# Patient Record
Sex: FEMALE | Race: BLACK OR AFRICAN AMERICAN | Employment: FULL TIME | URBAN - METROPOLITAN AREA
[De-identification: names, ages, dates, MRNs, and addresses within clinical notes are randomized per-mention and may not be internally consistent; named-entity substitution may affect disease eponyms.]

---

## 2020-10-06 ENCOUNTER — TRANSCRIBE ORDER (OUTPATIENT)
Dept: SCHEDULING | Age: 55
End: 2020-10-06

## 2020-10-06 DIAGNOSIS — Z12.31 SCREENING MAMMOGRAM, ENCOUNTER FOR: Primary | ICD-10-CM

## 2020-10-16 ENCOUNTER — HOSPITAL ENCOUNTER (OUTPATIENT)
Dept: MAMMOGRAPHY | Age: 55
Discharge: HOME | End: 2020-10-16
Payer: COMMERCIAL

## 2020-10-16 DIAGNOSIS — Z12.31 SCREENING MAMMOGRAM, ENCOUNTER FOR: ICD-10-CM

## 2020-10-16 PROCEDURE — 77067 SCR MAMMO BI INCL CAD: CPT

## 2020-11-16 ENCOUNTER — TRANSCRIBE ORDER (OUTPATIENT)
Dept: SCHEDULING | Age: 55
End: 2020-11-16

## 2020-11-16 DIAGNOSIS — I50.20 HFREF (HEART FAILURE WITH REDUCED EJECTION FRACTION) (HCC): Primary | ICD-10-CM

## 2020-11-25 ENCOUNTER — HOSPITAL ENCOUNTER (OUTPATIENT)
Dept: VASCULAR SURGERY | Age: 55
Discharge: HOME | End: 2020-11-25
Attending: NURSE PRACTITIONER
Payer: COMMERCIAL

## 2020-11-25 DIAGNOSIS — I50.20 HFREF (HEART FAILURE WITH REDUCED EJECTION FRACTION) (HCC): ICD-10-CM

## 2020-11-25 LAB
ECHO AO ROOT DIAM: 3.15 CM
ECHO AV AREA PEAK VELOCITY: 1.61 CM2
ECHO AV AREA VTI: 1.61 CM2
ECHO AV MEAN GRADIENT: 8.48 MMHG
ECHO AV MEAN VELOCITY: 133.64 CM/S
ECHO AV PEAK GRADIENT: 18.56 MMHG
ECHO AV PEAK VELOCITY: 215.38 CM/S
ECHO AV VTI: 38.54 CM
ECHO EST RA PRESSURE: 3 MMHG
ECHO LA VOL 2C: 29.26 ML (ref 22–52)
ECHO LA VOL 4C: 56.62 ML (ref 22–52)
ECHO LA VOL BP: 45.52 ML (ref 22–52)
ECHO LV EDV A2C: 176.62 ML
ECHO LV EDV A2C: 176.62 ML
ECHO LV EDV BP: 115.66 ML (ref 56–104)
ECHO LV EDV BP: 115.66 ML (ref 56–104)
ECHO LV EJECTION FRACTION A2C: 18 PERCENT
ECHO LV EJECTION FRACTION A4C: 26 PERCENT
ECHO LV EJECTION FRACTION A4C: 26 PERCENT
ECHO LV EJECTION FRACTION BIPLANE: 20 PERCENT (ref 55–100)
ECHO LV ESV A2C: 58 ML
ECHO LV ESV BP: 92.57 ML (ref 19–49)
ECHO LV ESV BP: 92.57 ML (ref 19–49)
ECHO LV INTERNAL DIMENSION DIASTOLIC: 5.95 CM (ref 3.9–5.3)
ECHO LV INTERNAL DIMENSION SYSTOLIC: 5.3 CM
ECHO LV IVSD: 1.12 CM (ref 0.6–0.9)
ECHO LV MASS 2D: 270.8 G (ref 67–162)
ECHO LV POSTERIOR WALL DIASTOLIC: 1.05 CM (ref 0.6–0.9)
ECHO LVOT DIAM: 2 CM
ECHO LVOT PEAK GRADIENT: 4.93 MMHG
ECHO LVOT PEAK VELOCITY: 110.96 CM/S
ECHO LVOT SV: 62.2 ML
ECHO LVOT SV: 62.2 ML
ECHO LVOT VTI: 19.76 CM
ECHO MV A VELOCITY: 130.02 CM/S
ECHO MV AREA PHT: 4.3 CM2
ECHO MV E DECELERATION TIME (DT): 176.52 MS
ECHO MV E VELOCITY: 97.96 CM/S
ECHO MV E/A RATIO: 0.75
ECHO MV PRESSURE HALF TIME (PHT): 51.19 MS
ECHO RA AREA 4C: 12 CM2
ECHO RIGHT VENTRICULAR SYSTOLIC PRESSURE (RVSP): 20 MMHG
ECHO RV INTERNAL DIMENSION: 2.07 CM
ECHO TV REGURGITANT MAX VELOCITY: 207.79 CM/S
ECHO TV REGURGITANT PEAK GRADIENT: 17.27 MMHG
LVOT MG: 2.82 MMHG

## 2020-11-25 PROCEDURE — 93306 TTE W/DOPPLER COMPLETE: CPT

## 2021-01-01 ENCOUNTER — TRANSCRIBE ORDER (OUTPATIENT)
Dept: REGISTRATION | Age: 56
End: 2021-01-01

## 2021-01-01 ENCOUNTER — HOSPITAL ENCOUNTER (INPATIENT)
Age: 56
LOS: 1 days | Discharge: HOME | DRG: 378 | End: 2021-10-08
Attending: EMERGENCY MEDICINE | Admitting: HOSPITALIST
Payer: COMMERCIAL

## 2021-01-01 ENCOUNTER — APPOINTMENT (OUTPATIENT)
Dept: GENERAL RADIOLOGY | Age: 56
End: 2021-01-01
Attending: EMERGENCY MEDICINE
Payer: COMMERCIAL

## 2021-01-01 ENCOUNTER — HOSPITAL ENCOUNTER (OUTPATIENT)
Dept: INFUSION THERAPY | Age: 56
Discharge: HOME | End: 2021-11-03
Payer: COMMERCIAL

## 2021-01-01 ENCOUNTER — HOSPITAL ENCOUNTER (OUTPATIENT)
Dept: MAMMOGRAPHY | Age: 56
Discharge: HOME | End: 2021-10-22
Payer: COMMERCIAL

## 2021-01-01 ENCOUNTER — HOSPITAL ENCOUNTER (OUTPATIENT)
Dept: INFUSION THERAPY | Age: 56
Discharge: HOME | End: 2021-12-23
Attending: INTERNAL MEDICINE | Admitting: INTERNAL MEDICINE
Payer: COMMERCIAL

## 2021-01-01 ENCOUNTER — APPOINTMENT (OUTPATIENT)
Dept: CT IMAGING | Age: 56
DRG: 378 | End: 2021-01-01
Attending: EMERGENCY MEDICINE
Payer: COMMERCIAL

## 2021-01-01 ENCOUNTER — TRANSCRIBE ORDER (OUTPATIENT)
Dept: SCHEDULING | Age: 56
End: 2021-01-01

## 2021-01-01 ENCOUNTER — APPOINTMENT (OUTPATIENT)
Dept: GENERAL RADIOLOGY | Age: 56
DRG: 378 | End: 2021-01-01
Attending: EMERGENCY MEDICINE
Payer: COMMERCIAL

## 2021-01-01 ENCOUNTER — ANESTHESIA (OUTPATIENT)
Dept: ENDOSCOPY | Age: 56
End: 2021-01-01
Payer: COMMERCIAL

## 2021-01-01 ENCOUNTER — TELEPHONE (OUTPATIENT)
Dept: INTERNAL MEDICINE | Age: 56
End: 2021-01-01

## 2021-01-01 ENCOUNTER — APPOINTMENT (OUTPATIENT)
Dept: CT IMAGING | Age: 56
End: 2021-01-01
Attending: EMERGENCY MEDICINE
Payer: COMMERCIAL

## 2021-01-01 ENCOUNTER — OFFICE VISIT (OUTPATIENT)
Dept: GASTROENTEROLOGY | Age: 56
End: 2021-01-01
Payer: COMMERCIAL

## 2021-01-01 ENCOUNTER — HOSPITAL ENCOUNTER (EMERGENCY)
Age: 56
Discharge: HOME | End: 2021-11-04
Attending: EMERGENCY MEDICINE
Payer: COMMERCIAL

## 2021-01-01 ENCOUNTER — HOSPITAL ENCOUNTER (OUTPATIENT)
Dept: PREADMISSION TESTING | Age: 56
Discharge: HOME | End: 2021-11-12
Payer: COMMERCIAL

## 2021-01-01 ENCOUNTER — TELEPHONE (OUTPATIENT)
Dept: PRIMARY CARE CLINIC | Age: 56
End: 2021-01-01

## 2021-01-01 ENCOUNTER — ANESTHESIA EVENT (OUTPATIENT)
Dept: ENDOSCOPY | Age: 56
DRG: 378 | End: 2021-01-01
Payer: COMMERCIAL

## 2021-01-01 ENCOUNTER — HOSPITAL ENCOUNTER (OUTPATIENT)
Dept: CT IMAGING | Age: 56
Discharge: HOME | End: 2021-12-14
Attending: INTERNAL MEDICINE
Payer: COMMERCIAL

## 2021-01-01 ENCOUNTER — HOSPITAL ENCOUNTER (OUTPATIENT)
Age: 56
Setting detail: OUTPATIENT SURGERY
Discharge: HOME | End: 2021-11-17
Attending: INTERNAL MEDICINE | Admitting: INTERNAL MEDICINE
Payer: COMMERCIAL

## 2021-01-01 ENCOUNTER — HOSPITAL ENCOUNTER (OUTPATIENT)
Dept: LAB | Age: 56
Discharge: HOME | End: 2021-11-02
Payer: COMMERCIAL

## 2021-01-01 ENCOUNTER — ANESTHESIA (OUTPATIENT)
Dept: ENDOSCOPY | Age: 56
DRG: 378 | End: 2021-01-01
Payer: COMMERCIAL

## 2021-01-01 ENCOUNTER — ANESTHESIA EVENT (OUTPATIENT)
Dept: ENDOSCOPY | Age: 56
End: 2021-01-01
Payer: COMMERCIAL

## 2021-01-01 ENCOUNTER — HOSPITAL ENCOUNTER (OUTPATIENT)
Dept: GENERAL RADIOLOGY | Age: 56
Discharge: HOME | End: 2021-12-30
Attending: INTERNAL MEDICINE
Payer: COMMERCIAL

## 2021-01-01 ENCOUNTER — APPOINTMENT (OUTPATIENT)
Dept: VASCULAR SURGERY | Age: 56
DRG: 378 | End: 2021-01-01
Attending: HOSPITALIST
Payer: COMMERCIAL

## 2021-01-01 VITALS
HEART RATE: 80 BPM | WEIGHT: 170.64 LBS | TEMPERATURE: 98.3 F | HEIGHT: 60 IN | OXYGEN SATURATION: 99 % | DIASTOLIC BLOOD PRESSURE: 72 MMHG | RESPIRATION RATE: 20 BRPM | SYSTOLIC BLOOD PRESSURE: 116 MMHG | BODY MASS INDEX: 33.5 KG/M2

## 2021-01-01 VITALS
SYSTOLIC BLOOD PRESSURE: 140 MMHG | DIASTOLIC BLOOD PRESSURE: 82 MMHG | BODY MASS INDEX: 33.57 KG/M2 | OXYGEN SATURATION: 100 % | TEMPERATURE: 98.6 F | HEART RATE: 73 BPM | WEIGHT: 171 LBS | HEIGHT: 60 IN | RESPIRATION RATE: 16 BRPM

## 2021-01-01 VITALS
OXYGEN SATURATION: 100 % | HEART RATE: 61 BPM | RESPIRATION RATE: 17 BRPM | TEMPERATURE: 98.7 F | BODY MASS INDEX: 33.38 KG/M2 | HEIGHT: 60 IN | DIASTOLIC BLOOD PRESSURE: 89 MMHG | SYSTOLIC BLOOD PRESSURE: 169 MMHG | WEIGHT: 170 LBS

## 2021-01-01 VITALS
OXYGEN SATURATION: 97 % | RESPIRATION RATE: 18 BRPM | WEIGHT: 171 LBS | TEMPERATURE: 98.2 F | BODY MASS INDEX: 33.57 KG/M2 | HEART RATE: 74 BPM | SYSTOLIC BLOOD PRESSURE: 139 MMHG | DIASTOLIC BLOOD PRESSURE: 77 MMHG | HEIGHT: 60 IN

## 2021-01-01 VITALS
DIASTOLIC BLOOD PRESSURE: 90 MMHG | TEMPERATURE: 98 F | HEIGHT: 60 IN | OXYGEN SATURATION: 97 % | RESPIRATION RATE: 16 BRPM | SYSTOLIC BLOOD PRESSURE: 160 MMHG | WEIGHT: 171.2 LBS | HEART RATE: 65 BPM | BODY MASS INDEX: 33.61 KG/M2

## 2021-01-01 VITALS
SYSTOLIC BLOOD PRESSURE: 108 MMHG | HEIGHT: 60 IN | WEIGHT: 169 LBS | TEMPERATURE: 97.8 F | HEART RATE: 59 BPM | RESPIRATION RATE: 18 BRPM | DIASTOLIC BLOOD PRESSURE: 70 MMHG | OXYGEN SATURATION: 99 % | BODY MASS INDEX: 33.18 KG/M2

## 2021-01-01 VITALS
TEMPERATURE: 98.6 F | DIASTOLIC BLOOD PRESSURE: 86 MMHG | HEART RATE: 70 BPM | RESPIRATION RATE: 20 BRPM | SYSTOLIC BLOOD PRESSURE: 151 MMHG | OXYGEN SATURATION: 97 %

## 2021-01-01 DIAGNOSIS — D64.9 SYMPTOMATIC ANEMIA: ICD-10-CM

## 2021-01-01 DIAGNOSIS — R19.5 HEME POSITIVE STOOL: Primary | ICD-10-CM

## 2021-01-01 DIAGNOSIS — R77.8 OTHER SPECIFIED ABNORMALITIES OF PLASMA PROTEINS: ICD-10-CM

## 2021-01-01 DIAGNOSIS — R77.8 ELEVATED TROPONIN LEVEL: Primary | ICD-10-CM

## 2021-01-01 DIAGNOSIS — R77.8 ELEVATED TROPONIN LEVEL: ICD-10-CM

## 2021-01-01 DIAGNOSIS — D64.9 ANEMIA, UNSPECIFIED TYPE: Primary | ICD-10-CM

## 2021-01-01 DIAGNOSIS — E88.09 HYPERPROTEINEMIA: Primary | ICD-10-CM

## 2021-01-01 DIAGNOSIS — E88.09 HYPERPROTEINEMIA: ICD-10-CM

## 2021-01-01 DIAGNOSIS — D64.9 ANEMIA: ICD-10-CM

## 2021-01-01 DIAGNOSIS — Z12.31 VISIT FOR SCREENING MAMMOGRAM: ICD-10-CM

## 2021-01-01 DIAGNOSIS — Z12.31 VISIT FOR SCREENING MAMMOGRAM: Primary | ICD-10-CM

## 2021-01-01 DIAGNOSIS — S22.32XA CLOSED FRACTURE OF ONE RIB OF LEFT SIDE, INITIAL ENCOUNTER: Primary | ICD-10-CM

## 2021-01-01 DIAGNOSIS — R06.02 SHORTNESS OF BREATH: ICD-10-CM

## 2021-01-01 DIAGNOSIS — D64.9 ANEMIA: Primary | ICD-10-CM

## 2021-01-01 LAB
ABO + RH BLD: NORMAL
ALBUMIN SERPL ELPH-MCNC: 4.3 G/DL (ref 2.9–4.4)
ALBUMIN SERPL-MCNC: 3.2 G/DL (ref 3.5–5)
ALBUMIN/GLOB SERPL: 0.4 {RATIO} (ref 1.1–2.2)
ALBUMIN/GLOB SERPL: 0.6 {RATIO} (ref 0.7–1.7)
ALP SERPL-CCNC: 61 U/L (ref 45–117)
ALPHA1 GLOB SERPL ELPH-MCNC: 0.3 G/DL (ref 0–0.4)
ALPHA2 GLOB SERPL ELPH-MCNC: 0.7 G/DL (ref 0.4–1)
ALT SERPL-CCNC: 13 U/L (ref 12–78)
ANION GAP SERPL CALC-SCNC: 2 MMOL/L (ref 5–15)
ANION GAP SERPL CALC-SCNC: 4 MMOL/L (ref 5–15)
ANION GAP SERPL CALC-SCNC: 6 MMOL/L (ref 5–15)
AST SERPL W P-5'-P-CCNC: 13 U/L (ref 15–37)
ATRIAL RATE: 70 BPM
B-GLOBULIN SERPL ELPH-MCNC: 1.1 G/DL (ref 0.7–1.3)
BASOPHILS # BLD: 0 K/UL (ref 0–0.1)
BASOPHILS # BLD: 0 K/UL (ref 0–0.2)
BASOPHILS # BLD: 0 K/UL (ref 0–0.2)
BASOPHILS NFR BLD: 0 % (ref 0–1)
BASOPHILS NFR BLD: 1 % (ref 0–2.5)
BASOPHILS NFR BLD: 1 % (ref 0–2.5)
BILIRUB SERPL-MCNC: 0.5 MG/DL (ref 0.2–1)
BLD PROD TYP BPU: NORMAL
BLOOD GROUP ANTIBODIES SERPL: NEGATIVE
BNP SERPL-MCNC: 50 PG/ML
BPU ID: NORMAL
BUN SERPL-MCNC: 11 MG/DL (ref 6–20)
BUN SERPL-MCNC: 13 MG/DL (ref 6–20)
BUN SERPL-MCNC: 13 MG/DL (ref 6–20)
BUN/CREAT SERPL: 10 (ref 12–20)
BUN/CREAT SERPL: 11 (ref 12–20)
BUN/CREAT SERPL: 7 (ref 12–20)
CA-I BLD-MCNC: 8.7 MG/DL (ref 8.5–10.1)
CA-I BLD-MCNC: 8.9 MG/DL (ref 8.5–10.1)
CA-I BLD-MCNC: 9 MG/DL (ref 8.5–10.1)
CALCULATED P AXIS, ECG09: -3 DEGREES
CALCULATED R AXIS, ECG10: 129 DEGREES
CALCULATED T AXIS, ECG11: 163 DEGREES
CHLORIDE SERPL-SCNC: 100 MMOL/L (ref 97–108)
CHLORIDE SERPL-SCNC: 103 MMOL/L (ref 97–108)
CHLORIDE SERPL-SCNC: 105 MMOL/L (ref 97–108)
CO2 SERPL-SCNC: 28 MMOL/L (ref 21–32)
CO2 SERPL-SCNC: 28 MMOL/L (ref 21–32)
CO2 SERPL-SCNC: 29 MMOL/L (ref 21–32)
COVID-19 RAPID TEST, COVR: NOT DETECTED
CREAT SERPL-MCNC: 1 MG/DL (ref 0.55–1.02)
CREAT SERPL-MCNC: 1.35 MG/DL (ref 0.55–1.02)
CREAT SERPL-MCNC: 1.85 MG/DL (ref 0.55–1.02)
CROSSMATCH RESULT,%XM: NORMAL
D DIMER PPP FEU-MCNC: 0.76 MG/L FEU (ref 0.19–0.5)
DIAGNOSIS, 93000: NORMAL
DIFFERENTIAL METHOD BLD: ABNORMAL
ECHO AO ROOT DIAM: 2.89 CM
ECHO AV MEAN GRADIENT: 9.06 MMHG
ECHO AV MEAN VELOCITY: 141.92 CM/S
ECHO AV PEAK GRADIENT: 19.44 MMHG
ECHO AV PEAK VELOCITY: 220.48 CM/S
ECHO AV VTI: 44.07 CM
ECHO LA VOL 2C: 33.52 ML (ref 22–52)
ECHO LA VOL 4C: 30.66 ML (ref 22–52)
ECHO LA VOL BP: 35.82 ML (ref 22–52)
ECHO LA VOL BP: 35.82 ML (ref 22–52)
ECHO LA VOLUME INDEX A2C: 19.15 ML/M2 (ref 16–28)
ECHO LA VOLUME INDEX A4C: 17.52 ML/M2 (ref 16–28)
ECHO LV EDV A2C: 122.41 ML
ECHO LV EDV A2C: 122.41 ML
ECHO LV EDV A2C: 99.61 ML
ECHO LV EDV A2C: 99.61 ML
ECHO LV EDV BP: 71.14 ML (ref 56–104)
ECHO LV EDV BP: 71.14 ML (ref 56–104)
ECHO LV EJECTION FRACTION A2C: 68 PERCENT
ECHO LV EJECTION FRACTION A2C: 68 PERCENT
ECHO LV EJECTION FRACTION A4C: 62 PERCENT
ECHO LV EJECTION FRACTION A4C: 62 PERCENT
ECHO LV EJECTION FRACTION BIPLANE: 64 PERCENT (ref 55–100)
ECHO LV EJECTION FRACTION BIPLANE: 64 PERCENT (ref 55–100)
ECHO LV ESV A2C: 20.9 ML
ECHO LV ESV BP: 25.61 ML (ref 19–49)
ECHO LV ESV BP: 25.61 ML (ref 19–49)
ECHO LV ESV INDEX A2C: 11.9 ML/M2
ECHO LV INTERNAL DIMENSION DIASTOLIC: 4.65 CM (ref 3.9–5.3)
ECHO LV INTERNAL DIMENSION DIASTOLIC: 5.08 CM (ref 3.9–5.3)
ECHO LV INTERNAL DIMENSION SYSTOLIC: 3.55 CM
ECHO LV IVSD: 0.99 CM (ref 0.6–0.9)
ECHO LV POSTERIOR WALL DIASTOLIC: 0.9 CM (ref 0.6–0.9)
ECHO LVOT PEAK GRADIENT: 6.93 MMHG
ECHO LVOT PEAK VELOCITY: 131.59 CM/S
ECHO LVOT SV: 42.2 ML
ECHO LVOT SV: 55.7 ML
ECHO LVOT VTI: 27.81 CM
ECHO MV A VELOCITY: 113 CM/S
ECHO MV AREA PHT: 3.16 CM2
ECHO MV AREA PHT: 3.16 CM2
ECHO MV E DECELERATION TIME (DT): 240.14 MS
ECHO MV E VELOCITY: 99.91 CM/S
ECHO MV E/A RATIO: 0.88
ECHO MV PRESSURE HALF TIME (PHT): 69.64 MS
ECHO RV INTERNAL DIMENSION: 2.66 CM
ECHO TV REGURGITANT MAX VELOCITY: 264.38 CM/S
ECHO TV REGURGITANT MAX VELOCITY: 264.38 CM/S
ECHO TV REGURGITANT PEAK GRADIENT: 27.96 MMHG
ECHO TV REGURGITANT PEAK GRADIENT: 27.96 MMHG
EOSINOPHIL # BLD: 0 K/UL (ref 0–0.4)
EOSINOPHIL # BLD: 0 K/UL (ref 0–0.7)
EOSINOPHIL # BLD: 0 K/UL (ref 0–0.7)
EOSINOPHIL NFR BLD: 0 % (ref 0.9–2.9)
EOSINOPHIL NFR BLD: 0 % (ref 0.9–2.9)
EOSINOPHIL NFR BLD: 0 % (ref 0–7)
ERYTHROCYTE [DISTWIDTH] IN BLOOD BY AUTOMATED COUNT: 18.9 % (ref 11.5–14.5)
ERYTHROCYTE [DISTWIDTH] IN BLOOD BY AUTOMATED COUNT: 21.2 % (ref 11.5–14.5)
ERYTHROCYTE [DISTWIDTH] IN BLOOD BY AUTOMATED COUNT: 22.6 % (ref 11.5–14.5)
EST. AVERAGE GLUCOSE BLD GHB EST-MCNC: 169 MG/DL
FOLATE SERPL-MCNC: 17 NG/ML (ref 5–21)
GAMMA GLOB SERPL ELPH-MCNC: 5.2 G/DL (ref 0.4–1.8)
GLOBULIN SER CALC-MCNC: 7.2 G/DL (ref 2.2–3.9)
GLOBULIN SER CALC-MCNC: 8 G/DL (ref 2–4)
GLUCOSE BLD STRIP.AUTO-MCNC: 101 MG/DL (ref 65–117)
GLUCOSE BLD STRIP.AUTO-MCNC: 104 MG/DL (ref 65–117)
GLUCOSE BLD STRIP.AUTO-MCNC: 113 MG/DL (ref 65–117)
GLUCOSE BLD STRIP.AUTO-MCNC: 118 MG/DL (ref 65–117)
GLUCOSE BLD STRIP.AUTO-MCNC: 119 MG/DL (ref 65–117)
GLUCOSE BLD STRIP.AUTO-MCNC: 131 MG/DL (ref 65–117)
GLUCOSE BLD STRIP.AUTO-MCNC: 185 MG/DL (ref 65–117)
GLUCOSE BLD STRIP.AUTO-MCNC: 92 MG/DL (ref 65–117)
GLUCOSE BLD STRIP.AUTO-MCNC: 98 MG/DL (ref 65–117)
GLUCOSE SERPL-MCNC: 110 MG/DL (ref 65–100)
GLUCOSE SERPL-MCNC: 123 MG/DL (ref 65–100)
GLUCOSE SERPL-MCNC: 190 MG/DL (ref 65–100)
HBA1C MFR BLD: 7.5 % (ref 4–5.6)
HCT VFR BLD AUTO: 23.1 % (ref 36–46)
HCT VFR BLD AUTO: 24.2 % (ref 35–47)
HCT VFR BLD AUTO: 24.8 % (ref 36–46)
HCT VFR BLD AUTO: 25.5 % (ref 36–46)
HCT VFR BLD AUTO: 25.6 % (ref 36–46)
HGB BLD-MCNC: 7.6 G/DL (ref 11.5–16)
HGB BLD-MCNC: 7.6 G/DL (ref 13.5–17.5)
HGB BLD-MCNC: 8.3 G/DL (ref 13.5–17.5)
HGB BLD-MCNC: 8.4 G/DL (ref 13.5–17.5)
HGB BLD-MCNC: 8.4 G/DL (ref 13.5–17.5)
IMM GRANULOCYTES # BLD AUTO: 0 K/UL
IMM GRANULOCYTES NFR BLD AUTO: 0 %
INR PPP: 1.1 (ref 0.9–1.1)
INR PPP: 1.2 (ref 0.9–1.1)
IRON SATN MFR SERPL: 31 % (ref 20–50)
IRON SERPL-MCNC: 93 UG/DL (ref 35–150)
LVOT MG: 3.67 MMHG
LYMPHOCYTES # BLD: 1.7 K/UL (ref 1–4.8)
LYMPHOCYTES # BLD: 1.8 K/UL (ref 1–4.8)
LYMPHOCYTES # BLD: 2.4 K/UL (ref 0.8–3.5)
LYMPHOCYTES NFR BLD: 38 % (ref 20.5–51.1)
LYMPHOCYTES NFR BLD: 45 % (ref 20.5–51.1)
LYMPHOCYTES NFR BLD: 57 % (ref 12–49)
M PROTEIN SERPL ELPH-MCNC: 5 G/DL
MAGNESIUM SERPL-MCNC: 1.7 MG/DL (ref 1.6–2.4)
MAGNESIUM SERPL-MCNC: 2 MG/DL (ref 1.6–2.4)
MCH RBC QN AUTO: 31.3 PG (ref 26–34)
MCH RBC QN AUTO: 32.7 PG (ref 31–34)
MCH RBC QN AUTO: 33.5 PG (ref 31–34)
MCHC RBC AUTO-ENTMCNC: 31.4 G/DL (ref 30–36.5)
MCHC RBC AUTO-ENTMCNC: 32.7 G/DL (ref 31–36)
MCHC RBC AUTO-ENTMCNC: 33.4 G/DL (ref 31–36)
MCV RBC AUTO: 102.4 FL (ref 80–100)
MCV RBC AUTO: 97.9 FL (ref 80–100)
MCV RBC AUTO: 99.6 FL (ref 80–99)
MONOCYTES # BLD: 0.4 K/UL (ref 0–1)
MONOCYTES # BLD: 0.5 K/UL (ref 0.2–2.4)
MONOCYTES # BLD: 0.6 K/UL (ref 0.2–2.4)
MONOCYTES NFR BLD: 11 % (ref 1.7–9.3)
MONOCYTES NFR BLD: 15 % (ref 1.7–9.3)
MONOCYTES NFR BLD: 9 % (ref 5–13)
NEUTS SEG # BLD: 1.4 K/UL (ref 1.8–8)
NEUTS SEG # BLD: 1.5 K/UL (ref 1.8–7.7)
NEUTS SEG # BLD: 2.3 K/UL (ref 1.8–7.7)
NEUTS SEG NFR BLD: 34 % (ref 32–75)
NEUTS SEG NFR BLD: 39 % (ref 42–75)
NEUTS SEG NFR BLD: 50 % (ref 42–75)
NRBC # BLD: 0 K/UL (ref 0–0.01)
NRBC # BLD: 0.01 K/UL
NRBC # BLD: 0.01 K/UL
NRBC BLD-RTO: 0 PER 100 WBC
NRBC BLD-RTO: 0.3 PER 100 WBC
NRBC BLD-RTO: 0.3 PER 100 WBC
P-R INTERVAL, ECG05: 178 MS
PERFORMED BY, TECHID: ABNORMAL
PERFORMED BY, TECHID: NORMAL
PLATELET # BLD AUTO: 110 K/UL (ref 150–400)
PLATELET # BLD AUTO: 120 K/UL (ref 150–400)
PLATELET # BLD AUTO: 86 K/UL (ref 150–400)
PLEASE NOTE, 011150: ABNORMAL
PMV BLD AUTO: 10.5 FL (ref 8.9–12.9)
PMV BLD AUTO: 8.2 FL (ref 6.5–11.5)
PMV BLD AUTO: 8.3 FL (ref 6.5–11.5)
POTASSIUM SERPL-SCNC: 4 MMOL/L (ref 3.5–5.1)
POTASSIUM SERPL-SCNC: 4.2 MMOL/L (ref 3.5–5.1)
POTASSIUM SERPL-SCNC: 4.2 MMOL/L (ref 3.5–5.1)
PROT SERPL-MCNC: 11.2 G/DL (ref 6.4–8.2)
PROT SERPL-MCNC: 11.5 G/DL (ref 6–8.5)
PROTHROMBIN TIME: 11.1 SEC (ref 9–11.1)
PROTHROMBIN TIME: 15 SEC (ref 11.9–14.7)
Q-T INTERVAL, ECG07: 430 MS
QRS DURATION, ECG06: 130 MS
QTC CALCULATION (BEZET), ECG08: 464 MS
RBC # BLD AUTO: 2.25 M/UL (ref 4.5–5.9)
RBC # BLD AUTO: 2.43 M/UL (ref 3.8–5.2)
RBC # BLD AUTO: 2.54 M/UL (ref 4.5–5.9)
RBC MORPH BLD: ABNORMAL
SARS-COV-2, COV2: NORMAL
SARS-COV-2, XPLCVT: NOT DETECTED
SODIUM SERPL-SCNC: 134 MMOL/L (ref 136–145)
SODIUM SERPL-SCNC: 135 MMOL/L (ref 136–145)
SODIUM SERPL-SCNC: 136 MMOL/L (ref 136–145)
SOURCE, COVRS: NORMAL
SPECIMEN EXP DATE BLD: NORMAL
SPECIMEN SOURCE: NORMAL
STATUS OF UNIT,%ST: NORMAL
TIBC SERPL-MCNC: 304 UG/DL (ref 250–450)
TRANSFUSION STATUS PATIENT QL: NORMAL
TROPONIN-HIGH SENSITIVITY: 4 NG/L (ref 0–51)
UNIT DIVISION, %UDIV: 0
VENTRICULAR RATE, ECG03: 70 BPM
VIT B12 SERPL-MCNC: 151 PG/ML (ref 193–986)
WBC # BLD AUTO: 4 K/UL (ref 4.4–11.3)
WBC # BLD AUTO: 4.2 K/UL (ref 3.6–11)
WBC # BLD AUTO: 4.5 K/UL (ref 4.4–11.3)

## 2021-01-01 PROCEDURE — 87635 SARS-COV-2 COVID-19 AMP PRB: CPT

## 2021-01-01 PROCEDURE — 84484 ASSAY OF TROPONIN QUANT: CPT

## 2021-01-01 PROCEDURE — 76040000019: Performed by: INTERNAL MEDICINE

## 2021-01-01 PROCEDURE — 85610 PROTHROMBIN TIME: CPT

## 2021-01-01 PROCEDURE — 36430 TRANSFUSION BLD/BLD COMPNT: CPT

## 2021-01-01 PROCEDURE — C9113 INJ PANTOPRAZOLE SODIUM, VIA: HCPCS | Performed by: HOSPITALIST

## 2021-01-01 PROCEDURE — 88342 IMHCHEM/IMCYTCHM 1ST ANTB: CPT

## 2021-01-01 PROCEDURE — 74011000250 HC RX REV CODE- 250: Performed by: HOSPITALIST

## 2021-01-01 PROCEDURE — 76060000031 HC ANESTHESIA FIRST 0.5 HR: Performed by: INTERNAL MEDICINE

## 2021-01-01 PROCEDURE — 65270000029 HC RM PRIVATE

## 2021-01-01 PROCEDURE — 36415 COLL VENOUS BLD VENIPUNCTURE: CPT

## 2021-01-01 PROCEDURE — 83735 ASSAY OF MAGNESIUM: CPT

## 2021-01-01 PROCEDURE — 85025 COMPLETE CBC W/AUTO DIFF WBC: CPT

## 2021-01-01 PROCEDURE — 43239 EGD BIOPSY SINGLE/MULTIPLE: CPT | Performed by: INTERNAL MEDICINE

## 2021-01-01 PROCEDURE — 80048 BASIC METABOLIC PNL TOTAL CA: CPT

## 2021-01-01 PROCEDURE — 88305 TISSUE EXAM BY PATHOLOGIST: CPT

## 2021-01-01 PROCEDURE — 88311 DECALCIFY TISSUE: CPT

## 2021-01-01 PROCEDURE — 99284 EMERGENCY DEPT VISIT MOD MDM: CPT

## 2021-01-01 PROCEDURE — 85018 HEMOGLOBIN: CPT

## 2021-01-01 PROCEDURE — 74176 CT ABD & PELVIS W/O CONTRAST: CPT

## 2021-01-01 PROCEDURE — 88264 CHROMOSOME ANALYSIS 20-25: CPT

## 2021-01-01 PROCEDURE — 74011250636 HC RX REV CODE- 250/636

## 2021-01-01 PROCEDURE — 71045 X-RAY EXAM CHEST 1 VIEW: CPT

## 2021-01-01 PROCEDURE — 77067 SCR MAMMO BI INCL CAD: CPT

## 2021-01-01 PROCEDURE — 82607 VITAMIN B-12: CPT

## 2021-01-01 PROCEDURE — 86923 COMPATIBILITY TEST ELECTRIC: CPT

## 2021-01-01 PROCEDURE — 82962 GLUCOSE BLOOD TEST: CPT

## 2021-01-01 PROCEDURE — 74011250636 HC RX REV CODE- 250/636: Performed by: NURSE ANESTHETIST, CERTIFIED REGISTERED

## 2021-01-01 PROCEDURE — 80053 COMPREHEN METABOLIC PANEL: CPT

## 2021-01-01 PROCEDURE — 74011000258 HC RX REV CODE- 258: Performed by: NURSE ANESTHETIST, CERTIFIED REGISTERED

## 2021-01-01 PROCEDURE — 74011250636 HC RX REV CODE- 250/636: Performed by: RADIOLOGY

## 2021-01-01 PROCEDURE — 88237 TISSUE CULTURE BONE MARROW: CPT

## 2021-01-01 PROCEDURE — 99153 MOD SED SAME PHYS/QHP EA: CPT

## 2021-01-01 PROCEDURE — 74011250636 HC RX REV CODE- 250/636: Performed by: INTERNAL MEDICINE

## 2021-01-01 PROCEDURE — 77030021593 HC FCPS BIOP ENDOSC BSC -A: Performed by: INTERNAL MEDICINE

## 2021-01-01 PROCEDURE — 88280 CHROMOSOME KARYOTYPE STUDY: CPT

## 2021-01-01 PROCEDURE — 83036 HEMOGLOBIN GLYCOSYLATED A1C: CPT

## 2021-01-01 PROCEDURE — 93005 ELECTROCARDIOGRAM TRACING: CPT

## 2021-01-01 PROCEDURE — 77030019988 HC FCPS ENDOSC DISP BSC -B: Performed by: INTERNAL MEDICINE

## 2021-01-01 PROCEDURE — 88374 M/PHMTRC ALYS ISHQUANT/SEMIQ: CPT

## 2021-01-01 PROCEDURE — 0DB78ZX EXCISION OF STOMACH, PYLORUS, VIA NATURAL OR ARTIFICIAL OPENING ENDOSCOPIC, DIAGNOSTIC: ICD-10-PCS | Performed by: INTERNAL MEDICINE

## 2021-01-01 PROCEDURE — 86901 BLOOD TYPING SEROLOGIC RH(D): CPT

## 2021-01-01 PROCEDURE — 88184 FLOWCYTOMETRY/ TC 1 MARKER: CPT

## 2021-01-01 PROCEDURE — 77075 RADEX OSSEOUS SURVEY COMPL: CPT

## 2021-01-01 PROCEDURE — P9016 RBC LEUKOCYTES REDUCED: HCPCS

## 2021-01-01 PROCEDURE — 83880 ASSAY OF NATRIURETIC PEPTIDE: CPT

## 2021-01-01 PROCEDURE — 99214 OFFICE O/P EST MOD 30 MIN: CPT | Performed by: INTERNAL MEDICINE

## 2021-01-01 PROCEDURE — 77030028872 CT BX BONE MARROW AND ASPIRATION

## 2021-01-01 PROCEDURE — 99152 MOD SED SAME PHYS/QHP 5/>YRS: CPT

## 2021-01-01 PROCEDURE — 93306 TTE W/DOPPLER COMPLETE: CPT

## 2021-01-01 PROCEDURE — 74011000636 HC RX REV CODE- 636: Performed by: EMERGENCY MEDICINE

## 2021-01-01 PROCEDURE — 30233N1 TRANSFUSION OF NONAUTOLOGOUS RED BLOOD CELLS INTO PERIPHERAL VEIN, PERCUTANEOUS APPROACH: ICD-10-PCS | Performed by: HOSPITALIST

## 2021-01-01 PROCEDURE — 74011250636 HC RX REV CODE- 250/636: Performed by: HOSPITALIST

## 2021-01-01 PROCEDURE — 88185 FLOWCYTOMETRY/TC ADD-ON: CPT

## 2021-01-01 PROCEDURE — 74011000250 HC RX REV CODE- 250: Performed by: NURSE ANESTHETIST, CERTIFIED REGISTERED

## 2021-01-01 PROCEDURE — 74011250636 HC RX REV CODE- 250/636: Performed by: FAMILY MEDICINE

## 2021-01-01 PROCEDURE — 74011250637 HC RX REV CODE- 250/637: Performed by: EMERGENCY MEDICINE

## 2021-01-01 PROCEDURE — 71275 CT ANGIOGRAPHY CHEST: CPT

## 2021-01-01 PROCEDURE — 83540 ASSAY OF IRON: CPT

## 2021-01-01 PROCEDURE — 85379 FIBRIN DEGRADATION QUANT: CPT

## 2021-01-01 PROCEDURE — 74011000250 HC RX REV CODE- 250

## 2021-01-01 PROCEDURE — 99223 1ST HOSP IP/OBS HIGH 75: CPT | Performed by: INTERNAL MEDICINE

## 2021-01-01 PROCEDURE — 74011250637 HC RX REV CODE- 250/637: Performed by: HOSPITALIST

## 2021-01-01 PROCEDURE — 2709999900 HC NON-CHARGEABLE SUPPLY: Performed by: INTERNAL MEDICINE

## 2021-01-01 PROCEDURE — 45380 COLONOSCOPY AND BIOPSY: CPT | Performed by: INTERNAL MEDICINE

## 2021-01-01 PROCEDURE — U0005 INFEC AGEN DETEC AMPLI PROBE: HCPCS

## 2021-01-01 PROCEDURE — 88313 SPECIAL STAINS GROUP 2: CPT

## 2021-01-01 PROCEDURE — 77063 BREAST TOMOSYNTHESIS BI: CPT

## 2021-01-01 PROCEDURE — 71101 X-RAY EXAM UNILAT RIBS/CHEST: CPT

## 2021-01-01 RX ORDER — SODIUM CHLORIDE 0.9 % (FLUSH) 0.9 %
5-40 SYRINGE (ML) INJECTION AS NEEDED
Status: DISCONTINUED | OUTPATIENT
Start: 2021-01-01 | End: 2021-01-01 | Stop reason: HOSPADM

## 2021-01-01 RX ORDER — SPIRONOLACTONE 25 MG/1
25 TABLET ORAL DAILY
COMMUNITY
End: 2021-01-01

## 2021-01-01 RX ORDER — OXYCODONE AND ACETAMINOPHEN 5; 325 MG/1; MG/1
2 TABLET ORAL
Status: COMPLETED | OUTPATIENT
Start: 2021-01-01 | End: 2021-01-01

## 2021-01-01 RX ORDER — PANTOPRAZOLE SODIUM 20 MG/1
40 TABLET, DELAYED RELEASE ORAL DAILY
Status: ON HOLD | COMMUNITY
End: 2021-01-01 | Stop reason: SDUPTHER

## 2021-01-01 RX ORDER — ACETAMINOPHEN 325 MG/1
650 TABLET ORAL
Status: DISCONTINUED | OUTPATIENT
Start: 2021-01-01 | End: 2021-01-01 | Stop reason: HOSPADM

## 2021-01-01 RX ORDER — SODIUM CHLORIDE 9 MG/ML
250 INJECTION, SOLUTION INTRAVENOUS AS NEEDED
Status: DISCONTINUED | OUTPATIENT
Start: 2021-01-01 | End: 2021-01-01 | Stop reason: HOSPADM

## 2021-01-01 RX ORDER — ONDANSETRON 2 MG/ML
4 INJECTION INTRAMUSCULAR; INTRAVENOUS
Status: DISCONTINUED | OUTPATIENT
Start: 2021-01-01 | End: 2021-01-01 | Stop reason: HOSPADM

## 2021-01-01 RX ORDER — MAGNESIUM SULFATE 100 %
4 CRYSTALS MISCELLANEOUS AS NEEDED
Status: DISCONTINUED | OUTPATIENT
Start: 2021-01-01 | End: 2021-01-01 | Stop reason: HOSPADM

## 2021-01-01 RX ORDER — LANOLIN ALCOHOL/MO/W.PET/CERES
400 CREAM (GRAM) TOPICAL 4 TIMES DAILY
COMMUNITY

## 2021-01-01 RX ORDER — METFORMIN HYDROCHLORIDE 500 MG/1
TABLET ORAL
COMMUNITY
End: 2022-01-01

## 2021-01-01 RX ORDER — DEXTROSE 50 % IN WATER (D50W) INTRAVENOUS SYRINGE
25-50 AS NEEDED
Status: DISCONTINUED | OUTPATIENT
Start: 2021-01-01 | End: 2021-01-01 | Stop reason: HOSPADM

## 2021-01-01 RX ORDER — SODIUM CHLORIDE 0.9 % (FLUSH) 0.9 %
5-40 SYRINGE (ML) INJECTION EVERY 8 HOURS
Status: DISCONTINUED | OUTPATIENT
Start: 2021-01-01 | End: 2021-01-01 | Stop reason: HOSPADM

## 2021-01-01 RX ORDER — INSULIN LISPRO 100 [IU]/ML
INJECTION, SOLUTION INTRAVENOUS; SUBCUTANEOUS
Status: DISCONTINUED | OUTPATIENT
Start: 2021-01-01 | End: 2021-01-01 | Stop reason: HOSPADM

## 2021-01-01 RX ORDER — LANOLIN ALCOHOL/MO/W.PET/CERES
400 CREAM (GRAM) TOPICAL 4 TIMES DAILY
Status: DISCONTINUED | OUTPATIENT
Start: 2021-01-01 | End: 2021-01-01 | Stop reason: HOSPADM

## 2021-01-01 RX ORDER — CALCIUM CARB/VITAMIN D3/VIT K1 500-500-40
TABLET,CHEWABLE ORAL DAILY
COMMUNITY
End: 2022-01-01

## 2021-01-01 RX ORDER — FENTANYL CITRATE 50 UG/ML
12.5-5 INJECTION, SOLUTION INTRAMUSCULAR; INTRAVENOUS
Status: DISCONTINUED | OUTPATIENT
Start: 2021-01-01 | End: 2021-01-01 | Stop reason: HOSPADM

## 2021-01-01 RX ORDER — SODIUM, POTASSIUM,MAG SULFATES 17.5-3.13G
SOLUTION, RECONSTITUTED, ORAL ORAL
Qty: 1 KIT | Refills: 0 | Status: SHIPPED | OUTPATIENT
Start: 2021-01-01 | End: 2021-01-01

## 2021-01-01 RX ORDER — PANTOPRAZOLE SODIUM 40 MG/1
40 TABLET, DELAYED RELEASE ORAL
Status: DISCONTINUED | OUTPATIENT
Start: 2021-01-01 | End: 2021-01-01 | Stop reason: HOSPADM

## 2021-01-01 RX ORDER — MIDAZOLAM HYDROCHLORIDE 1 MG/ML
.5-1 INJECTION, SOLUTION INTRAMUSCULAR; INTRAVENOUS
Status: DISCONTINUED | OUTPATIENT
Start: 2021-01-01 | End: 2021-01-01 | Stop reason: HOSPADM

## 2021-01-01 RX ORDER — POLYETHYLENE GLYCOL 3350 17 G/17G
17 POWDER, FOR SOLUTION ORAL DAILY PRN
Status: DISCONTINUED | OUTPATIENT
Start: 2021-01-01 | End: 2021-01-01 | Stop reason: HOSPADM

## 2021-01-01 RX ORDER — FAMOTIDINE 40 MG/1
40 TABLET, FILM COATED ORAL 2 TIMES DAILY
COMMUNITY
End: 2021-01-01

## 2021-01-01 RX ORDER — FUROSEMIDE 20 MG/1
20 TABLET ORAL
Qty: 30 TABLET | Refills: 0 | Status: ON HOLD | OUTPATIENT
Start: 2021-01-01 | End: 2022-01-01

## 2021-01-01 RX ORDER — SODIUM CHLORIDE 9 MG/ML
INJECTION, SOLUTION INTRAVENOUS
Status: DISCONTINUED | OUTPATIENT
Start: 2021-01-01 | End: 2021-01-01 | Stop reason: HOSPADM

## 2021-01-01 RX ORDER — GLYCOPYRROLATE 0.2 MG/ML
INJECTION INTRAMUSCULAR; INTRAVENOUS AS NEEDED
Status: DISCONTINUED | OUTPATIENT
Start: 2021-01-01 | End: 2021-01-01 | Stop reason: HOSPADM

## 2021-01-01 RX ORDER — OXYCODONE AND ACETAMINOPHEN 5; 325 MG/1; MG/1
1 TABLET ORAL
Qty: 12 TABLET | Refills: 0 | Status: SHIPPED | OUTPATIENT
Start: 2021-01-01 | End: 2021-01-01

## 2021-01-01 RX ORDER — SODIUM CHLORIDE 9 MG/ML
125 INJECTION, SOLUTION INTRAVENOUS CONTINUOUS
Status: DISCONTINUED | OUTPATIENT
Start: 2021-01-01 | End: 2021-01-01 | Stop reason: HOSPADM

## 2021-01-01 RX ORDER — ACETAMINOPHEN 650 MG/1
650 SUPPOSITORY RECTAL
Status: DISCONTINUED | OUTPATIENT
Start: 2021-01-01 | End: 2021-01-01 | Stop reason: HOSPADM

## 2021-01-01 RX ORDER — PANTOPRAZOLE SODIUM 40 MG/1
40 TABLET, DELAYED RELEASE ORAL DAILY
Qty: 30 TABLET | Refills: 0 | Status: ON HOLD | OUTPATIENT
Start: 2021-01-01 | End: 2022-01-01 | Stop reason: SDUPTHER

## 2021-01-01 RX ORDER — CARVEDILOL 12.5 MG/1
25 TABLET ORAL 2 TIMES DAILY
Status: DISCONTINUED | OUTPATIENT
Start: 2021-01-01 | End: 2021-01-01 | Stop reason: HOSPADM

## 2021-01-01 RX ORDER — AMLODIPINE BESYLATE 5 MG/1
5 TABLET ORAL DAILY
COMMUNITY
End: 2021-01-01

## 2021-01-01 RX ORDER — PANTOPRAZOLE SODIUM 20 MG/1
40 TABLET, DELAYED RELEASE ORAL DAILY
Qty: 30 TABLET | Refills: 1 | Status: SHIPPED | OUTPATIENT
Start: 2021-01-01 | End: 2021-01-01 | Stop reason: SDUPTHER

## 2021-01-01 RX ORDER — ATORVASTATIN CALCIUM 20 MG/1
20 TABLET, FILM COATED ORAL DAILY
COMMUNITY

## 2021-01-01 RX ORDER — SODIUM CHLORIDE 9 MG/ML
25 INJECTION, SOLUTION INTRAVENOUS CONTINUOUS
Status: DISCONTINUED | OUTPATIENT
Start: 2021-01-01 | End: 2021-01-01 | Stop reason: HOSPADM

## 2021-01-01 RX ORDER — CARVEDILOL 25 MG/1
25 TABLET ORAL 2 TIMES DAILY
COMMUNITY

## 2021-01-01 RX ORDER — LOSARTAN POTASSIUM 25 MG/1
25 TABLET ORAL DAILY
COMMUNITY
End: 2022-01-01

## 2021-01-01 RX ORDER — PROPOFOL 10 MG/ML
INJECTION, EMULSION INTRAVENOUS AS NEEDED
Status: DISCONTINUED | OUTPATIENT
Start: 2021-01-01 | End: 2021-01-01 | Stop reason: HOSPADM

## 2021-01-01 RX ORDER — MAGNESIUM SULFATE HEPTAHYDRATE 40 MG/ML
2 INJECTION, SOLUTION INTRAVENOUS ONCE
Status: COMPLETED | OUTPATIENT
Start: 2021-01-01 | End: 2021-01-01

## 2021-01-01 RX ORDER — SODIUM CHLORIDE 9 MG/ML
50 INJECTION, SOLUTION INTRAVENOUS CONTINUOUS
Status: DISCONTINUED | OUTPATIENT
Start: 2021-01-01 | End: 2021-01-01 | Stop reason: HOSPADM

## 2021-01-01 RX ADMIN — Medication 10 ML: at 05:49

## 2021-01-01 RX ADMIN — PROPOFOL 50 MG: 10 INJECTION, EMULSION INTRAVENOUS at 10:19

## 2021-01-01 RX ADMIN — MAGNESIUM OXIDE 400 MG: 400 TABLET ORAL at 18:39

## 2021-01-01 RX ADMIN — Medication 10 ML: at 14:00

## 2021-01-01 RX ADMIN — SODIUM CHLORIDE: 9 INJECTION INTRAVENOUS at 10:11

## 2021-01-01 RX ADMIN — GLYCOPYRROLATE 0.2 MG: 0.2 INJECTION, SOLUTION INTRAMUSCULAR; INTRAVENOUS at 14:17

## 2021-01-01 RX ADMIN — Medication 10 ML: at 21:43

## 2021-01-01 RX ADMIN — PROPOFOL 50 MG: 10 INJECTION, EMULSION INTRAVENOUS at 10:24

## 2021-01-01 RX ADMIN — MAGNESIUM OXIDE 400 MG: 400 TABLET ORAL at 21:11

## 2021-01-01 RX ADMIN — Medication 10 ML: at 14:51

## 2021-01-01 RX ADMIN — FENTANYL CITRATE 50 MCG: 50 INJECTION, SOLUTION INTRAMUSCULAR; INTRAVENOUS at 08:45

## 2021-01-01 RX ADMIN — SODIUM CHLORIDE 40 MG: 9 INJECTION INTRAMUSCULAR; INTRAVENOUS; SUBCUTANEOUS at 21:11

## 2021-01-01 RX ADMIN — SODIUM CHLORIDE 40 MG: 9 INJECTION INTRAMUSCULAR; INTRAVENOUS; SUBCUTANEOUS at 09:00

## 2021-01-01 RX ADMIN — SODIUM CHLORIDE 25 ML/HR: 9 INJECTION, SOLUTION INTRAVENOUS at 09:00

## 2021-01-01 RX ADMIN — OXYCODONE AND ACETAMINOPHEN 2 TABLET: 5; 325 TABLET ORAL at 11:08

## 2021-01-01 RX ADMIN — PROPOFOL 50 MG: 10 INJECTION, EMULSION INTRAVENOUS at 14:17

## 2021-01-01 RX ADMIN — IOPAMIDOL 100 ML: 755 INJECTION, SOLUTION INTRAVENOUS at 11:00

## 2021-01-01 RX ADMIN — SODIUM CHLORIDE 50 ML/HR: 9 INJECTION, SOLUTION INTRAVENOUS at 08:00

## 2021-01-01 RX ADMIN — PROPOFOL 50 MG: 10 INJECTION, EMULSION INTRAVENOUS at 14:14

## 2021-01-01 RX ADMIN — MAGNESIUM SULFATE HEPTAHYDRATE 2 G: 40 INJECTION, SOLUTION INTRAVENOUS at 13:32

## 2021-01-01 RX ADMIN — PROPOFOL 50 MG: 10 INJECTION, EMULSION INTRAVENOUS at 10:29

## 2021-01-01 RX ADMIN — MIDAZOLAM 1 MG: 1 INJECTION INTRAMUSCULAR; INTRAVENOUS at 08:52

## 2021-01-01 RX ADMIN — SODIUM CHLORIDE 500 ML: 9 INJECTION, SOLUTION INTRAVENOUS at 08:55

## 2021-01-01 RX ADMIN — SODIUM CHLORIDE 40 MG: 9 INJECTION INTRAMUSCULAR; INTRAVENOUS; SUBCUTANEOUS at 13:41

## 2021-01-01 RX ADMIN — MAGNESIUM OXIDE 400 MG: 400 TABLET ORAL at 14:57

## 2021-01-01 RX ADMIN — SODIUM CHLORIDE: 9 INJECTION INTRAVENOUS at 14:13

## 2021-01-01 RX ADMIN — MAGNESIUM OXIDE 400 MG: 400 TABLET ORAL at 17:05

## 2021-01-01 RX ADMIN — FENTANYL CITRATE 50 MCG: 50 INJECTION, SOLUTION INTRAMUSCULAR; INTRAVENOUS at 08:52

## 2021-01-01 RX ADMIN — CARVEDILOL 25 MG: 12.5 TABLET, FILM COATED ORAL at 21:11

## 2021-01-01 RX ADMIN — MIDAZOLAM 1 MG: 1 INJECTION INTRAMUSCULAR; INTRAVENOUS at 08:45

## 2021-08-26 ENCOUNTER — APPOINTMENT (OUTPATIENT)
Dept: CT IMAGING | Age: 56
End: 2021-08-26
Attending: EMERGENCY MEDICINE
Payer: COMMERCIAL

## 2021-08-26 ENCOUNTER — HOSPITAL ENCOUNTER (EMERGENCY)
Age: 56
Discharge: HOME | End: 2021-08-26
Attending: EMERGENCY MEDICINE
Payer: COMMERCIAL

## 2021-08-26 VITALS
WEIGHT: 166 LBS | SYSTOLIC BLOOD PRESSURE: 118 MMHG | HEART RATE: 61 BPM | OXYGEN SATURATION: 100 % | RESPIRATION RATE: 16 BRPM | TEMPERATURE: 97.5 F | HEIGHT: 60 IN | DIASTOLIC BLOOD PRESSURE: 78 MMHG | BODY MASS INDEX: 32.59 KG/M2

## 2021-08-26 DIAGNOSIS — R10.84 ABDOMINAL PAIN, GENERALIZED: Primary | ICD-10-CM

## 2021-08-26 LAB
ALBUMIN SERPL-MCNC: 2.8 G/DL (ref 3.5–5)
ALBUMIN/GLOB SERPL: 0.3 {RATIO} (ref 1.1–2.2)
ALP SERPL-CCNC: 54 U/L (ref 45–117)
ALT SERPL-CCNC: 42 U/L (ref 12–78)
ANION GAP SERPL CALC-SCNC: 5 MMOL/L (ref 5–15)
APPEARANCE UR: CLEAR
AST SERPL W P-5'-P-CCNC: 38 U/L (ref 15–37)
BASOPHILS # BLD: 0 K/UL (ref 0–0.2)
BASOPHILS NFR BLD: 1 % (ref 0–2.5)
BILIRUB SERPL-MCNC: 0.4 MG/DL (ref 0.2–1)
BILIRUB UR QL: NEGATIVE
BUN SERPL-MCNC: 28 MG/DL (ref 6–20)
BUN/CREAT SERPL: 20 (ref 12–20)
CA-I BLD-MCNC: 8.7 MG/DL (ref 8.5–10.1)
CHLORIDE SERPL-SCNC: 97 MMOL/L (ref 97–108)
CO2 SERPL-SCNC: 27 MMOL/L (ref 21–32)
COLOR UR: ABNORMAL
CREAT SERPL-MCNC: 1.41 MG/DL (ref 0.55–1.02)
EOSINOPHIL # BLD: 0 K/UL (ref 0–0.7)
EOSINOPHIL NFR BLD: 0 % (ref 0.9–2.9)
ERYTHROCYTE [DISTWIDTH] IN BLOOD BY AUTOMATED COUNT: 19.1 % (ref 11.5–14.5)
GLOBULIN SER CALC-MCNC: 8.1 G/DL (ref 2–4)
GLUCOSE SERPL-MCNC: 134 MG/DL (ref 65–100)
GLUCOSE UR STRIP.AUTO-MCNC: >1000 MG/DL
HCT VFR BLD AUTO: 28.1 % (ref 36–46)
HGB BLD-MCNC: 9.2 G/DL (ref 13.5–17.5)
HGB UR QL STRIP: NEGATIVE
INR PPP: 1.2 (ref 0.9–1.1)
KETONES UR QL STRIP.AUTO: NEGATIVE MG/DL
LEUKOCYTE ESTERASE UR QL STRIP.AUTO: NEGATIVE
LIPASE SERPL-CCNC: 205 U/L (ref 73–393)
LYMPHOCYTES # BLD: 3.1 K/UL (ref 1–4.8)
LYMPHOCYTES NFR BLD: 52 % (ref 20.5–51.1)
MAGNESIUM SERPL-MCNC: 1.8 MG/DL (ref 1.6–2.4)
MCH RBC QN AUTO: 32.5 PG (ref 31–34)
MCHC RBC AUTO-ENTMCNC: 32.7 G/DL (ref 31–36)
MCV RBC AUTO: 99.5 FL (ref 80–100)
MONOCYTES # BLD: 0.6 K/UL (ref 0.2–2.4)
MONOCYTES NFR BLD: 10 % (ref 1.7–9.3)
NEUTS SEG # BLD: 2.2 K/UL (ref 1.8–7.7)
NEUTS SEG NFR BLD: 37 % (ref 42–75)
NITRITE UR QL STRIP.AUTO: NEGATIVE
NRBC # BLD: 0.02 K/UL
NRBC BLD-RTO: 0.3 PER 100 WBC
PH UR STRIP: 6.5 [PH] (ref 5–8)
PLATELET # BLD AUTO: 240 K/UL (ref 150–400)
PMV BLD AUTO: 8.1 FL (ref 6.5–11.5)
POTASSIUM SERPL-SCNC: 5.5 MMOL/L (ref 3.5–5.1)
PROT SERPL-MCNC: 10.9 G/DL (ref 6.4–8.2)
PROT UR STRIP-MCNC: NEGATIVE MG/DL
PROTHROMBIN TIME: 12 SEC (ref 9–11.1)
RBC # BLD AUTO: 2.82 M/UL (ref 4.5–5.9)
SODIUM SERPL-SCNC: 129 MMOL/L (ref 136–145)
SP GR UR REFRACTOMETRY: 1.01 (ref 1–1.03)
UROBILINOGEN UR QL STRIP.AUTO: 0.2 EU/DL (ref 0.2–1)
WBC # BLD AUTO: 5.9 K/UL (ref 4.4–11.3)

## 2021-08-26 PROCEDURE — 85610 PROTHROMBIN TIME: CPT

## 2021-08-26 PROCEDURE — 74011250636 HC RX REV CODE- 250/636: Performed by: EMERGENCY MEDICINE

## 2021-08-26 PROCEDURE — 99284 EMERGENCY DEPT VISIT MOD MDM: CPT

## 2021-08-26 PROCEDURE — 85025 COMPLETE CBC W/AUTO DIFF WBC: CPT

## 2021-08-26 PROCEDURE — 81003 URINALYSIS AUTO W/O SCOPE: CPT

## 2021-08-26 PROCEDURE — 83735 ASSAY OF MAGNESIUM: CPT

## 2021-08-26 PROCEDURE — 83690 ASSAY OF LIPASE: CPT

## 2021-08-26 PROCEDURE — 80053 COMPREHEN METABOLIC PANEL: CPT

## 2021-08-26 PROCEDURE — 74177 CT ABD & PELVIS W/CONTRAST: CPT

## 2021-08-26 PROCEDURE — 74011000636 HC RX REV CODE- 636: Performed by: EMERGENCY MEDICINE

## 2021-08-26 PROCEDURE — 96360 HYDRATION IV INFUSION INIT: CPT

## 2021-08-26 RX ORDER — PANTOPRAZOLE SODIUM 40 MG/1
40 TABLET, DELAYED RELEASE ORAL DAILY
Qty: 20 TABLET | Refills: 0 | Status: SHIPPED | OUTPATIENT
Start: 2021-08-26 | End: 2021-09-15

## 2021-08-26 RX ADMIN — SODIUM CHLORIDE 1000 ML: 9 INJECTION, SOLUTION INTRAVENOUS at 12:46

## 2021-08-26 RX ADMIN — IOPAMIDOL 100 ML: 755 INJECTION, SOLUTION INTRAVENOUS at 11:51

## 2021-08-26 NOTE — ED PROVIDER NOTES
EMERGENCY DEPARTMENT HISTORY AND PHYSICAL EXAM      Date: 8/26/2021  Patient Name: Jose Luis Lopez    History of Presenting Illness     Chief Complaint   Patient presents with    Abdominal Pain     pt seen by Delvin Peraza PCP this morning, pt has had abdominal pain ongoing for a week. Pt VS stable. Pt states she has had a small amount of diarrhea, denies N/V. Pt states that epigastric pain, states she has no appetite. History Provided By: Patient    HPI: Jose Luis Lopez, 48 y.o. female with a past medical history significant diabetes, hypertension and obesity presents to the ED with cc of abdominal pain achy constant diffusely in abdomen, not better with eating not better with bowel movement; patient states decreased p.o. intake due to early satiety and then vomits up food that she has eaten soon after    There are no other complaints, changes, or physical findings at this time. PCP: Yanira Ibanez PA-C    No current facility-administered medications on file prior to encounter. No current outpatient medications on file prior to encounter. Past History     Past Medical History:  Past Medical History:   Diagnosis Date    AICD (automatic cardioverter/defibrillator) present     Diabetes ([facility] [address]     Hypertension        Past Surgical History:  Past Surgical History:   Procedure Laterality Date    HX HYSTERECTOMY      HX OOPHORECTOMY         Family History:  Family History   Problem Relation Age of Onset    Breast Cancer Sister        Social History:  Social History     Tobacco Use    Smoking status: Never Smoker    Smokeless tobacco: Never Used   Substance Use Topics    Alcohol use: Not on file    Drug use: Not on file       Allergies:  No Known Allergies      Review of Systems     Review of Systems   Constitutional: Positive for appetite change. Negative for fever and unexpected weight change. HENT: Negative for rhinorrhea and sore throat.     Eyes: Negative for pain and visual disturbance. Respiratory: Negative for cough and shortness of breath. Cardiovascular: Negative for chest pain and leg swelling. Gastrointestinal: Positive for abdominal pain, diarrhea, nausea and vomiting. Endocrine: Negative for polydipsia and polyuria. Genitourinary: Negative for dysuria and urgency. Musculoskeletal: Negative for back pain and neck pain. Skin: Negative for color change and pallor. Neurological: Negative for weakness and numbness. Psychiatric/Behavioral: Negative. Physical Exam     Physical Exam  Vitals and nursing note reviewed. Constitutional:       General: She is not in acute distress. Appearance: She is obese. She is not ill-appearing, toxic-appearing or diaphoretic. HENT:      Head: Normocephalic and atraumatic. Mouth/Throat:      Mouth: Mucous membranes are moist.   Eyes:      Extraocular Movements: Extraocular movements intact. Pupils: Pupils are equal, round, and reactive to light. Cardiovascular:      Rate and Rhythm: Normal rate and regular rhythm. Heart sounds: Normal heart sounds. Pulmonary:      Effort: Pulmonary effort is normal.      Breath sounds: Normal breath sounds. Abdominal:      General: Bowel sounds are normal.      Palpations: Abdomen is soft. Tenderness: There is generalized abdominal tenderness. Skin:     General: Skin is warm and dry. Capillary Refill: Capillary refill takes less than 2 seconds. Neurological:      General: No focal deficit present. Mental Status: She is alert and oriented to person, place, and time.    Psychiatric:         Mood and Affect: Mood normal.         Behavior: Behavior normal.         Lab and Diagnostic Study Results     Labs -     Recent Results (from the past 12 hour(s))   URINALYSIS W/ RFLX MICROSCOPIC    Collection Time: 08/26/21 10:53 AM   Result Value Ref Range    Color Yellow/Straw      Appearance Clear Clear      Specific gravity 1.015 1.003 - 1.030      pH (UA) 6.5 5.0 - 8.0      Protein Negative Negative mg/dL    Glucose >1,000 (A) Negative mg/dL    Ketone Negative Negative mg/dL    Bilirubin Negative Negative      Blood Negative Negative      Urobilinogen 0.2 0.2 - 1.0 EU/dL    Nitrites Negative Negative      Leukocyte Esterase Negative Negative         Radiologic Studies -   @lastxrresult@  CT Results  (Last 48 hours)    None        CXR Results  (Last 48 hours)    None            Medical Decision Making   - I am the first provider for this patient. - I reviewed the vital signs, available nursing notes, past medical history, past surgical history, family history and social history. - Initial assessment performed. The patients presenting problems have been discussed, and they are in agreement with the care plan formulated and outlined with them. I have encouraged them to ask questions as they arise throughout their visit. Vital Signs-Reviewed the patient's vital signs. Patient Vitals for the past 12 hrs:   Temp Pulse Resp BP SpO2   08/26/21 1041 97.5 °F (36.4 °C) 61 16 113/74 100 %       Records Reviewed: Nursing Notes    The patient presents with abdominal pain with a differential diagnosis of cholecystitis, diverticulitis, gastroenteritis and UTI      ED Course:     ED Course as of Aug 26 1417   Thu Aug 26, 2021   1347 Called radiology and left message for CT results for patient    [SB]   1408 Patient to finish first bag of IV fluids there is about 500 cc left to be infused and patient be discharged to home    [SB]      ED Course User Index  [SB] Viola Curry MD       Provider Notes (Medical Decision Making): MDM       Procedures   Medical Decision Makingedical Decision Making  Performed by: Junie Kiser MD  PROCEDURES:  Procedures       Disposition   Disposition: Condition stable and improved  DC- Adult Discharges: All of the diagnostic tests were reviewed and questions answered.  Diagnosis, care plan and treatment options were discussed. The patient understands the instructions and will follow up as directed. The patients results have been reviewed with them. They have been counseled regarding their diagnosis. The patient verbally convey understanding and agreement of the signs, symptoms, diagnosis, treatment and prognosis and additionally agrees to follow up as recommended with their PCP in 24 - 48 hours. They also agree with the care-plan and convey that all of their questions have been answered. I have also put together some discharge instructions for them that include: 1) educational information regarding their diagnosis, 2) how to care for their diagnosis at home, as well a 3) list of reasons why they would want to return to the ED prior to their follow-up appointment, should their condition change. DISCHARGE PLAN:  1. There are no discharge medications for this patient. 2.   Follow-up Information    None       3. Return to ED if worse   4. There are no discharge medications for this patient. Diagnosis     Clinical Impression: No diagnosis found. Attestations:    Miriam Stoll MD    Please note that this dictation was completed with [employer] the computer voice recognition software. Quite often unanticipated grammatical, syntax, homophones, and other interpretive errors are inadvertently transcribed by the computer software. Please disregard these errors. Please excuse any errors that have escaped final proofreading. Thank you.

## 2021-08-26 NOTE — ED TRIAGE NOTES
.  Chief Complaint   Patient presents with    Abdominal Pain     pt seen by Dodie Betancourt PCP this morning, pt has had abdominal pain ongoing for a week. Pt VS stable. Pt states she has had a small amount of diarrhea, denies N/V. Pt states that epigastric pain, states she has no appetite.

## 2021-10-07 PROBLEM — D64.9 SYMPTOMATIC ANEMIA: Status: ACTIVE | Noted: 2021-01-01

## 2021-10-07 PROBLEM — N17.9 AKI (ACUTE KIDNEY INJURY) (HCC): Status: ACTIVE | Noted: 2021-01-01

## 2021-10-07 PROBLEM — I50.22 CHRONIC SYSTOLIC HF (HEART FAILURE) (HCC): Status: ACTIVE | Noted: 2021-01-01

## 2021-10-07 PROBLEM — R19.5 HEME POSITIVE STOOL: Status: ACTIVE | Noted: 2021-01-01

## 2021-10-07 NOTE — H&P
History and Physical      Chief Complaints:     Chief Complaint   Patient presents with    Shortness of Breath     pt presents with c/o SOB that pt states has been ongoing for 3 days, pt 100% on room air, pt has pmh of aicd placement and EF of 20%. Pt in NAD in triage, pt VS Stable, pt also describes decreased urinary output         Subjective:     Desiree Castaneda is a 48 y.o. female followed by Mary Adler PA-C and [facility] cardiology  has a past medical history of AICD (automatic cardioverter/defibrillator) present (12/2020), Cardiomyopathy ([address] EF 15-20% (08/2020), Diabetes ([address] and Hypertension. Presents from home with sob with activity leading to coughing and also with increased easy fatigue saying that her legs get tired. Patient has hx of HF and says weight stays stable at 170-172. Had recent defibrilator placement in December 2020 and has been compliant with her meds. Patient states she went to pcp office and tested stool for blood and found to be positive but patient denies any black stool and says almost all the time they are brown in color and sometimes green. Hg drawn on 8/26 had noted hg of 9.2 and repeat today was down to 7. 6. patient has been on baby ASA since her cardiomyopathy diagnosis. Has hx of GERD symptoms. It was also noted that creat slightly increased at 1.85 from previous of 1.4 and mag low at 1.7. dimer was slightly increased at 0.76 and CTA of chest done and negative for PE. COVID screening done and also noted to be negative. BNP also low at 50. With patient hx of low EF cardiomyopathy and presenting symptoms it was concern for either worsening heart failure or symptomatic anemia. So patient was referred for admission for symptomatic anemia, hypomagnesemia, chronic systolic HF.        Past Medical History:   Diagnosis Date    AICD (automatic cardioverter/defibrillator) present 12/2020    Cardiomyopathy ([address] 08/2020    Diabetes ([address]     Hypertension Past Surgical History:   Procedure Laterality Date    HX COLONOSCOPY      HX HEART CATHETERIZATION      x 2     HX HERNIA REPAIR      HX HYSTERECTOMY      HX IMPLANTABLE CARDIOVERTER DEFIBRILLATOR      HX OOPHORECTOMY      HX SVT ABLATION       Family History   Problem Relation Age of Onset    Breast Cancer Sister     Diabetes Mother     Hypertension Mother     Elevated Lipids Mother     Heart Surgery Father       Social History     Tobacco Use    Smoking status: Never Smoker    Smokeless tobacco: Never Used   Substance Use Topics    Alcohol use: Not on file       Prior to Admission medications    Medication Sig Start Date End Date Taking? Authorizing Provider   metFORMIN (GLUCOPHAGE) 500 mg tablet Take  by mouth three (3) times daily (with meals). Yes Provider, Historical   carvediloL (Coreg) 25 mg tablet Take 25 mg by mouth two (2) times a day. Yes Provider, Historical   magnesium oxide (MAG-OX) 400 mg tablet Take 400 mg by mouth four (4) times daily. Yes Provider, Historical   amLODIPine (NORVASC) 5 mg tablet Take 5 mg by mouth daily. Yes Provider, Historical   spironolactone (ALDACTONE) 25 mg tablet Take 25 mg by mouth daily. Yes Provider, Historical   famotidine (Pepcid) 40 mg tablet Take 40 mg by mouth two (2) times a day. Yes Provider, Historical   pantoprazole (PROTONIX) 20 mg tablet Take 40 mg by mouth daily. Yes Provider, Historical   atorvastatin (LIPITOR) 20 mg tablet Take 20 mg by mouth daily. Yes Provider, Historical   dapagliflozin (Farxiga) 10 mg tab tablet Take 10 mg by mouth daily. Yes Provider, Historical   losartan (COZAAR) 25 mg tablet Take 25 mg by mouth daily. Yes Provider, Historical     No Known Allergies     Review of Systems:  Review of Systems   Constitutional: Positive for fatigue. HENT: Negative. Eyes: Negative. Respiratory: Positive for cough and shortness of breath. Cardiovascular: Negative. Gastrointestinal: Negative.     Endocrine: Negative. Genitourinary: Negative. Musculoskeletal: Negative. Skin: Negative. Allergic/Immunologic: Negative. Neurological: Negative. Hematological: Negative. Psychiatric/Behavioral: Negative. Objective:     Vitals:  Visit Vitals  /78   Pulse 77   Temp 98 °F (36.7 °C)   Resp 16   Ht 5' (1.524 m)   Wt 77.6 kg (171 lb)   SpO2 98%   BMI 33.40 kg/m²       Physical Exam:  General: Alert, cooperative, no distress. Head:  Normocephalic, without obvious abnormality, atraumatic. Eyes:  Conjunctivae/corneas clear. Pupils equal, round, reactive to light. Extraocular movements intact. Lungs:  Clear to auscultation bilaterally, no wheezes, crackles  Chest wall: left upper chest wall tenderness / swelling at defib site and noted to be chronic. Heart:  Regular rate and rhythm, S1, S2 normal, no murmur, click, rub, or gallop. Abdomen:   Soft, non-tender. Bowel sounds normal. No masses. No organomegaly. Back:  No spine tenderness to palpation  Extremities: Extremities normal, atraumatic, no cyanosis or edema. Pulses: Symmetric all extremities. Skin: Skin color, texture, turgor normal.   Lymph nodes: Cervical nodes normal.  Neurologic: CNII-XII intact. Normal strength, sensation, and reflexes throughout. Labs:  Recent Results (from the past 24 hour(s))   CBC WITH AUTOMATED DIFF    Collection Time: 10/07/21  9:42 AM   Result Value Ref Range    WBC 4.5 4.4 - 11.3 K/uL    RBC 2.25 (L) 4.50 - 5.90 M/uL    HGB 7.6 (L) 13.5 - 17.5 g/dL    HCT 23.1 (L) 36 - 46 %    .4 (H) 80 - 100 FL    MCH 33.5 31 - 34 PG    MCHC 32.7 31.0 - 36.0 g/dL    RDW 21.2 (H) 11.5 - 14.5 %    PLATELET 953 (L) 118 - 400 K/uL    MPV 8.2 6.5 - 11.5 FL    NRBC 0.3  WBC    ABSOLUTE NRBC 0.01 K/uL    NEUTROPHILS 50 42 - 75 %    LYMPHOCYTES 38 20.5 - 51.1 %    MONOCYTES 11 (H) 1.7 - 9.3 %    EOSINOPHILS 0 (L) 0.9 - 2.9 %    BASOPHILS 1 0.0 - 2.5 %    ABS. NEUTROPHILS 2.3 1.8 - 7.7 K/UL    ABS.  LYMPHOCYTES 1.7 1.0 - 4.8 K/UL    ABS. MONOCYTES 0.5 0.2 - 2.4 K/UL    ABS. EOSINOPHILS 0.0 0.0 - 0.7 K/UL    ABS. BASOPHILS 0.0 0.0 - 0.2 K/UL   PROTHROMBIN TIME + INR    Collection Time: 10/07/21  9:42 AM   Result Value Ref Range    Prothrombin time 11.1 9.0 - 11.1 sec    INR 1.1 0.9 - 1.1     D DIMER    Collection Time: 10/07/21  9:42 AM   Result Value Ref Range    D-dimer 0.76 (H) 0.19 - 0.50 mg/L FEU   METABOLIC PANEL, COMPREHENSIVE    Collection Time: 10/07/21  9:42 AM   Result Value Ref Range    Sodium 134 (L) 136 - 145 mmol/L    Potassium 4.2 3.5 - 5.1 mmol/L    Chloride 100 97 - 108 mmol/L    CO2 28 21 - 32 mmol/L    Anion gap 6 5 - 15 mmol/L    Glucose 190 (H) 65 - 100 mg/dL    BUN 13 6 - 20 mg/dL    Creatinine 1.85 (H) 0.55 - 1.02 mg/dL    BUN/Creatinine ratio 7 (L) 12 - 20      GFR est AA 34 (L) >60 ml/min/1.73m2    GFR est non-AA 28 (L) >60 ml/min/1.73m2    Calcium 8.9 8.5 - 10.1 mg/dL    Bilirubin, total 0.5 0.2 - 1.0 mg/dL    AST (SGOT) 13 (L) 15 - 37 U/L    ALT (SGPT) 13 12 - 78 U/L    Alk.  phosphatase 61 45 - 117 U/L    Protein, total 11.2 (H) 6.4 - 8.2 g/dL    Albumin 3.2 (L) 3.5 - 5.0 g/dL    Globulin 8.0 (H) 2.0 - 4.0 g/dL    A-G Ratio 0.4 (L) 1.1 - 2.2     TROPONIN-HIGH SENSITIVITY    Collection Time: 10/07/21  9:42 AM   Result Value Ref Range    Troponin-High Sensitivity 4 0 - 51 ng/L   MAGNESIUM    Collection Time: 10/07/21  9:42 AM   Result Value Ref Range    Magnesium 1.7 1.6 - 2.4 mg/dL   BNP    Collection Time: 10/07/21  9:42 AM   Result Value Ref Range    NT pro-BNP 50 <125 pg/mL   EKG, 12 LEAD, INITIAL    Collection Time: 10/07/21  9:45 AM   Result Value Ref Range    Ventricular Rate 70 BPM    Atrial Rate 70 BPM    P-R Interval 178 ms    QRS Duration 130 ms    Q-T Interval 430 ms    QTC Calculation (Bezet) 464 ms    Calculated P Axis -3 degrees    Calculated R Axis 129 degrees    Calculated T Axis 163 degrees    Diagnosis       Atrial-sensed ventricular-paced rhythm  No further analysis attempted due to paced rhythm     COVID-19 RAPID TEST    Collection Time: 10/07/21 11:55 AM   Result Value Ref Range    Specimen source Nasopharyngeal      COVID-19 rapid test Not Detected Not Detected     GLUCOSE, POC    Collection Time: 10/07/21  1:20 PM   Result Value Ref Range    Glucose (POC) 98 65 - 117 mg/dL    Performed by [address] [place] [address]    Collection Time: 10/07/21  1:45 PM   Result Value Ref Range    Crossmatch Expiration 10/10/2021,2359     ABO/Rh(D) A Positive     Antibody screen Negative     Unit number [id number]     Blood component type RC LR     Unit division 00     Status of unit Allocated     TRANSFUSION STATUS Ok to transfuse     Crossmatch result Compatible    ECHO ADULT COMPLETE    Collection Time: 10/07/21  2:23 PM   Result Value Ref Range    LV ED Vol A2C 122.41 mL    LV ED Vol A2C 122.41 mL    LV ED Vol A2C 99.61 mL    LV ED Vol A2C 99.61 mL    IVSd 0.99 (A) 0.60 - 0.90 cm    LVIDd 5.08 3.90 - 5.30 cm    LVIDd 4.65 3.90 - 5.30 cm    LVIDs 3.55 cm    LVPWd 0.90 0.60 - 0.90 cm    LVOT SV 55.7 mL    LVOT SV 42.2 mL    BP EF 64.0 55.0 - 100.0 percent    BP EF 64.0 55.0 - 100.0 percent    LV Ejection Fraction MOD 2C 68 percent    LV Ejection Fraction MOD 2C 68 percent    LV Ejection Fraction MOD 4C 62 percent    LV Ejection Fraction MOD 4C 62 percent    LV ED Vol BP 71.14 56.0 - 104.0 mL    LV ED Vol BP 71.14 56.0 - 104.0 mL    LV ES Vol A2C 20.90 mL    LV ES Vol BP 25.61 19.0 - 49.0 mL    LV ES Vol BP 25.61 19.0 - 49.0 mL    LVOT Peak Gradient 6.93 mmHg    Left Ventricular Outflow Tract Mean Gradient 3.67 mmHg    LVOT Peak Velocity 131.59 cm/s    LVOT VTI 27.81 cm    RVIDd 2.66 cm    LA Volume 35.82 22.0 - 52.0 mL    LA Volume 35.82 22.0 - 52.0 mL    LA Vol 2C 33.52 22.00 - 52.00 mL    LA Vol 4C 30.66 22.00 - 52.00 mL    AoV PG 19.44 mmHg    Aortic Valve Systolic Mean Gradient 3.33 mmHg    Aortic Valve Systolic Peak Velocity 637.66 cm/s    Aortic valve mean velocity 141.92 cm/s    AoV VTI 44.07 cm    MV A Mo 113.00 cm/s    Mitral Valve E Wave Deceleration Time 240.14 ms    MV E Mo 99.91 cm/s    MV E/A 0.88     Mitral Valve Pressure Half-time 69.64 ms    MVA (PHT) 3.16 cm2    MVA (PHT) 3.16 cm2    Triscuspid Valve Regurgitation Peak Gradient 27.96 mmHg    Triscuspid Valve Regurgitation Peak Gradient 27.96 mmHg    TR Max Velocity 264.38 cm/s    TR Max Velocity 264.38 cm/s    Ao Root D 2.89 cm    LA Vol Index 19.15 16.00 - 28.00 ml/m2    LA Vol Index 17.52 16.00 - 28.00 ml/m2    LVES Vol Index A2C 11.9 mL/m2       Imaging:  CTA CHEST W OR W WO CONT    Result Date: 10/7/2021  1. No large central pulmonary embolus. 2. Cardiomegaly with cardiac device. 3. No acute findings. XR CHEST PORT    Result Date: 10/7/2021  Right lung base atelectasis. No consolidation. Assessment & Plan:     Symptomatic anemia  - presents with increased sob with activity leading to coughing  - hg down from 9.2 in august to 7.6 now  - obtain iron panel   - type and cross 1 units and tranfuse. - risk and benefits explained. - monitor on tele  - repeat hg in Am    Possible GI bleed  - hg decreased down to 7.6 and family noted that patient was heme positive stool and was scheduled to see Dr. You Marcos but first available appt was in November. - GI consulted and on evaluation will plan for EGD in AM   - started protonix 40mg IV bid   - type and transfuse 1 unit of prbc secondary to hg < 8 in patient with low EF cardiomyopathy and symptomatic   - follow repeat cbc in AM   - keep NPO after midnight      Acute on chronic hypomagnesemia  -Slightly low at 1.7 and a 2 g IV magnesium sulfate rider was given and will restart patient's home magnesium supplementation 400 mg oral 4 times a day  -We will repeat mag level in a.m.     Low EF cardiomyopathy  -Diagnosed in August 2020 status post cardiac cath x2 with no stents and status post defibrillator placement in December 2020  -Follows with Demond amador at [facility] cardiology  -Continue Coreg 25 mg oral twice daily but will hold losartan and amlodipine and spironolactone secondary to YANETH  -BNP only 50  -Repeat echo shows prelim normal EF with resolution of previous low ejection fraction of 15 to 20%  -Cardiology consultation in a.m.     Hypertension  -Patient notes blood pressure has been low at times at home but was 140/67 on arrival with a heart rate of 71  -We will restart patient's home Coreg but hold losartan and amlodipine and spironolactone dosing  -Monitor vitals every 4 hours    Diabetes  -Monitor with Accu-Cheks AC at bedtime with sliding scale  -Hold Metformin secondary to creatinine increased to 1.85 but GFR is greater than 30 at 34  - also on farxiga but not formulary so we will hold    GI prophylaxis  -Protonix IV twice daily    DVT prophylaxis  -Contraindicated secondary to anemia and heme positive stools    CODE STATUS: Full code  Patient designates her sister [facility] as her medical decision-maker if for some reason she is unable to make decisions for herself    Spent 45 minutes evaluating according patient's admission to acute telemetry and expect 1 or 2 days of acute care stay    Electronically signed by Vaughn Mederos MD on 10/7/2021 at 3:44 PM

## 2021-10-07 NOTE — ED TRIAGE NOTES
.  Chief Complaint   Patient presents with    Shortness of Breath     pt presents with c/o SOB that pt states has been ongoing for 3 days, pt 100% on room air, pt has pmh of aicd placement and EF of 20%.  Pt in NAD in triage, pt VS Stable, pt also describes decreased urinary output

## 2021-10-07 NOTE — ED NOTES
TRANSFER - OUT REPORT:    Verbal report given to Ev RN(name) on Katrin Nipple  being transferred to Acute care(unit) for routine progression of care       Report consisted of patients Situation, Background, Assessment and   Recommendations(SBAR). Information from the following report(s) SBAR, ED Summary, [facility] and Recent Results was reviewed with the receiving nurse. Lines:   Peripheral IV 10/07/21 Right Antecubital (Active)   Site Assessment Clean, dry, & intact 10/07/21 0951   Phlebitis Assessment 0 10/07/21 0951   Infiltration Assessment 0 10/07/21 0951   Dressing Status Clean, dry, & intact 10/07/21 0951   Dressing Type Transparent 10/07/21 0951   Hub Color/Line Status Pink 10/07/21 0951        Opportunity for questions and clarification was provided.

## 2021-10-07 NOTE — ED PROVIDER NOTES
EMERGENCY DEPARTMENT HISTORY AND PHYSICAL EXAM      Date: 10/7/2021  Patient Name: Abimbola Contreras    History of Presenting Illness     Chief Complaint   Patient presents with    Shortness of Breath     pt presents with c/o SOB that pt states has been ongoing for 3 days, pt 100% on room air, pt has pmh of aicd placement and EF of 20%. Pt in NAD in triage, pt VS Stable, pt also describes decreased urinary output       History Provided By: Patient    HPI: Abimbola Contreras, 48 y.o. female with a past medical history significant diabetes and hypertension presents to the ED with cc of increased shortness of breath over the last 2 days as observed by patient's family member patient also denies experiencing any shortness of breath no chest pain no fever no chills, no dizziness    There are no other complaints, changes, or physical findings at this time. PCP: Mike Singh PA-C    No current facility-administered medications on file prior to encounter. No current outpatient medications on file prior to encounter. Past History     Past Medical History:  Past Medical History:   Diagnosis Date    AICD (automatic cardioverter/defibrillator) present     Diabetes ([facility] [address]     Hypertension        Past Surgical History:  Past Surgical History:   Procedure Laterality Date    HX HYSTERECTOMY      HX OOPHORECTOMY         Family History:  Family History   Problem Relation Age of Onset    Breast Cancer Sister        Social History:  Social History     Tobacco Use    Smoking status: Never Smoker    Smokeless tobacco: Never Used   Substance Use Topics    Alcohol use: Not on file    Drug use: Not on file       Allergies:  No Known Allergies      Review of Systems     Review of Systems   Constitutional: Negative for chills and unexpected weight change. HENT: Negative for rhinorrhea and sore throat. Eyes: Negative for pain and visual disturbance. Respiratory: Positive for shortness of breath. Negative for apnea, cough and chest tightness. Cardiovascular: Negative for chest pain, palpitations and leg swelling. Gastrointestinal: Negative for abdominal pain and vomiting. Endocrine: Negative for polydipsia and polyuria. Genitourinary: Negative for dysuria and urgency. Musculoskeletal: Negative for back pain and neck pain. Skin: Negative for color change and pallor. Neurological: Negative for weakness and numbness. Psychiatric/Behavioral: Negative. Physical Exam     Physical Exam  Vitals and nursing note reviewed. Constitutional:       General: She is not in acute distress. Appearance: She is not ill-appearing, toxic-appearing or diaphoretic. HENT:      Head: Normocephalic and atraumatic. Eyes:      Extraocular Movements: Extraocular movements intact. Pupils: Pupils are equal, round, and reactive to light. Cardiovascular:      Rate and Rhythm: Normal rate and regular rhythm. Pulses: Normal pulses. Heart sounds: Normal heart sounds. Pulmonary:      Effort: Pulmonary effort is normal. Tachypnea present. No accessory muscle usage. Breath sounds: Normal breath sounds. Abdominal:      General: Bowel sounds are normal.      Palpations: Abdomen is soft. Musculoskeletal:         General: Normal range of motion. Cervical back: Normal range of motion and neck supple. Right lower leg: No tenderness. No edema. Left lower leg: No tenderness. No edema. Skin:     General: Skin is warm and dry. Capillary Refill: Capillary refill takes less than 2 seconds. Neurological:      General: No focal deficit present. Mental Status: She is alert and oriented to person, place, and time. Psychiatric:         Mood and Affect: Mood normal.         Behavior: Behavior normal.         Lab and Diagnostic Study Results     Labs -   No results found for this or any previous visit (from the past 12 hour(s)).     Radiologic Studies -   @lastxrresult@  CT Results  (Last 48 hours)    None        CXR Results  (Last 48 hours)    None            Medical Decision Making   - I am the first provider for this patient. - I reviewed the vital signs, available nursing notes, past medical history, past surgical history, family history and social history. - Initial assessment performed. The patients presenting problems have been discussed, and they are in agreement with the care plan formulated and outlined with them. I have encouraged them to ask questions as they arise throughout their visit. Vital Signs-Reviewed the patient's vital signs. Patient Vitals for the past 12 hrs:   Temp Pulse Resp BP SpO2   10/07/21 0933 98.2 °F (36.8 °C) 71 20 (!) 140/67 100 %       Records Reviewed: Nursing Notes    The patient presents with shortness of with a differential diagnosis of ACS, pneumonia, CHF      ED Course:          Provider Notes (Medical Decision Making): MDM       Procedures   Medical Decision Makingedical Decision Making  Performed by: Margy Nix MD  PROCEDURES:  Procedures       Disposition   Disposition: Condition stable and ongoing  Admitted to Floor Medical Floor the case was discussed with the admitting physician Elia        DISCHARGE PLAN:  1. There are no discharge medications for this patient. 2.   Follow-up Information    None       3. Return to ED if worse   4. There are no discharge medications for this patient. Diagnosis     Clinical Impression: No diagnosis found. Attestations:    Margy Nix MD    Please note that this dictation was completed with [employer], the computer voice recognition software. Quite often unanticipated grammatical, syntax, homophones, and other interpretive errors are inadvertently transcribed by the computer software. Please disregard these errors. Please excuse any errors that have escaped final proofreading. Thank you.

## 2021-10-07 NOTE — CONSULTS
Gastroenterology Consult      Patient: Yo Rinaldi MRN: [id number]  SSN: [id number]    YOB: 1965  Age: 48 y.o. Sex: female        Assessment:     1. Symptomatic anemia   -The presence of Hemoccult positive stools and a drop in H&H suggests chronic blood loss. The macrocytic indices however suggest a nutritional anemia also. 2.  Occult GI bleeding  3. Cardiomyopathy  -Preliminary echocardiogram results suggest proved ejection fraction. Awaiting full report  4. Elevated total protein   -Possibly secondary to a concentrated specimen related to dehydration, but if repeat levels are still elevated a protein electro pheresis may be in order  5. Diabetes mellitus type 2    Plan:     1. Closely monitor H&H and stools of blood. 2.  Agree with transfusion of 1 unit of packed RBCs and then reevaluate. 3.  We will plan to perform an EGD on tomorrow. 4.  Repeat chemistries showed continued elevation of the total protein then will consider a protein electrophoresis. 5.  Agree with restarting twice daily PPI. 6.  Cardiology follow-up. 7.  Since patient had a normal colonoscopy done in 2018, a repeat is probably not needed at this time. Subjective:     Reason for consultation: Symptomatic anemia    History: 60-year-old female with history of hypertension, cardiomyopathy, status post AICD, diabetes mellitus 2, and obstructive sleep apnea on CPAP who presented with worsening shortness of breath and increased fatigue. Patient was tested in her PCPs office and found to have heme positive stools and found to have a hemoglobin of 7.6 which is down from her previous levels. No evidence of gross GI bleeding. No black or tarry stools. It was felt patient's shortness of breath was possibly secondary to anemia versus worsening of her congestive heart failure. Patient had a repeat echocardiogram with preliminary results showing that ejection fraction had normalized.   GI consult was sought to further evaluate for causes of her anemia and presentation. Patient did have colonoscopy on 6/26/2018 which was normal to level of cecum except for internal hemorrhoids. Patient states that she has been having abdominal pain for the last approximately 2 weeks. She has had loss of appetite. She had gone to the emergency room given IV fluids did give her temporary help. No nausea vomiting her bowel movements have been constipated usually once a week. She has generally stable weight however has had some mild loss over the last couple weeks. December 2020 she had a AICD inserted secondary to her cardiomyopathy ejection fraction of 10 to 20%. Patient states she felt much better afterwards. Patient's had a cardiac catheterization which showed no critical stenosis.   Denies use of NSAIDs    Hospital Problems  Never Reviewed        Codes Class Noted POA    Heme positive stool ICD-10-CM: R19.5  ICD-9-CM: 792.1  10/7/2021 Unknown        Chronic systolic HF (heart failure) (HCC) ICD-10-CM: I50.22  ICD-9-CM: 428.22  10/7/2021 Unknown        YANETH (acute kidney injury) ([facility] [address] ICD-10-CM: N17.9  ICD-9-CM: 584.9  10/7/2021 Unknown        Symptomatic anemia ICD-10-CM: D64.9  ICD-9-CM: 285.9  10/7/2021 Unknown            Past Medical History:   Diagnosis Date    AICD (automatic cardioverter/defibrillator) present 12/2020    Cardiomyopathy ([facility] [address] 08/2020    Diabetes ([facility] [address]     Hypertension      Past Surgical History:   Procedure Laterality Date    HX COLONOSCOPY      HX HEART CATHETERIZATION      x 2     HX HERNIA REPAIR      HX HYSTERECTOMY      HX IMPLANTABLE CARDIOVERTER DEFIBRILLATOR      HX OOPHORECTOMY      HX SVT ABLATION        Family History   Problem Relation Age of Onset    Breast Cancer Sister     Diabetes Mother     Hypertension Mother     Elevated Lipids Mother     Heart Surgery Father      Social History     Tobacco Use    Smoking status: Never Smoker    Smokeless tobacco: Never Used   Substance Use Topics    Alcohol use: Not on file      Current Facility-Administered Medications   Medication Dose Route Frequency Provider Last Rate Last Admin    sodium chloride (NS) flush 5-40 mL  5-40 mL IntraVENous Q8H Andrés Rodrigez MD   10 mL at 10/07/21 1451    sodium chloride (NS) flush 5-40 mL  5-40 mL IntraVENous PRN Mckenzie Rodrigez MD        acetaminophen (TYLENOL) tablet 650 mg  650 mg Oral Q6H PRN Mckenzie Rodrigez MD        Or    acetaminophen (TYLENOL) suppository 650 mg  650 mg Rectal Q6H PRN Mckenzie Rodrigez MD        polyethylene glycol (MIRALAX) packet 17 g  17 g Oral DAILY PRN Mckenzie Rodrigez MD        pantoprazole (PROTONIX) 40 mg in 0.9% sodium chloride 10 mL injection  40 mg IntraVENous Q12H Andrés Rodrigez MD   40 mg at 10/07/21 1341    ondansetron (ZOFRAN) injection 4 mg  4 mg IntraVENous Q6H PRN Mckenzie Rodrigez MD        glucose chewable tablet 16 g  4 Tablet Oral PRN Mckenzie Rodrigez MD        dextrose (D50W) injection syrg 12.5-25 g  25-50 mL IntraVENous PRN Mckenzie Rodrigez MD        glucagon (GLUCAGEN) injection 1 mg  1 mg IntraMUSCular PRN Mckenzie Rodrigez MD        insulin lispro (HUMALOG) injection   SubCUTAneous AC&HS Andrés Rodrigez MD        0.9% sodium chloride infusion 250 mL  250 mL IntraVENous PRN Mckenzie Rodrigez MD        carvediloL (COREG) tablet 25 mg  25 mg Oral BID Mckenzie Rodrigez MD        magnesium oxide (MAG-OX) tablet 400 mg  400 mg Oral QID Juancho TURNER MD   400 mg at 10/07/21 1705        No Known Allergies    Review of Systems:  Constitutional: No recent weight change, fever,fatigue, or loss of appetite. ENT/Mouth: No hearing loss, nose bleeds, sore throat, voice change, hoarseness, or difficulties with chewing and swallowing. Cardiovascular: No chest pain, palpitations, swelling of feet/ankles/hands. Respiratory: No chronic or frequent coughs, spitting up blood; (+)shortness of breath;  No  asthma, or wheezing. Gastrointestinal: Generalized abdominal pain; No heartburn, nausea, vomiting; (+) constipation; No  frequent diarrhea, rectal bleeding, or blood in stool. Genitourinary: No frequent urination, burning or painful urination, blood in urine. Musculoskeletal:  No joint pain, stiffness/swelling, weakness of muscles, muscle pain/cramp, or back pain. Integument:  No rash/itching, change in skin color. Neurological: No dizziness/vertigo, loss of consciousness, numbness/tingling sensation, tremors, weakness in limbs, difficulty with balance, frequent or recurring headaches, memory loss or confusion. Psychiatric:  No nervousness, depression, hallucinations, paranoia or suspiciousness. Endocrine: No excessive thirst or urination, heat or cold intolerance. Hematologic/Lymphatic: No bleeding/bruising tendency, phlebitis, or past transfusion. Objective:     Vitals:    10/07/21 1645 10/07/21 1657 10/07/21 1729 10/07/21 1815   BP: 137/78 138/76 139/67 138/89   Pulse: 86 87 82 78   Resp: 16 18 18 18   Temp: 97.5 °F (36.4 °C) 97.2 °F (36.2 °C) 97.8 °F (36.6 °C) 97 °F (36.1 °C)   SpO2: 97% 96% 97% 96%   Weight:       Height:            Physical Exam:  General: Alert, cooperative, no distress. Head:  Normocephalic, without obvious abnormality, atraumatic. Eyes:  Conjunctivae/corneas clear. Pupils equal, round, reactive to light. Extraocular movements intact. Lungs:  Clear to auscultation bilaterally. Chest wall: No tenderness or deformity. Heart:  Regular rate and rhythm, S1, S2 normal, no murmur, click, rub, or gallop. Abdomen:  Soft, epigastric tenderness. Bowel sounds normal. No masses. No organomegaly. Extremities: Extremities normal, atraumatic, no cyanosis or edema. Pulses: 2+ and symmetric all extremities. Skin: Skin color, texture, turgor normal. No rashes or lesions. Lymph nodes: Cervical, supraclavicular, and axillary nodes normal.  Neurologic: CNII-XII intact.  Normal strength, sensation, and reflexes throughout. CBC w/Diff Recent Labs     10/07/21  0942   WBC 4.5   RBC 2.25*   HGB 7.6*   HCT 23.1*   *   GRANS 50   LYMPH 38   EOS 0*        Chemistry Recent Labs     10/07/21  0942   *   *   K 4.2      CO2 28   BUN 13   CREA 1.85*   CA 8.9   MG 1.7   AGAP 6   BUCR 7*   AP 61   TP 11.2*   ALB 3.2*   GLOB 8.0*   AGRAT 0.4*        Lactic Acid No results found for: LAC  No results for input(s): LAC in the last 72 hours. Micro  No results for input(s): SDES, CULT in the last 72 hours. No results for input(s): CULT in the last 72 hours. Liver Enzymes Protein, total   Date Value Ref Range Status   10/07/2021 11.2 (H) 6.4 - 8.2 g/dL Final     Albumin   Date Value Ref Range Status   10/07/2021 3.2 (L) 3.5 - 5.0 g/dL Final     Globulin   Date Value Ref Range Status   10/07/2021 8.0 (H) 2.0 - 4.0 g/dL Final     A-G Ratio   Date Value Ref Range Status   10/07/2021 0.4 (L) 1.1 - 2.2   Final     Alk.  phosphatase   Date Value Ref Range Status   10/07/2021 61 45 - 117 U/L Final     Recent Labs     10/07/21  0942   TP 11.2*   ALB 3.2*   GLOB 8.0*   AGRAT 0.4*   AP 61          Cardiac Enzymes No results found for: CPK, CK, CKMMB, CKMB, RCK3, CKMBT, CKNDX, CKND1, KADEEM, TROPT, TROIQ, AGUSTIN, TROPT, TNIPOC, BNP, BNPP     BNP No results found for: BNP, BNPP, XBNPT     Coagulation Recent Labs     10/07/21  0942   PTP 11.1   INR 1.1         Thyroid  No results found for: T4, T3U, TSH, TSHEXT       Lipid Panel No results found for: CHOL, CHOLPOCT, CHOLX, CHLST, CHOLV, 395403, HDL, HDLP, LDL, LDLC, DLDLP, 004796, VLDLC, VLDL, TGLX, TRIGL, TRIGP, TGLPOCT, CHHD, CHHDX     Urinalysis Lab Results   Component Value Date/Time    Color Yellow/Straw 08/26/2021 10:53 AM    Appearance Clear 08/26/2021 10:53 AM    Specific gravity 1.015 08/26/2021 10:53 AM    pH (UA) 6.5 08/26/2021 10:53 AM    Protein Negative 08/26/2021 10:53 AM    Glucose >1,000 (A) 08/26/2021 10:53 AM    Ketone Negative 08/26/2021 10:53 AM    Bilirubin Negative 08/26/2021 10:53 AM    Urobilinogen 0.2 08/26/2021 10:53 AM    Nitrites Negative 08/26/2021 10:53 AM    Leukocyte Esterase Negative 08/26/2021 10:53 AM        XR (Most Recent). CXR reviewed by me and compared with previous CXR Results from Hospital Encounter encounter on 10/07/21    XR CHEST PORT    Narrative  EXAM: Chest radiograph. HISTORY: Shortness of breath. FINDINGS:    Comparison chest: No recent priors available. Lungs are clear. No pleural effusion. No large pneumothorax. Cardiomediastinal  silhouette is non enlarged. Regional emy structures without acute finding. Left subclavian approach pacer/AICD with leads at the right ventricle apex,  right atrial appendage and coronary sinus. Impression  Right lung base atelectasis. No consolidation. CT (Most Recent) Results from Hospital Encounter encounter on 10/07/21    CTA CHEST W OR W WO CONT    Narrative  Shortness of breath. Comparison chest x-ray 10/7/2021. Chest CTA Technique: Axial chest with IV contrast. Multiplanar chest  reformatting and chest MIPs. 100 cc Isovue 370 administered IV. Dose reduction: All CT scans at this facility are performed using dose reduction  optimization techniques as appropriate to a performed exam including the  following: Automated exposure control, adjustments of the mA and/or kV according  to patient's size, or use of iterative reconstruction technique. FINDINGS: Mild cardiomegaly. Cardiac leads right atrium, right ventricle,  coronary sinus. No pericardial effusion. Thoracic aorta without aneurysm or  dissection. No large central pulmonary arterial filling defect. No mediastinal  or hilar adenopathy. Thoracic esophagus is collapsed. No pleural effusion. Lungs  clear. Left chest wall cardiac device. No axillary adenopathy. Included thyroid  unremarkable. Small hiatal hernia. 1 cm right thyroid fatty nodule likely  myelolipoma. Thoracic spine DISH. Impression  1.  No large central pulmonary embolus. 2. Cardiomegaly with cardiac device. 3. No acute findings.              Paula Le MD  10/7/2021  6:41 PM.

## 2021-10-08 NOTE — ROUTINE PROCESS
RLE dressing changed after wound cleaned with wound cleanser and 4x4 dressing applied. States feeling better than yesterday.

## 2021-10-08 NOTE — PROGRESS NOTES
Problem: Anemia Care Plan (Adult and Pediatric)  Goal: *Labs within defined limits  Outcome: Progressing Towards Goal     Problem: Diabetes Self-Management  Goal: *Disease process and treatment process  Description: Define diabetes and identify own type of diabetes; list 3 options for treating diabetes. Outcome: Progressing Towards Goal  Goal: *Incorporating nutritional management into lifestyle  Description: Describe effect of type, amount and timing of food on blood glucose; list 3 methods for planning meals. Outcome: Progressing Towards Goal  Goal: *Incorporating physical activity into lifestyle  Description: State effect of exercise on blood glucose levels. Outcome: Progressing Towards Goal  Goal: *Developing strategies to promote health/change behavior  Description: Define the ABC's of diabetes; identify appropriate screenings, schedule and personal plan for screenings. Outcome: Progressing Towards Goal  Goal: *Using medications safely  Description: State effect of diabetes medications on diabetes; name diabetes medication taking, action and side effects. Outcome: Progressing Towards Goal  Goal: *Monitoring blood glucose, interpreting and using results  Description: Identify recommended blood glucose targets  and personal targets. Outcome: Progressing Towards Goal  Goal: *Prevention, detection, treatment of acute complications  Description: List symptoms of hyper- and hypoglycemia; describe how to treat low blood sugar and actions for lowering  high blood glucose level. Outcome: Progressing Towards Goal  Goal: *Prevention, detection and treatment of chronic complications  Description: Define the natural course of diabetes and describe the relationship of blood glucose levels to long term complications of diabetes.   Outcome: Progressing Towards Goal  Goal: *Developing strategies to address psychosocial issues  Description: Describe feelings about living with diabetes; identify support needed and support network  Outcome: Progressing Towards Goal  Goal: *Insulin pump training  Outcome: Progressing Towards Goal  Goal: *Sick day guidelines  Outcome: Progressing Towards Goal  Goal: *Patient Specific Goal (EDIT GOAL, INSERT TEXT)  Outcome: Progressing Towards Goal     Problem: Patient Education: Go to Patient Education Activity  Goal: Patient/Family Education  Outcome: Progressing Towards Goal     Problem: Falls - Risk of  Goal: *Absence of Falls  Description: Document Annmarie Fall Risk and appropriate interventions in the flowsheet.   Outcome: Progressing Towards Goal  Note: Fall Risk Interventions:                                Problem: Patient Education: Go to Patient Education Activity  Goal: Patient/Family Education  Outcome: Progressing Towards Goal

## 2021-10-08 NOTE — ACP (ADVANCE CARE PLANNING)
Advance Care Planning   Healthcare Decision Maker:       Primary Decision Maker: Maranda Hastings Mother - [phone]    Secondary Decision Maker: Karlee Yusuf - Sister - [phone]    Click here to complete [address] including selection of the Healthcare Decision Maker Relationship (ie \"Primary\")

## 2021-10-08 NOTE — PROGRESS NOTES
Patients case reviewed during interdisciplinary team meeting in [address] [place], [place] [address]Acute Care Unit. Rev.  Bryn Duverney, [address], [address]

## 2021-10-08 NOTE — PROGRESS NOTES
Problem: Patient Education: Go to Patient Education Activity  Goal: Patient/Family Education  10/8/2021 1830 by Arnulfo Murphy LPN  Outcome: Resolved/Met  10/8/2021 0859 by Arnulfo Murphy LPN  Outcome: Progressing Towards Goal     Problem: Falls - Risk of  Goal: *Absence of Falls  Description: Document Kavitaen Freeze Fall Risk and appropriate interventions in the flowsheet.   10/8/2021 1831 by Arnulfo Murphy LPN  Outcome: Resolved/Met  Note: Fall Risk Interventions:                             10/8/2021 0859 by Arnulfo Murphy LPN  Outcome: Progressing Towards Goal  Note: Fall Risk Interventions:

## 2021-10-08 NOTE — PROGRESS NOTES
Reason for Admission:  Symptomatic anemia                     RUR Score:  12- low risk                   Plan for utilizing home health:  No          PCP: First and Last name:  Digna Andrews PA-C     [facility] of [facility]   Are you a current patient: Yes/No: yes    Approximate date of last visit: 8/2021   Can you participate in a virtual visit with your PCP:  yes                    Current Advanced Directive/Advance Care Plan: Full Code      Healthcare Decision Maker:   Click here to complete [address] including selection of the Healthcare Decision Maker Relationship (ie \"Primary\")             Primary Decision Maker: Isa Pizarro - Mother - [phone]    Secondary Decision Maker: Laymon Felty - Sister - [phone]                  Transition of Care Plan:  Patient lives alone with assistance from her sister, Marie Young. Plan to discharge home to follow up with her PCP.

## 2021-10-08 NOTE — DISCHARGE SUMMARY
Physician Discharge Summary       Patient: Digna German MRN: [id number]     YOB: 1965  Age: 48 y.o. Sex: female    PCP: Irina Rahman PA-C    Allergies: Patient has no known allergies. Admit date: 10/7/2021  Admitting Provider: Yojana Chavis MD    Discharge date: 10/8/2021  Discharging Provider: Yojana Chavis MD    * Admission Diagnoses:   Symptomatic anemia [D64.9]  YANETH (acute kidney injury) ([facility] [A19.0]  Chronic systolic HF (heart failure) ([facility] [I50.22]  Heme positive stool [R19.5]      * Discharge Diagnoses:    Hospital Problems as of 10/8/2021 Never Reviewed        Codes Class Noted - Resolved POA    Heme positive stool ICD-10-CM: R19.5  ICD-9-CM: 792.1  10/7/2021 - Present Unknown        Chronic systolic HF (heart failure) ([facility] ICD-10-CM: I50.22  ICD-9-CM: 428.22  10/7/2021 - Present Unknown        YANETH (acute kidney injury) ([facility] ICD-10-CM: N17.9  ICD-9-CM: 584.9  10/7/2021 - Present Unknown        * (Principal) Symptomatic anemia ICD-10-CM: D64.9  ICD-9-CM: 285.9  10/7/2021 - Present Unknown                * Hospital Course: As per admitting provider:  Digna German is a 48 y.o. female followed by Irina aRhman PA-C and [facility] cardiology  has a past medical history of AICD (automatic cardioverter/defibrillator) present (12/2020), Cardiomyopathy ([facility] [facility] EF 15-20% (08/2020), Diabetes ([address] and Hypertension. Presents from home with sob with activity leading to coughing and also with increased easy fatigue saying that her legs get tired. Patient has hx of HF and says weight stays stable at 170-172. Had recent defibrilator placement in December 2020 and has been compliant with her meds. Patient states she went to pcp office and tested stool for blood and found to be positive but patient denies any black stool and says almost all the time they are brown in color and sometimes green.  Hg drawn on 8/26 had noted hg of 9.2 and repeat today was down to 7.6. patient has been on baby ASA since her cardiomyopathy diagnosis. Has hx of GERD symptoms. It was also noted that creat slightly increased at 1.85 from previous of 1.4 and mag low at 1.7. dimer was slightly increased at 0.76 and CTA of chest done and negative for PE. COVID screening done and also noted to be negative. BNP also low at 50. With patient hx of low EF cardiomyopathy and presenting symptoms it was concern for either worsening heart failure or symptomatic anemia. So patient was referred for admission for symptomatic anemia, hypomagnesemia, chronic systolic HF. Symptomatic anemia  - presents with increased sob with activity leading to coughing  - hg down from 9.2 in august to 7.6 now. Iron panel was obtained and found to be in normal range and was transfused 1 unit overnight and hemoglobin is at 8.3 and patient does have improvement in overall symptoms and fatigue and recheck was at 8.4       Possible GI bleed  - hg decreased down to 7.6 and family noted that patient was heme positive stool and was scheduled to see Dr. Harish Cohen but first available appt was in November. - GI consulted and on evaluation will plan for EGD which resulted as a antral gastritis and grade 2 esophagitis but no active bleeding noted and recommended continue daily PPIs. Patient was placed initially on Protonix 40 mg IV twice daily during inpatient stay and was transfused transfuse 1 unit of prbc secondary to hg < 8 in patient with low EF cardiomyopathy and symptomatic repeat hemoglobin was at 8.3 and on recheck was improved to 8.4. Iron level was normal range was recommend to check B12 and folate which are pending at this time.   Patient was counseled on a bland diet and was restarted on p.o. intake prior to discharge     Acute on chronic hypomagnesemia  -Slightly low at 1.7 and a 2 g IV magnesium sulfate rider was given and will restart patient's home magnesium supplementation 400 mg oral 4 times a day and the magnesium is at 2 we will continue patient home magnesium supplementation     Low EF cardiomyopathy  -Diagnosed in August 2020 status post cardiac cath x2 with no stents and status post defibrillator placement in December 2020  -Follows with Ray amador at [facility] cardiology  -Continue Coreg 25 mg oral twice daily but will hold losartan and amlodipine and spironolactone secondary to YANETH and repeat echo shows resolution of previous low EF and currently is a 60-65%. Cardiology consult appreciated noted BNP was only 50 and on reevaluation with creatinine returning back to baseline we will continue patient on losartan 25 mg daily as well as Coreg twice daily of 25 mg and then will hold spironolactone and place patient on as needed Lasix use only if weight gain of 3 pounds in 24 hours or 5 pounds in 5 days    Hypertension  -Patient notes blood pressure has been low at times at home but was 140/67 on arrival with a heart rate of 71  -We will restart patient's home Coreg but hold losartan and amlodipine and spironolactone dosing and patient blood pressure has been variable again even after blood transfusion and will continue on Coreg but hold amlodipine and continue losartan at 25 mg but should have close monitoring of her blood pressure and will discontinue spironolactone and change to Lasix 20 mg oral as needed if greater than 3 pound weight gain in 24 hours again 5 pound weight gain in 5 days       Diabetes  -Monitor with Accu-Cheks AC at bedtime with sliding scale  -Hold Metformin secondary to creatinine increased to 1.85 but GFR is greater than 30 at 34 but repeat blood work shows creatinine back to baseline at 1.35 so Metformin will be continued on discharge and also restart patient's home farxiga . Patient's A1c noted to be at 7.5    * Procedures:   Procedure(s):  ESOPHAGOGASTRODUODENOSCOPY (EGD)      IMPRESSION:         1. Antral gastritis  2. Distal esophagitis (grade 2)                   RECOMMENDATIONS:     1.  Check biopsy results. 2. Would continue on daily PPI for now. 3. Further evaluation may be needed for the anemia. Will obtain a full anemia profile of iron studies vitamin C52 and folic acid.       Consults:   Gastroenterology Consult        Patient: Amanda Varela MRN: [id number]  SSN: [id number]    YOB: 1965  Age: 48 y.o. Sex: female          Assessment:      1. Symptomatic anemia   -The presence of Hemoccult positive stools and a drop in H&H suggests chronic blood loss. The macrocytic indices however suggest a nutritional anemia also. 2.  Occult GI bleeding  3. Cardiomyopathy  -Preliminary echocardiogram results suggest proved ejection fraction. Awaiting full report  4. Elevated total protein   -Possibly secondary to a concentrated specimen related to dehydration, but if repeat levels are still elevated a protein electro pheresis may be in order  5. Diabetes mellitus type 2     Plan:      1.  Closely monitor H&H and stools of blood. 2.  Agree with transfusion of 1 unit of packed RBCs and then reevaluate. 3.  We will plan to perform an EGD on tomorrow. 4.  Repeat chemistries showed continued elevation of the total protein then will consider a protein electrophoresis. 5.  Agree with restarting twice daily PPI. 6.  Cardiology follow-up. 7.  Since patient had a normal colonoscopy done in 2018, a repeat is probably not needed at this time.        Cardiology :  Case discussed with collaborating physician Dr. Pat Ross and our impression and recommendations are as follows:   3. Anemia - agree with transfusion overnight. Hgb goal >8 to avoid demand ischemia. Appreciate GI input/workup. Monitor Thrombocytopenia. May stop statin therapy if needed. 4. Chronic HFrEF:  Patient appears euvolemic and well compensated. Would hold Losartan, Farxiga, and Spironolactone given YANETH. Will monitor BP, urine output, and renal function and start back medications as appropriate.   [place] should be started back first, then Losartan next. Repeat TTE 10/7 showing EF has recovered to 60-65%. Continue GDMT as appropriate. 5. Dilated NICM - s/p [employer] Bi-V CRT-D device (implanted 12/30/2020). Plan as above. Device Transmission from Sept 2021 showing appropriate CRT%, function, and battery life. 6. YANETH - plan as above in regards to medications. Renal function improved today. Continue to monitor. 7. HTN:  Plan as above in #2 in regards to medications. 8. Hypomagnesemia - resolved with repletion. Keep at goal >2. Continue telemetry monitoring. Consider MagOx 400mg PO daily @ discharge.        Thank you for involving us in the care of this patient. Please do not hesitate to call if additional questions arise. If after hours please call [phone]    Vitals Last 24 Hours:  Patient Vitals for the past 24 hrs:   Temp Pulse Resp BP SpO2   10/08/21 1608 98.3 °F (36.8 °C) 80 20 116/72 99 %   10/08/21 1425 -- 88 20 (!) 140/55 99 %   10/08/21 1203 98.7 °F (37.1 °C) 67 18 104/60 96 %   10/08/21 0900 -- 78 -- (!) 150/74 --   10/08/21 0855 -- -- -- (!) 165/72 --   10/08/21 0800 97.9 °F (36.6 °C) 65 18 124/71 95 %   10/08/21 0400 -- 66 -- -- --   10/08/21 0342 98.4 °F (36.9 °C) 72 18 109/66 98 %   10/08/21 0000 -- 75 -- -- --   10/07/21 2358 98.4 °F (36.9 °C) 75 18 104/63 95 %   10/07/21 2000 -- 76 -- -- --   10/07/21 1919 98.1 °F (36.7 °C) 73 18 122/73 97 %   10/07/21 1815 97 °F (36.1 °C) 78 18 138/89 96 %   10/07/21 1729 97.8 °F (36.6 °C) 82 18 139/67 97 %   10/07/21 1657 97.2 °F (36.2 °C) 87 18 138/76 96 %   10/07/21 1645 97.5 °F (36.4 °C) 86 16 137/78 97 %        Discharge Exam:  General: Alert, cooperative, no distress, appears stated age. Head:  Normocephalic, without obvious abnormality, atraumatic. Eyes:  Conjunctivae/corneas clear. Pupils equal, round, reactive to light. Extraocular movements intact. Lungs:  Clear to auscultation bilaterally. no wheeze, rales, crackles, rhonchi   Chest wall: No tenderness or deformity. Heart:  Regular rate and rhythm, S1, S2 normal, no murmur, click, rub or gallop. Abdomen:  Soft, non-tender. Bowel sounds normal. No masses,  No organomegaly. Extremities: Extremities normal, atraumatic, no cyanosis or edema. Pulses: 2+ and symmetric all extremities. Skin: Skin color, texture, turgor normal. No rashes or lesions  Neurologic: Awake, Alert, oriented.  No obvious gross sensory or motor deficits    Labs:  Recent Results (from the past 24 hour(s))   GLUCOSE, POC    Collection Time: 10/07/21  4:58 PM   Result Value Ref Range    Glucose (POC) 101 65 - 117 mg/dL    Performed by Jose Armando Sinha    GLUCOSE, POC    Collection Time: 10/07/21  9:14 PM   Result Value Ref Range    Glucose (POC) 131 (H) 65 - 117 mg/dL    Performed by [address] [place] [address],[address], BASIC    Collection Time: 10/08/21  5:48 AM   Result Value Ref Range    Sodium 135 (L) 136 - 145 mmol/L    Potassium 4.2 3.5 - 5.1 mmol/L    Chloride 103 97 - 108 mmol/L    CO2 28 21 - 32 mmol/L    Anion gap 4 (L) 5 - 15 mmol/L    Glucose 123 (H) 65 - 100 mg/dL    BUN 13 6 - 20 mg/dL    Creatinine 1.35 (H) 0.55 - 1.02 mg/dL    BUN/Creatinine ratio 10 (L) 12 - 20      GFR est AA 50 (L) >60 ml/min/1.73m2    GFR est non-AA 41 (L) >60 ml/min/1.73m2    Calcium 9.0 8.5 - 10.1 mg/dL   MAGNESIUM    Collection Time: 10/08/21  5:48 AM   Result Value Ref Range    Magnesium 2.0 1.6 - 2.4 mg/dL   CBC WITH AUTOMATED DIFF    Collection Time: 10/08/21  5:48 AM   Result Value Ref Range    WBC 4.0 (L) 4.4 - 11.3 K/uL    RBC 2.54 (L) 4.50 - 5.90 M/uL    HGB 8.3 (L) 13.5 - 17.5 g/dL    HCT 24.8 (L) 36 - 46 %    MCV 97.9 80 - 100 FL    MCH 32.7 31 - 34 PG    MCHC 33.4 31.0 - 36.0 g/dL    RDW 22.6 (H) 11.5 - 14.5 %    PLATELET 982 (L) 048 - 400 K/uL    MPV 8.3 6.5 - 11.5 FL    NRBC 0.3  WBC    ABSOLUTE NRBC 0.01 K/uL    NEUTROPHILS 39 (L) 42 - 75 %    LYMPHOCYTES 45 20.5 - 51.1 %    MONOCYTES 15 (H) 1.7 - 9.3 %    EOSINOPHILS 0 (L) 0.9 - 2.9 % BASOPHILS 1 0.0 - 2.5 %    ABS. NEUTROPHILS 1.5 (L) 1.8 - 7.7 K/UL    ABS. LYMPHOCYTES 1.8 1.0 - 4.8 K/UL    ABS. MONOCYTES 0.6 0.2 - 2.4 K/UL    ABS. EOSINOPHILS 0.0 0.0 - 0.7 K/UL    ABS. BASOPHILS 0.0 0.0 - 0.2 K/UL   GLUCOSE, POC    Collection Time: 10/08/21  8:27 AM   Result Value Ref Range    Glucose (POC) 113 65 - 117 mg/dL    Performed by [facility] [employer] [facility]    GLUCOSE, POC    Collection Time: 10/08/21 10:33 AM   Result Value Ref Range    Glucose (POC) 104 65 - 117 mg/dL    Performed by [employer]    HGB & HCT    Collection Time: 10/08/21  3:58 PM   Result Value Ref Range    HGB 8.4 (L) 13.5 - 17.5 g/dL    HCT 25.5 (L) 36 - 46 %         Imaging:  CTA CHEST W OR W WO CONT    Result Date: 10/7/2021  1. No large central pulmonary embolus. 2. Cardiomegaly with cardiac device. 3. No acute findings. XR CHEST PORT    Result Date: 10/7/2021  Right lung base atelectasis. No consolidation. * Discharge Condition: improved  * Disposition: Home w/Family      Discharge Medications:  Current Discharge Medication List      START taking these medications    Details   furosemide (Lasix) 20 mg tablet Take 1 Tablet by mouth daily as needed for Other (Use if greater than 3 pound weight gain in 24 hours or greater 5 pound weight gain in 5 days). Qty: 30 Tablet, Refills: 0  Start date: 10/8/2021         CONTINUE these medications which have CHANGED    Details   pantoprazole (PROTONIX) 20 mg tablet Take 2 Tablets by mouth daily. Qty: 30 Tablet, Refills: 1  Start date: 10/8/2021         CONTINUE these medications which have NOT CHANGED    Details   metFORMIN (GLUCOPHAGE) 500 mg tablet Take  by mouth three (3) times daily (with meals). carvediloL (Coreg) 25 mg tablet Take 25 mg by mouth two (2) times a day. magnesium oxide (MAG-OX) 400 mg tablet Take 400 mg by mouth four (4) times daily. atorvastatin (LIPITOR) 20 mg tablet Take 20 mg by mouth daily.       dapagliflozin (Farxiga) 10 mg tab tablet Take 10 mg by mouth daily. losartan (COZAAR) 25 mg tablet Take 25 mg by mouth daily. STOP taking these medications       amLODIPine (NORVASC) 5 mg tablet Comments:   Reason for Stopping:         spironolactone (ALDACTONE) 25 mg tablet Comments:   Reason for Stopping:         famotidine (Pepcid) 40 mg tablet Comments:   Reason for Stopping:                 * Follow-up Care/Patient Instructions: Activity: Activity as tolerated  Diet: Cardiac Diet, Diabetic Diet and soft bland diet      Follow-up Information     Follow up With Specialties Details Why Contact Info    Mirna Briceño PA-C Physician Assistant On 10/14/2021 @ 10:00 [address] [place] [address]  [phone]        Patient also has follow-up scheduled for Stefan amador at [facility] cardiology as well as Dr. Alexander Salazar and should continue on the same appointments    Spent 35 minutes evaluting and coordinating patient care and discharging home of which >50% was spent coordinating and counseling.        Signed:  Amaris Ogden MD  10/8/2021  4:42 PM

## 2021-10-08 NOTE — CONSULTS
CONSULTATION    REASON FOR CONSULT:  Dyspnea in established [address] patient with NICM and HFrEF    REQUESTING PROVIDER:  Dr. Erika Neal:    Chief Complaint   Patient presents with    Shortness of Breath     pt presents with c/o SOB that pt states has been ongoing for 3 days, pt 100% on room air, pt has pmh of aicd placement and EF of 20%. Pt in NAD in triage, pt VS Stable, pt also describes decreased urinary output         HISTORY OF PRESENT ILLNESS:  Shady Lau is a 48y.o. 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 Patient reports that she has been sob and weak for a few days PTA. SX have been very insidious in onset over the past month, but became more debilitating and noticeable the past few days. She denies peripheral edema, N/V, Dizziness, Palpitations, Headache, Chest Pain. Exertion worsened SOB and there were no specific alleviating factors. She does not she was \"mildly anemic\" with heme + stool in July and has been awaiting GI OP appointment scheduled for in November. She denies melena/hematochezia/hematemesis, but does note stools are dark green some times. Workup in ED was negative from cardiac standpoint HS Trop 4, BNP 50), however she was noted to have Creat @   1.85, Mag 1.7, Platelets 554, and Hgb 7.6. She was referred for admission for further treatment. This am she reports that she is feeling better and less CASSIDY after transfusion overnight and is scheduled for EGD with Dr. Gabriel Martinez this am.  She has no new complaints. Records from hospital admission course thus far reviewed.       Telemetry Review: paced      PAST MEDICAL HISTORY:    Past Medical History:   Diagnosis Date    AICD (automatic cardioverter/defibrillator) present 12/2020  Cardiomyopathy ([facility] [address] 08/2020    Diabetes ([facility] [address]     Hypertension        PAST SURGICAL HISTORY:   Past Surgical History:   Procedure Laterality Date    HX COLONOSCOPY      HX HEART CATHETERIZATION      x 2     HX HERNIA REPAIR      HX HYSTERECTOMY      HX IMPLANTABLE CARDIOVERTER DEFIBRILLATOR      HX OOPHORECTOMY      HX SVT ABLATION         ALLERGIES:  No Known Allergies    FAMILY HISTORY:    Family History   Problem Relation Age of Onset    Breast Cancer Sister     Diabetes Mother     Hypertension Mother     Elevated Lipids Mother     Heart Surgery Father        SOCIAL HISTORY:    Social History     Tobacco Use    Smoking status: Never Smoker    Smokeless tobacco: Never Used   Substance Use Topics    Alcohol use: Not on file    Drug use: Not on file         HOME MEDICATIONS:    Prior to Admission Medications   Prescriptions Last Dose Informant Patient Reported? Taking? amLODIPine (NORVASC) 5 mg tablet 10/7/2021 at Unknown time  Yes Yes   Sig: Take 5 mg by mouth daily. atorvastatin (LIPITOR) 20 mg tablet 10/7/2021 at Unknown time  Yes Yes   Sig: Take 20 mg by mouth daily. carvediloL (Coreg) 25 mg tablet 10/7/2021 at Unknown time  Yes Yes   Sig: Take 25 mg by mouth two (2) times a day. dapagliflozin (Farxiga) 10 mg tab tablet 10/7/2021 at Unknown time  Yes Yes   Sig: Take 10 mg by mouth daily. famotidine (Pepcid) 40 mg tablet 10/7/2021 at Unknown time  Yes Yes   Sig: Take 40 mg by mouth two (2) times a day. losartan (COZAAR) 25 mg tablet 10/7/2021 at Unknown time  Yes Yes   Sig: Take 25 mg by mouth daily. magnesium oxide (MAG-OX) 400 mg tablet 10/7/2021 at Unknown time  Yes Yes   Sig: Take 400 mg by mouth four (4) times daily. metFORMIN (GLUCOPHAGE) 500 mg tablet 10/7/2021 at Unknown time  Yes Yes   Sig: Take  by mouth three (3) times daily (with meals). pantoprazole (PROTONIX) 20 mg tablet 10/7/2021 at Unknown time  Yes Yes   Sig: Take 40 mg by mouth daily.    spironolactone (ALDACTONE) 25 mg tablet 10/7/2021 at Unknown time  Yes Yes   Sig: Take 25 mg by mouth daily. Facility-Administered Medications: None       REVIEW OF SYSTEMS:  Complete review of systems performed, pertinents noted above, all other systems are negative. Patient Vitals for the past 24 hrs:   Temp Pulse Resp BP SpO2   10/08/21 0855 -- -- -- (!) 165/72 --   10/08/21 0800 97.9 °F (36.6 °C) 65 18 124/71 95 %   10/08/21 0400 -- 66 -- -- --   10/08/21 0342 98.4 °F (36.9 °C) 72 18 109/66 98 %   10/08/21 0000 -- 75 -- -- --   10/07/21 2358 98.4 °F (36.9 °C) 75 18 104/63 95 %   10/07/21 2000 -- 76 -- -- --   10/07/21 1919 98.1 °F (36.7 °C) 73 18 122/73 97 %   10/07/21 1815 97 °F (36.1 °C) 78 18 138/89 96 %   10/07/21 1729 97.8 °F (36.6 °C) 82 18 139/67 97 %   10/07/21 1657 97.2 °F (36.2 °C) 87 18 138/76 96 %   10/07/21 1645 97.5 °F (36.4 °C) 86 16 137/78 97 %   10/07/21 1630 97 °F (36.1 °C) 82 18 (!) 151/87 96 %   10/07/21 1618 97.7 °F (36.5 °C) 84 18 (!) 155/87 96 %   10/07/21 1600 -- 88 -- -- --   10/07/21 1559 98 °F (36.7 °C) 82 16 114/64 --   10/07/21 1326 98 °F (36.7 °C) 77 16 117/78 98 %   10/07/21 1202 -- 78 18 111/72 98 %   10/07/21 1050 -- 68 18 126/74 100 %       PHYSICAL EXAMINATION:    General: Well nourished female lying in bed, NAD, A&O  HEENT: Normocephalic, PERRL, no drainage, glasses on  Neck: Supple, Trachea midline, No JVD  RESP: CTA bilaterally. + Symmetrical chest movement. No SOB or distress. On RA  Cardiovascular: RRR no MRG. + Device  PVS: No rubor, cyanosis, no edema, Radial, DP, PT pulses equal bilaterally  ABD: obese, soft, NT, Normoactive BS  Derm: Warm/Dry/Intact with no lesions, normal turgor  Neuro: A&O PPTS, cranial nerves II- XII grossly intact via interaction with patient. No focal deficits  PSYCH: No anxiety or agitation      Electrocardiogram performed earlier reviewed, it shows v-paced    Recent labs results and imaging reviewed.       Recent Results (from the past 24 hour(s)) COVID-19 RAPID TEST    Collection Time: 10/07/21 11:55 AM   Result Value Ref Range    Specimen source Nasopharyngeal      COVID-19 rapid test Not Detected Not Detected     GLUCOSE, POC    Collection Time: 10/07/21  1:20 PM   Result Value Ref Range    Glucose (POC) 98 65 - 117 mg/dL    Performed by [address] [place] [address]    Collection Time: 10/07/21  1:45 PM   Result Value Ref Range    Crossmatch Expiration 10/10/2021,2359     ABO/Rh(D) A Positive     Antibody screen Negative     Unit number [id number]     Blood component type  LR     Unit division 00     Status of unit Issued,final     TRANSFUSION STATUS Ok to transfuse     Crossmatch result Compatible    ECHO ADULT COMPLETE    Collection Time: 10/07/21  2:23 PM   Result Value Ref Range    LV ED Vol A2C 122.41 mL    LV ED Vol A2C 122.41 mL    LV ED Vol A2C 99.61 mL    LV ED Vol A2C 99.61 mL    IVSd 0.99 (A) 0.60 - 0.90 cm    LVIDd 5.08 3.90 - 5.30 cm    LVIDd 4.65 3.90 - 5.30 cm    LVIDs 3.55 cm    LVPWd 0.90 0.60 - 0.90 cm    LVOT SV 55.7 mL    LVOT SV 42.2 mL    BP EF 64.0 55.0 - 100.0 percent    BP EF 64.0 55.0 - 100.0 percent    LV Ejection Fraction MOD 2C 68 percent    LV Ejection Fraction MOD 2C 68 percent    LV Ejection Fraction MOD 4C 62 percent    LV Ejection Fraction MOD 4C 62 percent    LV ED Vol BP 71.14 56.0 - 104.0 mL    LV ED Vol BP 71.14 56.0 - 104.0 mL    LV ES Vol A2C 20.90 mL    LV ES Vol BP 25.61 19.0 - 49.0 mL    LV ES Vol BP 25.61 19.0 - 49.0 mL    LVOT Peak Gradient 6.93 mmHg    Left Ventricular Outflow Tract Mean Gradient 3.67 mmHg    LVOT Peak Velocity 131.59 cm/s    LVOT VTI 27.81 cm    RVIDd 2.66 cm    LA Volume 35.82 22.0 - 52.0 mL    LA Volume 35.82 22.0 - 52.0 mL    LA Vol 2C 33.52 22.00 - 52.00 mL    LA Vol 4C 30.66 22.00 - 52.00 mL    AoV PG 19.44 mmHg    Aortic Valve Systolic Mean Gradient 0.97 mmHg    Aortic Valve Systolic Peak Velocity 758.38 cm/s    Aortic valve mean velocity 141.92 cm/s    AoV VTI 44.07 cm    MV A Mo 113.00 cm/s    Mitral Valve E Wave Deceleration Time 240.14 ms    MV E Mo 99.91 cm/s    MV E/A 0.88     Mitral Valve Pressure Half-time 69.64 ms    MVA (PHT) 3.16 cm2    MVA (PHT) 3.16 cm2    Triscuspid Valve Regurgitation Peak Gradient 27.96 mmHg    Triscuspid Valve Regurgitation Peak Gradient 27.96 mmHg    TR Max Velocity 264.38 cm/s    TR Max Velocity 264.38 cm/s    Ao Root D 2.89 cm    LA Vol Index 19.15 16.00 - 28.00 ml/m2    LA Vol Index 17.52 16.00 - 28.00 ml/m2    LVES Vol Index A2C 11.9 mL/m2   GLUCOSE, POC    Collection Time: 10/07/21  4:58 PM   Result Value Ref Range    Glucose (POC) 101 65 - 117 mg/dL    Performed by Marina Grimm    GLUCOSE, POC    Collection Time: 10/07/21  9:14 PM   Result Value Ref Range    Glucose (POC) 131 (H) 65 - 117 mg/dL    Performed by [address] [place] [address],[address], BASIC    Collection Time: 10/08/21  5:48 AM   Result Value Ref Range    Sodium 135 (L) 136 - 145 mmol/L    Potassium 4.2 3.5 - 5.1 mmol/L    Chloride 103 97 - 108 mmol/L    CO2 28 21 - 32 mmol/L    Anion gap 4 (L) 5 - 15 mmol/L    Glucose 123 (H) 65 - 100 mg/dL    BUN 13 6 - 20 mg/dL    Creatinine 1.35 (H) 0.55 - 1.02 mg/dL    BUN/Creatinine ratio 10 (L) 12 - 20      GFR est AA 50 (L) >60 ml/min/1.73m2    GFR est non-AA 41 (L) >60 ml/min/1.73m2    Calcium 9.0 8.5 - 10.1 mg/dL   MAGNESIUM    Collection Time: 10/08/21  5:48 AM   Result Value Ref Range    Magnesium 2.0 1.6 - 2.4 mg/dL   CBC WITH AUTOMATED DIFF    Collection Time: 10/08/21  5:48 AM   Result Value Ref Range    WBC 4.0 (L) 4.4 - 11.3 K/uL    RBC 2.54 (L) 4.50 - 5.90 M/uL    HGB 8.3 (L) 13.5 - 17.5 g/dL    HCT 24.8 (L) 36 - 46 %    MCV 97.9 80 - 100 FL    MCH 32.7 31 - 34 PG    MCHC 33.4 31.0 - 36.0 g/dL    RDW 22.6 (H) 11.5 - 14.5 %    PLATELET 722 (L) 518 - 400 K/uL    MPV 8.3 6.5 - 11.5 FL    NRBC 0.3  WBC    ABSOLUTE NRBC 0.01 K/uL    NEUTROPHILS 39 (L) 42 - 75 %    LYMPHOCYTES 45 20.5 - 51.1 %    MONOCYTES 15 (H) 1.7 - 9.3 % EOSINOPHILS 0 (L) 0.9 - 2.9 %    BASOPHILS 1 0.0 - 2.5 %    ABS. NEUTROPHILS 1.5 (L) 1.8 - 7.7 K/UL    ABS. LYMPHOCYTES 1.8 1.0 - 4.8 K/UL    ABS. MONOCYTES 0.6 0.2 - 2.4 K/UL    ABS. EOSINOPHILS 0.0 0.0 - 0.7 K/UL    ABS. BASOPHILS 0.0 0.0 - 0.2 K/UL   GLUCOSE, POC    Collection Time: 10/08/21  8:27 AM   Result Value Ref Range    Glucose (POC) 113 65 - 117 mg/dL    Performed by Adam Ahn    GLUCOSE, POC    Collection Time: 10/08/21 10:33 AM   Result Value Ref Range    Glucose (POC) 104 65 - 117 mg/dL    Performed by Adam Ahn        XR Results (maximum last 3): Results from [place] encounter on 10/07/21    XR CHEST PORT    Impression  Right lung base atelectasis. No consolidation. CT Results (maximum last 3): Results from [place] encounter on 10/07/21    CTA CHEST W OR W WO CONT    Impression  1. No large central pulmonary embolus. 2. Cardiomegaly with cardiac device. 3. No acute findings. Results from [place] encounter on 08/26/21    CT ABD PELV W CONT    Impression  1. No acute inflammatory findings. 2. No urinary or bowel dilation.             Current Facility-Administered Medications:     0.9% sodium chloride infusion, 50 mL/hr, IntraVENous, CONTINUOUS, Andrés Rodrigez MD, Last Rate: 50 mL/hr at 10/08/21 0800, 50 mL/hr at 10/08/21 0800    sodium chloride (NS) flush 5-40 mL, 5-40 mL, IntraVENous, Q8H, Andrés Rodrigez MD, 10 mL at 10/08/21 0549    sodium chloride (NS) flush 5-40 mL, 5-40 mL, IntraVENous, PRN, Enrike Rodrigez MD    acetaminophen (TYLENOL) tablet 650 mg, 650 mg, Oral, Q6H PRN **OR** acetaminophen (TYLENOL) suppository 650 mg, 650 mg, Rectal, Q6H PRN, Andrés Rodrigez MD    polyethylene glycol (MIRALAX) packet 17 g, 17 g, Oral, DAILY PRN, Andrés Rodrigez MD    pantoprazole (PROTONIX) 40 mg in 0.9% sodium chloride 10 mL injection, 40 mg, IntraVENous, Q12H, Andrés Rodrigez MD, 40 mg at 10/08/21 0900    ondansetron (ZOFRAN) injection 4 mg, 4 mg, IntraVENous, Q6H PRN, Andrés Rodrigez MD    glucose chewable tablet 16 g, 4 Tablet, Oral, PRN, Andrés Rodrigez MD    dextrose (D50W) injection syrg 12.5-25 g, 25-50 mL, IntraVENous, PRN, Andrés Rodrigez MD    glucagon (GLUCAGEN) injection 1 mg, 1 mg, IntraMUSCular, PRN, Andrés Rodrigez MD    insulin lispro (HUMALOG) injection, , SubCUTAneous, AC&HS, Andrés Rodrigez MD    0.9% sodium chloride infusion 250 mL, 250 mL, IntraVENous, PRN, Andrés Rodrigez MD    carvediloL (COREG) tablet 25 mg, 25 mg, Oral, BID, Andrés Rodrigez MD, 25 mg at 10/07/21 2111    magnesium oxide (MAG-OX) tablet 400 mg, 400 mg, Oral, QID, Andrés Rodrigez MD, 400 mg at 10/07/21 2111          Case discussed with collaborating physician Dr. Bety Bennett and our impression and recommendations are as follows:   1. Anemia - agree with transfusion overnight. Hgb goal >8 to avoid demand ischemia. Appreciate GI input/workup. Monitor Thrombocytopenia. May stop statin therapy if needed. 2. Chronic HFrEF:  Patient appears euvolemic and well compensated. Would hold Losartan, Farxiga, and Spironolactone given YANETH. Will monitor BP, urine output, and renal function and start back medications as appropriate. Derenda Running should be started back first, then Losartan next. Repeat TTE 10/7 showing EF has recovered to 60-65%. Continue GDMT as appropriate. 3. Dilated NICM - s/p Medtronic Bi-V CRT-D device (implanted 12/30/2020). Plan as above. Device Transmission from Sept 2021 showing appropriate CRT%, function, and battery life. 4. YANETH - plan as above in regards to medications. Renal function improved today. Continue to monitor. 5. HTN:  Plan as above in #2 in regards to medications. 6. Hypomagnesemia - resolved with repletion. Keep at goal >2. Continue telemetry monitoring. Consider MagOx 400mg PO daily @ discharge. Thank you for involving us in the care of this patient.   Please do not hesitate to call if additional questions arise.  If after hours please call [phone]

## 2021-10-08 NOTE — PROGRESS NOTES
Care Management Interventions  PCP Verified by CM: Yes Katharine MARTINEZ- last seen 8/2021.)  Mode of Transport at Discharge: Other (see comment) (Sister)  Transition of Care Consult (CM Consult): Discharge Planning  Support Systems: Other Family Member(s)  Confirm Follow Up Transport: Self  The Plan for Transition of Care is Related to the Following Treatment Goals : Plan to discharge home to self care with assistance from family and PCP follow up. No other discharge planning needs identified.    Discharge Location  Discharge Placement: Home

## 2021-10-08 NOTE — OP NOTES
EGD Procedure Note        Patient: Dimas Grier MRN: [id number]  SSN: [id number]    YOB: 1965  Age: 48 y.o. Sex: female        Date/Time:  10/8/2021 2:21 PM         IMPRESSION:       1. Antral gastritis  2. Distal esophagitis (grade 2)       RECOMMENDATIONS:    1. Check biopsy results. 2. Would continue on daily PPI for now. 3. Further evaluation may be needed for the anemia. Will obtain a full anemia profile of iron studies vitamin H84 and folic acid. Procedure: Esophagogastroduodenoscopy with cold biopsies    Indication: Anemia, GI bleeding    Endoscopist:  Shirley Ross MD    Referring Provider:   Comer Felty, PA-C    History: The history and physical exam were reviewed and updated. Endoscope: GIF H190 Olympus video endoscope    Extent of Exam: Second part of the duodenum    ASA: Grade 2    Anethesia/Sedation:  TIVA    Description of the procedure: The procedure was discussed with the patient including risks, benefits, alternatives including risks of iv sedation, bleeding, perforation and aspiration. A safety timeout was performed. The patient was placed in the left lateral decubitus position. A bite block was placed. The patient was using standard protocol. The patients vital signs were monitored at all times including heart rate/rhythm, blood pressure and oxygen saturation. The endoscope was then passed under direct visualization to the second part of the duodenum. The endoscope was then slowly withdrawn while visualizing the mucosa. In the stomach a retroflexion was performed and gastric fundus and cardia visualized. The patient was then transferred to recovery in stable condition. Findings:   Esophagus: The esophageal mucosa was inflammation of the distal esophagus consistent with a grade 2 esophagitis. .  Stomach: The gastric mucosa was mild formation throughout the gastric antrum. Multiple biopsies were taken there. .   Duodenum:  The duodenum mucosa was normal with no ulceration, mass, stricture and no evidence of villous atrophy. Therapies: None    Specimens:   ID Type Source Tests Collected by Time Destination   1 : gastric antrum Preservative   Crissy Albright MD 10/8/2021 1417 Pathology              EBL: Minimal    Complications:   None; patient tolerated the procedure well.      Implants: None    Discharge disposition:  Out of the recovery area when discharge criteria met         Semaj Saunders MD  October 8, 2021  2:21 PM

## 2021-10-08 NOTE — ANESTHESIA POSTPROCEDURE EVALUATION
Procedure(s):  ESOPHAGOGASTRODUODENOSCOPY (EGD).     total IV anesthesia    Anesthesia Post Evaluation      Multimodal analgesia: multimodal analgesia not used between 6 hours prior to anesthesia start to PACU discharge  Patient location during evaluation: bedside  Patient participation: complete - patient participated  Level of consciousness: sleepy but conscious  Pain score: 0  Pain management: adequate  Anesthetic complications: no  Cardiovascular status: acceptable and stable  Respiratory status: acceptable and room air  Hydration status: acceptable  Post anesthesia nausea and vomiting:  none  Final Post Anesthesia Temperature Assessment:  Normothermia (36.0-37.5 degrees C)      INITIAL Post-op Vital signs:   Vitals Value Taken Time   /55 10/08/21 1425   Temp     Pulse 88 10/08/21 1425   Resp 20 10/08/21 1425   SpO2 99 % 10/08/21 1425

## 2021-10-08 NOTE — PROGRESS NOTES
Problem: Anemia Care Plan (Adult and Pediatric)  Goal: *Labs within defined limits  10/8/2021 1830 by Deb Dawn LPN  Outcome: Resolved/Met  10/8/2021 0859 by Deb Dawn LPN  Outcome: Progressing Towards Goal     Problem: Diabetes Self-Management  Goal: *Disease process and treatment process  Description: Define diabetes and identify own type of diabetes; list 3 options for treating diabetes. 10/8/2021 1830 by Deb Dawn LPN  Outcome: Resolved/Met  10/8/2021 0859 by Deb Dawn LPN  Outcome: Progressing Towards Goal  Goal: *Incorporating nutritional management into lifestyle  Description: Describe effect of type, amount and timing of food on blood glucose; list 3 methods for planning meals. 10/8/2021 1830 by Deb Dawn LPN  Outcome: Resolved/Met  10/8/2021 0859 by Deb Dawn LPN  Outcome: Progressing Towards Goal  Goal: *Incorporating physical activity into lifestyle  Description: State effect of exercise on blood glucose levels. 10/8/2021 1830 by Deb Dawn LPN  Outcome: Resolved/Met  10/8/2021 0859 by Deb Dawn LPN  Outcome: Progressing Towards Goal  Goal: *Developing strategies to promote health/change behavior  Description: Define the ABC's of diabetes; identify appropriate screenings, schedule and personal plan for screenings. 10/8/2021 1830 by Deb Dawn LPN  Outcome: Resolved/Met  10/8/2021 0859 by Deb Dawn LPN  Outcome: Progressing Towards Goal  Goal: *Using medications safely  Description: State effect of diabetes medications on diabetes; name diabetes medication taking, action and side effects. 10/8/2021 1830 by Deb Dawn LPN  Outcome: Resolved/Met  10/8/2021 0859 by Deb Dawn LPN  Outcome: Progressing Towards Goal  Goal: *Monitoring blood glucose, interpreting and using results  Description: Identify recommended blood glucose targets  and personal targets.   10/8/2021 1830 by Deb Dawn LPN  Outcome: Resolved/Met  10/8/2021 0859 by Roxanne Smith LPN  Outcome: Progressing Towards Goal  Goal: *Prevention, detection, treatment of acute complications  Description: List symptoms of hyper- and hypoglycemia; describe how to treat low blood sugar and actions for lowering  high blood glucose level. 10/8/2021 1830 by Roxanne Smith LPN  Outcome: Resolved/Met  10/8/2021 0859 by Roxanne Smith LPN  Outcome: Progressing Towards Goal  Goal: *Prevention, detection and treatment of chronic complications  Description: Define the natural course of diabetes and describe the relationship of blood glucose levels to long term complications of diabetes.   10/8/2021 1830 by Roxanne Smith LPN  Outcome: Resolved/Met  10/8/2021 0859 by Roxanne Smith LPN  Outcome: Progressing Towards Goal  Goal: *Developing strategies to address psychosocial issues  Description: Describe feelings about living with diabetes; identify support needed and support network  10/8/2021 1830 by Roxanne Smith LPN  Outcome: Resolved/Met  10/8/2021 0859 by Roxanne Smith LPN  Outcome: Progressing Towards Goal  Goal: *Insulin pump training  10/8/2021 1830 by Roxanne Smith LPN  Outcome: Resolved/Met  10/8/2021 0859 by Roxanne Smith LPN  Outcome: Progressing Towards Goal  Goal: *Sick day guidelines  10/8/2021 1830 by Roxanne Smith LPN  Outcome: Resolved/Met  10/8/2021 0859 by Roxanne Smith LPN  Outcome: Progressing Towards Goal  Goal: *Patient Specific Goal (EDIT GOAL, INSERT TEXT)  10/8/2021 1830 by Roxanne Smith LPN  Outcome: Resolved/Met  10/8/2021 0859 by Roxanne Smith LPN  Outcome: Progressing Towards Goal     Problem: Patient Education: Go to Patient Education Activity  Goal: Patient/Family Education  10/8/2021 1830 by Roxanne Smith LPN  Outcome: Resolved/Met  10/8/2021 0859 by Roxanne Smith LPN  Outcome: Progressing Towards Goal     Problem: Falls - Risk of  Goal: *Absence of Falls  Description: Document Krystle Quiros Fall Risk and appropriate interventions in the flowsheet.   Outcome: Progressing Towards Goal  Note: Fall Risk Interventions:                                Problem: Patient Education: Go to Patient Education Activity  Goal: Patient/Family Education  Outcome: Progressing Towards Goal

## 2021-10-08 NOTE — ANESTHESIA PREPROCEDURE EVALUATION
Relevant Problems   No relevant active problems       Anesthetic History   No history of anesthetic complications  Other anesthesia complications          Review of Systems / Medical History  Patient summary reviewed, nursing notes reviewed and pertinent labs reviewed    Pulmonary  Within defined limits                 Neuro/Psych   Within defined limits           Cardiovascular  Within defined limits  Hypertension          Pacemaker and CAD         GI/Hepatic/Renal  Within defined limits              Endo/Other  Within defined limits  Diabetes         Other Findings              Physical Exam    Airway  Mallampati: II  TM Distance: 4 - 6 cm  Neck ROM: normal range of motion        Cardiovascular    Rhythm: regular  Rate: normal         Dental  No notable dental hx       Pulmonary  Breath sounds clear to auscultation               Abdominal  Abdominal exam normal       Other Findings            Anesthetic Plan    ASA: 3  Anesthesia type: total IV anesthesia            Anesthetic plan and risks discussed with: Patient

## 2021-10-24 NOTE — TELEPHONE ENCOUNTER
Followed up with patient on her most recent hospitalization for symptomatic anemia and was status post 1 unit packed red blood cells as well as had EGD which showed gastritis and distal grade 2 esophagitis. Overall patient is doing well with no further episodes of symptoms she states that she ran out of her medications and was given a temporary supply by the [facility] pharmacy but does not have a follow-up appointment with Alicia Salmon until 10/28. At that time she states that she is supposed to have a repeat hemoglobin checked. We will go ahead and send prescription for 40 mg of Protonix 1 tablet daily and will give an additional 30-day supply and can be reevaluated by PCP when she follows up on 10/28.   Overall patient states she is doing well

## 2021-11-04 NOTE — ED NOTES
Patient holding & guarding left upper quadrant of abdomen. Denies chest pain or SOB at this time. Patient grunting due to pain. Lung sounds clear at this time.

## 2021-11-04 NOTE — ED TRIAGE NOTES
Pt is brought in via EMS from home with a cc cough/SOB-- Pt has pain underneath left breast side rib cage--reports she \"heard a pop x 30 mins ago PTA. Pt has a defib/pacemaker in left upper chest.   Pt received 2 units of blood yesterday      EMS reports V/S stable en route.

## 2021-11-04 NOTE — ED PROVIDER NOTES
HPI   Patient reports left lower anterior and lateral thoracic pain starting overnight. It was constant and gradually improved, however patient reports coughing vigorously short while ago and hearing a \"pop\" and experiencing sudden onset of sharp pain. Denies shortness of breath, upper chest pain, fall or trauma. Patient now reports that the pain is severe.   Past Medical History:   Diagnosis Date    AICD (automatic cardioverter/defibrillator) present 12/2020    CAD (coronary artery disease)     Cardiomyopathy ([facility] [address] 08/2020    Diabetes ([facility] [address]     Hypertension        Past Surgical History:   Procedure Laterality Date    HX COLONOSCOPY      HX HEART CATHETERIZATION      x 2     HX HERNIA REPAIR      HX HYSTERECTOMY      HX IMPLANTABLE CARDIOVERTER DEFIBRILLATOR      HX OOPHORECTOMY      HX SVT ABLATION           Family History:   Problem Relation Age of Onset    Breast Cancer Sister     Diabetes Mother     Hypertension Mother    [employer] Elevated Lipids Mother     Heart Surgery Father        Social History     Socioeconomic History    Marital status:      Spouse name: Not on file    Number of children: Not on file    Years of education: Not on file    Highest education level: Not on file   Occupational History    Not on file   Tobacco Use    Smoking status: Never Smoker    Smokeless tobacco: Never Used   Substance and Sexual Activity    Alcohol use: Not on file    Drug use: Not on file    Sexual activity: Not on file   Other Topics Concern     Service Not Asked    Blood Transfusions Not Asked    Caffeine Concern Not Asked    Occupational Exposure Not Asked    Hobby Hazards Not Asked    Sleep Concern Not Asked    Stress Concern Not Asked    Weight Concern Not Asked    Special Diet Not Asked    Back Care Not Asked    Exercise Not Asked    Bike Helmet Not Asked   [address] Not Asked    Self-Exams Not Asked   Social History Narrative    Not on file     Social Determinants of Health     Financial Resource Strain:     Difficulty of Paying Living Expenses: Not on file   Food Insecurity:     Worried About Running Out of Food in the Last Year: Not on file    Jairo of Food in the Last Year: Not on file   Transportation Needs:     Lack of Transportation (Medical): Not on file    Lack of Transportation (Non-Medical): Not on file   Physical Activity:     Days of Exercise per Week: Not on file    Minutes of Exercise per Session: Not on file   Stress:     Feeling of Stress : Not on file   Social Connections:     Frequency of Communication with Friends and Family: Not on file    Frequency of Social Gatherings with Friends and Family: Not on file    Attends [employer] Services: Not on file    Active Member of [address] [place] [address] [employer] or Organizations: Not on file    Attends Club or Organization Meetings: Not on file    Marital Status: Not on file   Intimate Partner Violence:     Fear of Current or Ex-Partner: Not on file    Emotionally Abused: Not on file    Physically Abused: Not on file    Sexually Abused: Not on file   Housing Stability:     Unable to Pay for Housing in the Last Year: Not on file    Number of [place] in the Last Year: Not on file    Unstable Housing in the Last Year: Not on file         ALLERGIES: Patient has no known allergies. Review of Systems   Constitutional: Negative. HENT: Negative. Eyes: Negative. Respiratory: Negative. Cardiovascular: Negative. Gastrointestinal: Negative. Endocrine: Negative. Genitourinary: Negative. Musculoskeletal:        As in HPI   Allergic/Immunologic: Negative. Neurological: Negative. Hematological: Negative. Psychiatric/Behavioral: Negative. All other systems reviewed and are negative.       Vitals:    11/04/21 1059   BP: (!) 168/107   Pulse: 77   Resp: 18   Temp: 98.7 °F (37.1 °C)   SpO2: 98%   Weight: 77.1 kg (170 lb)   Height: 5' (1.524 m)            Physical Exam  Vitals and nursing note reviewed. Constitutional:       General: She is in acute distress. Appearance: Normal appearance. She is obese. She is not ill-appearing, toxic-appearing or diaphoretic. Comments: Tearful and anxious   HENT:      Head: Normocephalic and atraumatic. Nose: Nose normal.      Mouth/Throat:      Mouth: Mucous membranes are moist.   Eyes:      Extraocular Movements: Extraocular movements intact. Pupils: Pupils are equal, round, and reactive to light. Cardiovascular:      Rate and Rhythm: Normal rate and regular rhythm. Pulses: Normal pulses. Heart sounds: Normal heart sounds. No murmur heard. No friction rub. No gallop. Pulmonary:      Effort: Pulmonary effort is normal. No respiratory distress. Breath sounds: Normal breath sounds. No stridor. No wheezing, rhonchi or rales. Comments: Tender to palpation to the small area of the left lateral thorax. No skin changes  Chest:      Chest wall: Tenderness present. Abdominal:      General: Abdomen is flat. There is no distension. Palpations: Abdomen is soft. There is no mass. Tenderness: There is no abdominal tenderness. There is no left CVA tenderness, guarding or rebound. Hernia: No hernia is present. Musculoskeletal:         General: No swelling, tenderness, deformity or signs of injury. Normal range of motion. Cervical back: Normal range of motion and neck supple. No rigidity or tenderness. Right lower leg: No edema. Left lower leg: No edema. Lymphadenopathy:      Cervical: No cervical adenopathy. Skin:     General: Skin is warm and dry. Coloration: Skin is not jaundiced or pale. Findings: No bruising, erythema, lesion or rash. Neurological:      General: No focal deficit present. Mental Status: She is alert and oriented to person, place, and time. Mental status is at baseline. Cranial Nerves: No cranial nerve deficit. Sensory: No sensory deficit.       Motor: No weakness. Coordination: Coordination normal.   Psychiatric:      Comments: Tearful and anxious          MDM     This appears to be a chest wall muscle strain or pull. Given vigorous coughing could also be spontaneous rib fracture. Will start with pain control and rib series. May need noncontrast CT chest.  I do not suspect an internal injury.   Procedures

## 2021-11-10 NOTE — H&P (VIEW-ONLY)
Sanjuana Huff is a 55 y.o. female who presents today for the following:  Chief Complaint   Patient presents with    Anemia         No Known Allergies    Current Outpatient Medications   Medication Sig    cholecalciferol, vitamin d3, (Vitamin D3) 10 mcg (400 unit) cap Take  by mouth daily.  sodium-potassium-mag sulfate (SUPREP) 17.5-3.13-1.6 gram solr oral solution Use as directed1  Indications: emptying of the bowel, Colonoscopy    pantoprazole (PROTONIX) 40 mg tablet Take 1 Tablet by mouth daily.  metFORMIN (GLUCOPHAGE) 500 mg tablet Take  by mouth three (3) times daily (with meals).  carvediloL (Coreg) 25 mg tablet Take 25 mg by mouth two (2) times a day.  magnesium oxide (MAG-OX) 400 mg tablet Take 400 mg by mouth four (4) times daily.  atorvastatin (LIPITOR) 20 mg tablet Take 20 mg by mouth daily.  dapagliflozin (Farxiga) 10 mg tab tablet Take 10 mg by mouth daily.  losartan (COZAAR) 25 mg tablet Take 25 mg by mouth daily.  furosemide (Lasix) 20 mg tablet Take 1 Tablet by mouth daily as needed for Other (Use if greater than 3 pound weight gain in 24 hours or greater 5 pound weight gain in 5 days). (Patient not taking: Reported on 11/10/2021)     No current facility-administered medications for this visit.        Past Medical History:   Diagnosis Date    AICD (automatic cardioverter/defibrillator) present 12/2020    CAD (coronary artery disease)     Cardiomyopathy ([facility] [address] 08/2020    Diabetes ([facility] [address]     Hypertension        Past Surgical History:   Procedure Laterality Date    ENDOSCOPY VISIT-OUTPATIENT  10/08/2021    HX COLONOSCOPY      HX HEART CATHETERIZATION      x 2     HX HERNIA REPAIR      HX HYSTERECTOMY      HX IMPLANTABLE CARDIOVERTER DEFIBRILLATOR      HX OOPHORECTOMY      HX SVT ABLATION         Family History   Problem Relation Age of Onset    Breast Cancer Sister     Diabetes Mother     Hypertension Mother     Elevated Lipids Mother     Heart Surgery Father        Social History     Socioeconomic History    Marital status:      Spouse name: Not on file    Number of children: Not on file    Years of education: Not on file    Highest education level: Not on file   Occupational History    Not on file   Tobacco Use    Smoking status: Never Smoker    Smokeless tobacco: Never Used   Vaping Use    Vaping Use: Never used   Substance and Sexual Activity    Alcohol use: Not on file    Drug use: Not on file    Sexual activity: Not on file   Other Topics Concern   [address] Service Not Asked    Blood Transfusions Not Asked    Caffeine Concern Not Asked    Occupational Exposure Not Asked    Hobby Hazards Not Asked    Sleep Concern Not Asked    Stress Concern Not Asked    Weight Concern Not Asked    Special Diet Not Asked    Back Care Not Asked    Exercise Not Asked    Bike Helmet Not Asked    Seat Belt Not Asked    Self-Exams Not Asked   Social History Narrative    Not on file     Social Determinants of Health     Financial Resource Strain:     Difficulty of Paying Living Expenses: Not on file   Food Insecurity:     Worried About Running Out of Food in the Last Year: Not on file    Jairo of Food in the Last Year: Not on file   Transportation Needs:     Lack of Transportation (Medical): Not on file    Lack of Transportation (Non-Medical):  Not on file   Physical Activity:     Days of Exercise per Week: Not on file    Minutes of Exercise per Session: Not on file   Stress:     Feeling of Stress : Not on file   Social Connections:     Frequency of Communication with Friends and Family: Not on file    Frequency of Social Gatherings with Friends and Family: Not on file    Attends Christianity Services: Not on file    Active Member of Clubs or Organizations: Not on file    Attends Club or Organization Meetings: Not on file    Marital Status: Not on file   Intimate Partner Violence:     Fear of Current or Ex-Partner: Not on file   [employer] Emotionally Abused: Not on file    Physically Abused: Not on file    Sexually Abused: Not on file   Housing Stability:     Unable to Pay for Housing in the Last Year: Not on file    Number of Places Lived in the Last Year: Not on file    Unstable Housing in the Last Year: Not on file         FUNMI  60-year-old female history of hypertension, cardiomyopathy, AICD, diabetes mellitus type 2, and obstructive sleep apnea on CPAP, and anemia who comes in for further evaluation of anemia. Patient was seen in October 2021 while hospitalized and she had a EGD on 10/8/2021 which showed antral gastritis and distal esophagitis. Since discharge patient has required blood transfusions secondary to severe anemia. Patient states she recently coughed and fractured a rib on her right side for uncertain reasons. She denied any trauma, but states she was soreness prior. No shortness of breath. Her most recent hemoglobin was 10.4 which was after transfusion of 2 units of packed RBCs. Patient did last have a colonoscopy in 2018 which primarily showed diverticular disease. She has been found to be Hemoccult positive. No abdominal pain. Patient states she had bad abdominal pain prior to the start of already her symptoms. Blood work done in the hospital did show elevated total protein level. No gross GI bleeding. No blackouts tarry stools. Review of Systems   Constitutional: Negative. HENT: Negative. Negative for nosebleeds. Eyes: Negative. Respiratory: Negative. Cardiovascular: Negative. Gastrointestinal: Positive for heartburn and melena. Negative for abdominal pain, blood in stool, constipation, diarrhea, nausea and vomiting. Genitourinary: Negative. Musculoskeletal: Negative. Skin: Negative. Neurological: Negative. Endo/Heme/Allergies: Negative. Psychiatric/Behavioral: Negative. All other systems reviewed and are negative.         Visit Vitals  BP (!) 160/90 (BP 1 Location: Right upper arm, BP Patient Position: Sitting, BP Cuff Size: Adult)   Pulse 65   Temp 98 °F (36.7 °C) (Temporal)   Resp 16   Ht 5' (1.524 m)   Wt 77.7 kg (171 lb 3.2 oz)   SpO2 97% Comment: room air   BMI 33.44 kg/m²     Physical Exam  Vitals and nursing note reviewed. Constitutional:       Appearance: Normal appearance. She is obese. HENT:      Head: Normocephalic and atraumatic. Nose: Nose normal.      Mouth/Throat:      Mouth: Mucous membranes are moist.      Pharynx: Oropharynx is clear. Eyes:      General: No scleral icterus. Conjunctiva/sclera: Conjunctivae normal.      Pupils: Pupils are equal, round, and reactive to light. Cardiovascular:      Rate and Rhythm: Normal rate and regular rhythm. Pulses: Normal pulses. Heart sounds: Normal heart sounds. Pulmonary:      Effort: Pulmonary effort is normal.      Breath sounds: Normal breath sounds. Abdominal:      General: Bowel sounds are normal. There is no distension. Palpations: Abdomen is soft. There is no mass. Tenderness: There is no abdominal tenderness. There is no right CVA tenderness, left CVA tenderness, guarding or rebound. Hernia: No hernia is present. Musculoskeletal:         General: Normal range of motion. Cervical back: Normal range of motion and neck supple. Skin:     General: Skin is warm and dry. Coloration: Skin is not jaundiced. Neurological:      General: No focal deficit present. Mental Status: She is alert and oriented to person, place, and time. Psychiatric:         Mood and Affect: Mood normal.         Behavior: Behavior normal.         Thought Content: Thought content normal.         Judgment: Judgment normal.            1. Heme positive stool    - COLONOSCOPY,DIAGNOSTIC; Future  - sodium-potassium-mag sulfate (SUPREP) 17.5-3.13-1.6 gram solr oral solution; Use as directed1  Indications: emptying of the bowel, Colonoscopy  Dispense: 1 Kit; Refill: 0    2.  Symptomatic anemia  We will continue work-up to determine cause of this recurrent severe anemia  - COLONOSCOPY,DIAGNOSTIC; Future  - sodium-potassium-mag sulfate (SUPREP) 17.5-3.13-1.6 gram solr oral solution; Use as directed1  Indications: emptying of the bowel, Colonoscopy  Dispense: 1 Kit; Refill: 0  - PROTEIN ELECTROPHORESIS; Future    3. Hyperproteinemia  We will perform him with electrophoresis for her elevated total protein.   - PROTEIN ELECTROPHORESIS; Future

## 2021-11-10 NOTE — PROGRESS NOTES
1. Have you been to the ER, urgent care clinic since your last visit? Hospitalized since your last visit? Yes   11-3-2021  Blood transfusion,  11-4-2021 cracked ribs abd pain    2. Have you seen or consulted any other health care providers outside of the [address] [place] [address] since your last visit? Include any pap smears or colon screening.  No   Chief Complaint   Patient presents with    Anemia     Visit Vitals  BP (!) 160/90 (BP 1 Location: Right upper arm, BP Patient Position: Sitting, BP Cuff Size: Adult)   Pulse 65   Temp 98 °F (36.7 °C) (Temporal)   Resp 16   Ht 5' (1.524 m)   Wt 77.7 kg (171 lb 3.2 oz)   SpO2 97% Comment: room air   BMI 33.44 kg/m²

## 2021-11-10 NOTE — PROGRESS NOTES
Jessica Aguilera is a 55 y.o. female who presents today for the following:  Chief Complaint   Patient presents with    Anemia         No Known Allergies    Current Outpatient Medications   Medication Sig    cholecalciferol, vitamin d3, (Vitamin D3) 10 mcg (400 unit) cap Take  by mouth daily.  sodium-potassium-mag sulfate (SUPREP) 17.5-3.13-1.6 gram solr oral solution Use as directed1  Indications: emptying of the bowel, Colonoscopy    pantoprazole (PROTONIX) 40 mg tablet Take 1 Tablet by mouth daily.  metFORMIN (GLUCOPHAGE) 500 mg tablet Take  by mouth three (3) times daily (with meals).  carvediloL (Coreg) 25 mg tablet Take 25 mg by mouth two (2) times a day.  magnesium oxide (MAG-OX) 400 mg tablet Take 400 mg by mouth four (4) times daily.  atorvastatin (LIPITOR) 20 mg tablet Take 20 mg by mouth daily.  dapagliflozin (Farxiga) 10 mg tab tablet Take 10 mg by mouth daily.  losartan (COZAAR) 25 mg tablet Take 25 mg by mouth daily.  furosemide (Lasix) 20 mg tablet Take 1 Tablet by mouth daily as needed for Other (Use if greater than 3 pound weight gain in 24 hours or greater 5 pound weight gain in 5 days). (Patient not taking: Reported on 11/10/2021)     No current facility-administered medications for this visit.        Past Medical History:   Diagnosis Date    AICD (automatic cardioverter/defibrillator) present 12/2020    CAD (coronary artery disease)     Cardiomyopathy ([address] 08/2020    Diabetes ([facility] [address]     Hypertension        Past Surgical History:   Procedure Laterality Date    ENDOSCOPY VISIT-OUTPATIENT  10/08/2021    HX COLONOSCOPY      HX HEART CATHETERIZATION      x 2     HX HERNIA REPAIR      HX HYSTERECTOMY      HX IMPLANTABLE CARDIOVERTER DEFIBRILLATOR      HX OOPHORECTOMY      HX SVT ABLATION         Family History   Problem Relation Age of Onset    Breast Cancer Sister     Diabetes Mother     Hypertension Mother     Elevated Lipids Mother     Heart Surgery Father        Social History     Socioeconomic History    Marital status:      Spouse name: Not on file    Number of children: Not on file    Years of education: Not on file    Highest education level: Not on file   Occupational History    Not on file   Tobacco Use    Smoking status: Never Smoker    Smokeless tobacco: Never Used   Vaping Use    Vaping Use: Never used   Substance and Sexual Activity    Alcohol use: Not on file    Drug use: Not on file    Sexual activity: Not on file   Other Topics Concern   [address] Service Not Asked    Blood Transfusions Not Asked    Caffeine Concern Not Asked    Occupational Exposure Not Asked    Hobby Hazards Not Asked    Sleep Concern Not Asked    Stress Concern Not Asked    Weight Concern Not Asked    Special Diet Not Asked    Back Care Not Asked    Exercise Not Asked    Bike Helmet Not Asked    Seat Belt Not Asked    Self-Exams Not Asked   Social History Narrative    Not on file     Social Determinants of Health     Financial Resource Strain:     Difficulty of Paying Living Expenses: Not on file   Food Insecurity:     Worried About Running Out of Food in the Last Year: Not on file    Jairo of Food in the Last Year: Not on file   Transportation Needs:     Lack of Transportation (Medical): Not on file    Lack of Transportation (Non-Medical):  Not on file   Physical Activity:     Days of Exercise per Week: Not on file    Minutes of Exercise per Session: Not on file   Stress:     Feeling of Stress : Not on file   Social Connections:     Frequency of Communication with Friends and Family: Not on file    Frequency of Social Gatherings with Friends and Family: Not on file    Attends Christianity Services: Not on file    Active Member of Clubs or Organizations: Not on file    Attends Club or Organization Meetings: Not on file    Marital Status: Not on file   Intimate Partner Violence:     Fear of Current or Ex-Partner: Not on file   [facility] Emotionally Abused: Not on file    Physically Abused: Not on file    Sexually Abused: Not on file   Housing Stability:     Unable to Pay for Housing in the Last Year: Not on file    Number of Places Lived in the Last Year: Not on file    Unstable Housing in the Last Year: Not on file         FUNMI  52-year-old female history of hypertension, cardiomyopathy, AICD, diabetes mellitus type 2, and obstructive sleep apnea on CPAP, and anemia who comes in for further evaluation of anemia. Patient was seen in October 2021 while hospitalized and she had a EGD on 10/8/2021 which showed antral gastritis and distal esophagitis. Since discharge patient has required blood transfusions secondary to severe anemia. Patient states she recently coughed and fractured a rib on her right side for uncertain reasons. She denied any trauma, but states she was soreness prior. No shortness of breath. Her most recent hemoglobin was 10.4 which was after transfusion of 2 units of packed RBCs. Patient did last have a colonoscopy in 2018 which primarily showed diverticular disease. She has been found to be Hemoccult positive. No abdominal pain. Patient states she had bad abdominal pain prior to the start of already her symptoms. Blood work done in the hospital did show elevated total protein level. No gross GI bleeding. No blackouts tarry stools. Review of Systems   Constitutional: Negative. HENT: Negative. Negative for nosebleeds. Eyes: Negative. Respiratory: Negative. Cardiovascular: Negative. Gastrointestinal: Positive for heartburn and melena. Negative for abdominal pain, blood in stool, constipation, diarrhea, nausea and vomiting. Genitourinary: Negative. Musculoskeletal: Negative. Skin: Negative. Neurological: Negative. Endo/Heme/Allergies: Negative. Psychiatric/Behavioral: Negative. All other systems reviewed and are negative.         Visit Vitals  BP (!) 160/90 (BP 1 Location: Right upper arm, BP Patient Position: Sitting, BP Cuff Size: Adult)   Pulse 65   Temp 98 °F (36.7 °C) (Temporal)   Resp 16   Ht 5' (1.524 m)   Wt 77.7 kg (171 lb 3.2 oz)   SpO2 97% Comment: room air   BMI 33.44 kg/m²     Physical Exam  Vitals and nursing note reviewed. Constitutional:       Appearance: Normal appearance. She is obese. HENT:      Head: Normocephalic and atraumatic. Nose: Nose normal.      Mouth/Throat:      Mouth: Mucous membranes are moist.      Pharynx: Oropharynx is clear. Eyes:      General: No scleral icterus. Conjunctiva/sclera: Conjunctivae normal.      Pupils: Pupils are equal, round, and reactive to light. Cardiovascular:      Rate and Rhythm: Normal rate and regular rhythm. Pulses: Normal pulses. Heart sounds: Normal heart sounds. Pulmonary:      Effort: Pulmonary effort is normal.      Breath sounds: Normal breath sounds. Abdominal:      General: Bowel sounds are normal. There is no distension. Palpations: Abdomen is soft. There is no mass. Tenderness: There is no abdominal tenderness. There is no right CVA tenderness, left CVA tenderness, guarding or rebound. Hernia: No hernia is present. Musculoskeletal:         General: Normal range of motion. Cervical back: Normal range of motion and neck supple. Skin:     General: Skin is warm and dry. Coloration: Skin is not jaundiced. Neurological:      General: No focal deficit present. Mental Status: She is alert and oriented to person, place, and time. Psychiatric:         Mood and Affect: Mood normal.         Behavior: Behavior normal.         Thought Content: Thought content normal.         Judgment: Judgment normal.            1. Heme positive stool    - COLONOSCOPY,DIAGNOSTIC; Future  - sodium-potassium-mag sulfate (SUPREP) 17.5-3.13-1.6 gram solr oral solution; Use as directed1  Indications: emptying of the bowel, Colonoscopy  Dispense: 1 Kit; Refill: 0    2.  Symptomatic anemia  We will continue work-up to determine cause of this recurrent severe anemia  - COLONOSCOPY,DIAGNOSTIC; Future  - sodium-potassium-mag sulfate (SUPREP) 17.5-3.13-1.6 gram solr oral solution; Use as directed1  Indications: emptying of the bowel, Colonoscopy  Dispense: 1 Kit; Refill: 0  - PROTEIN ELECTROPHORESIS; Future    3. Hyperproteinemia  We will perform him with electrophoresis for her elevated total protein.   - PROTEIN ELECTROPHORESIS; Future

## 2021-11-17 NOTE — ANESTHESIA PREPROCEDURE EVALUATION
Relevant Problems   RENAL FAILURE   (+) YANETH (acute kidney injury) ([facility] [address]      HEMATOLOGY   (+) Symptomatic anemia       Anesthetic History   No history of anesthetic complications  Other anesthesia complications          Review of Systems / Medical History  Patient summary reviewed, nursing notes reviewed and pertinent labs reviewed    Pulmonary  Within defined limits                 Neuro/Psych   Within defined limits           Cardiovascular    Hypertension          CAD      Comments: AICD   GI/Hepatic/Renal  Within defined limits              Endo/Other    Diabetes         Other Findings            Physical Exam    Airway  Mallampati: II  TM Distance: 4 - 6 cm  Neck ROM: normal range of motion        Cardiovascular    Rhythm: regular  Rate: normal         Dental  No notable dental hx       Pulmonary  Breath sounds clear to auscultation               Abdominal  Abdominal exam normal       Other Findings            Anesthetic Plan    ASA: 3  Anesthesia type: total IV anesthesia            Anesthetic plan and risks discussed with: Patient

## 2021-11-17 NOTE — OP NOTES
Colonoscopy Procedure Note      Patient: Johnson Desai MRN: [id number]  SSN: [id number]    YOB: 1965  Age: 55 y.o. Sex: female      Date of Procedure: 11/17/2021  Date/Time:  11/17/2021 10:36 AM       IMPRESSION:     1. Transverse colon polyps   2. Internal hemorrhoids (grade 2)         RECOMMENDATIONS:     1) Check biopsy results. 2) Await pathology report. Call me in 2 weeks if you have not received any information from my office regarding your results. 3) Repeat colonoscopy in 2 years. INDICATION: Heme positive stools, anemia   PROCEDURE PERFORMED: Colonoscopy with cold biopsies     DESCRIPTION OF PROCEDURE: An informed consent was obtained. The patient was placed in left lateral position. Perianal inspection and a digital rectal exam was performed. Video colonoscope was introduced into the rectum and advanced under direct vision up to the terminal ileum. With adequate insufflation and maneuvering of the withdrawing scope, the colonic mucosa was visualized carefully. Retroflexion was performed in the rectum to see the anorectum and also in the ascending colon to look behind the folds. Vital signs, pulse oximetry, single lead cardiac monitor were monitored throughout the procedure as the sedation was titrated to the desired effect ensuring patient comfort and safety. The patient tolerated the procedure very well and was transferred to the recovery area. Following is the summary of findings: 2 small polyps were noted in the proximal transverse colon which measured 0.5 and 0.4 cm which were removed via cold biopsies. No other mucosal lesions were noted.   Did have some internal hemorrhoids as removed through the anal canal     ENDOSCOPIST: Nicolasa Habermann, MD      ENDOSCOPE: Olympus video colonoscope     ASSISTANT:Circ-1: Chico Sifuentes              Scrub Tech-1: Marcie Perez RN-1: Patrick Vela RN     ANESTHESIA: TIVA      QUALITY OF PREPARATION: Good      FINDINGS:   1. Transverse colon polyps   2.  Internal hemorrhoids (grade 2)        EBL: Minimal     SPECIMENS:   ID Type Source Tests Collected by Time Destination   1 : [facility]ps [facility]  Larymie MD Lori 11/17/2021 1024 Pathology             Mariya Pruitt MD  November 17, 2021  10:36 AM

## 2021-11-17 NOTE — INTERVAL H&P NOTE
Update History & Physical    The Patient's History and Physical of November 17, 2021,  was reviewed with the patient and I examined the patient. There was no change. The surgical site was confirmed by the patient and me. Plan:  The risk, benefits, expected outcome, and alternative to the recommended procedure have been discussed with the patient. Patient understands and wants to proceed with the procedure.     Electronically signed by Rico Acosta MD on 11/17/2021 at 9:35 AM

## 2021-11-17 NOTE — ANESTHESIA POSTPROCEDURE EVALUATION
Procedure(s):  COLONOSCOPY ( T I V A). total IV anesthesia    Anesthesia Post Evaluation      Multimodal analgesia: multimodal analgesia not used between 6 hours prior to anesthesia start to PACU discharge  Patient location during evaluation: PACU  Patient participation: complete - patient participated  Level of consciousness: sleepy but conscious  Pain management: adequate  Anesthetic complications: no  Cardiovascular status: acceptable and stable  Respiratory status: acceptable and room air  Hydration status: acceptable  Post anesthesia nausea and vomiting:  none  Final Post Anesthesia Temperature Assessment:  Normothermia (36.0-37.5 degrees C)      INITIAL Post-op Vital signs: No vitals data found for the desired time range.

## 2021-11-17 NOTE — DISCHARGE INSTRUCTIONS

## 2021-12-14 NOTE — PROGRESS NOTES
Results already discussed with patient and sister and she has been referred to hematology where she has already been seen there.

## 2021-12-15 NOTE — TELEPHONE ENCOUNTER
----- Message from Jo De Guzman MD sent at 12/14/2021  1:36 PM EST -----  Tell patient that the polyp removed from her colon was benign. She should have a repeat colonoscopy in 2 years.

## 2021-12-15 NOTE — TELEPHONE ENCOUNTER
Patient  Notified that the polyp removed from her colon was benign.  She should have a repeat colonoscopy in 2 years. Patient agrees.

## 2021-12-23 PROBLEM — D64.9 ANEMIA: Status: ACTIVE | Noted: 2021-01-01

## 2021-12-24 NOTE — PROGRESS NOTES
Patient alert and oriented x 4 with respirations even and unlabored on RA. Patient discharged home per physician's order. Patient verbalizes understanding of discharge instructions. Patient declines wheelchair transport for discharge. Patient ambulating  With steady gait noted accompanied by patient's sister to hospital entrance for discharge.

## 2022-01-01 ENCOUNTER — APPOINTMENT (OUTPATIENT)
Dept: CT IMAGING | Age: 57
DRG: 871 | End: 2022-01-01
Attending: STUDENT IN AN ORGANIZED HEALTH CARE EDUCATION/TRAINING PROGRAM
Payer: COMMERCIAL

## 2022-01-01 ENCOUNTER — APPOINTMENT (OUTPATIENT)
Dept: CT IMAGING | Age: 57
End: 2022-01-01
Attending: FAMILY MEDICINE
Payer: COMMERCIAL

## 2022-01-01 ENCOUNTER — HOSPITAL ENCOUNTER (EMERGENCY)
Age: 57
Discharge: ANOTHER HOSPITAL | End: 2022-09-13
Attending: EMERGENCY MEDICINE | Admitting: EMERGENCY MEDICINE
Payer: COMMERCIAL

## 2022-01-01 ENCOUNTER — HOSPITAL ENCOUNTER (INPATIENT)
Age: 57
LOS: 5 days | Discharge: HOME | DRG: 871 | End: 2022-05-31
Attending: INTERNAL MEDICINE | Admitting: INTERNAL MEDICINE
Payer: COMMERCIAL

## 2022-01-01 ENCOUNTER — APPOINTMENT (OUTPATIENT)
Dept: CT IMAGING | Age: 57
DRG: 871 | End: 2022-01-01
Attending: PHYSICIAN ASSISTANT
Payer: COMMERCIAL

## 2022-01-01 ENCOUNTER — HOSPITAL ENCOUNTER (INPATIENT)
Age: 57
LOS: 1 days | DRG: 951 | End: 2022-09-17
Attending: INTERNAL MEDICINE | Admitting: INTERNAL MEDICINE
Payer: OTHER MISCELLANEOUS

## 2022-01-01 ENCOUNTER — HOSPITAL ENCOUNTER (INPATIENT)
Age: 57
LOS: 6 days | Discharge: HOME | DRG: 177 | End: 2022-01-26
Attending: EMERGENCY MEDICINE | Admitting: HOSPITALIST
Payer: COMMERCIAL

## 2022-01-01 ENCOUNTER — PATIENT OUTREACH (OUTPATIENT)
Dept: CASE MANAGEMENT | Age: 57
End: 2022-01-01

## 2022-01-01 ENCOUNTER — HOSPITAL ENCOUNTER (INPATIENT)
Age: 57
LOS: 2 days | Discharge: HOSPICE | DRG: 871 | End: 2022-09-16
Attending: STUDENT IN AN ORGANIZED HEALTH CARE EDUCATION/TRAINING PROGRAM | Admitting: INTERNAL MEDICINE
Payer: COMMERCIAL

## 2022-01-01 ENCOUNTER — APPOINTMENT (OUTPATIENT)
Dept: GENERAL RADIOLOGY | Age: 57
DRG: 871 | End: 2022-01-01
Attending: STUDENT IN AN ORGANIZED HEALTH CARE EDUCATION/TRAINING PROGRAM
Payer: COMMERCIAL

## 2022-01-01 ENCOUNTER — HOSPICE ADMISSION (OUTPATIENT)
Dept: HOSPICE | Facility: HOSPICE | Age: 57
End: 2022-01-01
Payer: COMMERCIAL

## 2022-01-01 ENCOUNTER — APPOINTMENT (OUTPATIENT)
Dept: GENERAL RADIOLOGY | Age: 57
DRG: 177 | End: 2022-01-01
Attending: EMERGENCY MEDICINE
Payer: COMMERCIAL

## 2022-01-01 ENCOUNTER — APPOINTMENT (OUTPATIENT)
Dept: CT IMAGING | Age: 57
DRG: 641 | End: 2022-01-01
Attending: HOSPITALIST
Payer: COMMERCIAL

## 2022-01-01 ENCOUNTER — HOSPITAL ENCOUNTER (INPATIENT)
Age: 57
LOS: 3 days | Discharge: HOME | DRG: 641 | End: 2022-09-02
Attending: EMERGENCY MEDICINE | Admitting: FAMILY MEDICINE
Payer: COMMERCIAL

## 2022-01-01 ENCOUNTER — APPOINTMENT (OUTPATIENT)
Dept: CT IMAGING | Age: 57
DRG: 177 | End: 2022-01-01
Attending: HOSPITALIST
Payer: COMMERCIAL

## 2022-01-01 ENCOUNTER — APPOINTMENT (OUTPATIENT)
Dept: GENERAL RADIOLOGY | Age: 57
End: 2022-01-01
Attending: FAMILY MEDICINE
Payer: COMMERCIAL

## 2022-01-01 ENCOUNTER — HOSPITAL ENCOUNTER (OUTPATIENT)
Age: 57
Setting detail: OBSERVATION
Discharge: HOME | End: 2022-05-26
Attending: EMERGENCY MEDICINE | Admitting: HOSPITALIST
Payer: COMMERCIAL

## 2022-01-01 ENCOUNTER — HOME CARE VISIT (OUTPATIENT)
Dept: HOSPICE | Facility: HOSPICE | Age: 57
End: 2022-01-01
Payer: COMMERCIAL

## 2022-01-01 ENCOUNTER — APPOINTMENT (OUTPATIENT)
Dept: NON INVASIVE DIAGNOSTICS | Age: 57
DRG: 871 | End: 2022-01-01
Attending: PHYSICIAN ASSISTANT
Payer: COMMERCIAL

## 2022-01-01 VITALS
HEIGHT: 60 IN | RESPIRATION RATE: 23 BRPM | TEMPERATURE: 96.9 F | SYSTOLIC BLOOD PRESSURE: 117 MMHG | WEIGHT: 143 LBS | OXYGEN SATURATION: 100 % | DIASTOLIC BLOOD PRESSURE: 71 MMHG | BODY MASS INDEX: 28.07 KG/M2 | HEART RATE: 89 BPM

## 2022-01-01 VITALS
HEIGHT: 60 IN | HEART RATE: 61 BPM | OXYGEN SATURATION: 96 % | RESPIRATION RATE: 24 BRPM | TEMPERATURE: 98.6 F | BODY MASS INDEX: 26.06 KG/M2 | SYSTOLIC BLOOD PRESSURE: 97 MMHG | DIASTOLIC BLOOD PRESSURE: 62 MMHG | WEIGHT: 132.72 LBS

## 2022-01-01 VITALS
OXYGEN SATURATION: 100 % | RESPIRATION RATE: 14 BRPM | WEIGHT: 145.94 LBS | HEART RATE: 70 BPM | HEIGHT: 60 IN | TEMPERATURE: 98.2 F | DIASTOLIC BLOOD PRESSURE: 88 MMHG | SYSTOLIC BLOOD PRESSURE: 144 MMHG | BODY MASS INDEX: 28.65 KG/M2

## 2022-01-01 VITALS
RESPIRATION RATE: 16 BRPM | OXYGEN SATURATION: 100 % | TEMPERATURE: 98.8 F | DIASTOLIC BLOOD PRESSURE: 81 MMHG | HEART RATE: 89 BPM | HEIGHT: 60 IN | WEIGHT: 134.7 LBS | SYSTOLIC BLOOD PRESSURE: 114 MMHG | BODY MASS INDEX: 26.45 KG/M2

## 2022-01-01 VITALS
HEART RATE: 83 BPM | DIASTOLIC BLOOD PRESSURE: 61 MMHG | SYSTOLIC BLOOD PRESSURE: 105 MMHG | TEMPERATURE: 97 F | RESPIRATION RATE: 18 BRPM | BODY MASS INDEX: 25.07 KG/M2 | HEIGHT: 60 IN | WEIGHT: 127.7 LBS | OXYGEN SATURATION: 95 %

## 2022-01-01 VITALS
WEIGHT: 132.72 LBS | HEIGHT: 60 IN | SYSTOLIC BLOOD PRESSURE: 52 MMHG | OXYGEN SATURATION: 95 % | HEART RATE: 110 BPM | DIASTOLIC BLOOD PRESSURE: 32 MMHG | RESPIRATION RATE: 18 BRPM | TEMPERATURE: 98.9 F | BODY MASS INDEX: 26.06 KG/M2

## 2022-01-01 VITALS
WEIGHT: 181 LBS | BODY MASS INDEX: 35.53 KG/M2 | OXYGEN SATURATION: 98 % | DIASTOLIC BLOOD PRESSURE: 80 MMHG | SYSTOLIC BLOOD PRESSURE: 130 MMHG | HEIGHT: 60 IN | HEART RATE: 65 BPM | TEMPERATURE: 97.9 F | RESPIRATION RATE: 20 BRPM

## 2022-01-01 DIAGNOSIS — R19.5 OCCULT BLOOD IN STOOLS: Primary | ICD-10-CM

## 2022-01-01 DIAGNOSIS — E83.52 HYPERCALCEMIA: Primary | ICD-10-CM

## 2022-01-01 DIAGNOSIS — U07.1 PNEUMONIA DUE TO COVID-19 VIRUS: Primary | ICD-10-CM

## 2022-01-01 DIAGNOSIS — E86.0 DEHYDRATION: Primary | ICD-10-CM

## 2022-01-01 DIAGNOSIS — D64.9 ANEMIA, UNSPECIFIED TYPE: ICD-10-CM

## 2022-01-01 DIAGNOSIS — A41.9 SEPSIS, DUE TO UNSPECIFIED ORGANISM, UNSPECIFIED WHETHER ACUTE ORGAN DYSFUNCTION PRESENT (HCC): ICD-10-CM

## 2022-01-01 DIAGNOSIS — J12.82 PNEUMONIA DUE TO COVID-19 VIRUS: Primary | ICD-10-CM

## 2022-01-01 DIAGNOSIS — D69.6 THROMBOCYTOPENIA (HCC): ICD-10-CM

## 2022-01-01 DIAGNOSIS — D61.818 PANCYTOPENIA (HCC): Primary | ICD-10-CM

## 2022-01-01 DIAGNOSIS — D61.818 PANCYTOPENIA (HCC): ICD-10-CM

## 2022-01-01 LAB
1,25(OH)2D3 SERPL-MCNC: 9.8 PG/ML (ref 24.8–81.5)
ABO + RH BLD: NORMAL
ABO + RH BLD: NORMAL
ACC. NO. FROM MICRO ORDER, ACCP: ABNORMAL
ACINETOBACTER CALCOACETICUS-BAUMANII COMPLEX, ACBCX: NOT DETECTED
ALBUMIN SERPL-MCNC: 1.2 G/DL (ref 3.5–5)
ALBUMIN SERPL-MCNC: 1.3 G/DL (ref 3.5–5)
ALBUMIN SERPL-MCNC: 1.6 G/DL (ref 3.5–5)
ALBUMIN SERPL-MCNC: 1.7 G/DL (ref 3.5–5)
ALBUMIN SERPL-MCNC: 1.8 G/DL (ref 3.5–5)
ALBUMIN SERPL-MCNC: 1.8 G/DL (ref 3.5–5)
ALBUMIN SERPL-MCNC: 1.9 G/DL (ref 3.5–5)
ALBUMIN SERPL-MCNC: 2 G/DL (ref 3.5–5)
ALBUMIN SERPL-MCNC: 2 G/DL (ref 3.5–5)
ALBUMIN SERPL-MCNC: 2.1 G/DL (ref 3.5–5)
ALBUMIN SERPL-MCNC: 2.1 G/DL (ref 3.5–5)
ALBUMIN SERPL-MCNC: 2.2 G/DL (ref 3.5–5)
ALBUMIN SERPL-MCNC: 2.4 G/DL (ref 3.5–5)
ALBUMIN SERPL-MCNC: 3.2 G/DL (ref 3.5–5)
ALBUMIN SERPL-MCNC: 3.4 G/DL (ref 3.5–5)
ALBUMIN/GLOB SERPL: 0.1 {RATIO} (ref 1.1–2.2)
ALBUMIN/GLOB SERPL: 0.2 {RATIO} (ref 1.1–2.2)
ALBUMIN/GLOB SERPL: 0.3 {RATIO} (ref 1.1–2.2)
ALBUMIN/GLOB SERPL: 0.6 {RATIO} (ref 1.1–2.2)
ALBUMIN/GLOB SERPL: 0.7 {RATIO} (ref 1.1–2.2)
ALP SERPL-CCNC: 102 U/L (ref 45–117)
ALP SERPL-CCNC: 104 U/L (ref 45–117)
ALP SERPL-CCNC: 39 U/L (ref 45–117)
ALP SERPL-CCNC: 40 U/L (ref 45–117)
ALP SERPL-CCNC: 41 U/L (ref 45–117)
ALP SERPL-CCNC: 42 U/L (ref 45–117)
ALP SERPL-CCNC: 45 U/L (ref 45–117)
ALP SERPL-CCNC: 48 U/L (ref 45–117)
ALP SERPL-CCNC: 49 U/L (ref 45–117)
ALP SERPL-CCNC: 50 U/L (ref 45–117)
ALP SERPL-CCNC: 63 U/L (ref 45–117)
ALP SERPL-CCNC: 72 U/L (ref 45–117)
ALP SERPL-CCNC: 73 U/L (ref 45–117)
ALP SERPL-CCNC: 95 U/L (ref 45–117)
ALP SERPL-CCNC: 98 U/L (ref 45–117)
ALT SERPL-CCNC: 20 U/L (ref 12–78)
ALT SERPL-CCNC: 21 U/L (ref 12–78)
ALT SERPL-CCNC: 23 U/L (ref 12–78)
ALT SERPL-CCNC: 23 U/L (ref 12–78)
ALT SERPL-CCNC: 24 U/L (ref 12–78)
ALT SERPL-CCNC: 24 U/L (ref 12–78)
ALT SERPL-CCNC: 25 U/L (ref 12–78)
ALT SERPL-CCNC: 27 U/L (ref 12–78)
ALT SERPL-CCNC: 35 U/L (ref 12–78)
ALT SERPL-CCNC: 36 U/L (ref 12–78)
ALT SERPL-CCNC: 42 U/L (ref 12–78)
ALT SERPL-CCNC: 44 U/L (ref 12–78)
ALT SERPL-CCNC: 47 U/L (ref 12–78)
ALT SERPL-CCNC: 61 U/L (ref 12–78)
ALT SERPL-CCNC: 65 U/L (ref 12–78)
ALT SERPL-CCNC: 70 U/L (ref 12–78)
ALT SERPL-CCNC: 92 U/L (ref 12–78)
AMMONIA PLAS-SCNC: 58 UMOL/L
ANION GAP SERPL CALC-SCNC: 1 MMOL/L (ref 5–15)
ANION GAP SERPL CALC-SCNC: 10 MMOL/L (ref 5–15)
ANION GAP SERPL CALC-SCNC: 16 MMOL/L (ref 5–15)
ANION GAP SERPL CALC-SCNC: 19 MMOL/L (ref 5–15)
ANION GAP SERPL CALC-SCNC: 2 MMOL/L (ref 5–15)
ANION GAP SERPL CALC-SCNC: 3 MMOL/L (ref 5–15)
ANION GAP SERPL CALC-SCNC: 4 MMOL/L (ref 5–15)
ANION GAP SERPL CALC-SCNC: 5 MMOL/L (ref 5–15)
ANION GAP SERPL CALC-SCNC: 7 MMOL/L (ref 5–15)
ANION GAP SERPL CALC-SCNC: 9 MMOL/L (ref 5–15)
ANION GAP SERPL CALC-SCNC: 9 MMOL/L (ref 5–15)
ANION GAP SERPL CALC-SCNC: ABNORMAL MMOL/L (ref 5–15)
ANION GAP SERPL CALC-SCNC: ABNORMAL MMOL/L (ref 5–15)
ANTI-COMPLEMENT (C3B,C3D): NEGATIVE
APPEARANCE UR: CLEAR
ARTERIAL PATENCY WRIST A: ABNORMAL
ARTERIAL PATENCY WRIST A: YES
AST SERPL W P-5'-P-CCNC: 10 U/L (ref 15–37)
AST SERPL W P-5'-P-CCNC: 12 U/L (ref 15–37)
AST SERPL W P-5'-P-CCNC: 173 U/L (ref 15–37)
AST SERPL W P-5'-P-CCNC: 19 U/L (ref 15–37)
AST SERPL W P-5'-P-CCNC: 22 U/L (ref 15–37)
AST SERPL W P-5'-P-CCNC: 25 U/L (ref 15–37)
AST SERPL W P-5'-P-CCNC: 26 U/L (ref 15–37)
AST SERPL W P-5'-P-CCNC: 302 U/L (ref 15–37)
AST SERPL W P-5'-P-CCNC: 33 U/L (ref 15–37)
AST SERPL W P-5'-P-CCNC: 36 U/L (ref 15–37)
AST SERPL W P-5'-P-CCNC: 38 U/L (ref 15–37)
AST SERPL W P-5'-P-CCNC: 6 U/L (ref 15–37)
AST SERPL W P-5'-P-CCNC: 60 U/L (ref 15–37)
AST SERPL W P-5'-P-CCNC: 66 U/L (ref 15–37)
AST SERPL W P-5'-P-CCNC: 79 U/L (ref 15–37)
AST SERPL W P-5'-P-CCNC: 8 U/L (ref 15–37)
AST SERPL W P-5'-P-CCNC: 9 U/L (ref 15–37)
ATRIAL RATE: 82 BPM
ATRIAL RATE: 90 BPM
ATRIAL RATE: 95 BPM
BACTERIA SPEC CULT: ABNORMAL
BACTERIA SPEC CULT: NORMAL
BACTERIA URNS QL MICRO: ABNORMAL /HPF
BACTERIA URNS QL MICRO: NEGATIVE /HPF
BACTEROIDES FRAGILIS, BFRA: NOT DETECTED
BASE DEFICIT BLDA-SCNC: 0.9 MMOL/L (ref 0–2)
BASE DEFICIT BLDA-SCNC: 26.2 MMOL/L
BASE DEFICIT BLDA-SCNC: 4.2 MMOL/L
BASOPHILS # BLD: 0 K/UL (ref 0–0.1)
BASOPHILS # BLD: 0 K/UL (ref 0–0.2)
BASOPHILS # BLD: 0.1 K/UL (ref 0–0.2)
BASOPHILS NFR BLD: 0 % (ref 0–1)
BASOPHILS NFR BLD: 0 % (ref 0–2.5)
BASOPHILS NFR BLD: 1 % (ref 0–1)
BASOPHILS NFR BLD: 1 % (ref 0–2.5)
BDY SITE: ABNORMAL
BILIRUB SERPL-MCNC: 0.3 MG/DL (ref 0.2–1)
BILIRUB SERPL-MCNC: 0.4 MG/DL (ref 0.2–1)
BILIRUB SERPL-MCNC: 0.6 MG/DL (ref 0.2–1)
BILIRUB SERPL-MCNC: 0.7 MG/DL (ref 0.2–1)
BILIRUB SERPL-MCNC: 0.7 MG/DL (ref 0.2–1)
BILIRUB SERPL-MCNC: 0.8 MG/DL (ref 0.2–1)
BILIRUB SERPL-MCNC: 0.9 MG/DL (ref 0.2–1)
BILIRUB SERPL-MCNC: 1 MG/DL (ref 0.2–1)
BILIRUB SERPL-MCNC: 1 MG/DL (ref 0.2–1)
BILIRUB UR QL: NEGATIVE
BIOFIRE COMMENT, BCIDPF: ABNORMAL
BLD PROD TYP BPU: NORMAL
BLOOD GROUP ANTIBODIES SERPL: NEGATIVE
BLOOD GROUP ANTIBODIES SERPL: NORMAL
BLOOD GROUP ANTIBODIES SERPL: POSITIVE
BNP SERPL-MCNC: 976 PG/ML
BODY TEMPERATURE: 96.6
BODY TEMPERATURE: 97
BODY TEMPERATURE: 98.6
BPU ID: NORMAL
BUN SERPL-MCNC: 10 MG/DL (ref 6–20)
BUN SERPL-MCNC: 11 MG/DL (ref 6–20)
BUN SERPL-MCNC: 12 MG/DL (ref 6–20)
BUN SERPL-MCNC: 12 MG/DL (ref 6–20)
BUN SERPL-MCNC: 13 MG/DL (ref 6–20)
BUN SERPL-MCNC: 14 MG/DL (ref 6–20)
BUN SERPL-MCNC: 15 MG/DL (ref 6–20)
BUN SERPL-MCNC: 17 MG/DL (ref 6–20)
BUN SERPL-MCNC: 17 MG/DL (ref 6–20)
BUN SERPL-MCNC: 19 MG/DL (ref 6–20)
BUN SERPL-MCNC: 21 MG/DL (ref 6–20)
BUN SERPL-MCNC: 21 MG/DL (ref 6–20)
BUN SERPL-MCNC: 22 MG/DL (ref 6–20)
BUN SERPL-MCNC: 25 MG/DL (ref 6–20)
BUN SERPL-MCNC: 26 MG/DL (ref 6–20)
BUN SERPL-MCNC: 5 MG/DL (ref 6–20)
BUN SERPL-MCNC: 5 MG/DL (ref 6–20)
BUN SERPL-MCNC: 6 MG/DL (ref 6–20)
BUN SERPL-MCNC: 7 MG/DL (ref 6–20)
BUN SERPL-MCNC: 9 MG/DL (ref 6–20)
BUN/CREAT SERPL: 10 (ref 12–20)
BUN/CREAT SERPL: 11 (ref 12–20)
BUN/CREAT SERPL: 12 (ref 12–20)
BUN/CREAT SERPL: 13 (ref 12–20)
BUN/CREAT SERPL: 13 (ref 12–20)
BUN/CREAT SERPL: 14 (ref 12–20)
BUN/CREAT SERPL: 16 (ref 12–20)
BUN/CREAT SERPL: 18 (ref 12–20)
BUN/CREAT SERPL: 20 (ref 12–20)
BUN/CREAT SERPL: 23 (ref 12–20)
BUN/CREAT SERPL: 24 (ref 12–20)
BUN/CREAT SERPL: 25 (ref 12–20)
BUN/CREAT SERPL: 25 (ref 12–20)
BUN/CREAT SERPL: 5 (ref 12–20)
BUN/CREAT SERPL: 7 (ref 12–20)
BUN/CREAT SERPL: 8 (ref 12–20)
C GLABRATA DNA VAG QL NAA+PROBE: NOT DETECTED
CA-I BLD-MCNC: 10.2 MG/DL (ref 8.5–10.1)
CA-I BLD-MCNC: 11.9 MG/DL (ref 8.5–10.1)
CA-I BLD-MCNC: 13.7 MG/DL (ref 8.5–10.1)
CA-I BLD-MCNC: 14.6 MG/DL (ref 8.5–10.1)
CA-I BLD-MCNC: 6.6 MG/DL (ref 8.5–10.1)
CA-I BLD-MCNC: 7.2 MG/DL (ref 8.5–10.1)
CA-I BLD-MCNC: 7.2 MG/DL (ref 8.5–10.1)
CA-I BLD-MCNC: 7.8 MG/DL (ref 8.5–10.1)
CA-I BLD-MCNC: 7.9 MG/DL (ref 8.5–10.1)
CA-I BLD-MCNC: 8.2 MG/DL (ref 8.5–10.1)
CA-I BLD-MCNC: 8.3 MG/DL (ref 8.5–10.1)
CA-I BLD-MCNC: 8.4 MG/DL (ref 8.5–10.1)
CA-I BLD-MCNC: 8.5 MG/DL (ref 8.5–10.1)
CA-I BLD-MCNC: 8.8 MG/DL (ref 8.5–10.1)
CA-I BLD-MCNC: 9 MG/DL (ref 8.5–10.1)
CA-I BLD-MCNC: 9.3 MG/DL (ref 8.5–10.1)
CA-I BLD-MCNC: 9.3 MG/DL (ref 8.5–10.1)
CA-I BLD-MCNC: 9.5 MG/DL (ref 8.5–10.1)
CA-I BLD-MCNC: 9.5 MG/DL (ref 8.5–10.1)
CALCULATED P AXIS, ECG09: -21 DEGREES
CALCULATED P AXIS, ECG09: 110 DEGREES
CALCULATED P AXIS, ECG09: 15 DEGREES
CALCULATED R AXIS, ECG10: 112 DEGREES
CALCULATED R AXIS, ECG10: 46 DEGREES
CALCULATED R AXIS, ECG10: 5 DEGREES
CALCULATED T AXIS, ECG11: 132 DEGREES
CALCULATED T AXIS, ECG11: 168 DEGREES
CALCULATED T AXIS, ECG11: 168 DEGREES
CANDIDA ALBICANS: NOT DETECTED
CANDIDA AURIS, CAAU: NOT DETECTED
CANDIDA KRUSEI, CKRP: NOT DETECTED
CANDIDA PARAPSILOSIS, CPAUP: NOT DETECTED
CANDIDA TROPICALIS, CTROP: NOT DETECTED
CHLORIDE SERPL-SCNC: 101 MMOL/L (ref 97–108)
CHLORIDE SERPL-SCNC: 102 MMOL/L (ref 97–108)
CHLORIDE SERPL-SCNC: 103 MMOL/L (ref 97–108)
CHLORIDE SERPL-SCNC: 103 MMOL/L (ref 97–108)
CHLORIDE SERPL-SCNC: 104 MMOL/L (ref 97–108)
CHLORIDE SERPL-SCNC: 105 MMOL/L (ref 97–108)
CHLORIDE SERPL-SCNC: 106 MMOL/L (ref 97–108)
CHLORIDE SERPL-SCNC: 108 MMOL/L (ref 97–108)
CHLORIDE SERPL-SCNC: 109 MMOL/L (ref 97–108)
CHLORIDE SERPL-SCNC: 109 MMOL/L (ref 97–108)
CHLORIDE SERPL-SCNC: 111 MMOL/L (ref 97–108)
CHLORIDE SERPL-SCNC: 119 MMOL/L (ref 97–108)
CHLORIDE SERPL-SCNC: 96 MMOL/L (ref 97–108)
CHLORIDE SERPL-SCNC: 97 MMOL/L (ref 97–108)
CHLORIDE SERPL-SCNC: 98 MMOL/L (ref 97–108)
CHLORIDE UR-SCNC: 51 MMOL/L
CK SERPL-CCNC: 81 U/L (ref 26–192)
CO2 SERPL-SCNC: 18 MMOL/L (ref 21–32)
CO2 SERPL-SCNC: 19 MMOL/L (ref 21–32)
CO2 SERPL-SCNC: 20 MMOL/L (ref 21–32)
CO2 SERPL-SCNC: 20 MMOL/L (ref 21–32)
CO2 SERPL-SCNC: 22 MMOL/L (ref 21–32)
CO2 SERPL-SCNC: 23 MMOL/L (ref 21–32)
CO2 SERPL-SCNC: 23 MMOL/L (ref 21–32)
CO2 SERPL-SCNC: 24 MMOL/L (ref 21–32)
CO2 SERPL-SCNC: 25 MMOL/L (ref 21–32)
CO2 SERPL-SCNC: 26 MMOL/L (ref 21–32)
CO2 SERPL-SCNC: 29 MMOL/L (ref 21–32)
CO2 SERPL-SCNC: 29 MMOL/L (ref 21–32)
CO2 SERPL-SCNC: 31 MMOL/L (ref 21–32)
CO2 SERPL-SCNC: <5 MMOL/L (ref 21–32)
COHGB MFR BLD: 0.3 % (ref 1–2)
COHGB MFR BLD: 0.9 % (ref 0–5)
COLLECT DATE STL: ABNORMAL
COLONY COUNT,CNT: ABNORMAL
COLONY COUNT,CNT: ABNORMAL
COLOR UR: ABNORMAL
COVID-19 RAPID TEST, COVR: NOT DETECTED
CREAT SERPL-MCNC: 0.68 MG/DL (ref 0.55–1.02)
CREAT SERPL-MCNC: 0.7 MG/DL (ref 0.55–1.02)
CREAT SERPL-MCNC: 0.7 MG/DL (ref 0.55–1.02)
CREAT SERPL-MCNC: 0.72 MG/DL (ref 0.55–1.02)
CREAT SERPL-MCNC: 0.74 MG/DL (ref 0.55–1.02)
CREAT SERPL-MCNC: 0.84 MG/DL (ref 0.55–1.02)
CREAT SERPL-MCNC: 0.89 MG/DL (ref 0.55–1.02)
CREAT SERPL-MCNC: 0.97 MG/DL (ref 0.55–1.02)
CREAT SERPL-MCNC: 1.02 MG/DL (ref 0.55–1.02)
CREAT SERPL-MCNC: 1.05 MG/DL (ref 0.55–1.02)
CREAT SERPL-MCNC: 1.11 MG/DL (ref 0.55–1.02)
CREAT SERPL-MCNC: 1.17 MG/DL (ref 0.55–1.02)
CREAT SERPL-MCNC: 1.26 MG/DL (ref 0.55–1.02)
CREAT SERPL-MCNC: 1.29 MG/DL (ref 0.55–1.02)
CREAT SERPL-MCNC: 1.3 MG/DL (ref 0.55–1.02)
CREAT SERPL-MCNC: 1.3 MG/DL (ref 0.55–1.02)
CREAT SERPL-MCNC: 1.33 MG/DL (ref 0.55–1.02)
CREAT SERPL-MCNC: 1.38 MG/DL (ref 0.55–1.02)
CREAT SERPL-MCNC: 1.4 MG/DL (ref 0.55–1.02)
CREAT SERPL-MCNC: 1.49 MG/DL (ref 0.55–1.02)
CREAT SERPL-MCNC: 1.51 MG/DL (ref 0.55–1.02)
CREAT SERPL-MCNC: 2.06 MG/DL (ref 0.55–1.02)
CREAT SERPL-MCNC: 2.13 MG/DL (ref 0.55–1.02)
CREAT SERPL-MCNC: 2.23 MG/DL (ref 0.55–1.02)
CROSSMATCH RESULT,%XM: NORMAL
CRP SERPL-MCNC: 1.1 MG/DL (ref 0–0.6)
CRP SERPL-MCNC: 1.75 MG/DL (ref 0–0.6)
CRP SERPL-MCNC: 1.96 MG/DL (ref 0–0.6)
CRP SERPL-MCNC: 15.5 MG/DL
CRP SERPL-MCNC: 2.09 MG/DL
CRP SERPL-MCNC: 3.71 MG/DL
CRP SERPL-MCNC: 9.74 MG/DL
CRYPTO NEOFORMANS/GATTII, CRYNEG: NOT DETECTED
D DIMER PPP FEU-MCNC: 1.21 MG/L FEU (ref 0.19–0.5)
D DIMER PPP FEU-MCNC: >35.2 MG/L FEU (ref 0.19–0.5)
D DIMER PPP FEU-MCNC: >35.2 MG/L FEU (ref 0.19–0.5)
DAT IGG-SP REAG RBC QL: POSITIVE
DAT POLY-SP REAG RBC QL: POSITIVE
DIAGNOSIS, 93000: NORMAL
DIFFERENTIAL METHOD BLD: ABNORMAL
ECHO AO DESC DIAM: 1.9 CM
ECHO AO DESCENDING AORTA INDEX: 1.17 CM/M2
ECHO AO ROOT DIAM: 3 CM
ECHO AO ROOT INDEX: 1.84 CM/M2
ECHO AV AREA PEAK VELOCITY: 1.4 CM2
ECHO AV AREA VTI: 1.5 CM2
ECHO AV AREA/BSA PEAK VELOCITY: 0.9 CM2/M2
ECHO AV AREA/BSA VTI: 0.9 CM2/M2
ECHO AV MEAN GRADIENT: 8 MMHG
ECHO AV MEAN VELOCITY: 1.4 M/S
ECHO AV PEAK GRADIENT: 19 MMHG
ECHO AV PEAK VELOCITY: 2.2 M/S
ECHO AV VELOCITY RATIO: 0.5
ECHO AV VTI: 37.7 CM
ECHO EST RA PRESSURE: 3 MMHG
ECHO IVC PROX: 1.5 CM
ECHO LA AREA 4C: 18.6 CM2
ECHO LA DIAMETER INDEX: 1.72 CM/M2
ECHO LA DIAMETER: 2.8 CM
ECHO LA MAJOR AXIS: 5.9 CM
ECHO LA TO AORTIC ROOT RATIO: 0.93
ECHO LV E' LATERAL VELOCITY: 11 CM/S
ECHO LV E' SEPTAL VELOCITY: 6 CM/S
ECHO LV EJECTION FRACTION BIPLANE: 45 % (ref 55–100)
ECHO LV FRACTIONAL SHORTENING: 22 % (ref 28–44)
ECHO LV INTERNAL DIMENSION DIASTOLE INDEX: 3.07 CM/M2
ECHO LV INTERNAL DIMENSION DIASTOLIC: 5 CM (ref 3.9–5.3)
ECHO LV INTERNAL DIMENSION SYSTOLIC INDEX: 2.39 CM/M2
ECHO LV INTERNAL DIMENSION SYSTOLIC: 3.9 CM
ECHO LV IVSD: 1.2 CM (ref 0.6–0.9)
ECHO LV MASS 2D: 233.7 G (ref 67–162)
ECHO LV MASS INDEX 2D: 143.4 G/M2 (ref 43–95)
ECHO LV POSTERIOR WALL DIASTOLIC: 1.2 CM (ref 0.6–0.9)
ECHO LV RELATIVE WALL THICKNESS RATIO: 0.48
ECHO LVOT AREA: 2.8 CM2
ECHO LVOT AV VTI INDEX: 0.51
ECHO LVOT DIAM: 1.9 CM
ECHO LVOT MEAN GRADIENT: 2 MMHG
ECHO LVOT PEAK GRADIENT: 4 MMHG
ECHO LVOT PEAK VELOCITY: 1.1 M/S
ECHO LVOT STROKE VOLUME INDEX: 33.4 ML/M2
ECHO LVOT SV: 54.4 ML
ECHO LVOT VTI: 19.2 CM
ECHO MV A VELOCITY: 0.76 M/S
ECHO MV AREA VTI: 1.7 CM2
ECHO MV E DECELERATION TIME (DT): 331 MS
ECHO MV E VELOCITY: 0.48 M/S
ECHO MV E/A RATIO: 0.63
ECHO MV E/E' LATERAL: 4.36
ECHO MV E/E' RATIO (AVERAGED): 6.18
ECHO MV E/E' SEPTAL: 8
ECHO MV LVOT VTI INDEX: 1.7
ECHO MV MAX VELOCITY: 1.2 M/S
ECHO MV MEAN GRADIENT: 2 MMHG
ECHO MV MEAN VELOCITY: 0.6 M/S
ECHO MV PEAK GRADIENT: 6 MMHG
ECHO MV REGURGITANT PEAK GRADIENT: 21 MMHG
ECHO MV REGURGITANT PEAK VELOCITY: 2.3 M/S
ECHO MV VTI: 32.7 CM
ECHO RIGHT VENTRICULAR SYSTOLIC PRESSURE (RVSP): 18 MMHG
ECHO RV TAPSE: 1.4 CM (ref 1.7–?)
ECHO TV REGURGITANT MAX VELOCITY: 1.95 M/S
ECHO TV REGURGITANT PEAK GRADIENT: 15 MMHG
ENTEROBACTER CLOACAE COMPLEX, ECCP: NOT DETECTED
ENTEROBACTERALES SP. , ENBLS: NOT DETECTED
ENTEROCOCCUS FAECALIS, ENFA: NOT DETECTED
ENTEROCOCCUS FAECIUM, ENFAM: NOT DETECTED
EOSINOPHIL # BLD: 0 K/UL (ref 0–0.4)
EOSINOPHIL # BLD: 0 K/UL (ref 0–0.7)
EOSINOPHIL NFR BLD: 0 % (ref 0.9–2.9)
EOSINOPHIL NFR BLD: 0 % (ref 0–7)
ERYTHROCYTE [DISTWIDTH] IN BLOOD BY AUTOMATED COUNT: 14.5 % (ref 11.5–14.5)
ERYTHROCYTE [DISTWIDTH] IN BLOOD BY AUTOMATED COUNT: 14.6 % (ref 11.5–14.5)
ERYTHROCYTE [DISTWIDTH] IN BLOOD BY AUTOMATED COUNT: 14.8 % (ref 11.5–14.5)
ERYTHROCYTE [DISTWIDTH] IN BLOOD BY AUTOMATED COUNT: 14.9 % (ref 11.5–14.5)
ERYTHROCYTE [DISTWIDTH] IN BLOOD BY AUTOMATED COUNT: 14.9 % (ref 11.5–14.5)
ERYTHROCYTE [DISTWIDTH] IN BLOOD BY AUTOMATED COUNT: 15.2 % (ref 11.5–14.5)
ERYTHROCYTE [DISTWIDTH] IN BLOOD BY AUTOMATED COUNT: 15.6 % (ref 11.5–14.5)
ERYTHROCYTE [DISTWIDTH] IN BLOOD BY AUTOMATED COUNT: 16.2 % (ref 11.5–14.5)
ERYTHROCYTE [DISTWIDTH] IN BLOOD BY AUTOMATED COUNT: 16.5 % (ref 11.5–14.5)
ERYTHROCYTE [DISTWIDTH] IN BLOOD BY AUTOMATED COUNT: 18.8 % (ref 11.5–14.5)
ERYTHROCYTE [DISTWIDTH] IN BLOOD BY AUTOMATED COUNT: 19.1 % (ref 11.5–14.5)
ERYTHROCYTE [DISTWIDTH] IN BLOOD BY AUTOMATED COUNT: 19.8 % (ref 11.5–14.5)
ERYTHROCYTE [DISTWIDTH] IN BLOOD BY AUTOMATED COUNT: 22 % (ref 11.5–14.5)
ERYTHROCYTE [DISTWIDTH] IN BLOOD BY AUTOMATED COUNT: 22.1 % (ref 11.5–14.5)
ERYTHROCYTE [DISTWIDTH] IN BLOOD BY AUTOMATED COUNT: 22.2 % (ref 11.5–14.5)
ERYTHROCYTE [DISTWIDTH] IN BLOOD BY AUTOMATED COUNT: 22.6 % (ref 11.5–14.5)
ERYTHROCYTE [DISTWIDTH] IN BLOOD BY AUTOMATED COUNT: 22.9 % (ref 11.5–14.5)
ERYTHROCYTE [DISTWIDTH] IN BLOOD BY AUTOMATED COUNT: 23.1 % (ref 11.5–14.5)
ESCHERICHIA COLI: NOT DETECTED
EST. AVERAGE GLUCOSE BLD GHB EST-MCNC: 146 MG/DL
EST. AVERAGE GLUCOSE BLD GHB EST-MCNC: 148 MG/DL
FERRITIN SERPL-MCNC: 3503 NG/ML (ref 8–252)
FIO2 ON VENT: 21 %
FIO2 ON VENT: 21 %
FIO2 ON VENT: 32 %
FLUAV AG NPH QL IA: NEGATIVE
FLUAV RNA SPEC QL NAA+PROBE: NOT DETECTED
FLUBV AG NOSE QL IA: NEGATIVE
FLUBV RNA SPEC QL NAA+PROBE: NOT DETECTED
GAS FLOW.O2 O2 DELIVERY SYS: 3 L/MIN
GLOBULIN SER CALC-MCNC: 10 G/DL (ref 2–4)
GLOBULIN SER CALC-MCNC: 4.9 G/DL (ref 2–4)
GLOBULIN SER CALC-MCNC: 5.5 G/DL (ref 2–4)
GLOBULIN SER CALC-MCNC: 5.7 G/DL (ref 2–4)
GLOBULIN SER CALC-MCNC: 5.9 G/DL (ref 2–4)
GLOBULIN SER CALC-MCNC: 6.1 G/DL (ref 2–4)
GLOBULIN SER CALC-MCNC: 6.1 G/DL (ref 2–4)
GLOBULIN SER CALC-MCNC: 6.3 G/DL (ref 2–4)
GLOBULIN SER CALC-MCNC: 6.3 G/DL (ref 2–4)
GLOBULIN SER CALC-MCNC: 6.4 G/DL (ref 2–4)
GLOBULIN SER CALC-MCNC: 6.5 G/DL (ref 2–4)
GLOBULIN SER CALC-MCNC: 6.8 G/DL (ref 2–4)
GLOBULIN SER CALC-MCNC: 7 G/DL (ref 2–4)
GLOBULIN SER CALC-MCNC: 7.4 G/DL (ref 2–4)
GLOBULIN SER CALC-MCNC: 8.4 G/DL (ref 2–4)
GLOBULIN SER CALC-MCNC: 9.1 G/DL (ref 2–4)
GLOBULIN SER CALC-MCNC: 9.5 G/DL (ref 2–4)
GLUCOSE BLD STRIP.AUTO-MCNC: 102 MG/DL (ref 65–117)
GLUCOSE BLD STRIP.AUTO-MCNC: 103 MG/DL (ref 65–100)
GLUCOSE BLD STRIP.AUTO-MCNC: 103 MG/DL (ref 65–117)
GLUCOSE BLD STRIP.AUTO-MCNC: 106 MG/DL (ref 65–117)
GLUCOSE BLD STRIP.AUTO-MCNC: 109 MG/DL (ref 65–117)
GLUCOSE BLD STRIP.AUTO-MCNC: 110 MG/DL (ref 65–100)
GLUCOSE BLD STRIP.AUTO-MCNC: 115 MG/DL (ref 65–117)
GLUCOSE BLD STRIP.AUTO-MCNC: 117 MG/DL (ref 65–100)
GLUCOSE BLD STRIP.AUTO-MCNC: 119 MG/DL (ref 65–100)
GLUCOSE BLD STRIP.AUTO-MCNC: 119 MG/DL (ref 65–117)
GLUCOSE BLD STRIP.AUTO-MCNC: 120 MG/DL (ref 65–117)
GLUCOSE BLD STRIP.AUTO-MCNC: 122 MG/DL (ref 65–117)
GLUCOSE BLD STRIP.AUTO-MCNC: 125 MG/DL (ref 65–100)
GLUCOSE BLD STRIP.AUTO-MCNC: 125 MG/DL (ref 65–117)
GLUCOSE BLD STRIP.AUTO-MCNC: 125 MG/DL (ref 65–117)
GLUCOSE BLD STRIP.AUTO-MCNC: 126 MG/DL (ref 65–117)
GLUCOSE BLD STRIP.AUTO-MCNC: 132 MG/DL (ref 65–100)
GLUCOSE BLD STRIP.AUTO-MCNC: 132 MG/DL (ref 65–117)
GLUCOSE BLD STRIP.AUTO-MCNC: 133 MG/DL (ref 65–117)
GLUCOSE BLD STRIP.AUTO-MCNC: 135 MG/DL (ref 65–117)
GLUCOSE BLD STRIP.AUTO-MCNC: 137 MG/DL (ref 65–117)
GLUCOSE BLD STRIP.AUTO-MCNC: 138 MG/DL (ref 65–117)
GLUCOSE BLD STRIP.AUTO-MCNC: 140 MG/DL (ref 65–117)
GLUCOSE BLD STRIP.AUTO-MCNC: 153 MG/DL (ref 65–100)
GLUCOSE BLD STRIP.AUTO-MCNC: 154 MG/DL (ref 65–117)
GLUCOSE BLD STRIP.AUTO-MCNC: 155 MG/DL (ref 65–117)
GLUCOSE BLD STRIP.AUTO-MCNC: 156 MG/DL (ref 65–117)
GLUCOSE BLD STRIP.AUTO-MCNC: 161 MG/DL (ref 65–117)
GLUCOSE BLD STRIP.AUTO-MCNC: 165 MG/DL (ref 65–117)
GLUCOSE BLD STRIP.AUTO-MCNC: 168 MG/DL (ref 65–117)
GLUCOSE BLD STRIP.AUTO-MCNC: 172 MG/DL (ref 65–117)
GLUCOSE BLD STRIP.AUTO-MCNC: 182 MG/DL (ref 65–100)
GLUCOSE BLD STRIP.AUTO-MCNC: 189 MG/DL (ref 65–117)
GLUCOSE BLD STRIP.AUTO-MCNC: 193 MG/DL (ref 65–117)
GLUCOSE BLD STRIP.AUTO-MCNC: 228 MG/DL (ref 65–117)
GLUCOSE BLD STRIP.AUTO-MCNC: 237 MG/DL (ref 65–117)
GLUCOSE BLD STRIP.AUTO-MCNC: 246 MG/DL (ref 65–117)
GLUCOSE BLD STRIP.AUTO-MCNC: 262 MG/DL (ref 65–117)
GLUCOSE BLD STRIP.AUTO-MCNC: 78 MG/DL (ref 65–117)
GLUCOSE BLD STRIP.AUTO-MCNC: 86 MG/DL (ref 65–117)
GLUCOSE BLD STRIP.AUTO-MCNC: 92 MG/DL (ref 65–117)
GLUCOSE BLD STRIP.AUTO-MCNC: 92 MG/DL (ref 65–117)
GLUCOSE BLD STRIP.AUTO-MCNC: 94 MG/DL (ref 65–117)
GLUCOSE BLD STRIP.AUTO-MCNC: 95 MG/DL (ref 65–117)
GLUCOSE BLD STRIP.AUTO-MCNC: 96 MG/DL (ref 65–100)
GLUCOSE BLD STRIP.AUTO-MCNC: 96 MG/DL (ref 65–100)
GLUCOSE BLD STRIP.AUTO-MCNC: 97 MG/DL (ref 65–117)
GLUCOSE SERPL-MCNC: 104 MG/DL (ref 65–100)
GLUCOSE SERPL-MCNC: 106 MG/DL (ref 65–100)
GLUCOSE SERPL-MCNC: 107 MG/DL (ref 65–100)
GLUCOSE SERPL-MCNC: 117 MG/DL (ref 65–100)
GLUCOSE SERPL-MCNC: 120 MG/DL (ref 65–100)
GLUCOSE SERPL-MCNC: 135 MG/DL (ref 65–100)
GLUCOSE SERPL-MCNC: 136 MG/DL (ref 65–100)
GLUCOSE SERPL-MCNC: 142 MG/DL (ref 65–100)
GLUCOSE SERPL-MCNC: 143 MG/DL (ref 65–100)
GLUCOSE SERPL-MCNC: 146 MG/DL (ref 65–100)
GLUCOSE SERPL-MCNC: 155 MG/DL (ref 65–100)
GLUCOSE SERPL-MCNC: 165 MG/DL (ref 65–100)
GLUCOSE SERPL-MCNC: 193 MG/DL (ref 65–100)
GLUCOSE SERPL-MCNC: 211 MG/DL (ref 65–100)
GLUCOSE SERPL-MCNC: 232 MG/DL (ref 65–100)
GLUCOSE SERPL-MCNC: 239 MG/DL (ref 65–100)
GLUCOSE SERPL-MCNC: 80 MG/DL (ref 65–100)
GLUCOSE SERPL-MCNC: 82 MG/DL (ref 65–100)
GLUCOSE SERPL-MCNC: 85 MG/DL (ref 65–100)
GLUCOSE SERPL-MCNC: 92 MG/DL (ref 65–100)
GLUCOSE SERPL-MCNC: 93 MG/DL (ref 65–100)
GLUCOSE SERPL-MCNC: 96 MG/DL (ref 65–100)
GLUCOSE SERPL-MCNC: 97 MG/DL (ref 65–100)
GLUCOSE SERPL-MCNC: 97 MG/DL (ref 65–100)
GLUCOSE UR STRIP.AUTO-MCNC: 100 MG/DL
GLUCOSE UR STRIP.AUTO-MCNC: >1000 MG/DL
GLUCOSE UR STRIP.AUTO-MCNC: >1000 MG/DL
GLUCOSE UR STRIP.AUTO-MCNC: >300 MG/DL
HAEMOPHILUS INFLUENZAE, HMI: NOT DETECTED
HBA1C MFR BLD: 6.7 % (ref 4–5.6)
HBA1C MFR BLD: 6.8 % (ref 4–5.6)
HCO3 BLDA-SCNC: 18 MMOL/L (ref 22–26)
HCO3 BLDA-SCNC: 23 MMOL/L (ref 22–26)
HCO3 BLDA-SCNC: 4 MMOL/L (ref 22–26)
HCT VFR BLD AUTO: 15.3 % (ref 35–47)
HCT VFR BLD AUTO: 20.2 % (ref 36–46)
HCT VFR BLD AUTO: 21.3 % (ref 36–46)
HCT VFR BLD AUTO: 22.2 % (ref 36–46)
HCT VFR BLD AUTO: 23.4 % (ref 35–47)
HCT VFR BLD AUTO: 24 % (ref 35–47)
HCT VFR BLD AUTO: 24.7 % (ref 35–47)
HCT VFR BLD AUTO: 24.9 % (ref 35–47)
HCT VFR BLD AUTO: 25.7 % (ref 35–47)
HCT VFR BLD AUTO: 26.1 % (ref 35–47)
HCT VFR BLD AUTO: 26.2 % (ref 35–47)
HCT VFR BLD AUTO: 26.3 % (ref 35–47)
HCT VFR BLD AUTO: 26.6 % (ref 36–46)
HCT VFR BLD AUTO: 26.7 % (ref 36–46)
HCT VFR BLD AUTO: 27.4 % (ref 36–46)
HCT VFR BLD AUTO: 27.5 % (ref 35–47)
HCT VFR BLD AUTO: 28 % (ref 36–46)
HCT VFR BLD AUTO: 28.3 % (ref 36–46)
HCT VFR BLD AUTO: 28.5 % (ref 36–46)
HCT VFR BLD AUTO: 33.4 % (ref 36–46)
HEMOCCULT SP1 STL QL: POSITIVE
HGB BLD OXIMETRY-MCNC: 7.6 G/DL (ref 13.5–17.5)
HGB BLD-MCNC: 11 G/DL (ref 13.5–17.5)
HGB BLD-MCNC: 4.1 G/DL (ref 11.5–16)
HGB BLD-MCNC: 6.9 G/DL (ref 13.5–17.5)
HGB BLD-MCNC: 7.3 G/DL (ref 13.5–17.5)
HGB BLD-MCNC: 7.4 G/DL (ref 11.5–16)
HGB BLD-MCNC: 7.4 G/DL (ref 13.5–17.5)
HGB BLD-MCNC: 7.8 G/DL (ref 11.5–16)
HGB BLD-MCNC: 8.1 G/DL (ref 11.5–16)
HGB BLD-MCNC: 8.2 G/DL (ref 11.5–16)
HGB BLD-MCNC: 8.3 G/DL (ref 11.5–16)
HGB BLD-MCNC: 8.5 G/DL (ref 11.5–16)
HGB BLD-MCNC: 8.5 G/DL (ref 11.5–16)
HGB BLD-MCNC: 8.7 G/DL (ref 11.5–16)
HGB BLD-MCNC: 8.8 G/DL (ref 11.5–16)
HGB BLD-MCNC: 8.9 G/DL (ref 13.5–17.5)
HGB BLD-MCNC: 9 G/DL (ref 13.5–17.5)
HGB BLD-MCNC: 9.1 G/DL (ref 13.5–17.5)
HGB BLD-MCNC: 9.6 G/DL (ref 13.5–17.5)
HGB UR QL STRIP: ABNORMAL
HGB UR QL STRIP: ABNORMAL
HGB UR QL STRIP: NEGATIVE
HGB UR QL STRIP: NEGATIVE
IMM GRANULOCYTES # BLD AUTO: 0 K/UL
IMM GRANULOCYTES # BLD AUTO: 0 K/UL (ref 0–0.04)
IMM GRANULOCYTES # BLD AUTO: 0.1 K/UL (ref 0–0.04)
IMM GRANULOCYTES NFR BLD AUTO: 0 %
IMM GRANULOCYTES NFR BLD AUTO: 1 % (ref 0–0.5)
IMM GRANULOCYTES NFR BLD AUTO: 2 % (ref 0–0.5)
INR PPP: 1.8 (ref 0.9–1.1)
KETONES UR QL STRIP.AUTO: ABNORMAL MG/DL
KETONES UR QL STRIP.AUTO: NEGATIVE MG/DL
KLEBSIELLA AEROGENES, KLAE: NOT DETECTED
KLEBSIELLA OXYTOCA: NOT DETECTED
KLEBSIELLA PNEUMONIAE GROUP, KPPG: NOT DETECTED
LACTATE SERPL-SCNC: 0.7 MMOL/L (ref 0.4–2)
LACTATE SERPL-SCNC: 1.4 MMOL/L (ref 0.4–2)
LACTATE SERPL-SCNC: 13.4 MMOL/L (ref 0.4–2)
LACTATE SERPL-SCNC: 18.9 MMOL/L (ref 0.4–2)
LACTATE SERPL-SCNC: 3.4 MMOL/L (ref 0.4–2)
LACTATE SERPL-SCNC: 4.1 MMOL/L (ref 0.4–2)
LACTATE SERPL-SCNC: 6.7 MMOL/L (ref 0.4–2)
LEUKOCYTE ESTERASE UR QL STRIP.AUTO: ABNORMAL
LIPASE SERPL-CCNC: 125 U/L (ref 73–393)
LIPASE SERPL-CCNC: 45 U/L (ref 73–393)
LIPASE SERPL-CCNC: 69 U/L (ref 73–393)
LISTERIA MONOCYTOGENES, LMONP: NOT DETECTED
LYMPHOCYTES # BLD: 0.3 K/UL (ref 1–4.8)
LYMPHOCYTES # BLD: 0.3 K/UL (ref 1–4.8)
LYMPHOCYTES # BLD: 0.5 K/UL (ref 1–4.8)
LYMPHOCYTES # BLD: 0.7 K/UL (ref 1–4.8)
LYMPHOCYTES # BLD: 0.7 K/UL (ref 1–4.8)
LYMPHOCYTES # BLD: 0.9 K/UL (ref 0.8–3.5)
LYMPHOCYTES # BLD: 0.9 K/UL (ref 0.8–3.5)
LYMPHOCYTES # BLD: 1 K/UL (ref 0.8–3.5)
LYMPHOCYTES # BLD: 1.1 K/UL (ref 0.8–3.5)
LYMPHOCYTES # BLD: 1.1 K/UL (ref 0.8–3.5)
LYMPHOCYTES # BLD: 1.1 K/UL (ref 1–4.8)
LYMPHOCYTES # BLD: 1.1 K/UL (ref 1–4.8)
LYMPHOCYTES # BLD: 1.3 K/UL (ref 0.8–3.5)
LYMPHOCYTES # BLD: 1.6 K/UL (ref 1–4.8)
LYMPHOCYTES # BLD: 10.1 K/UL (ref 0.8–3.5)
LYMPHOCYTES # BLD: 2.3 K/UL (ref 0.8–3.5)
LYMPHOCYTES # BLD: 2.7 K/UL (ref 1–4.8)
LYMPHOCYTES # BLD: 3.7 K/UL (ref 0.8–3.5)
LYMPHOCYTES # BLD: 4 K/UL (ref 0.8–3.5)
LYMPHOCYTES NFR BLD: 14 % (ref 20.5–51.1)
LYMPHOCYTES NFR BLD: 20 % (ref 20.5–51.1)
LYMPHOCYTES NFR BLD: 21 % (ref 20.5–51.1)
LYMPHOCYTES NFR BLD: 22 % (ref 20.5–51.1)
LYMPHOCYTES NFR BLD: 23 % (ref 20.5–51.1)
LYMPHOCYTES NFR BLD: 27 % (ref 12–49)
LYMPHOCYTES NFR BLD: 28 % (ref 12–49)
LYMPHOCYTES NFR BLD: 29 % (ref 12–49)
LYMPHOCYTES NFR BLD: 29 % (ref 12–49)
LYMPHOCYTES NFR BLD: 4 % (ref 20.5–51.1)
LYMPHOCYTES NFR BLD: 48 % (ref 12–49)
LYMPHOCYTES NFR BLD: 49 % (ref 12–49)
LYMPHOCYTES NFR BLD: 51 % (ref 12–49)
LYMPHOCYTES NFR BLD: 54 % (ref 12–49)
LYMPHOCYTES NFR BLD: 54 % (ref 12–49)
LYMPHOCYTES NFR BLD: 57 % (ref 20.5–51.1)
LYMPHOCYTES NFR BLD: 8 % (ref 20.5–51.1)
LYMPHOCYTES NFR BLD: 85 % (ref 12–49)
LYMPHOCYTES NFR BLD: 9 % (ref 20.5–51.1)
MAGNESIUM SERPL-MCNC: 1.2 MG/DL (ref 1.6–2.4)
MAGNESIUM SERPL-MCNC: 1.8 MG/DL (ref 1.6–2.4)
MAGNESIUM SERPL-MCNC: 1.9 MG/DL (ref 1.6–2.4)
MAGNESIUM SERPL-MCNC: 2 MG/DL (ref 1.6–2.4)
MAGNESIUM SERPL-MCNC: 2.1 MG/DL (ref 1.6–2.4)
MAGNESIUM SERPL-MCNC: 2.2 MG/DL (ref 1.6–2.4)
MCH RBC QN AUTO: 31.5 PG (ref 26–34)
MCH RBC QN AUTO: 31.5 PG (ref 26–34)
MCH RBC QN AUTO: 31.8 PG (ref 26–34)
MCH RBC QN AUTO: 31.8 PG (ref 31–34)
MCH RBC QN AUTO: 31.9 PG (ref 26–34)
MCH RBC QN AUTO: 31.9 PG (ref 26–34)
MCH RBC QN AUTO: 32 PG (ref 31–34)
MCH RBC QN AUTO: 32.2 PG (ref 31–34)
MCH RBC QN AUTO: 32.5 PG (ref 26–34)
MCH RBC QN AUTO: 32.5 PG (ref 31–34)
MCH RBC QN AUTO: 32.6 PG (ref 26–34)
MCH RBC QN AUTO: 32.6 PG (ref 26–34)
MCH RBC QN AUTO: 32.9 PG (ref 31–34)
MCH RBC QN AUTO: 32.9 PG (ref 31–34)
MCH RBC QN AUTO: 34 PG (ref 31–34)
MCH RBC QN AUTO: 34.1 PG (ref 31–34)
MCH RBC QN AUTO: 34.4 PG (ref 26–34)
MCH RBC QN AUTO: 34.5 PG (ref 26–34)
MCH RBC QN AUTO: 34.7 PG (ref 31–34)
MCH RBC QN AUTO: 35.1 PG (ref 31–34)
MCHC RBC AUTO-ENTMCNC: 26.8 G/DL (ref 30–36.5)
MCHC RBC AUTO-ENTMCNC: 31.6 G/DL (ref 30–36.5)
MCHC RBC AUTO-ENTMCNC: 32 G/DL (ref 30–36.5)
MCHC RBC AUTO-ENTMCNC: 32.3 G/DL (ref 30–36.5)
MCHC RBC AUTO-ENTMCNC: 32.3 G/DL (ref 31–36)
MCHC RBC AUTO-ENTMCNC: 32.4 G/DL (ref 30–36.5)
MCHC RBC AUTO-ENTMCNC: 32.5 G/DL (ref 30–36.5)
MCHC RBC AUTO-ENTMCNC: 32.5 G/DL (ref 30–36.5)
MCHC RBC AUTO-ENTMCNC: 32.6 G/DL (ref 30–36.5)
MCHC RBC AUTO-ENTMCNC: 32.6 G/DL (ref 31–36)
MCHC RBC AUTO-ENTMCNC: 33 G/DL (ref 31–36)
MCHC RBC AUTO-ENTMCNC: 33.1 G/DL (ref 30–36.5)
MCHC RBC AUTO-ENTMCNC: 33.2 G/DL (ref 30–36.5)
MCHC RBC AUTO-ENTMCNC: 33.2 G/DL (ref 31–36)
MCHC RBC AUTO-ENTMCNC: 33.3 G/DL (ref 31–36)
MCHC RBC AUTO-ENTMCNC: 33.6 G/DL (ref 31–36)
MCHC RBC AUTO-ENTMCNC: 33.8 G/DL (ref 31–36)
MCHC RBC AUTO-ENTMCNC: 33.8 G/DL (ref 31–36)
MCHC RBC AUTO-ENTMCNC: 33.9 G/DL (ref 31–36)
MCHC RBC AUTO-ENTMCNC: 34.4 G/DL (ref 31–36)
MCV RBC AUTO: 100 FL (ref 80–99)
MCV RBC AUTO: 100 FL (ref 80–99)
MCV RBC AUTO: 100.3 FL (ref 80–100)
MCV RBC AUTO: 101.9 FL (ref 80–99)
MCV RBC AUTO: 104.7 FL (ref 80–100)
MCV RBC AUTO: 106.5 FL (ref 80–100)
MCV RBC AUTO: 108.8 FL (ref 80–99)
MCV RBC AUTO: 128.6 FL (ref 80–99)
MCV RBC AUTO: 95.7 FL (ref 80–99)
MCV RBC AUTO: 96.3 FL (ref 80–99)
MCV RBC AUTO: 96.8 FL (ref 80–100)
MCV RBC AUTO: 96.8 FL (ref 80–100)
MCV RBC AUTO: 96.9 FL (ref 80–99)
MCV RBC AUTO: 97 FL (ref 80–99)
MCV RBC AUTO: 97.3 FL (ref 80–100)
MCV RBC AUTO: 97.3 FL (ref 80–100)
MCV RBC AUTO: 98.2 FL (ref 80–100)
MCV RBC AUTO: 98.5 FL (ref 80–100)
MCV RBC AUTO: 98.8 FL (ref 80–99)
MCV RBC AUTO: 99 FL (ref 80–100)
MECA/C (METHICILLIN RESISTANT GENE), MECACP: DETECTED
METAMYELOCYTES NFR BLD MANUAL: 1 %
METHGB MFR BLD: 0 % (ref 0–5)
METHGB MFR BLD: 0.2 % (ref 0–1.4)
METHGB MFR BLD: 0.2 % (ref 0–1.4)
METHGB MFR BLD: 0.3 % (ref 0–1.4)
MONOCYTES # BLD: 0.1 K/UL (ref 0.2–2.4)
MONOCYTES # BLD: 0.2 K/UL (ref 0.2–2.4)
MONOCYTES # BLD: 0.2 K/UL (ref 0–1)
MONOCYTES # BLD: 0.2 K/UL (ref 0–1)
MONOCYTES # BLD: 0.3 K/UL (ref 0–1)
MONOCYTES # BLD: 0.3 K/UL (ref 0–1)
MONOCYTES # BLD: 0.4 K/UL (ref 0.2–2.4)
MONOCYTES # BLD: 0.4 K/UL (ref 0–1)
MONOCYTES # BLD: 0.6 K/UL (ref 0–1)
MONOCYTES # BLD: 0.7 K/UL (ref 0–1)
MONOCYTES # BLD: 0.8 K/UL (ref 0.2–2.4)
MONOCYTES # BLD: 0.9 K/UL (ref 0.2–2.4)
MONOCYTES # BLD: 1.4 K/UL (ref 0.2–2.4)
MONOCYTES # BLD: 1.8 K/UL (ref 0–1)
MONOCYTES # BLD: 3.2 K/UL (ref 0–1)
MONOCYTES NFR BLD: 10 % (ref 5–13)
MONOCYTES NFR BLD: 14 % (ref 5–13)
MONOCYTES NFR BLD: 14 % (ref 5–13)
MONOCYTES NFR BLD: 17 % (ref 5–13)
MONOCYTES NFR BLD: 18 % (ref 1.7–9.3)
MONOCYTES NFR BLD: 19 % (ref 5–13)
MONOCYTES NFR BLD: 2 % (ref 1.7–9.3)
MONOCYTES NFR BLD: 2 % (ref 5–13)
MONOCYTES NFR BLD: 24 % (ref 1.7–9.3)
MONOCYTES NFR BLD: 24 % (ref 5–13)
MONOCYTES NFR BLD: 26 % (ref 1.7–9.3)
MONOCYTES NFR BLD: 26 % (ref 1.7–9.3)
MONOCYTES NFR BLD: 3 % (ref 1.7–9.3)
MONOCYTES NFR BLD: 5 % (ref 1.7–9.3)
MONOCYTES NFR BLD: 5 % (ref 1.7–9.3)
MONOCYTES NFR BLD: 6 % (ref 5–13)
MONOCYTES NFR BLD: 7 % (ref 5–13)
MONOCYTES NFR BLD: 8 % (ref 1.7–9.3)
MONOCYTES NFR BLD: 8 % (ref 5–13)
MRSA DNA SPEC QL NAA+PROBE: NOT DETECTED
MUCOUS THREADS URNS QL MICRO: ABNORMAL /LPF
NEISSERIA MENINGITIDIS, NMNI: NOT DETECTED
NEUTS BAND NFR BLD MANUAL: 1 % (ref 0–6)
NEUTS BAND NFR BLD MANUAL: 2 % (ref 0–6)
NEUTS SEG # BLD: 0.7 K/UL (ref 1.8–8)
NEUTS SEG # BLD: 1 K/UL (ref 1.8–8)
NEUTS SEG # BLD: 1.2 K/UL (ref 1.8–7.7)
NEUTS SEG # BLD: 1.5 K/UL (ref 1.8–8)
NEUTS SEG # BLD: 1.5 K/UL (ref 1.8–8)
NEUTS SEG # BLD: 1.7 K/UL (ref 1.8–8)
NEUTS SEG # BLD: 1.9 K/UL (ref 1.8–8)
NEUTS SEG # BLD: 2 K/UL (ref 1.8–8)
NEUTS SEG # BLD: 2.2 K/UL (ref 1.8–8)
NEUTS SEG # BLD: 2.2 K/UL (ref 1.8–8)
NEUTS SEG # BLD: 2.3 K/UL (ref 1.8–7.7)
NEUTS SEG # BLD: 2.3 K/UL (ref 1.8–7.7)
NEUTS SEG # BLD: 2.3 K/UL (ref 1.8–8)
NEUTS SEG # BLD: 2.5 K/UL (ref 1.8–7.7)
NEUTS SEG # BLD: 2.7 K/UL (ref 1.8–7.7)
NEUTS SEG # BLD: 3.2 K/UL (ref 1.8–7.7)
NEUTS SEG # BLD: 5.1 K/UL (ref 1.8–7.7)
NEUTS SEG # BLD: 6.2 K/UL (ref 1.8–7.7)
NEUTS SEG # BLD: 7.2 K/UL (ref 1.8–7.7)
NEUTS SEG NFR BLD: 13 % (ref 32–75)
NEUTS SEG NFR BLD: 21 % (ref 32–75)
NEUTS SEG NFR BLD: 25 % (ref 42–75)
NEUTS SEG NFR BLD: 32 % (ref 32–75)
NEUTS SEG NFR BLD: 34 % (ref 32–75)
NEUTS SEG NFR BLD: 36 % (ref 32–75)
NEUTS SEG NFR BLD: 44 % (ref 32–75)
NEUTS SEG NFR BLD: 51 % (ref 32–75)
NEUTS SEG NFR BLD: 52 % (ref 42–75)
NEUTS SEG NFR BLD: 55 % (ref 32–75)
NEUTS SEG NFR BLD: 59 % (ref 32–75)
NEUTS SEG NFR BLD: 59 % (ref 42–75)
NEUTS SEG NFR BLD: 62 % (ref 32–75)
NEUTS SEG NFR BLD: 67 % (ref 42–75)
NEUTS SEG NFR BLD: 69 % (ref 42–75)
NEUTS SEG NFR BLD: 74 % (ref 42–75)
NEUTS SEG NFR BLD: 74 % (ref 42–75)
NEUTS SEG NFR BLD: 89 % (ref 42–75)
NEUTS SEG NFR BLD: 93 % (ref 42–75)
NITRITE UR QL STRIP.AUTO: NEGATIVE
NITRITE UR QL STRIP.AUTO: POSITIVE
NRBC # BLD: 0 K/UL (ref 0–0.01)
NRBC # BLD: 0.01 K/UL
NRBC # BLD: 0.02 K/UL
NRBC # BLD: 0.02 K/UL (ref 0–0.01)
NRBC # BLD: 0.03 K/UL
NRBC # BLD: 0.03 K/UL
NRBC # BLD: 0.03 K/UL (ref 0–0.01)
NRBC # BLD: 0.04 K/UL (ref 0–0.01)
NRBC # BLD: 0.07 K/UL (ref 0–0.01)
NRBC # BLD: 0.19 K/UL
NRBC BLD MANUAL-RTO: 3 PER 100 WBC
NRBC BLD-RTO: 0 PER 100 WBC
NRBC BLD-RTO: 0.1 PER 100 WBC
NRBC BLD-RTO: 0.2 PER 100 WBC
NRBC BLD-RTO: 0.3 PER 100 WBC
NRBC BLD-RTO: 0.5 PER 100 WBC
NRBC BLD-RTO: 0.6 PER 100 WBC
NRBC BLD-RTO: 0.6 PER 100 WBC
NRBC BLD-RTO: 0.9 PER 100 WBC
NRBC BLD-RTO: 1.4 PER 100 WBC
OTHER CELLS NFR BLD MANUAL: 2 %
OTHER CELLS NFR BLD MANUAL: 5 %
OXYHGB MFR BLD: 94.6 % (ref 95–99)
OXYHGB MFR BLD: 95.3 % (ref 95–99)
OXYHGB MFR BLD: 97.1 % (ref 95–99)
OXYHGB MFR BLD: 98.5 % (ref 95–100)
P-R INTERVAL, ECG05: 158 MS
P-R INTERVAL, ECG05: 165 MS
P-R INTERVAL, ECG05: 174 MS
PCO2 BLDA: 16 MMHG (ref 35–45)
PCO2 BLDA: 16 MMHG (ref 35–45)
PCO2 BLDA: 23 MMHG (ref 35–45)
PCO2 BLDA: 35 MMHG (ref 35–45)
PERFORMED BY, TECHID: ABNORMAL
PERFORMED BY, TECHID: NORMAL
PH BLDA: 6.97 [PH] (ref 7.35–7.45)
PH BLDA: 7.16 [PH] (ref 7.35–7.45)
PH BLDA: 7.44 [PH] (ref 7.35–7.45)
PH BLDA: 7.51 [PH] (ref 7.35–7.45)
PH UR STRIP: 6 [PH] (ref 5–8)
PH UR STRIP: 6 [PH] (ref 5–8)
PH UR STRIP: 7 [PH] (ref 5–8)
PH UR STRIP: 7.5 [PH] (ref 5–8)
PHOSPHATE SERPL-MCNC: 1.9 MG/DL (ref 2.6–4.7)
PHOSPHATE SERPL-MCNC: 2.4 MG/DL (ref 2.6–4.7)
PHOSPHATE SERPL-MCNC: 2.6 MG/DL (ref 2.6–4.7)
PHOSPHATE SERPL-MCNC: 3.7 MG/DL (ref 2.6–4.7)
PLATELET # BLD AUTO: 110 K/UL (ref 150–400)
PLATELET # BLD AUTO: 14 K/UL (ref 150–400)
PLATELET # BLD AUTO: 16 K/UL (ref 150–400)
PLATELET # BLD AUTO: 17 K/UL (ref 150–400)
PLATELET # BLD AUTO: 23 K/UL (ref 150–400)
PLATELET # BLD AUTO: 23 K/UL (ref 150–400)
PLATELET # BLD AUTO: 31 K/UL (ref 150–400)
PLATELET # BLD AUTO: 33 K/UL (ref 150–400)
PLATELET # BLD AUTO: 38 K/UL (ref 150–400)
PLATELET # BLD AUTO: 41 K/UL (ref 150–400)
PLATELET # BLD AUTO: 51 K/UL (ref 150–400)
PLATELET # BLD AUTO: 59 K/UL (ref 150–400)
PLATELET # BLD AUTO: 60 K/UL (ref 150–400)
PLATELET # BLD AUTO: 61 K/UL (ref 150–400)
PLATELET # BLD AUTO: 69 K/UL (ref 150–400)
PLATELET # BLD AUTO: 70 K/UL (ref 150–400)
PLATELET # BLD AUTO: 73 K/UL (ref 150–400)
PLATELET # BLD AUTO: 80 K/UL (ref 150–400)
PLATELET # BLD AUTO: 87 K/UL (ref 150–400)
PLATELET # BLD AUTO: 93 K/UL (ref 150–400)
PMV BLD AUTO: 10 FL (ref 8.9–12.9)
PMV BLD AUTO: 10.1 FL (ref 8.9–12.9)
PMV BLD AUTO: 10.2 FL (ref 8.9–12.9)
PMV BLD AUTO: 10.4 FL (ref 6.5–11.5)
PMV BLD AUTO: 10.5 FL (ref 6.5–11.5)
PMV BLD AUTO: 10.6 FL (ref 6.5–11.5)
PMV BLD AUTO: 10.6 FL (ref 8.9–12.9)
PMV BLD AUTO: 10.9 FL (ref 6.5–11.5)
PMV BLD AUTO: 11.1 FL (ref 6.5–11.5)
PMV BLD AUTO: 11.1 FL (ref 8.9–12.9)
PMV BLD AUTO: 11.5 FL (ref 8.9–12.9)
PMV BLD AUTO: 11.6 FL (ref 8.9–12.9)
PMV BLD AUTO: 12.2 FL (ref 8.9–12.9)
PMV BLD AUTO: 7.9 FL (ref 6.5–11.5)
PMV BLD AUTO: 8.5 FL (ref 6.5–11.5)
PMV BLD AUTO: 9 FL (ref 6.5–11.5)
PMV BLD AUTO: 9.7 FL (ref 6.5–11.5)
PMV BLD AUTO: 9.8 FL (ref 6.5–11.5)
PO2 BLDA: 116 MMHG (ref 80–100)
PO2 BLDA: 128 MMHG (ref 80–100)
PO2 BLDA: 139 MMHG (ref 70–100)
PO2 BLDA: 83 MMHG (ref 80–100)
POTASSIUM SERPL-SCNC: 3 MMOL/L (ref 3.5–5.1)
POTASSIUM SERPL-SCNC: 3.2 MMOL/L (ref 3.5–5.1)
POTASSIUM SERPL-SCNC: 3.4 MMOL/L (ref 3.5–5.1)
POTASSIUM SERPL-SCNC: 3.6 MMOL/L (ref 3.5–5.1)
POTASSIUM SERPL-SCNC: 3.7 MMOL/L (ref 3.5–5.1)
POTASSIUM SERPL-SCNC: 3.8 MMOL/L (ref 3.5–5.1)
POTASSIUM SERPL-SCNC: 3.9 MMOL/L (ref 3.5–5.1)
POTASSIUM SERPL-SCNC: 3.9 MMOL/L (ref 3.5–5.1)
POTASSIUM SERPL-SCNC: 4 MMOL/L (ref 3.5–5.1)
POTASSIUM SERPL-SCNC: 4.2 MMOL/L (ref 3.5–5.1)
POTASSIUM SERPL-SCNC: 4.3 MMOL/L (ref 3.5–5.1)
POTASSIUM SERPL-SCNC: 4.4 MMOL/L (ref 3.5–5.1)
POTASSIUM SERPL-SCNC: 4.7 MMOL/L (ref 3.5–5.1)
POTASSIUM SERPL-SCNC: 4.9 MMOL/L (ref 3.5–5.1)
POTASSIUM SERPL-SCNC: 4.9 MMOL/L (ref 3.5–5.1)
POTASSIUM SERPL-SCNC: 5 MMOL/L (ref 3.5–5.1)
POTASSIUM SERPL-SCNC: 5 MMOL/L (ref 3.5–5.1)
POTASSIUM SERPL-SCNC: 5.1 MMOL/L (ref 3.5–5.1)
PROCALCITONIN SERPL-MCNC: 0.43 NG/ML
PROCALCITONIN SERPL-MCNC: 1.27 NG/ML
PROCALCITONIN SERPL-MCNC: 1.84 NG/ML
PROCALCITONIN SERPL-MCNC: 2.84 NG/ML
PROCALCITONIN SERPL-MCNC: 6.82 NG/ML
PROT SERPL-MCNC: 10 G/DL (ref 6.4–8.2)
PROT SERPL-MCNC: 10.4 G/DL (ref 6.4–8.2)
PROT SERPL-MCNC: 11.1 G/DL (ref 6.4–8.2)
PROT SERPL-MCNC: 11.7 G/DL (ref 6.4–8.2)
PROT SERPL-MCNC: 7.4 G/DL (ref 6.4–8.2)
PROT SERPL-MCNC: 7.9 G/DL (ref 6.4–8.2)
PROT SERPL-MCNC: 8 G/DL (ref 6.4–8.2)
PROT SERPL-MCNC: 8.1 G/DL (ref 6.4–8.2)
PROT SERPL-MCNC: 8.1 G/DL (ref 6.4–8.2)
PROT SERPL-MCNC: 8.2 G/DL (ref 6.4–8.2)
PROT SERPL-MCNC: 8.4 G/DL (ref 6.4–8.2)
PROT SERPL-MCNC: 8.7 G/DL (ref 6.4–8.2)
PROT SERPL-MCNC: 8.9 G/DL (ref 6.4–8.2)
PROT SERPL-MCNC: 9.4 G/DL (ref 6.4–8.2)
PROT SERPL-MCNC: 9.5 G/DL (ref 6.4–8.2)
PROT UR STRIP-MCNC: 30 MG/DL
PROT UR STRIP-MCNC: NEGATIVE MG/DL
PROTEUS, PRP: NOT DETECTED
PROTHROMBIN TIME: 19.8 SEC (ref 11.9–14.6)
PSEUDOMONAS AERUGINOSA: NOT DETECTED
PTH RELATED PROT SERPL-SCNC: <2 PMOL/L
PTH-INTACT SERPL-MCNC: <6.3 PG/ML (ref 18.4–88)
Q-T INTERVAL, ECG07: 387 MS
Q-T INTERVAL, ECG07: 401 MS
Q-T INTERVAL, ECG07: 446 MS
QRS DURATION, ECG06: 123 MS
QRS DURATION, ECG06: 131 MS
QRS DURATION, ECG06: 140 MS
QTC CALCULATION (BEZET), ECG08: 458 MS
QTC CALCULATION (BEZET), ECG08: 491 MS
QTC CALCULATION (BEZET), ECG08: 560 MS
RBC # BLD AUTO: 1.19 M/UL (ref 3.8–5.2)
RBC # BLD AUTO: 2.01 M/UL (ref 4.5–5.9)
RBC # BLD AUTO: 2.12 M/UL (ref 4.5–5.9)
RBC # BLD AUTO: 2.15 M/UL (ref 3.8–5.2)
RBC # BLD AUTO: 2.15 M/UL (ref 4.5–5.9)
RBC # BLD AUTO: 2.4 M/UL (ref 3.8–5.2)
RBC # BLD AUTO: 2.57 M/UL (ref 3.8–5.2)
RBC # BLD AUTO: 2.58 M/UL (ref 3.8–5.2)
RBC # BLD AUTO: 2.6 M/UL (ref 3.8–5.2)
RBC # BLD AUTO: 2.61 M/UL (ref 3.8–5.2)
RBC # BLD AUTO: 2.7 M/UL (ref 3.8–5.2)
RBC # BLD AUTO: 2.7 M/UL (ref 3.8–5.2)
RBC # BLD AUTO: 2.73 M/UL (ref 3.8–5.2)
RBC # BLD AUTO: 2.74 M/UL (ref 4.5–5.9)
RBC # BLD AUTO: 2.74 M/UL (ref 4.5–5.9)
RBC # BLD AUTO: 2.83 M/UL (ref 4.5–5.9)
RBC # BLD AUTO: 2.85 M/UL (ref 4.5–5.9)
RBC # BLD AUTO: 2.87 M/UL (ref 4.5–5.9)
RBC # BLD AUTO: 2.93 M/UL (ref 4.5–5.9)
RBC # BLD AUTO: 3.14 M/UL (ref 4.5–5.9)
RBC #/AREA URNS HPF: ABNORMAL /HPF (ref 0–3)
RBC #/AREA URNS HPF: ABNORMAL /HPF (ref 0–5)
RBC MORPH BLD: ABNORMAL
RESISTANT GENE SPACE, REGENE: ABNORMAL
SALMONELLA, SALMO: NOT DETECTED
SAO2 % BLD: 95 % (ref 95–99)
SAO2 % BLD: 96 % (ref 95–99)
SAO2 % BLD: 98 % (ref 95–99)
SAO2% DEVICE SAO2% SENSOR NAME: ABNORMAL
SARS-COV-2, COV2: DETECTED
SERRATIA MARCESCENS: NOT DETECTED
SERVICE CMNT-IMP: ABNORMAL
SERVICE CMNT-IMP: ABNORMAL
SODIUM SERPL-SCNC: 128 MMOL/L (ref 136–145)
SODIUM SERPL-SCNC: 128 MMOL/L (ref 136–145)
SODIUM SERPL-SCNC: 130 MMOL/L (ref 136–145)
SODIUM SERPL-SCNC: 132 MMOL/L (ref 136–145)
SODIUM SERPL-SCNC: 133 MMOL/L (ref 136–145)
SODIUM SERPL-SCNC: 134 MMOL/L (ref 136–145)
SODIUM SERPL-SCNC: 135 MMOL/L (ref 136–145)
SODIUM SERPL-SCNC: 136 MMOL/L (ref 136–145)
SODIUM SERPL-SCNC: 137 MMOL/L (ref 136–145)
SODIUM SERPL-SCNC: 138 MMOL/L (ref 136–145)
SODIUM SERPL-SCNC: 138 MMOL/L (ref 136–145)
SODIUM SERPL-SCNC: 140 MMOL/L (ref 136–145)
SODIUM SERPL-SCNC: 143 MMOL/L (ref 136–145)
SODIUM SERPL-SCNC: 143 MMOL/L (ref 136–145)
SODIUM SERPL-SCNC: 145 MMOL/L (ref 136–145)
SODIUM UR-SCNC: 33 MMOL/L
SP GR UR REFRACTOMETRY: 1 (ref 1–1.03)
SP GR UR REFRACTOMETRY: 1.01 (ref 1–1.03)
SPECIAL REQUESTS,SREQ: ABNORMAL
SPECIAL REQUESTS,SREQ: NORMAL
SPECIMEN EXP DATE BLD: NORMAL
SPECIMEN EXP DATE BLD: NORMAL
SPECIMEN SITE: ABNORMAL
STAPH EPIDERMIDIS, STEP: DETECTED
STAPH LUGDUNENSIS, STALUG: NOT DETECTED
STAPHYLOCOCCUS AUREUS: NOT DETECTED
STAPHYLOCOCCUS, STAPP: DETECTED
STATUS OF UNIT,%ST: NORMAL
STENO MALTOPHILIA, STMA: NOT DETECTED
STREPTOCOCCUS , STPSP: NOT DETECTED
STREPTOCOCCUS AGALACTIAE (GROUP B): NOT DETECTED
STREPTOCOCCUS PNEUMONIAE , SPNP: NOT DETECTED
STREPTOCOCCUS PYOGENES (GROUP A), SPYOP: NOT DETECTED
TRANSFUSION STATUS PATIENT QL: NORMAL
TROPONIN-HIGH SENSITIVITY: 16 NG/L (ref 0–51)
TROPONIN-HIGH SENSITIVITY: 5 NG/L (ref 0–51)
TSH SERPL DL<=0.05 MIU/L-ACNC: 0.56 UIU/ML (ref 0.36–3.74)
UA: UC IF INDICATED,UAUC: ABNORMAL
UNIT DIVISION, %UDIV: 0
UNIT TAG COMMENT,%CM: NORMAL
UNIT TAG COMMENT,%CM: NORMAL
UROBILINOGEN UR QL STRIP.AUTO: 0.1 EU/DL (ref 0.1–1)
UROBILINOGEN UR QL STRIP.AUTO: 0.2 EU/DL (ref 0.2–1)
VENTRICULAR RATE, ECG03: 84 BPM
VENTRICULAR RATE, ECG03: 90 BPM
VENTRICULAR RATE, ECG03: 95 BPM
WBC # BLD AUTO: 11.8 K/UL (ref 3.6–11)
WBC # BLD AUTO: 12.1 K/UL (ref 4.4–11.3)
WBC # BLD AUTO: 2.2 K/UL (ref 3.6–11)
WBC # BLD AUTO: 2.8 K/UL (ref 3.6–11)
WBC # BLD AUTO: 3.1 K/UL (ref 3.6–11)
WBC # BLD AUTO: 3.2 K/UL (ref 4.4–11.3)
WBC # BLD AUTO: 3.3 K/UL (ref 3.6–11)
WBC # BLD AUTO: 3.3 K/UL (ref 4.4–11.3)
WBC # BLD AUTO: 3.4 K/UL (ref 3.6–11)
WBC # BLD AUTO: 3.5 K/UL (ref 4.4–11.3)
WBC # BLD AUTO: 4 K/UL (ref 3.6–11)
WBC # BLD AUTO: 4.7 K/UL (ref 4.4–11.3)
WBC # BLD AUTO: 4.9 K/UL (ref 3.6–11)
WBC # BLD AUTO: 4.9 K/UL (ref 4.4–11.3)
WBC # BLD AUTO: 5 K/UL (ref 4.4–11.3)
WBC # BLD AUTO: 5.3 K/UL (ref 4.4–11.3)
WBC # BLD AUTO: 5.8 K/UL (ref 4.4–11.3)
WBC # BLD AUTO: 6.4 K/UL (ref 3.6–11)
WBC # BLD AUTO: 6.6 K/UL (ref 4.4–11.3)
WBC # BLD AUTO: 7.5 K/UL (ref 3.6–11)
WBC URNS QL MICRO: ABNORMAL /HPF (ref 0–4)
WBC URNS QL MICRO: ABNORMAL /HPF (ref 0–5)
XXAUTO CONTROL: POSITIVE

## 2022-01-01 PROCEDURE — 82803 BLOOD GASES ANY COMBINATION: CPT

## 2022-01-01 PROCEDURE — 36415 COLL VENOUS BLD VENIPUNCTURE: CPT

## 2022-01-01 PROCEDURE — 74011000636 HC RX REV CODE- 636: Performed by: HOSPITALIST

## 2022-01-01 PROCEDURE — 82962 GLUCOSE BLOOD TEST: CPT

## 2022-01-01 PROCEDURE — 84145 PROCALCITONIN (PCT): CPT

## 2022-01-01 PROCEDURE — G0378 HOSPITAL OBSERVATION PER HR: HCPCS

## 2022-01-01 PROCEDURE — 65270000029 HC RM PRIVATE

## 2022-01-01 PROCEDURE — C9113 INJ PANTOPRAZOLE SODIUM, VIA: HCPCS | Performed by: EMERGENCY MEDICINE

## 2022-01-01 PROCEDURE — 74011250636 HC RX REV CODE- 250/636: Performed by: PHYSICIAN ASSISTANT

## 2022-01-01 PROCEDURE — 86140 C-REACTIVE PROTEIN: CPT

## 2022-01-01 PROCEDURE — 74011250637 HC RX REV CODE- 250/637: Performed by: EMERGENCY MEDICINE

## 2022-01-01 PROCEDURE — 74011250637 HC RX REV CODE- 250/637: Performed by: INTERNAL MEDICINE

## 2022-01-01 PROCEDURE — 74011000250 HC RX REV CODE- 250: Performed by: PHYSICIAN ASSISTANT

## 2022-01-01 PROCEDURE — 85025 COMPLETE CBC W/AUTO DIFF WBC: CPT

## 2022-01-01 PROCEDURE — 82550 ASSAY OF CK (CPK): CPT

## 2022-01-01 PROCEDURE — 74011636637 HC RX REV CODE- 636/637: Performed by: HOSPITALIST

## 2022-01-01 PROCEDURE — 77010033678 HC OXYGEN DAILY

## 2022-01-01 PROCEDURE — 83605 ASSAY OF LACTIC ACID: CPT

## 2022-01-01 PROCEDURE — 96375 TX/PRO/DX INJ NEW DRUG ADDON: CPT

## 2022-01-01 PROCEDURE — 85379 FIBRIN DEGRADATION QUANT: CPT

## 2022-01-01 PROCEDURE — 83690 ASSAY OF LIPASE: CPT

## 2022-01-01 PROCEDURE — 80053 COMPREHEN METABOLIC PANEL: CPT

## 2022-01-01 PROCEDURE — 74011000250 HC RX REV CODE- 250: Performed by: EMERGENCY MEDICINE

## 2022-01-01 PROCEDURE — 80069 RENAL FUNCTION PANEL: CPT

## 2022-01-01 PROCEDURE — 74011250636 HC RX REV CODE- 250/636: Performed by: EMERGENCY MEDICINE

## 2022-01-01 PROCEDURE — 82436 ASSAY OF URINE CHLORIDE: CPT

## 2022-01-01 PROCEDURE — 74011000250 HC RX REV CODE- 250: Performed by: FAMILY MEDICINE

## 2022-01-01 PROCEDURE — 0656 HSPC GENERAL INPATIENT

## 2022-01-01 PROCEDURE — 99285 EMERGENCY DEPT VISIT HI MDM: CPT

## 2022-01-01 PROCEDURE — 94761 N-INVAS EAR/PLS OXIMETRY MLT: CPT

## 2022-01-01 PROCEDURE — 87077 CULTURE AEROBIC IDENTIFY: CPT

## 2022-01-01 PROCEDURE — 74011250636 HC RX REV CODE- 250/636: Performed by: HOSPITALIST

## 2022-01-01 PROCEDURE — 86923 COMPATIBILITY TEST ELECTRIC: CPT

## 2022-01-01 PROCEDURE — 87186 SC STD MICRODIL/AGAR DIL: CPT

## 2022-01-01 PROCEDURE — 81001 URINALYSIS AUTO W/SCOPE: CPT

## 2022-01-01 PROCEDURE — 83735 ASSAY OF MAGNESIUM: CPT

## 2022-01-01 PROCEDURE — 83880 ASSAY OF NATRIURETIC PEPTIDE: CPT

## 2022-01-01 PROCEDURE — 96360 HYDRATION IV INFUSION INIT: CPT

## 2022-01-01 PROCEDURE — 74011250636 HC RX REV CODE- 250/636: Performed by: FAMILY MEDICINE

## 2022-01-01 PROCEDURE — 74011250637 HC RX REV CODE- 250/637: Performed by: HOSPITALIST

## 2022-01-01 PROCEDURE — 74011000636 HC RX REV CODE- 636: Performed by: STUDENT IN AN ORGANIZED HEALTH CARE EDUCATION/TRAINING PROGRAM

## 2022-01-01 PROCEDURE — 74011000250 HC RX REV CODE- 250: Performed by: HOSPITALIST

## 2022-01-01 PROCEDURE — 74011000258 HC RX REV CODE- 258: Performed by: HOSPITALIST

## 2022-01-01 PROCEDURE — P9016 RBC LEUKOCYTES REDUCED: HCPCS

## 2022-01-01 PROCEDURE — 97530 THERAPEUTIC ACTIVITIES: CPT

## 2022-01-01 PROCEDURE — 94760 N-INVAS EAR/PLS OXIMETRY 1: CPT

## 2022-01-01 PROCEDURE — 93005 ELECTROCARDIOGRAM TRACING: CPT

## 2022-01-01 PROCEDURE — 30233R1 TRANSFUSION OF NONAUTOLOGOUS PLATELETS INTO PERIPHERAL VEIN, PERCUTANEOUS APPROACH: ICD-10-PCS | Performed by: INTERNAL MEDICINE

## 2022-01-01 PROCEDURE — 83036 HEMOGLOBIN GLYCOSYLATED A1C: CPT

## 2022-01-01 PROCEDURE — 74011000250 HC RX REV CODE- 250: Performed by: INTERNAL MEDICINE

## 2022-01-01 PROCEDURE — 74011000258 HC RX REV CODE- 258: Performed by: PHYSICIAN ASSISTANT

## 2022-01-01 PROCEDURE — 83970 ASSAY OF PARATHORMONE: CPT

## 2022-01-01 PROCEDURE — 74011250636 HC RX REV CODE- 250/636: Performed by: INTERNAL MEDICINE

## 2022-01-01 PROCEDURE — 87040 BLOOD CULTURE FOR BACTERIA: CPT

## 2022-01-01 PROCEDURE — 36430 TRANSFUSION BLD/BLD COMPNT: CPT

## 2022-01-01 PROCEDURE — 87086 URINE CULTURE/COLONY COUNT: CPT

## 2022-01-01 PROCEDURE — 74011250637 HC RX REV CODE- 250/637: Performed by: NURSE PRACTITIONER

## 2022-01-01 PROCEDURE — 74011250636 HC RX REV CODE- 250/636: Performed by: STUDENT IN AN ORGANIZED HEALTH CARE EDUCATION/TRAINING PROGRAM

## 2022-01-01 PROCEDURE — 71045 X-RAY EXAM CHEST 1 VIEW: CPT

## 2022-01-01 PROCEDURE — 74011250636 HC RX REV CODE- 250/636: Performed by: NURSE PRACTITIONER

## 2022-01-01 PROCEDURE — P9047 ALBUMIN (HUMAN), 25%, 50ML: HCPCS | Performed by: NURSE PRACTITIONER

## 2022-01-01 PROCEDURE — 96374 THER/PROPH/DIAG INJ IV PUSH: CPT

## 2022-01-01 PROCEDURE — 80048 BASIC METABOLIC PNL TOTAL CA: CPT

## 2022-01-01 PROCEDURE — 87150 DNA/RNA AMPLIFIED PROBE: CPT

## 2022-01-01 PROCEDURE — 86900 BLOOD TYPING SEROLOGIC ABO: CPT

## 2022-01-01 PROCEDURE — 74178 CT ABD&PLV WO CNTR FLWD CNTR: CPT

## 2022-01-01 PROCEDURE — 87636 SARSCOV2 & INF A&B AMP PRB: CPT

## 2022-01-01 PROCEDURE — 96365 THER/PROPH/DIAG IV INF INIT: CPT

## 2022-01-01 PROCEDURE — P9073 PLATELETS PHERESIS PATH REDU: HCPCS

## 2022-01-01 PROCEDURE — XW0DXM6 INTRODUCTION OF BARICITINIB INTO MOUTH AND PHARYNX, EXTERNAL APPROACH, NEW TECHNOLOGY GROUP 6: ICD-10-PCS | Performed by: HOSPITALIST

## 2022-01-01 PROCEDURE — 84300 ASSAY OF URINE SODIUM: CPT

## 2022-01-01 PROCEDURE — 96372 THER/PROPH/DIAG INJ SC/IM: CPT

## 2022-01-01 PROCEDURE — 74011250637 HC RX REV CODE- 250/637: Performed by: PHYSICIAN ASSISTANT

## 2022-01-01 PROCEDURE — 74011000250 HC RX REV CODE- 250: Performed by: STUDENT IN AN ORGANIZED HEALTH CARE EDUCATION/TRAINING PROGRAM

## 2022-01-01 PROCEDURE — 84443 ASSAY THYROID STIM HORMONE: CPT

## 2022-01-01 PROCEDURE — 96361 HYDRATE IV INFUSION ADD-ON: CPT

## 2022-01-01 PROCEDURE — 99284 EMERGENCY DEPT VISIT MOD MDM: CPT

## 2022-01-01 PROCEDURE — 74011250637 HC RX REV CODE- 250/637: Performed by: FAMILY MEDICINE

## 2022-01-01 PROCEDURE — 97116 GAIT TRAINING THERAPY: CPT

## 2022-01-01 PROCEDURE — 36600 WITHDRAWAL OF ARTERIAL BLOOD: CPT

## 2022-01-01 PROCEDURE — 74011000258 HC RX REV CODE- 258: Performed by: FAMILY MEDICINE

## 2022-01-01 PROCEDURE — 74177 CT ABD & PELVIS W/CONTRAST: CPT

## 2022-01-01 PROCEDURE — 74011000636 HC RX REV CODE- 636: Performed by: FAMILY MEDICINE

## 2022-01-01 PROCEDURE — 82140 ASSAY OF AMMONIA: CPT

## 2022-01-01 PROCEDURE — 82330 ASSAY OF CALCIUM: CPT

## 2022-01-01 PROCEDURE — 74176 CT ABD & PELVIS W/O CONTRAST: CPT

## 2022-01-01 PROCEDURE — C9113 INJ PANTOPRAZOLE SODIUM, VIA: HCPCS | Performed by: INTERNAL MEDICINE

## 2022-01-01 PROCEDURE — 65620000000 HC RM CCU GENERAL

## 2022-01-01 PROCEDURE — 87804 INFLUENZA ASSAY W/OPTIC: CPT

## 2022-01-01 PROCEDURE — 82652 VIT D 1 25-DIHYDROXY: CPT

## 2022-01-01 PROCEDURE — 74011000258 HC RX REV CODE- 258: Performed by: INTERNAL MEDICINE

## 2022-01-01 PROCEDURE — 99232 SBSQ HOSP IP/OBS MODERATE 35: CPT | Performed by: INTERNAL MEDICINE

## 2022-01-01 PROCEDURE — 97165 OT EVAL LOW COMPLEX 30 MIN: CPT

## 2022-01-01 PROCEDURE — 97161 PT EVAL LOW COMPLEX 20 MIN: CPT

## 2022-01-01 PROCEDURE — 99223 1ST HOSP IP/OBS HIGH 75: CPT | Performed by: INTERNAL MEDICINE

## 2022-01-01 PROCEDURE — 82397 CHEMILUMINESCENT ASSAY: CPT

## 2022-01-01 PROCEDURE — 86870 RBC ANTIBODY IDENTIFICATION: CPT

## 2022-01-01 PROCEDURE — 96376 TX/PRO/DX INJ SAME DRUG ADON: CPT

## 2022-01-01 PROCEDURE — 87635 SARS-COV-2 COVID-19 AMP PRB: CPT

## 2022-01-01 PROCEDURE — 84100 ASSAY OF PHOSPHORUS: CPT

## 2022-01-01 PROCEDURE — 65610000006 HC RM INTENSIVE CARE

## 2022-01-01 PROCEDURE — 85610 PROTHROMBIN TIME: CPT

## 2022-01-01 PROCEDURE — 70450 CT HEAD/BRAIN W/O DYE: CPT

## 2022-01-01 PROCEDURE — C8929 TTE W OR WO FOL WCON,DOPPLER: HCPCS

## 2022-01-01 PROCEDURE — 71275 CT ANGIOGRAPHY CHEST: CPT

## 2022-01-01 PROCEDURE — 82272 OCCULT BLD FECES 1-3 TESTS: CPT

## 2022-01-01 PROCEDURE — 85027 COMPLETE CBC AUTOMATED: CPT

## 2022-01-01 PROCEDURE — 87641 MR-STAPH DNA AMP PROBE: CPT

## 2022-01-01 PROCEDURE — 82728 ASSAY OF FERRITIN: CPT

## 2022-01-01 PROCEDURE — 84484 ASSAY OF TROPONIN QUANT: CPT

## 2022-01-01 RX ORDER — ASCORBIC ACID 500 MG
500 TABLET ORAL 2 TIMES DAILY
Status: DISCONTINUED | OUTPATIENT
Start: 2022-01-01 | End: 2022-01-01 | Stop reason: HOSPADM

## 2022-01-01 RX ORDER — SODIUM CHLORIDE 0.9 % (FLUSH) 0.9 %
5-40 SYRINGE (ML) INJECTION AS NEEDED
Status: DISCONTINUED | OUTPATIENT
Start: 2022-01-01 | End: 2022-01-01 | Stop reason: HOSPADM

## 2022-01-01 RX ORDER — MELATONIN
2000 DAILY
Status: DISCONTINUED | OUTPATIENT
Start: 2022-01-01 | End: 2022-01-01

## 2022-01-01 RX ORDER — ONDANSETRON 2 MG/ML
4 INJECTION INTRAMUSCULAR; INTRAVENOUS
Status: DISCONTINUED | OUTPATIENT
Start: 2022-01-01 | End: 2022-01-01 | Stop reason: HOSPADM

## 2022-01-01 RX ORDER — SODIUM BICARBONATE 84 MG/ML
50 INJECTION, SOLUTION INTRAVENOUS
Status: COMPLETED | OUTPATIENT
Start: 2022-01-01 | End: 2022-01-01

## 2022-01-01 RX ORDER — LEVOFLOXACIN 750 MG/1
750 TABLET ORAL EVERY 24 HOURS
Qty: 5 TABLET | Refills: 0 | Status: SHIPPED | OUTPATIENT
Start: 2022-01-01 | End: 2022-01-01

## 2022-01-01 RX ORDER — ALLOPURINOL 100 MG/1
100 TABLET ORAL DAILY
Status: DISCONTINUED | OUTPATIENT
Start: 2022-01-01 | End: 2022-01-01 | Stop reason: HOSPADM

## 2022-01-01 RX ORDER — NOREPINEPHRINE BIT/0.9 % NACL 8 MG/250ML
.5-3 INFUSION BOTTLE (ML) INTRAVENOUS
Status: DISCONTINUED | OUTPATIENT
Start: 2022-01-01 | End: 2022-01-01

## 2022-01-01 RX ORDER — ACYCLOVIR 800 MG/1
400 TABLET ORAL DAILY
Status: DISCONTINUED | OUTPATIENT
Start: 2022-01-01 | End: 2022-01-01 | Stop reason: HOSPADM

## 2022-01-01 RX ORDER — MAG HYDROX/ALUMINUM HYD/SIMETH 200-200-20
30 SUSPENSION, ORAL (FINAL DOSE FORM) ORAL
Status: DISCONTINUED | OUTPATIENT
Start: 2022-01-01 | End: 2022-01-01 | Stop reason: HOSPADM

## 2022-01-01 RX ORDER — SODIUM CHLORIDE 0.9 % (FLUSH) 0.9 %
5-40 SYRINGE (ML) INJECTION EVERY 8 HOURS
Status: DISCONTINUED | OUTPATIENT
Start: 2022-01-01 | End: 2022-01-01 | Stop reason: HOSPADM

## 2022-01-01 RX ORDER — LORAZEPAM 2 MG/ML
1 INJECTION INTRAMUSCULAR EVERY 4 HOURS
Status: DISCONTINUED | OUTPATIENT
Start: 2022-01-01 | End: 2022-01-01 | Stop reason: HOSPADM

## 2022-01-01 RX ORDER — ATORVASTATIN CALCIUM 20 MG/1
20 TABLET, FILM COATED ORAL DAILY
Status: DISCONTINUED | OUTPATIENT
Start: 2022-01-01 | End: 2022-01-01

## 2022-01-01 RX ORDER — CARVEDILOL 3.12 MG/1
6.25 TABLET ORAL 2 TIMES DAILY WITH MEALS
Status: DISCONTINUED | OUTPATIENT
Start: 2022-01-01 | End: 2022-01-01

## 2022-01-01 RX ORDER — SODIUM CHLORIDE 9 MG/ML
125 INJECTION, SOLUTION INTRAVENOUS CONTINUOUS
Status: DISCONTINUED | OUTPATIENT
Start: 2022-01-01 | End: 2022-01-01 | Stop reason: HOSPADM

## 2022-01-01 RX ORDER — ACETAMINOPHEN 650 MG/1
650 SUPPOSITORY RECTAL
Status: DISCONTINUED | OUTPATIENT
Start: 2022-01-01 | End: 2022-01-01 | Stop reason: HOSPADM

## 2022-01-01 RX ORDER — INSULIN LISPRO 100 [IU]/ML
INJECTION, SOLUTION INTRAVENOUS; SUBCUTANEOUS
Status: DISCONTINUED | OUTPATIENT
Start: 2022-01-01 | End: 2022-01-01 | Stop reason: HOSPADM

## 2022-01-01 RX ORDER — HYDROCORTISONE SODIUM SUCCINATE 100 MG/2ML
100 INJECTION, POWDER, FOR SOLUTION INTRAMUSCULAR; INTRAVENOUS ONCE
Status: COMPLETED | OUTPATIENT
Start: 2022-01-01 | End: 2022-01-01

## 2022-01-01 RX ORDER — LANOLIN ALCOHOL/MO/W.PET/CERES
400 CREAM (GRAM) TOPICAL 3 TIMES DAILY
Status: DISCONTINUED | OUTPATIENT
Start: 2022-01-01 | End: 2022-01-01 | Stop reason: HOSPADM

## 2022-01-01 RX ORDER — LIDOCAINE 4 G/100G
2 PATCH TOPICAL EVERY 24 HOURS
Status: DISCONTINUED | OUTPATIENT
Start: 2022-01-01 | End: 2022-01-01

## 2022-01-01 RX ORDER — CARVEDILOL 12.5 MG/1
25 TABLET ORAL 2 TIMES DAILY
Status: DISCONTINUED | OUTPATIENT
Start: 2022-01-01 | End: 2022-01-01 | Stop reason: HOSPADM

## 2022-01-01 RX ORDER — CEPHALEXIN 500 MG/1
500 CAPSULE ORAL 4 TIMES DAILY
Qty: 20 CAPSULE | Refills: 0 | Status: SHIPPED | OUTPATIENT
Start: 2022-01-01 | End: 2022-01-01

## 2022-01-01 RX ORDER — POLYETHYLENE GLYCOL 3350 17 G/17G
17 POWDER, FOR SOLUTION ORAL DAILY PRN
Status: DISCONTINUED | OUTPATIENT
Start: 2022-01-01 | End: 2022-01-01 | Stop reason: HOSPADM

## 2022-01-01 RX ORDER — SODIUM CHLORIDE 9 MG/ML
250 INJECTION, SOLUTION INTRAVENOUS AS NEEDED
Status: DISCONTINUED | OUTPATIENT
Start: 2022-01-01 | End: 2022-01-01 | Stop reason: HOSPADM

## 2022-01-01 RX ORDER — LANOLIN ALCOHOL/MO/W.PET/CERES
400 CREAM (GRAM) TOPICAL 2 TIMES DAILY
Status: DISCONTINUED | OUTPATIENT
Start: 2022-01-01 | End: 2022-01-01 | Stop reason: HOSPADM

## 2022-01-01 RX ORDER — PANTOPRAZOLE SODIUM 40 MG/1
40 TABLET, DELAYED RELEASE ORAL
Status: DISCONTINUED | OUTPATIENT
Start: 2022-01-01 | End: 2022-01-01

## 2022-01-01 RX ORDER — ACETAMINOPHEN 500 MG
2000 TABLET ORAL DAILY
Status: DISCONTINUED | OUTPATIENT
Start: 2022-01-01 | End: 2022-01-01 | Stop reason: HOSPADM

## 2022-01-01 RX ORDER — DRONABINOL 2.5 MG/1
5 CAPSULE ORAL 2 TIMES DAILY
Status: DISCONTINUED | OUTPATIENT
Start: 2022-01-01 | End: 2022-01-01 | Stop reason: HOSPADM

## 2022-01-01 RX ORDER — METRONIDAZOLE 500 MG/100ML
500 INJECTION, SOLUTION INTRAVENOUS ONCE
Status: COMPLETED | OUTPATIENT
Start: 2022-01-01 | End: 2022-01-01

## 2022-01-01 RX ORDER — METFORMIN HYDROCHLORIDE 500 MG/1
500 TABLET ORAL
Status: DISCONTINUED | OUTPATIENT
Start: 2022-01-01 | End: 2022-01-01 | Stop reason: HOSPADM

## 2022-01-01 RX ORDER — LANOLIN ALCOHOL/MO/W.PET/CERES
400 CREAM (GRAM) TOPICAL DAILY
Status: DISCONTINUED | OUTPATIENT
Start: 2022-01-01 | End: 2022-01-01 | Stop reason: HOSPADM

## 2022-01-01 RX ORDER — ACETAMINOPHEN 325 MG/1
650 TABLET ORAL
Status: DISCONTINUED | OUTPATIENT
Start: 2022-01-01 | End: 2022-01-01 | Stop reason: HOSPADM

## 2022-01-01 RX ORDER — ONDANSETRON 2 MG/ML
4 INJECTION INTRAMUSCULAR; INTRAVENOUS
Status: COMPLETED | OUTPATIENT
Start: 2022-01-01 | End: 2022-01-01

## 2022-01-01 RX ORDER — POTASSIUM CHLORIDE 20 MEQ/1
20 TABLET, EXTENDED RELEASE ORAL DAILY
Status: DISCONTINUED | OUTPATIENT
Start: 2022-01-01 | End: 2022-01-01

## 2022-01-01 RX ORDER — ONDANSETRON 4 MG/1
4 TABLET, ORALLY DISINTEGRATING ORAL
Status: DISCONTINUED | OUTPATIENT
Start: 2022-01-01 | End: 2022-01-01 | Stop reason: HOSPADM

## 2022-01-01 RX ORDER — ATORVASTATIN CALCIUM 20 MG/1
20 TABLET, FILM COATED ORAL
Status: DISCONTINUED | OUTPATIENT
Start: 2022-01-01 | End: 2022-01-01 | Stop reason: HOSPADM

## 2022-01-01 RX ORDER — MORPHINE SULFATE 2 MG/ML
2 INJECTION, SOLUTION INTRAMUSCULAR; INTRAVENOUS ONCE
Status: COMPLETED | OUTPATIENT
Start: 2022-01-01 | End: 2022-01-01

## 2022-01-01 RX ORDER — CARVEDILOL 12.5 MG/1
12.5 TABLET ORAL 2 TIMES DAILY WITH MEALS
Status: DISCONTINUED | OUTPATIENT
Start: 2022-01-01 | End: 2022-01-01 | Stop reason: HOSPADM

## 2022-01-01 RX ORDER — GABAPENTIN 100 MG/1
CAPSULE ORAL
Status: ON HOLD | COMMUNITY
Start: 2022-01-01 | End: 2022-01-01

## 2022-01-01 RX ORDER — FLUOXETINE HYDROCHLORIDE 20 MG/1
20 CAPSULE ORAL DAILY
COMMUNITY

## 2022-01-01 RX ORDER — PANTOPRAZOLE SODIUM 40 MG/1
40 TABLET, DELAYED RELEASE ORAL DAILY
Status: DISCONTINUED | OUTPATIENT
Start: 2022-01-01 | End: 2022-01-01

## 2022-01-01 RX ORDER — INSULIN LISPRO 100 [IU]/ML
INJECTION, SOLUTION INTRAVENOUS; SUBCUTANEOUS EVERY 4 HOURS
Status: DISCONTINUED | OUTPATIENT
Start: 2022-01-01 | End: 2022-01-01

## 2022-01-01 RX ORDER — ATORVASTATIN CALCIUM 20 MG/1
20 TABLET, FILM COATED ORAL DAILY
Status: DISCONTINUED | OUTPATIENT
Start: 2022-01-01 | End: 2022-01-01 | Stop reason: HOSPADM

## 2022-01-01 RX ORDER — SODIUM CHLORIDE 9 MG/ML
75 INJECTION, SOLUTION INTRAVENOUS CONTINUOUS
Status: DISCONTINUED | OUTPATIENT
Start: 2022-01-01 | End: 2022-01-01

## 2022-01-01 RX ORDER — LIDOCAINE HYDROCHLORIDE 20 MG/ML
15 SOLUTION OROPHARYNGEAL AS NEEDED
Status: DISCONTINUED | OUTPATIENT
Start: 2022-01-01 | End: 2022-01-01 | Stop reason: HOSPADM

## 2022-01-01 RX ORDER — ENOXAPARIN SODIUM 100 MG/ML
40 INJECTION SUBCUTANEOUS DAILY
Status: DISCONTINUED | OUTPATIENT
Start: 2022-01-01 | End: 2022-01-01 | Stop reason: HOSPADM

## 2022-01-01 RX ORDER — DRONABINOL 5 MG/1
5 CAPSULE ORAL 2 TIMES DAILY
COMMUNITY

## 2022-01-01 RX ORDER — SODIUM CHLORIDE 9 MG/ML
100 INJECTION, SOLUTION INTRAVENOUS CONTINUOUS
Status: DISCONTINUED | OUTPATIENT
Start: 2022-01-01 | End: 2022-01-01

## 2022-01-01 RX ORDER — SODIUM CHLORIDE 9 MG/ML
125 INJECTION, SOLUTION INTRAVENOUS CONTINUOUS
Status: DISCONTINUED | OUTPATIENT
Start: 2022-01-01 | End: 2022-01-01

## 2022-01-01 RX ORDER — ENOXAPARIN SODIUM 100 MG/ML
30 INJECTION SUBCUTANEOUS DAILY
Status: DISCONTINUED | OUTPATIENT
Start: 2022-01-01 | End: 2022-01-01

## 2022-01-01 RX ORDER — SODIUM CHLORIDE 9 MG/ML
125 INJECTION, SOLUTION INTRAVENOUS ONCE
Status: COMPLETED | OUTPATIENT
Start: 2022-01-01 | End: 2022-01-01

## 2022-01-01 RX ORDER — ENOXAPARIN SODIUM 100 MG/ML
30 INJECTION SUBCUTANEOUS DAILY
Status: DISCONTINUED | OUTPATIENT
Start: 2022-01-01 | End: 2022-01-01 | Stop reason: HOSPADM

## 2022-01-01 RX ORDER — LOSARTAN POTASSIUM 25 MG/1
25 TABLET ORAL DAILY
Status: DISCONTINUED | OUTPATIENT
Start: 2022-01-01 | End: 2022-01-01 | Stop reason: HOSPADM

## 2022-01-01 RX ORDER — ONDANSETRON 4 MG/1
4 TABLET, ORALLY DISINTEGRATING ORAL
Status: DISCONTINUED | OUTPATIENT
Start: 2022-01-01 | End: 2022-01-01

## 2022-01-01 RX ORDER — MORPHINE SULFATE 2 MG/ML
2 INJECTION, SOLUTION INTRAMUSCULAR; INTRAVENOUS
Status: DISCONTINUED | OUTPATIENT
Start: 2022-01-01 | End: 2022-01-01 | Stop reason: HOSPADM

## 2022-01-01 RX ORDER — PANTOPRAZOLE SODIUM 40 MG/1
40 TABLET, DELAYED RELEASE ORAL DAILY
Status: DISCONTINUED | OUTPATIENT
Start: 2022-01-01 | End: 2022-01-01 | Stop reason: HOSPADM

## 2022-01-01 RX ORDER — DEXAMETHASONE 4 MG/1
40 TABLET ORAL
Status: DISCONTINUED | OUTPATIENT
Start: 2022-01-01 | End: 2022-01-01 | Stop reason: HOSPADM

## 2022-01-01 RX ORDER — SODIUM CHLORIDE 0.9 % (FLUSH) 0.9 %
5-40 SYRINGE (ML) INJECTION EVERY 8 HOURS
Status: DISCONTINUED | OUTPATIENT
Start: 2022-01-01 | End: 2022-01-01

## 2022-01-01 RX ORDER — LOSARTAN POTASSIUM 25 MG/1
12.5 TABLET ORAL DAILY
Status: DISCONTINUED | OUTPATIENT
Start: 2022-01-01 | End: 2022-01-01

## 2022-01-01 RX ORDER — SODIUM CHLORIDE 9 MG/ML
250 INJECTION, SOLUTION INTRAVENOUS ONCE
Status: DISPENSED | OUTPATIENT
Start: 2022-01-01 | End: 2022-01-01

## 2022-01-01 RX ORDER — PANTOPRAZOLE SODIUM 40 MG/1
40 TABLET, DELAYED RELEASE ORAL
Status: DISCONTINUED | OUTPATIENT
Start: 2022-01-01 | End: 2022-01-01 | Stop reason: HOSPADM

## 2022-01-01 RX ORDER — CODEINE PHOSPHATE AND GUAIFENESIN 10; 100 MG/5ML; MG/5ML
5 SOLUTION ORAL
Status: DISCONTINUED | OUTPATIENT
Start: 2022-01-01 | End: 2022-01-01 | Stop reason: HOSPADM

## 2022-01-01 RX ORDER — PANTOPRAZOLE SODIUM 20 MG/1
20 TABLET, DELAYED RELEASE ORAL DAILY
Qty: 30 TABLET | Refills: 0 | Status: SHIPPED | OUTPATIENT
Start: 2022-01-01

## 2022-01-01 RX ORDER — HYDROCORTISONE SODIUM SUCCINATE 100 MG/2ML
100 INJECTION, POWDER, FOR SOLUTION INTRAMUSCULAR; INTRAVENOUS EVERY 8 HOURS
Status: DISCONTINUED | OUTPATIENT
Start: 2022-01-01 | End: 2022-01-01 | Stop reason: HOSPADM

## 2022-01-01 RX ORDER — DRONABINOL 5 MG/1
5 CAPSULE ORAL 2 TIMES DAILY
Status: DISCONTINUED | OUTPATIENT
Start: 2022-01-01 | End: 2022-01-01

## 2022-01-01 RX ORDER — HYDROMORPHONE HYDROCHLORIDE 1 MG/ML
1 INJECTION, SOLUTION INTRAMUSCULAR; INTRAVENOUS; SUBCUTANEOUS
Status: DISCONTINUED | OUTPATIENT
Start: 2022-01-01 | End: 2022-01-01 | Stop reason: HOSPADM

## 2022-01-01 RX ORDER — METOPROLOL SUCCINATE 25 MG/1
25 TABLET, EXTENDED RELEASE ORAL DAILY
COMMUNITY

## 2022-01-01 RX ORDER — HYDROMORPHONE HYDROCHLORIDE 1 MG/ML
1 INJECTION, SOLUTION INTRAMUSCULAR; INTRAVENOUS; SUBCUTANEOUS EVERY 4 HOURS
Status: DISCONTINUED | OUTPATIENT
Start: 2022-01-01 | End: 2022-01-01 | Stop reason: HOSPADM

## 2022-01-01 RX ORDER — MAGNESIUM SULFATE 100 %
4 CRYSTALS MISCELLANEOUS AS NEEDED
Status: DISCONTINUED | OUTPATIENT
Start: 2022-01-01 | End: 2022-01-01 | Stop reason: HOSPADM

## 2022-01-01 RX ORDER — SODIUM CHLORIDE 0.9 % (FLUSH) 0.9 %
5-10 SYRINGE (ML) INJECTION AS NEEDED
Status: DISCONTINUED | OUTPATIENT
Start: 2022-01-01 | End: 2022-01-01 | Stop reason: HOSPADM

## 2022-01-01 RX ORDER — HALOPERIDOL 5 MG/ML
3 INJECTION INTRAMUSCULAR ONCE
Status: COMPLETED | OUTPATIENT
Start: 2022-01-01 | End: 2022-01-01

## 2022-01-01 RX ORDER — ALLOPURINOL 100 MG/1
100 TABLET ORAL DAILY
Status: ON HOLD | COMMUNITY
Start: 2021-01-01 | End: 2022-01-01

## 2022-01-01 RX ORDER — LORAZEPAM 2 MG/ML
1 INJECTION INTRAMUSCULAR
Status: DISCONTINUED | OUTPATIENT
Start: 2022-01-01 | End: 2022-01-01 | Stop reason: HOSPADM

## 2022-01-01 RX ORDER — FUROSEMIDE 40 MG/1
20 TABLET ORAL DAILY
Status: DISCONTINUED | OUTPATIENT
Start: 2022-01-01 | End: 2022-01-01

## 2022-01-01 RX ORDER — DRONABINOL 5 MG/1
5 CAPSULE ORAL
Status: DISCONTINUED | OUTPATIENT
Start: 2022-01-01 | End: 2022-01-01 | Stop reason: HOSPADM

## 2022-01-01 RX ORDER — POTASSIUM CHLORIDE 20 MEQ/1
20 TABLET, EXTENDED RELEASE ORAL DAILY
COMMUNITY

## 2022-01-01 RX ORDER — SODIUM CHLORIDE 9 MG/ML
250 INJECTION, SOLUTION INTRAVENOUS AS NEEDED
Status: DISCONTINUED | OUTPATIENT
Start: 2022-01-01 | End: 2022-01-01 | Stop reason: SDUPTHER

## 2022-01-01 RX ORDER — KETOROLAC TROMETHAMINE 30 MG/ML
30 INJECTION, SOLUTION INTRAMUSCULAR; INTRAVENOUS
Status: DISCONTINUED | OUTPATIENT
Start: 2022-01-01 | End: 2022-01-01 | Stop reason: HOSPADM

## 2022-01-01 RX ORDER — FAMOTIDINE 20 MG/1
20 TABLET, FILM COATED ORAL 2 TIMES DAILY
Status: DISCONTINUED | OUTPATIENT
Start: 2022-01-01 | End: 2022-01-01

## 2022-01-01 RX ORDER — ACYCLOVIR 800 MG/1
400 TABLET ORAL DAILY
Status: DISCONTINUED | OUTPATIENT
Start: 2022-01-01 | End: 2022-01-01

## 2022-01-01 RX ORDER — POTASSIUM CHLORIDE 750 MG/1
40 TABLET, FILM COATED, EXTENDED RELEASE ORAL
Status: COMPLETED | OUTPATIENT
Start: 2022-01-01 | End: 2022-01-01

## 2022-01-01 RX ORDER — GLYCOPYRROLATE 0.2 MG/ML
0.2 INJECTION INTRAMUSCULAR; INTRAVENOUS
Status: DISCONTINUED | OUTPATIENT
Start: 2022-01-01 | End: 2022-01-01 | Stop reason: HOSPADM

## 2022-01-01 RX ORDER — MORPHINE SULFATE 4 MG/ML
4 INJECTION INTRAVENOUS
Status: COMPLETED | OUTPATIENT
Start: 2022-01-01 | End: 2022-01-01

## 2022-01-01 RX ORDER — HYDRALAZINE HYDROCHLORIDE 25 MG/1
25 TABLET, FILM COATED ORAL
Status: DISCONTINUED | OUTPATIENT
Start: 2022-01-01 | End: 2022-01-01 | Stop reason: HOSPADM

## 2022-01-01 RX ORDER — DEXAMETHASONE 4 MG/1
40 TABLET ORAL
COMMUNITY
Start: 2022-01-01

## 2022-01-01 RX ORDER — DEXAMETHASONE SODIUM PHOSPHATE 100 MG/10ML
6 INJECTION INTRAMUSCULAR; INTRAVENOUS EVERY 12 HOURS
Status: DISCONTINUED | OUTPATIENT
Start: 2022-01-01 | End: 2022-01-01 | Stop reason: HOSPADM

## 2022-01-01 RX ORDER — LEVOFLOXACIN 750 MG/1
750 TABLET ORAL DAILY
Qty: 7 TABLET | Refills: 0 | Status: SHIPPED | OUTPATIENT
Start: 2022-01-01 | End: 2022-01-01

## 2022-01-01 RX ORDER — ONDANSETRON 2 MG/ML
4 INJECTION INTRAMUSCULAR; INTRAVENOUS
Status: DISCONTINUED | OUTPATIENT
Start: 2022-01-01 | End: 2022-01-01

## 2022-01-01 RX ORDER — POTASSIUM CHLORIDE 20 MEQ/1
40 TABLET, EXTENDED RELEASE ORAL 2 TIMES DAILY
Status: DISCONTINUED | OUTPATIENT
Start: 2022-01-01 | End: 2022-01-01 | Stop reason: HOSPADM

## 2022-01-01 RX ORDER — CALCITONIN SALMON 200 [USP'U]/ML
4 INJECTION, SOLUTION INTRAMUSCULAR; SUBCUTANEOUS EVERY 12 HOURS
Status: DISCONTINUED | OUTPATIENT
Start: 2022-01-01 | End: 2022-01-01

## 2022-01-01 RX ORDER — ALBUMIN HUMAN 250 G/1000ML
25 SOLUTION INTRAVENOUS EVERY 6 HOURS
Status: COMPLETED | OUTPATIENT
Start: 2022-01-01 | End: 2022-01-01

## 2022-01-01 RX ORDER — MAGNESIUM SULFATE 100 %
4 CRYSTALS MISCELLANEOUS AS NEEDED
Status: DISCONTINUED | OUTPATIENT
Start: 2022-01-01 | End: 2022-01-01

## 2022-01-01 RX ORDER — ZINC SULFATE 50(220)MG
1 CAPSULE ORAL DAILY
Status: DISCONTINUED | OUTPATIENT
Start: 2022-01-01 | End: 2022-01-01 | Stop reason: HOSPADM

## 2022-01-01 RX ORDER — LENALIDOMIDE 15 MG/1
CAPSULE ORAL
Status: ON HOLD | COMMUNITY
Start: 2022-01-01 | End: 2022-01-01 | Stop reason: CLARIF

## 2022-01-01 RX ORDER — FAMOTIDINE 20 MG/1
10 TABLET, FILM COATED ORAL 2 TIMES DAILY
Status: DISCONTINUED | OUTPATIENT
Start: 2022-01-01 | End: 2022-01-01

## 2022-01-01 RX ORDER — DEXTROSE 50 % IN WATER (D50W) INTRAVENOUS SYRINGE
25-50 AS NEEDED
Status: DISCONTINUED | OUTPATIENT
Start: 2022-01-01 | End: 2022-01-01 | Stop reason: HOSPADM

## 2022-01-01 RX ORDER — ACYCLOVIR 400 MG/1
400 TABLET ORAL DAILY
COMMUNITY
Start: 2022-01-01

## 2022-01-01 RX ORDER — DEXAMETHASONE SODIUM PHOSPHATE 4 MG/ML
6 INJECTION, SOLUTION INTRA-ARTICULAR; INTRALESIONAL; INTRAMUSCULAR; INTRAVENOUS; SOFT TISSUE EVERY 12 HOURS
Status: DISCONTINUED | OUTPATIENT
Start: 2022-01-01 | End: 2022-01-01

## 2022-01-01 RX ORDER — FACIAL-BODY WIPES
10 EACH TOPICAL DAILY PRN
Status: DISCONTINUED | OUTPATIENT
Start: 2022-01-01 | End: 2022-01-01 | Stop reason: HOSPADM

## 2022-01-01 RX ORDER — FLUTICASONE PROPIONATE 50 MCG
2 SPRAY, SUSPENSION (ML) NASAL DAILY
Status: DISCONTINUED | OUTPATIENT
Start: 2022-01-01 | End: 2022-01-01 | Stop reason: HOSPADM

## 2022-01-01 RX ORDER — GABAPENTIN 100 MG/1
100 CAPSULE ORAL
Status: DISCONTINUED | OUTPATIENT
Start: 2022-01-01 | End: 2022-01-01

## 2022-01-01 RX ORDER — SODIUM CHLORIDE 9 MG/ML
500 INJECTION, SOLUTION INTRAVENOUS ONCE
Status: COMPLETED | OUTPATIENT
Start: 2022-01-01 | End: 2022-01-01

## 2022-01-01 RX ORDER — CARVEDILOL 12.5 MG/1
25 TABLET ORAL 2 TIMES DAILY WITH MEALS
Status: DISCONTINUED | OUTPATIENT
Start: 2022-01-01 | End: 2022-01-01

## 2022-01-01 RX ORDER — GABAPENTIN 100 MG/1
200 CAPSULE ORAL 2 TIMES DAILY
Status: DISCONTINUED | OUTPATIENT
Start: 2022-01-01 | End: 2022-01-01

## 2022-01-01 RX ORDER — CALCITONIN SALMON 200 [USP'U]/ML
240 INJECTION, SOLUTION INTRAMUSCULAR; SUBCUTANEOUS EVERY 12 HOURS
Status: DISCONTINUED | OUTPATIENT
Start: 2022-01-01 | End: 2022-01-01

## 2022-01-01 RX ORDER — GUAIFENESIN 600 MG/1
600 TABLET, EXTENDED RELEASE ORAL EVERY 12 HOURS
Status: DISCONTINUED | OUTPATIENT
Start: 2022-01-01 | End: 2022-01-01 | Stop reason: HOSPADM

## 2022-01-01 RX ORDER — CETIRIZINE HCL 10 MG
10 TABLET ORAL EVERY EVENING
Status: DISCONTINUED | OUTPATIENT
Start: 2022-01-01 | End: 2022-01-01 | Stop reason: HOSPADM

## 2022-01-01 RX ADMIN — PANTOPRAZOLE SODIUM 40 MG: 40 TABLET, DELAYED RELEASE ORAL at 09:30

## 2022-01-01 RX ADMIN — DRONABINOL 5 MG: 2.5 CAPSULE ORAL at 21:14

## 2022-01-01 RX ADMIN — SODIUM CHLORIDE 100 ML/HR: 9 INJECTION, SOLUTION INTRAVENOUS at 17:55

## 2022-01-01 RX ADMIN — POTASSIUM CHLORIDE 40 MEQ: 1500 TABLET, EXTENDED RELEASE ORAL at 21:24

## 2022-01-01 RX ADMIN — SODIUM CHLORIDE 75 ML/HR: 9 INJECTION, SOLUTION INTRAVENOUS at 05:37

## 2022-01-01 RX ADMIN — DEXAMETHASONE SODIUM PHOSPHATE 6 MG: 10 INJECTION INTRAMUSCULAR; INTRAVENOUS at 08:31

## 2022-01-01 RX ADMIN — AZITHROMYCIN DIHYDRATE 500 MG: 500 INJECTION, POWDER, LYOPHILIZED, FOR SOLUTION INTRAVENOUS at 17:11

## 2022-01-01 RX ADMIN — METFORMIN HYDROCHLORIDE 500 MG: 500 TABLET ORAL at 08:00

## 2022-01-01 RX ADMIN — CARVEDILOL 12.5 MG: 12.5 TABLET, FILM COATED ORAL at 08:30

## 2022-01-01 RX ADMIN — SODIUM CHLORIDE, PRESERVATIVE FREE 10 ML: 5 INJECTION INTRAVENOUS at 23:03

## 2022-01-01 RX ADMIN — PANTOPRAZOLE SODIUM 40 MG: 40 TABLET, DELAYED RELEASE ORAL at 09:27

## 2022-01-01 RX ADMIN — ACETAMINOPHEN 650 MG: 325 TABLET ORAL at 08:18

## 2022-01-01 RX ADMIN — CALCITONIN SALMON 232 INT'L UNITS: 200 INJECTION, SOLUTION INTRAMUSCULAR; SUBCUTANEOUS at 08:59

## 2022-01-01 RX ADMIN — SODIUM CHLORIDE 125 ML/HR: 9 INJECTION, SOLUTION INTRAVENOUS at 03:12

## 2022-01-01 RX ADMIN — Medication 2000 UNITS: at 08:30

## 2022-01-01 RX ADMIN — CARVEDILOL 25 MG: 12.5 TABLET, FILM COATED ORAL at 08:44

## 2022-01-01 RX ADMIN — MAGNESIUM OXIDE 400 MG: 400 TABLET ORAL at 16:55

## 2022-01-01 RX ADMIN — SODIUM CHLORIDE 75 ML/HR: 9 INJECTION, SOLUTION INTRAVENOUS at 19:50

## 2022-01-01 RX ADMIN — CARVEDILOL 25 MG: 12.5 TABLET, FILM COATED ORAL at 21:42

## 2022-01-01 RX ADMIN — ACYCLOVIR 400 MG: 800 TABLET ORAL at 08:00

## 2022-01-01 RX ADMIN — GUAIFENESIN 600 MG: 600 TABLET, EXTENDED RELEASE ORAL at 22:25

## 2022-01-01 RX ADMIN — PANTOPRAZOLE SODIUM 40 MG: 40 TABLET, DELAYED RELEASE ORAL at 17:27

## 2022-01-01 RX ADMIN — METFORMIN HYDROCHLORIDE 500 MG: 500 TABLET ORAL at 11:29

## 2022-01-01 RX ADMIN — ACYCLOVIR 400 MG: 800 TABLET ORAL at 08:44

## 2022-01-01 RX ADMIN — PANTOPRAZOLE SODIUM 40 MG: 40 TABLET, DELAYED RELEASE ORAL at 08:16

## 2022-01-01 RX ADMIN — PANTOPRAZOLE SODIUM 40 MG: 40 TABLET, DELAYED RELEASE ORAL at 08:43

## 2022-01-01 RX ADMIN — DEXAMETHASONE SODIUM PHOSPHATE 6 MG: 10 INJECTION INTRAMUSCULAR; INTRAVENOUS at 21:45

## 2022-01-01 RX ADMIN — FLUTICASONE PROPIONATE 2 SPRAY: 50 SPRAY, METERED NASAL at 08:37

## 2022-01-01 RX ADMIN — IOPAMIDOL 100 ML: 755 INJECTION, SOLUTION INTRAVENOUS at 03:11

## 2022-01-01 RX ADMIN — SODIUM CHLORIDE 125 ML/HR: 9 INJECTION, SOLUTION INTRAVENOUS at 05:25

## 2022-01-01 RX ADMIN — LOSARTAN POTASSIUM 25 MG: 25 TABLET, FILM COATED ORAL at 09:53

## 2022-01-01 RX ADMIN — INSULIN LISPRO 2 UNITS: 100 INJECTION, SOLUTION INTRAVENOUS; SUBCUTANEOUS at 17:21

## 2022-01-01 RX ADMIN — ATORVASTATIN CALCIUM 20 MG: 20 TABLET, FILM COATED ORAL at 08:44

## 2022-01-01 RX ADMIN — MORPHINE SULFATE 2 MG: 2 INJECTION, SOLUTION INTRAMUSCULAR; INTRAVENOUS at 07:46

## 2022-01-01 RX ADMIN — ENOXAPARIN SODIUM 40 MG: 100 INJECTION SUBCUTANEOUS at 09:01

## 2022-01-01 RX ADMIN — CARVEDILOL 12.5 MG: 12.5 TABLET, FILM COATED ORAL at 10:00

## 2022-01-01 RX ADMIN — CETIRIZINE HYDROCHLORIDE 10 MG: 10 TABLET, FILM COATED ORAL at 17:11

## 2022-01-01 RX ADMIN — PIPERACILLIN AND TAZOBACTAM 3.38 G: 3; .375 INJECTION, POWDER, LYOPHILIZED, FOR SOLUTION INTRAVENOUS at 09:55

## 2022-01-01 RX ADMIN — SODIUM CHLORIDE, PRESERVATIVE FREE 5 ML: 5 INJECTION INTRAVENOUS at 14:00

## 2022-01-01 RX ADMIN — SODIUM CHLORIDE, PRESERVATIVE FREE 10 ML: 5 INJECTION INTRAVENOUS at 22:35

## 2022-01-01 RX ADMIN — MAGNESIUM OXIDE 400 MG: 400 TABLET ORAL at 09:03

## 2022-01-01 RX ADMIN — INSULIN LISPRO 2 UNITS: 100 INJECTION, SOLUTION INTRAVENOUS; SUBCUTANEOUS at 21:48

## 2022-01-01 RX ADMIN — DRONABINOL 5 MG: 2.5 CAPSULE ORAL at 21:43

## 2022-01-01 RX ADMIN — SALINE NASAL SPRAY 2 SPRAY: 1.5 SOLUTION NASAL at 12:02

## 2022-01-01 RX ADMIN — DEXAMETHASONE 40 MG: 4 TABLET ORAL at 08:00

## 2022-01-01 RX ADMIN — ACYCLOVIR 400 MG: 800 TABLET ORAL at 08:18

## 2022-01-01 RX ADMIN — MAGNESIUM OXIDE 400 MG: 400 TABLET ORAL at 21:23

## 2022-01-01 RX ADMIN — HYDROMORPHONE HYDROCHLORIDE 1 MG: 1 INJECTION, SOLUTION INTRAMUSCULAR; INTRAVENOUS; SUBCUTANEOUS at 05:44

## 2022-01-01 RX ADMIN — AZITHROMYCIN DIHYDRATE 500 MG: 500 INJECTION, POWDER, LYOPHILIZED, FOR SOLUTION INTRAVENOUS at 16:27

## 2022-01-01 RX ADMIN — SODIUM CHLORIDE 125 ML/HR: 9 INJECTION, SOLUTION INTRAVENOUS at 23:14

## 2022-01-01 RX ADMIN — PIPERACILLIN AND TAZOBACTAM 3.38 G: 3; .375 INJECTION, POWDER, LYOPHILIZED, FOR SOLUTION INTRAVENOUS at 11:19

## 2022-01-01 RX ADMIN — BARICITINIB 2 MG: 2 TABLET, FILM COATED ORAL at 08:31

## 2022-01-01 RX ADMIN — DEXAMETHASONE SODIUM PHOSPHATE 6 MG: 10 INJECTION INTRAMUSCULAR; INTRAVENOUS at 22:25

## 2022-01-01 RX ADMIN — ALLOPURINOL 100 MG: 100 TABLET ORAL at 11:19

## 2022-01-01 RX ADMIN — MAGNESIUM OXIDE 400 MG: 400 TABLET ORAL at 21:48

## 2022-01-01 RX ADMIN — Medication 400 MG: at 08:44

## 2022-01-01 RX ADMIN — CETIRIZINE HYDROCHLORIDE 10 MG: 10 TABLET, FILM COATED ORAL at 19:14

## 2022-01-01 RX ADMIN — CEFTRIAXONE SODIUM 1 G: 1 INJECTION, POWDER, FOR SOLUTION INTRAMUSCULAR; INTRAVENOUS at 09:01

## 2022-01-01 RX ADMIN — METRONIDAZOLE 500 MG: 500 INJECTION, SOLUTION INTRAVENOUS at 05:36

## 2022-01-01 RX ADMIN — SODIUM CHLORIDE 125 ML/HR: 9 INJECTION, SOLUTION INTRAVENOUS at 12:45

## 2022-01-01 RX ADMIN — CEFTRIAXONE SODIUM 1 G: 1 INJECTION, POWDER, FOR SOLUTION INTRAMUSCULAR; INTRAVENOUS at 05:36

## 2022-01-01 RX ADMIN — MORPHINE SULFATE 2 MG: 2 INJECTION, SOLUTION INTRAMUSCULAR; INTRAVENOUS at 21:54

## 2022-01-01 RX ADMIN — PAMIDRONATE DISODIUM: 3 INJECTION INTRAVENOUS at 09:01

## 2022-01-01 RX ADMIN — LEVOFLOXACIN 750 MG: 500 TABLET, FILM COATED ORAL at 17:51

## 2022-01-01 RX ADMIN — MAGNESIUM OXIDE 400 MG: 400 TABLET ORAL at 17:23

## 2022-01-01 RX ADMIN — LORAZEPAM 1 MG: 2 INJECTION INTRAMUSCULAR; INTRAVENOUS at 14:58

## 2022-01-01 RX ADMIN — DRONABINOL 5 MG: 2.5 CAPSULE ORAL at 08:18

## 2022-01-01 RX ADMIN — MAGNESIUM OXIDE 400 MG: 400 TABLET ORAL at 09:23

## 2022-01-01 RX ADMIN — SODIUM CHLORIDE, PRESERVATIVE FREE 10 ML: 5 INJECTION INTRAVENOUS at 06:54

## 2022-01-01 RX ADMIN — DRONABINOL 5 MG: 2.5 CAPSULE ORAL at 08:01

## 2022-01-01 RX ADMIN — SODIUM BICARBONATE: 84 INJECTION, SOLUTION INTRAVENOUS at 00:00

## 2022-01-01 RX ADMIN — PIPERACILLIN AND TAZOBACTAM 3.38 G: 3; .375 INJECTION, POWDER, LYOPHILIZED, FOR SOLUTION INTRAVENOUS at 08:50

## 2022-01-01 RX ADMIN — PIPERACILLIN AND TAZOBACTAM 3.38 G: 3; .375 INJECTION, POWDER, LYOPHILIZED, FOR SOLUTION INTRAVENOUS at 00:34

## 2022-01-01 RX ADMIN — CARVEDILOL 12.5 MG: 12.5 TABLET, FILM COATED ORAL at 16:27

## 2022-01-01 RX ADMIN — INSULIN LISPRO 2 UNITS: 100 INJECTION, SOLUTION INTRAVENOUS; SUBCUTANEOUS at 12:01

## 2022-01-01 RX ADMIN — Medication 400 MG: at 08:00

## 2022-01-01 RX ADMIN — SODIUM CHLORIDE, PRESERVATIVE FREE 10 ML: 5 INJECTION INTRAVENOUS at 16:09

## 2022-01-01 RX ADMIN — CARVEDILOL 25 MG: 12.5 TABLET, FILM COATED ORAL at 21:10

## 2022-01-01 RX ADMIN — SODIUM CHLORIDE, PRESERVATIVE FREE 10 ML: 5 INJECTION INTRAVENOUS at 05:14

## 2022-01-01 RX ADMIN — CEFTRIAXONE SODIUM 1 G: 1 INJECTION, POWDER, FOR SOLUTION INTRAMUSCULAR; INTRAVENOUS at 09:19

## 2022-01-01 RX ADMIN — SODIUM CHLORIDE, PRESERVATIVE FREE 10 ML: 5 INJECTION INTRAVENOUS at 13:13

## 2022-01-01 RX ADMIN — SODIUM CHLORIDE 500 ML: 9 INJECTION, SOLUTION INTRAVENOUS at 04:45

## 2022-01-01 RX ADMIN — AZITHROMYCIN DIHYDRATE 500 MG: 500 INJECTION, POWDER, LYOPHILIZED, FOR SOLUTION INTRAVENOUS at 16:55

## 2022-01-01 RX ADMIN — ACETAMINOPHEN 650 MG: 325 TABLET ORAL at 20:36

## 2022-01-01 RX ADMIN — HYDROMORPHONE HYDROCHLORIDE 1 MG: 1 INJECTION, SOLUTION INTRAMUSCULAR; INTRAVENOUS; SUBCUTANEOUS at 03:14

## 2022-01-01 RX ADMIN — SODIUM CHLORIDE 1000 ML: 9 INJECTION, SOLUTION INTRAVENOUS at 03:27

## 2022-01-01 RX ADMIN — PIPERACILLIN AND TAZOBACTAM 3.38 G: 3; .375 INJECTION, POWDER, LYOPHILIZED, FOR SOLUTION INTRAVENOUS at 03:48

## 2022-01-01 RX ADMIN — SODIUM CHLORIDE, PRESERVATIVE FREE 10 ML: 5 INJECTION INTRAVENOUS at 17:21

## 2022-01-01 RX ADMIN — SODIUM CHLORIDE 100 ML/HR: 9 INJECTION, SOLUTION INTRAVENOUS at 03:50

## 2022-01-01 RX ADMIN — SODIUM CHLORIDE, PRESERVATIVE FREE 10 ML: 5 INJECTION INTRAVENOUS at 05:38

## 2022-01-01 RX ADMIN — DEXAMETHASONE SODIUM PHOSPHATE 6 MG: 10 INJECTION INTRAMUSCULAR; INTRAVENOUS at 09:56

## 2022-01-01 RX ADMIN — BARICITINIB 2 MG: 2 TABLET, FILM COATED ORAL at 09:56

## 2022-01-01 RX ADMIN — PANTOPRAZOLE SODIUM 40 MG: 40 TABLET, DELAYED RELEASE ORAL at 10:11

## 2022-01-01 RX ADMIN — OXYCODONE HYDROCHLORIDE AND ACETAMINOPHEN 500 MG: 500 TABLET ORAL at 21:48

## 2022-01-01 RX ADMIN — LOSARTAN POTASSIUM 25 MG: 25 TABLET, FILM COATED ORAL at 09:56

## 2022-01-01 RX ADMIN — SODIUM CHLORIDE, PRESERVATIVE FREE 40 MG: 5 INJECTION INTRAVENOUS at 09:20

## 2022-01-01 RX ADMIN — MAGNESIUM OXIDE 400 MG: 400 TABLET ORAL at 08:30

## 2022-01-01 RX ADMIN — LOSARTAN POTASSIUM 25 MG: 25 TABLET, FILM COATED ORAL at 11:19

## 2022-01-01 RX ADMIN — SODIUM CHLORIDE, PRESERVATIVE FREE 10 ML: 5 INJECTION INTRAVENOUS at 05:12

## 2022-01-01 RX ADMIN — DEXAMETHASONE SODIUM PHOSPHATE 6 MG: 10 INJECTION INTRAMUSCULAR; INTRAVENOUS at 09:48

## 2022-01-01 RX ADMIN — ATORVASTATIN CALCIUM 20 MG: 20 TABLET, FILM COATED ORAL at 21:23

## 2022-01-01 RX ADMIN — OXYCODONE HYDROCHLORIDE AND ACETAMINOPHEN 500 MG: 500 TABLET ORAL at 22:25

## 2022-01-01 RX ADMIN — INSULIN LISPRO 2 UNITS: 100 INJECTION, SOLUTION INTRAVENOUS; SUBCUTANEOUS at 09:23

## 2022-01-01 RX ADMIN — ACETAMINOPHEN 650 MG: 325 TABLET ORAL at 05:13

## 2022-01-01 RX ADMIN — ZINC SULFATE 220 MG (50 MG) CAPSULE 1 CAPSULE: CAPSULE at 09:56

## 2022-01-01 RX ADMIN — ACETAMINOPHEN 650 MG: 325 TABLET, FILM COATED ORAL at 22:17

## 2022-01-01 RX ADMIN — MORPHINE SULFATE 2 MG: 2 INJECTION, SOLUTION INTRAMUSCULAR; INTRAVENOUS at 12:06

## 2022-01-01 RX ADMIN — SODIUM BICARBONATE: 84 INJECTION, SOLUTION INTRAVENOUS at 12:17

## 2022-01-01 RX ADMIN — CARVEDILOL 25 MG: 12.5 TABLET, FILM COATED ORAL at 20:44

## 2022-01-01 RX ADMIN — POTASSIUM CHLORIDE 40 MEQ: 1500 TABLET, EXTENDED RELEASE ORAL at 08:43

## 2022-01-01 RX ADMIN — CETIRIZINE HYDROCHLORIDE 10 MG: 10 TABLET, FILM COATED ORAL at 17:00

## 2022-01-01 RX ADMIN — Medication 2000 UNITS: at 08:16

## 2022-01-01 RX ADMIN — ACETAMINOPHEN 650 MG: 325 TABLET ORAL at 21:29

## 2022-01-01 RX ADMIN — PIPERACILLIN AND TAZOBACTAM 3.38 G: 3; .375 INJECTION, POWDER, LYOPHILIZED, FOR SOLUTION INTRAVENOUS at 11:53

## 2022-01-01 RX ADMIN — ONDANSETRON 4 MG: 2 INJECTION INTRAMUSCULAR; INTRAVENOUS at 21:16

## 2022-01-01 RX ADMIN — MAGNESIUM OXIDE 400 MG: 400 TABLET ORAL at 09:56

## 2022-01-01 RX ADMIN — PIPERACILLIN AND TAZOBACTAM 3.38 G: 3; .375 INJECTION, POWDER, LYOPHILIZED, FOR SOLUTION INTRAVENOUS at 19:11

## 2022-01-01 RX ADMIN — ATORVASTATIN CALCIUM 20 MG: 20 TABLET, FILM COATED ORAL at 21:11

## 2022-01-01 RX ADMIN — PANTOPRAZOLE SODIUM 40 MG: 40 TABLET, DELAYED RELEASE ORAL at 09:56

## 2022-01-01 RX ADMIN — SODIUM CHLORIDE, PRESERVATIVE FREE 10 ML: 5 INJECTION INTRAVENOUS at 21:24

## 2022-01-01 RX ADMIN — SODIUM CHLORIDE, PRESERVATIVE FREE 10 ML: 5 INJECTION INTRAVENOUS at 05:21

## 2022-01-01 RX ADMIN — SODIUM CHLORIDE, PRESERVATIVE FREE 10 ML: 5 INJECTION INTRAVENOUS at 22:26

## 2022-01-01 RX ADMIN — DRONABINOL 5 MG: 2.5 CAPSULE ORAL at 20:36

## 2022-01-01 RX ADMIN — LEVOFLOXACIN 750 MG: 500 TABLET, FILM COATED ORAL at 14:14

## 2022-01-01 RX ADMIN — HYDROMORPHONE HYDROCHLORIDE 1 MG: 1 INJECTION, SOLUTION INTRAMUSCULAR; INTRAVENOUS; SUBCUTANEOUS at 22:01

## 2022-01-01 RX ADMIN — METFORMIN HYDROCHLORIDE 500 MG: 500 TABLET ORAL at 11:57

## 2022-01-01 RX ADMIN — SODIUM BICARBONATE: 84 INJECTION, SOLUTION INTRAVENOUS at 09:24

## 2022-01-01 RX ADMIN — SODIUM CHLORIDE, PRESERVATIVE FREE 10 ML: 5 INJECTION INTRAVENOUS at 16:02

## 2022-01-01 RX ADMIN — ACYCLOVIR 400 MG: 800 TABLET ORAL at 09:03

## 2022-01-01 RX ADMIN — HYDROMORPHONE HYDROCHLORIDE 1 MG: 1 INJECTION, SOLUTION INTRAMUSCULAR; INTRAVENOUS; SUBCUTANEOUS at 14:57

## 2022-01-01 RX ADMIN — MAGNESIUM OXIDE 400 MG: 400 TABLET ORAL at 09:05

## 2022-01-01 RX ADMIN — HALOPERIDOL LACTATE 3 MG: 5 INJECTION, SOLUTION INTRAMUSCULAR at 06:05

## 2022-01-01 RX ADMIN — CETIRIZINE HYDROCHLORIDE 10 MG: 10 TABLET, FILM COATED ORAL at 17:17

## 2022-01-01 RX ADMIN — DEXAMETHASONE SODIUM PHOSPHATE 6 MG: 10 INJECTION INTRAMUSCULAR; INTRAVENOUS at 11:30

## 2022-01-01 RX ADMIN — MAGNESIUM OXIDE 400 MG: 400 TABLET ORAL at 21:45

## 2022-01-01 RX ADMIN — METFORMIN HYDROCHLORIDE 500 MG: 500 TABLET ORAL at 08:44

## 2022-01-01 RX ADMIN — MEROPENEM 1 G: 1 INJECTION, POWDER, FOR SOLUTION INTRAVENOUS at 15:59

## 2022-01-01 RX ADMIN — OXYCODONE HYDROCHLORIDE AND ACETAMINOPHEN 500 MG: 500 TABLET ORAL at 08:31

## 2022-01-01 RX ADMIN — METFORMIN HYDROCHLORIDE 500 MG: 500 TABLET ORAL at 08:17

## 2022-01-01 RX ADMIN — INSULIN LISPRO 3 UNITS: 100 INJECTION, SOLUTION INTRAVENOUS; SUBCUTANEOUS at 19:10

## 2022-01-01 RX ADMIN — ALLOPURINOL 100 MG: 100 TABLET ORAL at 09:06

## 2022-01-01 RX ADMIN — MAGNESIUM OXIDE 400 MG: 400 TABLET ORAL at 08:42

## 2022-01-01 RX ADMIN — METFORMIN HYDROCHLORIDE 500 MG: 500 TABLET ORAL at 17:04

## 2022-01-01 RX ADMIN — CARVEDILOL 25 MG: 12.5 TABLET, FILM COATED ORAL at 08:00

## 2022-01-01 RX ADMIN — MAGNESIUM OXIDE 400 MG: 400 TABLET ORAL at 16:00

## 2022-01-01 RX ADMIN — ALBUMIN (HUMAN) 25 G: 0.25 INJECTION, SOLUTION INTRAVENOUS at 14:14

## 2022-01-01 RX ADMIN — CALCITONIN SALMON 240 INT'L UNITS: 200 INJECTION, SOLUTION INTRAMUSCULAR; SUBCUTANEOUS at 21:54

## 2022-01-01 RX ADMIN — PIPERACILLIN AND TAZOBACTAM 3.38 G: 3; .375 INJECTION, POWDER, LYOPHILIZED, FOR SOLUTION INTRAVENOUS at 04:01

## 2022-01-01 RX ADMIN — POTASSIUM CHLORIDE 40 MEQ: 750 TABLET, FILM COATED, EXTENDED RELEASE ORAL at 11:29

## 2022-01-01 RX ADMIN — SODIUM CHLORIDE 250 ML: 9 INJECTION, SOLUTION INTRAVENOUS at 08:00

## 2022-01-01 RX ADMIN — LIDOCAINE HYDROCHLORIDE 15 ML: 20 SOLUTION ORAL; TOPICAL at 02:12

## 2022-01-01 RX ADMIN — PIPERACILLIN AND TAZOBACTAM 3.38 G: 3; .375 INJECTION, POWDER, LYOPHILIZED, FOR SOLUTION INTRAVENOUS at 23:33

## 2022-01-01 RX ADMIN — LORAZEPAM 1 MG: 2 INJECTION INTRAMUSCULAR; INTRAVENOUS at 05:44

## 2022-01-01 RX ADMIN — INSULIN LISPRO 2 UNITS: 100 INJECTION, SOLUTION INTRAVENOUS; SUBCUTANEOUS at 12:21

## 2022-01-01 RX ADMIN — MAGNESIUM OXIDE 400 MG: 400 TABLET ORAL at 22:25

## 2022-01-01 RX ADMIN — AZITHROMYCIN DIHYDRATE 500 MG: 500 INJECTION, POWDER, LYOPHILIZED, FOR SOLUTION INTRAVENOUS at 19:11

## 2022-01-01 RX ADMIN — OXYCODONE HYDROCHLORIDE AND ACETAMINOPHEN 500 MG: 500 TABLET ORAL at 09:48

## 2022-01-01 RX ADMIN — SODIUM CHLORIDE, PRESERVATIVE FREE 10 ML: 5 INJECTION INTRAVENOUS at 21:28

## 2022-01-01 RX ADMIN — ACYCLOVIR 400 MG: 800 TABLET ORAL at 17:26

## 2022-01-01 RX ADMIN — POTASSIUM CHLORIDE 40 MEQ: 1500 TABLET, EXTENDED RELEASE ORAL at 09:30

## 2022-01-01 RX ADMIN — POTASSIUM CHLORIDE 40 MEQ: 750 TABLET, FILM COATED, EXTENDED RELEASE ORAL at 12:00

## 2022-01-01 RX ADMIN — ENOXAPARIN SODIUM 40 MG: 100 INJECTION SUBCUTANEOUS at 09:23

## 2022-01-01 RX ADMIN — INSULIN LISPRO 2 UNITS: 100 INJECTION, SOLUTION INTRAVENOUS; SUBCUTANEOUS at 09:02

## 2022-01-01 RX ADMIN — PANTOPRAZOLE SODIUM 40 MG: 40 TABLET, DELAYED RELEASE ORAL at 09:53

## 2022-01-01 RX ADMIN — SODIUM CHLORIDE, PRESERVATIVE FREE 40 ML: 5 INJECTION INTRAVENOUS at 17:39

## 2022-01-01 RX ADMIN — DEXAMETHASONE SODIUM PHOSPHATE 6 MG: 10 INJECTION INTRAMUSCULAR; INTRAVENOUS at 09:07

## 2022-01-01 RX ADMIN — ALBUMIN (HUMAN) 25 G: 0.25 INJECTION, SOLUTION INTRAVENOUS at 07:45

## 2022-01-01 RX ADMIN — SALINE NASAL SPRAY 2 SPRAY: 1.5 SOLUTION NASAL at 09:00

## 2022-01-01 RX ADMIN — FLUTICASONE PROPIONATE 2 SPRAY: 50 SPRAY, METERED NASAL at 09:00

## 2022-01-01 RX ADMIN — SODIUM CHLORIDE, PRESERVATIVE FREE 10 ML: 5 INJECTION INTRAVENOUS at 22:22

## 2022-01-01 RX ADMIN — PANTOPRAZOLE SODIUM 40 MG: 40 TABLET, DELAYED RELEASE ORAL at 08:44

## 2022-01-01 RX ADMIN — ACYCLOVIR 400 MG: 800 TABLET ORAL at 08:31

## 2022-01-01 RX ADMIN — CEFTRIAXONE SODIUM 1 G: 1 INJECTION, POWDER, FOR SOLUTION INTRAMUSCULAR; INTRAVENOUS at 09:03

## 2022-01-01 RX ADMIN — CARVEDILOL 12.5 MG: 12.5 TABLET, FILM COATED ORAL at 17:17

## 2022-01-01 RX ADMIN — CETIRIZINE HYDROCHLORIDE 10 MG: 10 TABLET, FILM COATED ORAL at 16:27

## 2022-01-01 RX ADMIN — HYDROMORPHONE HYDROCHLORIDE 1 MG: 1 INJECTION, SOLUTION INTRAMUSCULAR; INTRAVENOUS; SUBCUTANEOUS at 18:27

## 2022-01-01 RX ADMIN — METFORMIN HYDROCHLORIDE 500 MG: 500 TABLET ORAL at 11:06

## 2022-01-01 RX ADMIN — ONDANSETRON 4 MG: 2 INJECTION INTRAMUSCULAR; INTRAVENOUS at 15:29

## 2022-01-01 RX ADMIN — PIPERACILLIN AND TAZOBACTAM 3.38 G: 3; .375 INJECTION, POWDER, LYOPHILIZED, FOR SOLUTION INTRAVENOUS at 16:27

## 2022-01-01 RX ADMIN — MAGNESIUM OXIDE 400 MG: 400 TABLET ORAL at 00:37

## 2022-01-01 RX ADMIN — OXYCODONE HYDROCHLORIDE AND ACETAMINOPHEN 500 MG: 500 TABLET ORAL at 11:19

## 2022-01-01 RX ADMIN — OXYCODONE HYDROCHLORIDE AND ACETAMINOPHEN 500 MG: 500 TABLET ORAL at 21:45

## 2022-01-01 RX ADMIN — ACYCLOVIR 400 MG: 800 TABLET ORAL at 09:06

## 2022-01-01 RX ADMIN — CARVEDILOL 12.5 MG: 12.5 TABLET, FILM COATED ORAL at 09:06

## 2022-01-01 RX ADMIN — SODIUM CHLORIDE, PRESERVATIVE FREE 10 ML: 5 INJECTION INTRAVENOUS at 06:07

## 2022-01-01 RX ADMIN — SODIUM CHLORIDE 500 ML: 9 INJECTION, SOLUTION INTRAVENOUS at 21:24

## 2022-01-01 RX ADMIN — ACETAMINOPHEN 650 MG: 325 TABLET ORAL at 08:44

## 2022-01-01 RX ADMIN — GUAIFENESIN 600 MG: 600 TABLET, EXTENDED RELEASE ORAL at 08:16

## 2022-01-01 RX ADMIN — MAGNESIUM OXIDE 400 MG: 400 TABLET ORAL at 22:35

## 2022-01-01 RX ADMIN — OXYCODONE HYDROCHLORIDE AND ACETAMINOPHEN 500 MG: 500 TABLET ORAL at 08:16

## 2022-01-01 RX ADMIN — ALUMINUM HYDROXIDE, MAGNESIUM HYDROXIDE, AND SIMETHICONE 30 ML: 200; 200; 20 SUSPENSION ORAL at 02:12

## 2022-01-01 RX ADMIN — CARVEDILOL 12.5 MG: 12.5 TABLET, FILM COATED ORAL at 11:19

## 2022-01-01 RX ADMIN — SODIUM CHLORIDE, PRESERVATIVE FREE 10 ML: 5 INJECTION INTRAVENOUS at 06:15

## 2022-01-01 RX ADMIN — ENOXAPARIN SODIUM 40 MG: 100 INJECTION SUBCUTANEOUS at 08:50

## 2022-01-01 RX ADMIN — ACETAMINOPHEN 650 MG: 325 TABLET, FILM COATED ORAL at 16:11

## 2022-01-01 RX ADMIN — DRONABINOL 5 MG: 2.5 CAPSULE ORAL at 07:57

## 2022-01-01 RX ADMIN — CARVEDILOL 12.5 MG: 12.5 TABLET, FILM COATED ORAL at 19:12

## 2022-01-01 RX ADMIN — DRONABINOL 5 MG: 2.5 CAPSULE ORAL at 20:44

## 2022-01-01 RX ADMIN — PERFLUTREN 2 ML: 6.52 INJECTION, SUSPENSION INTRAVENOUS at 12:33

## 2022-01-01 RX ADMIN — ZINC SULFATE 220 MG (50 MG) CAPSULE 1 CAPSULE: CAPSULE at 08:30

## 2022-01-01 RX ADMIN — MORPHINE SULFATE 2 MG: 2 INJECTION, SOLUTION INTRAMUSCULAR; INTRAVENOUS at 12:07

## 2022-01-01 RX ADMIN — SALINE NASAL SPRAY 2 SPRAY: 1.5 SOLUTION NASAL at 13:00

## 2022-01-01 RX ADMIN — ATORVASTATIN CALCIUM 20 MG: 20 TABLET, FILM COATED ORAL at 08:00

## 2022-01-01 RX ADMIN — OXYCODONE HYDROCHLORIDE AND ACETAMINOPHEN 500 MG: 500 TABLET ORAL at 00:37

## 2022-01-01 RX ADMIN — SALINE NASAL SPRAY 2 SPRAY: 1.5 SOLUTION NASAL at 18:00

## 2022-01-01 RX ADMIN — PANTOPRAZOLE SODIUM 40 MG: 40 TABLET, DELAYED RELEASE ORAL at 08:18

## 2022-01-01 RX ADMIN — PIPERACILLIN AND TAZOBACTAM 3.38 G: 3; .375 INJECTION, POWDER, LYOPHILIZED, FOR SOLUTION INTRAVENOUS at 09:47

## 2022-01-01 RX ADMIN — MEROPENEM 1 G: 1 INJECTION, POWDER, FOR SOLUTION INTRAVENOUS at 01:57

## 2022-01-01 RX ADMIN — ACETAMINOPHEN 650 MG: 325 TABLET ORAL at 20:45

## 2022-01-01 RX ADMIN — PIPERACILLIN SODIUM AND TAZOBACTAM SODIUM 4.5 G: 4; .5 INJECTION, POWDER, LYOPHILIZED, FOR SOLUTION INTRAVENOUS at 17:39

## 2022-01-01 RX ADMIN — PANTOPRAZOLE SODIUM 40 MG: 40 TABLET, DELAYED RELEASE ORAL at 09:03

## 2022-01-01 RX ADMIN — ACYCLOVIR 400 MG: 800 TABLET ORAL at 11:19

## 2022-01-01 RX ADMIN — SODIUM BICARBONATE 50 MEQ: 84 INJECTION, SOLUTION INTRAVENOUS at 08:51

## 2022-01-01 RX ADMIN — DEXAMETHASONE SODIUM PHOSPHATE 6 MG: 10 INJECTION INTRAMUSCULAR; INTRAVENOUS at 21:48

## 2022-01-01 RX ADMIN — Medication 400 MG: at 07:57

## 2022-01-01 RX ADMIN — CARVEDILOL 25 MG: 12.5 TABLET, FILM COATED ORAL at 11:06

## 2022-01-01 RX ADMIN — METFORMIN HYDROCHLORIDE 500 MG: 500 TABLET ORAL at 17:00

## 2022-01-01 RX ADMIN — ONDANSETRON 4 MG: 2 INJECTION INTRAMUSCULAR; INTRAVENOUS at 09:44

## 2022-01-01 RX ADMIN — Medication 2000 UNITS: at 09:56

## 2022-01-01 RX ADMIN — PANTOPRAZOLE SODIUM 40 MG: 40 TABLET, DELAYED RELEASE ORAL at 08:00

## 2022-01-01 RX ADMIN — SODIUM CHLORIDE, PRESERVATIVE FREE 10 ML: 5 INJECTION INTRAVENOUS at 05:40

## 2022-01-01 RX ADMIN — ALBUMIN (HUMAN) 25 G: 0.25 INJECTION, SOLUTION INTRAVENOUS at 02:17

## 2022-01-01 RX ADMIN — NOREPINEPHRINE BITARTRATE 2 MCG/MIN: 1 INJECTION, SOLUTION, CONCENTRATE INTRAVENOUS at 11:29

## 2022-01-01 RX ADMIN — SODIUM CHLORIDE 125 ML/HR: 9 INJECTION, SOLUTION INTRAVENOUS at 18:33

## 2022-01-01 RX ADMIN — SODIUM BICARBONATE 50 MEQ: 84 INJECTION, SOLUTION INTRAVENOUS at 09:32

## 2022-01-01 RX ADMIN — ACYCLOVIR 400 MG: 800 TABLET ORAL at 09:30

## 2022-01-01 RX ADMIN — CARVEDILOL 12.5 MG: 12.5 TABLET, FILM COATED ORAL at 16:55

## 2022-01-01 RX ADMIN — OXYCODONE HYDROCHLORIDE AND ACETAMINOPHEN 500 MG: 500 TABLET ORAL at 22:22

## 2022-01-01 RX ADMIN — GUAIFENESIN 600 MG: 600 TABLET, EXTENDED RELEASE ORAL at 22:22

## 2022-01-01 RX ADMIN — PANTOPRAZOLE SODIUM 40 MG: 40 TABLET, DELAYED RELEASE ORAL at 07:57

## 2022-01-01 RX ADMIN — ATORVASTATIN CALCIUM 20 MG: 20 TABLET, FILM COATED ORAL at 08:17

## 2022-01-01 RX ADMIN — AZITHROMYCIN DIHYDRATE 500 MG: 500 INJECTION, POWDER, LYOPHILIZED, FOR SOLUTION INTRAVENOUS at 17:17

## 2022-01-01 RX ADMIN — METFORMIN HYDROCHLORIDE 500 MG: 500 TABLET ORAL at 16:47

## 2022-01-01 RX ADMIN — SALINE NASAL SPRAY 2 SPRAY: 1.5 SOLUTION NASAL at 17:28

## 2022-01-01 RX ADMIN — SODIUM CHLORIDE, PRESERVATIVE FREE 10 ML: 5 INJECTION INTRAVENOUS at 14:00

## 2022-01-01 RX ADMIN — ALLOPURINOL 100 MG: 100 TABLET ORAL at 09:03

## 2022-01-01 RX ADMIN — SODIUM CHLORIDE 500 ML/HR: 9 INJECTION, SOLUTION INTRAVENOUS at 11:29

## 2022-01-01 RX ADMIN — Medication 2000 UNITS: at 09:48

## 2022-01-01 RX ADMIN — DEXAMETHASONE SODIUM PHOSPHATE 6 MG: 10 INJECTION INTRAMUSCULAR; INTRAVENOUS at 22:35

## 2022-01-01 RX ADMIN — LORAZEPAM 1 MG: 2 INJECTION INTRAMUSCULAR; INTRAVENOUS at 18:26

## 2022-01-01 RX ADMIN — ACETAMINOPHEN 650 MG: 325 TABLET ORAL at 15:06

## 2022-01-01 RX ADMIN — SALINE NASAL SPRAY 2 SPRAY: 1.5 SOLUTION NASAL at 08:37

## 2022-01-01 RX ADMIN — PIPERACILLIN AND TAZOBACTAM 3.38 G: 3; .375 INJECTION, POWDER, LYOPHILIZED, FOR SOLUTION INTRAVENOUS at 16:55

## 2022-01-01 RX ADMIN — ENOXAPARIN SODIUM 40 MG: 100 INJECTION SUBCUTANEOUS at 08:42

## 2022-01-01 RX ADMIN — Medication 2000 UNITS: at 11:19

## 2022-01-01 RX ADMIN — SODIUM CHLORIDE 125 ML/HR: 9 INJECTION, SOLUTION INTRAVENOUS at 10:29

## 2022-01-01 RX ADMIN — SODIUM CHLORIDE 125 ML/HR: 9 INJECTION, SOLUTION INTRAVENOUS at 21:24

## 2022-01-01 RX ADMIN — MAGNESIUM OXIDE 400 MG: 400 TABLET ORAL at 09:48

## 2022-01-01 RX ADMIN — PANTOPRAZOLE SODIUM 40 MG: 40 TABLET, DELAYED RELEASE ORAL at 08:30

## 2022-01-01 RX ADMIN — SODIUM CHLORIDE, PRESERVATIVE FREE 10 ML: 5 INJECTION INTRAVENOUS at 21:54

## 2022-01-01 RX ADMIN — SODIUM CHLORIDE, PRESERVATIVE FREE 10 ML: 5 INJECTION INTRAVENOUS at 06:24

## 2022-01-01 RX ADMIN — OXYCODONE HYDROCHLORIDE AND ACETAMINOPHEN 500 MG: 500 TABLET ORAL at 09:56

## 2022-01-01 RX ADMIN — SODIUM CHLORIDE 125 ML/HR: 9 INJECTION, SOLUTION INTRAVENOUS at 08:53

## 2022-01-01 RX ADMIN — CEFTRIAXONE SODIUM 1 G: 1 INJECTION, POWDER, FOR SOLUTION INTRAMUSCULAR; INTRAVENOUS at 08:55

## 2022-01-01 RX ADMIN — MAGNESIUM OXIDE 400 MG: 400 TABLET ORAL at 22:22

## 2022-01-01 RX ADMIN — OXYCODONE HYDROCHLORIDE AND ACETAMINOPHEN 500 MG: 500 TABLET ORAL at 22:35

## 2022-01-01 RX ADMIN — SODIUM CHLORIDE, PRESERVATIVE FREE 10 ML: 5 INJECTION INTRAVENOUS at 07:46

## 2022-01-01 RX ADMIN — SODIUM CHLORIDE, PRESERVATIVE FREE 10 ML: 5 INJECTION INTRAVENOUS at 21:49

## 2022-01-01 RX ADMIN — MAGNESIUM OXIDE 400 MG: 400 TABLET ORAL at 08:16

## 2022-01-01 RX ADMIN — MEROPENEM 1 G: 1 INJECTION, POWDER, FOR SOLUTION INTRAVENOUS at 14:20

## 2022-01-01 RX ADMIN — SODIUM CHLORIDE, PRESERVATIVE FREE 10 ML: 5 INJECTION INTRAVENOUS at 06:00

## 2022-01-01 RX ADMIN — BARICITINIB 2 MG: 2 TABLET, FILM COATED ORAL at 08:16

## 2022-01-01 RX ADMIN — CARVEDILOL 12.5 MG: 12.5 TABLET, FILM COATED ORAL at 09:53

## 2022-01-01 RX ADMIN — PIPERACILLIN AND TAZOBACTAM 3.38 G: 3; .375 INJECTION, POWDER, LYOPHILIZED, FOR SOLUTION INTRAVENOUS at 00:37

## 2022-01-01 RX ADMIN — ALBUMIN (HUMAN) 25 G: 0.25 INJECTION, SOLUTION INTRAVENOUS at 20:48

## 2022-01-01 RX ADMIN — ATORVASTATIN CALCIUM 20 MG: 20 TABLET, FILM COATED ORAL at 07:57

## 2022-01-01 RX ADMIN — ACYCLOVIR 400 MG: 800 TABLET ORAL at 08:42

## 2022-01-01 RX ADMIN — LOSARTAN POTASSIUM 25 MG: 25 TABLET, FILM COATED ORAL at 08:30

## 2022-01-01 RX ADMIN — MORPHINE SULFATE 4 MG: 4 INJECTION, SOLUTION INTRAMUSCULAR; INTRAVENOUS at 15:29

## 2022-01-01 RX ADMIN — IOPAMIDOL 100 ML: 755 INJECTION, SOLUTION INTRAVENOUS at 23:13

## 2022-01-01 RX ADMIN — MEROPENEM 1 G: 1 INJECTION, POWDER, FOR SOLUTION INTRAVENOUS at 01:03

## 2022-01-01 RX ADMIN — MAGNESIUM OXIDE 400 MG: 400 TABLET ORAL at 11:19

## 2022-01-01 RX ADMIN — CARVEDILOL 12.5 MG: 12.5 TABLET, FILM COATED ORAL at 13:52

## 2022-01-01 RX ADMIN — SODIUM CHLORIDE 125 ML/HR: 9 INJECTION, SOLUTION INTRAVENOUS at 01:14

## 2022-01-01 RX ADMIN — LOSARTAN POTASSIUM 25 MG: 25 TABLET, FILM COATED ORAL at 09:06

## 2022-01-01 RX ADMIN — MAGNESIUM OXIDE 400 MG: 400 TABLET ORAL at 21:11

## 2022-01-01 RX ADMIN — MAGNESIUM OXIDE 400 MG: 400 TABLET ORAL at 16:27

## 2022-01-01 RX ADMIN — SODIUM CHLORIDE 40 MG: 9 INJECTION INTRAMUSCULAR; INTRAVENOUS; SUBCUTANEOUS at 02:15

## 2022-01-01 RX ADMIN — CEFTRIAXONE SODIUM 1 G: 1 INJECTION, POWDER, FOR SOLUTION INTRAMUSCULAR; INTRAVENOUS at 08:50

## 2022-01-01 RX ADMIN — PIPERACILLIN AND TAZOBACTAM 3.38 G: 3; .375 INJECTION, POWDER, LYOPHILIZED, FOR SOLUTION INTRAVENOUS at 17:11

## 2022-01-01 RX ADMIN — SODIUM CHLORIDE, PRESERVATIVE FREE 10 ML: 5 INJECTION INTRAVENOUS at 12:48

## 2022-01-01 RX ADMIN — ATORVASTATIN CALCIUM 20 MG: 20 TABLET, FILM COATED ORAL at 09:03

## 2022-01-01 RX ADMIN — SODIUM CHLORIDE 125 ML/HR: 9 INJECTION, SOLUTION INTRAVENOUS at 17:59

## 2022-01-01 RX ADMIN — ACETAMINOPHEN 650 MG: 325 TABLET ORAL at 08:04

## 2022-01-01 RX ADMIN — PIPERACILLIN AND TAZOBACTAM 3.38 G: 3; .375 INJECTION, POWDER, LYOPHILIZED, FOR SOLUTION INTRAVENOUS at 21:45

## 2022-01-01 RX ADMIN — SODIUM CHLORIDE, PRESERVATIVE FREE 10 ML: 5 INJECTION INTRAVENOUS at 09:19

## 2022-01-01 RX ADMIN — ZINC SULFATE 220 MG (50 MG) CAPSULE 1 CAPSULE: CAPSULE at 09:06

## 2022-01-01 RX ADMIN — ALLOPURINOL 100 MG: 100 TABLET ORAL at 08:31

## 2022-01-01 RX ADMIN — SODIUM CHLORIDE 1000 ML: 9 INJECTION, SOLUTION INTRAVENOUS at 22:25

## 2022-01-01 RX ADMIN — METFORMIN HYDROCHLORIDE 500 MG: 500 TABLET ORAL at 17:51

## 2022-01-01 RX ADMIN — Medication 2000 UNITS: at 09:05

## 2022-01-01 RX ADMIN — SODIUM CHLORIDE, PRESERVATIVE FREE 10 ML: 5 INJECTION INTRAVENOUS at 22:25

## 2022-01-01 RX ADMIN — ZINC SULFATE 220 MG (50 MG) CAPSULE 1 CAPSULE: CAPSULE at 08:16

## 2022-01-01 RX ADMIN — GUAIFENESIN 600 MG: 600 TABLET, EXTENDED RELEASE ORAL at 09:48

## 2022-01-01 RX ADMIN — Medication 400 MG: at 08:18

## 2022-01-01 RX ADMIN — BARICITINIB 2 MG: 2 TABLET, FILM COATED ORAL at 09:05

## 2022-01-01 RX ADMIN — SODIUM CHLORIDE 1000 ML: 9 INJECTION, SOLUTION INTRAVENOUS at 21:23

## 2022-01-01 RX ADMIN — ONDANSETRON 4 MG: 4 TABLET, ORALLY DISINTEGRATING ORAL at 07:46

## 2022-01-01 RX ADMIN — LEVOFLOXACIN 750 MG: 500 TABLET, FILM COATED ORAL at 14:00

## 2022-01-01 RX ADMIN — MAGNESIUM OXIDE 400 MG: 400 TABLET ORAL at 12:00

## 2022-01-01 RX ADMIN — ZINC SULFATE 220 MG (50 MG) CAPSULE 1 CAPSULE: CAPSULE at 09:48

## 2022-01-01 RX ADMIN — ONDANSETRON 4 MG: 4 TABLET, ORALLY DISINTEGRATING ORAL at 12:06

## 2022-01-01 RX ADMIN — SODIUM CHLORIDE 125 ML/HR: 9 INJECTION, SOLUTION INTRAVENOUS at 23:55

## 2022-01-01 RX ADMIN — LORAZEPAM 1 MG: 2 INJECTION INTRAMUSCULAR; INTRAVENOUS at 22:01

## 2022-01-01 RX ADMIN — MORPHINE SULFATE 2 MG: 2 INJECTION, SOLUTION INTRAMUSCULAR; INTRAVENOUS at 21:11

## 2022-01-01 RX ADMIN — PIPERACILLIN AND TAZOBACTAM 3.38 G: 3; .375 INJECTION, POWDER, LYOPHILIZED, FOR SOLUTION INTRAVENOUS at 00:20

## 2022-01-01 RX ADMIN — SODIUM CHLORIDE 1000 ML: 9 INJECTION, SOLUTION INTRAVENOUS at 16:17

## 2022-01-01 RX ADMIN — SODIUM CHLORIDE, PRESERVATIVE FREE 10 ML: 5 INJECTION INTRAVENOUS at 05:09

## 2022-01-01 RX ADMIN — SODIUM CHLORIDE, PRESERVATIVE FREE 10 ML: 5 INJECTION INTRAVENOUS at 21:11

## 2022-01-01 RX ADMIN — SODIUM CHLORIDE, PRESERVATIVE FREE 10 ML: 5 INJECTION INTRAVENOUS at 17:00

## 2022-01-01 RX ADMIN — DRONABINOL 5 MG: 2.5 CAPSULE ORAL at 08:44

## 2022-01-01 RX ADMIN — HYDROCORTISONE SODIUM SUCCINATE 100 MG: 100 INJECTION, POWDER, FOR SOLUTION INTRAMUSCULAR; INTRAVENOUS at 16:09

## 2022-01-01 RX ADMIN — PANTOPRAZOLE SODIUM 40 MG: 40 TABLET, DELAYED RELEASE ORAL at 11:19

## 2022-01-01 RX ADMIN — MAGNESIUM OXIDE 400 MG: 400 TABLET ORAL at 19:10

## 2022-01-01 RX ADMIN — IOPAMIDOL 100 ML: 755 INJECTION, SOLUTION INTRAVENOUS at 16:06

## 2022-01-01 RX ADMIN — ZINC SULFATE 220 MG (50 MG) CAPSULE 1 CAPSULE: CAPSULE at 11:19

## 2022-01-01 RX ADMIN — SODIUM CHLORIDE, PRESERVATIVE FREE 10 ML: 5 INJECTION INTRAVENOUS at 21:06

## 2022-01-01 RX ADMIN — LORAZEPAM 1 MG: 2 INJECTION INTRAMUSCULAR; INTRAVENOUS at 03:14

## 2022-01-01 RX ADMIN — OXYCODONE HYDROCHLORIDE AND ACETAMINOPHEN 500 MG: 500 TABLET ORAL at 09:05

## 2022-01-01 RX ADMIN — LEVOFLOXACIN 750 MG: 500 TABLET, FILM COATED ORAL at 15:06

## 2022-01-01 RX ADMIN — DEXAMETHASONE SODIUM PHOSPHATE 6 MG: 10 INJECTION INTRAMUSCULAR; INTRAVENOUS at 22:22

## 2022-01-01 RX ADMIN — ACYCLOVIR 400 MG: 800 TABLET ORAL at 07:58

## 2022-01-01 RX ADMIN — BARICITINIB 2 MG: 2 TABLET, FILM COATED ORAL at 09:48

## 2022-01-01 RX ADMIN — MORPHINE SULFATE 2 MG: 2 INJECTION, SOLUTION INTRAMUSCULAR; INTRAVENOUS at 03:12

## 2022-01-01 RX ADMIN — BARICITINIB 2 MG: 2 TABLET, FILM COATED ORAL at 11:19

## 2022-01-01 RX ADMIN — DEXAMETHASONE SODIUM PHOSPHATE 6 MG: 10 INJECTION INTRAMUSCULAR; INTRAVENOUS at 08:17

## 2022-01-20 PROBLEM — U07.1 PNEUMONIA DUE TO COVID-19 VIRUS: Status: ACTIVE | Noted: 2022-01-01

## 2022-01-20 PROBLEM — D61.818 PANCYTOPENIA (HCC): Status: ACTIVE | Noted: 2022-01-01

## 2022-01-20 PROBLEM — J12.82 PNEUMONIA DUE TO COVID-19 VIRUS: Status: ACTIVE | Noted: 2022-01-01

## 2022-01-20 NOTE — ED PROVIDER NOTES
HPI   Patient is undergoing chemotherapy for multiple myeloma. She saw her doctor today for generalized fatigue, anorexia, myalgias and was diagnosed with COVID via rapid test and referred to the emergency department. Reportedly she was hypoxemic, and this is confirmed in the ER although not to the same extent. Patient reports generalized weakness, myalgias and arthralgias, anorexia, and abdominal pain due to subcu medications. Denies vomiting, constipation, focal weakness, fever,  complaints.   Past Medical History:   Diagnosis Date    AICD (automatic cardioverter/defibrillator) present 12/2020    Cardiomyopathy ([facility] [address] 08/2020    Diabetes ([facility] [address]     GERD (gastroesophageal reflux disease)     Heart failure ([facility] [address]     Hypertension     Sleep apnea     cpap       Past Surgical History:   Procedure Laterality Date    COLONOSCOPY N/A 11/17/2021    COLONOSCOPY ( T I V A) performed by [facility]o Ribeiro [facility] at [facility] ENDOSCOPY    ENDOSCOPY VISIT-OUTPATIENT  10/08/2021    HX COLONOSCOPY      HX HEART CATHETERIZATION      x 2     HX HERNIA REPAIR      HX HYSTERECTOMY      HX IMPLANTABLE CARDIOVERTER DEFIBRILLATOR      HX OOPHORECTOMY      HX PACEMAKER      pacemaker, defib    HX SVT ABLATION           Family History:   Problem Relation Age of Onset    Breast Cancer Sister     Diabetes Mother     Hypertension Mother     Elevated Lipids Mother     Heart Surgery Father        Social History     Socioeconomic History    Marital status:      Spouse name: Not on file    Number of children: Not on file    Years of education: Not on file    Highest education level: Not on file   Occupational History    Not on file   Tobacco Use    Smoking status: Never Smoker    Smokeless tobacco: Never Used   Vaping Use    Vaping Use: Never used   Substance and Sexual Activity    Alcohol use: Never    Drug use: Never    Sexual activity: Not on file   Other Topics Concern   [employer] [address] Service Not Asked    Blood Transfusions Not Asked    Caffeine Concern Not Asked    Occupational Exposure Not Asked    Hobby Hazards Not Asked    Sleep Concern Not Asked    Stress Concern Not Asked    Weight Concern Not Asked    Special Diet Not Asked    Back Care Not Asked    Exercise Not Asked    Bike Helmet Not Asked   [address] Not Asked    Self-Exams Not Asked   Social History Narrative    Not on file     Social Determinants of Health     Financial Resource Strain:     Difficulty of Paying Living Expenses: Not on file   Food Insecurity:     Worried About Running Out of Food in the Last Year: Not on file    Jairo of Food in the Last Year: Not on file   Transportation Needs:     Lack of Transportation (Medical): Not on file    Lack of Transportation (Non-Medical): Not on file   Physical Activity:     Days of Exercise per Week: Not on file    Minutes of Exercise per Session: Not on file   Stress:     Feeling of Stress : Not on file   Social Connections:     Frequency of Communication with Friends and Family: Not on file    Frequency of Social Gatherings with Friends and Family: Not on file    Attends Church Services: Not on file    Active Member of [address] [place] [address] or Organizations: Not on file    Attends Club or Organization Meetings: Not on file    Marital Status: Not on file   Intimate Partner Violence:     Fear of Current or Ex-Partner: Not on file    Emotionally Abused: Not on file    Physically Abused: Not on file    Sexually Abused: Not on file   Housing Stability:     Unable to Pay for Housing in the Last Year: Not on file    Number of [place] in the Last Year: Not on file    Unstable Housing in the Last Year: Not on file         ALLERGIES: Patient has no known allergies. Review of Systems   Constitutional: Positive for activity change, appetite change, chills and fatigue. HENT: Negative. Eyes: Negative. Respiratory: Negative. Cardiovascular: Negative.     Gastrointestinal: Positive for abdominal pain. Endocrine: Negative. Genitourinary: Negative. Musculoskeletal: Positive for arthralgias and myalgias. Allergic/Immunologic: Negative. Neurological: Negative. Hematological: Negative. Psychiatric/Behavioral: Negative. All other systems reviewed and are negative. Vitals:    01/20/22 1452 01/20/22 1459   BP: 108/66    Pulse: 75    Resp: 24    Temp: 97.9 °F (36.6 °C)    SpO2: 92% 98%   Weight: 77.6 kg (171 lb)    Height: 5' (1.524 m)             Physical Exam  Vitals and nursing note reviewed. Constitutional:       General: She is not in acute distress. Appearance: Normal appearance. She is obese. She is not ill-appearing, toxic-appearing or diaphoretic. Comments: Generally weak and ill-appearing but nontoxic. HENT:      Head: Normocephalic and atraumatic. Nose: Nose normal.      Mouth/Throat:      Mouth: Mucous membranes are dry. Pharynx: Oropharynx is clear. Eyes:      Extraocular Movements: Extraocular movements intact. Conjunctiva/sclera: Conjunctivae normal.      Pupils: Pupils are equal, round, and reactive to light. Cardiovascular:      Rate and Rhythm: Normal rate and regular rhythm. Pulses: Normal pulses. Heart sounds: Normal heart sounds. No murmur heard. No friction rub. No gallop. Pulmonary:      Effort: Pulmonary effort is normal. No respiratory distress. Breath sounds: Normal breath sounds. No stridor. No wheezing, rhonchi or rales. Chest:      Chest wall: No tenderness. Abdominal:      General: Abdomen is flat. There is no distension. Palpations: Abdomen is soft. There is no mass. Tenderness: There is no abdominal tenderness. There is no guarding or rebound. Hernia: No hernia is present. Musculoskeletal:         General: No swelling, tenderness, deformity or signs of injury. Normal range of motion. Cervical back: Normal range of motion. No rigidity.       Right lower leg: No edema. Left lower leg: No edema. Lymphadenopathy:      Cervical: No cervical adenopathy. Skin:     General: Skin is warm and dry. Coloration: Skin is not jaundiced or pale. Findings: Bruising present. No erythema, lesion or rash. Comments: Abdominal ecchymoses from subcutaneous injections. Neurological:      General: No focal deficit present. Mental Status: She is alert and oriented to person, place, and time. Mental status is at baseline. Cranial Nerves: No cranial nerve deficit. Sensory: No sensory deficit. Motor: No weakness. Coordination: Coordination normal.   Psychiatric:         Mood and Affect: Mood normal.         Behavior: Behavior normal.          MDM     Patient is generally weak and ill-appearing, almost certainly from the combined effects of chemo and COVID. She does have some mild hypoxemia but otherwise her vital signs are reassuring. Will check labs and provide symptom relief. Disposition to be determined.   Procedures

## 2022-01-20 NOTE — ED NOTES
NS Bolus continues to infuse. Pt provided dinner tray. Pt voices no concerns at this time. Dry intermittent cough noted.

## 2022-01-20 NOTE — ED TRIAGE NOTES
Pt reports she was seen by her PCP. Per report pt was reading 83% on room air. PT presents to ED with labored breathing maintaining 90-93% on RA. Pt placed on 3L NC. Per PCP pt rapid covid swabbed prior to arrival and it resulted positive.

## 2022-01-21 NOTE — PROGRESS NOTES
Care Management Interventions  PCP Verified by CM: Yes Hayes MARTINEZ)  Last Visit to PCP: 01/20/22  Mode of Transport at Discharge: Other (see comment) (Family)  Transition of Care Consult (CM Consult): Discharge Planning  Support Systems: Other Family Member(s)  Confirm Follow Up Transport: Self  The Plan for Transition of Care is Related to the Following Treatment Goals : Patient lives alone with assistance from her family. Plan to discharge home with outpatient follow up. No discharge planning needs identified at this time.   Discharge Location  Patient Expects to be Discharged to[de-identified] Home

## 2022-01-21 NOTE — PROGRESS NOTES
Patients case reviewed during interdisciplinary team meeting in [address] [place], [place] [address]Acute Care Unit. Rev.  Waleska Velazquez [address], [address]

## 2022-01-21 NOTE — ROUTINE PROCESS
Confirmed with md,pt to receive 1 unit of blood tonight, pt oriented to room,call bell in reach, iv abx infusing,pt labored breathing,vss,will initiate prbc

## 2022-01-21 NOTE — PROGRESS NOTES
Nutrition Assessment     Type and Reason for Visit: Initial,Consult (oral supplement)    Nutrition Recommendations/Plan:  Continue w/ current diet   Ensure x3/day   Monitor and Record wts, po/supplement intake,bm in I/O    Nutrition Assessment:  55 y.o. female admitted w/ anemia, covid 19, undergoing chemotherapy for multiple myeloma. Reported generalized weakness & anorexia 2/2 medication. Unknown any recent wt. loss/wt. loss amount. Reported good acceptance @ meals, roughly ~50%. Will add Ons. Labs: NA (132), glucose (120), BUN (21), alb (1.8). Meds: IVF, vitamin C, zinc, PPI, Vitamin D. Malnutrition Assessment:  Malnutrition Status: Insufficient data     Estimated Daily Nutrient Needs:  Energy (kcal):  2,172 (28kcal/kg)  Protein (g):  108g (1.4g/kg)       Fluid (ml/day):  2,172ml    Nutrition Related Findings:  No reported n/v,c/d nor edema. No hx. Dysphagia. NFPE unable to be perform due to precaution of covid 19. No BM recorded since adm. Current Nutrition Therapies:  ADULT DIET Regular; 3 carb choices (45 gm/meal);  Low Fat/Low Chol/High Fiber/2 gm Na    Anthropometric Measures:  · Height:  5' (152.4 cm)  · Current Body Wt:  77.6 kg (171 lb)  · BMI: 33.4    Nutrition Diagnosis:   · Inadequate oral intake related to inadequate protein-energy intake as evidenced by poor intake prior to admission,weight loss      Nutrition Intervention:  Food and/or Nutrient Delivery: Start oral nutrition supplement,Continue current diet  Nutrition Education and Counseling: Education not indicated  Coordination of Nutrition Care: Continue to monitor while inpatient    Goals:  >75% of EENS x 7days, Wt. stability +/- .5kg, skin intergity, ltyes wnl       Nutrition Monitoring and Evaluation:   Behavioral-Environmental Outcomes: None identified  Food/Nutrient Intake Outcomes: Food and nutrient intake,Supplement intake  Physical Signs/Symptoms Outcomes: Weight,Meal time behavior    Discharge Planning:    No discharge needs at this time     Electronically signed by Pasha Armenta on 1/21/2022 at 1:23 PM    Contact Number: [phone]

## 2022-01-21 NOTE — H&P
History and Physical      Chief Complaints:     Chief Complaint   Patient presents with    Fatigue         Subjective:     Kaz Silveira is a 55 y.o. female followed by Melani Cardoza PA-C and hematologist / oncologist Dr. Hien Leiva and Dahiana Willis [facility]  has a past medical history of AICD (automatic cardioverter/defibrillator) present (12/2020), Anemia, Cardiomyopathy ([facility] [address] (08/2020), Diabetes ([facility] [address] GERD (gastroesophageal reflux disease), Heart failure ([facility] [address] Hypertension, Multiple myeloma ([facility] [address] Sleep apnea, and Thrombocytopenia ([facility] [address] presents with worsening fatigue, decreased PO intake, cough and sob since Wednesday. She followed up with Dr. Gisell Fonseca after her initial Chemotherapy dose given and felt she shouldn't have been feeling this bad and there was concern of possible COVID. She then got tested by primary and found to be positive. She had a few family members that were positive but has not been close to them secondary to her new diagnosis of multiple myeloma. Patient on evaluation on 1/21 says she feels bad all over with coughing and sob. On evaluation in the ED was found to be hypoxic and placed on 3L NC and repeat covid testing was positive. CXR showed patchy alveolar opacities throughout the lungs. Labs showed anemia but according to family was in similar range when checked by oncologist. Also noted thrombocytopenia of 23,000 but not noted evidence of bleeding. abg was done on 1/21 which shows Po2 of 139 on 3L NC. Dr Hien Leiva was kind enough to call and was able to discuss her multiple myeloma which he notes was a fairly aggressive but appears to be responding to initial chemotherapy. No need to adjust treatments for her covid but did recommend transfusing if hg < 7 and platelets if level <40,259 or bleeding is noted.  Because of patient presentation was referred for admission for Acute hypoxic respiratory failure, Covid 19 PNA,, symptomatic anemia, thrombocytopenia       Past Medical History:   Diagnosis Date    AICD (automatic cardioverter/defibrillator) present 12/2020    Anemia     Cardiomyopathy ([facility] [address] 08/2020    Diabetes ([facility] [address]     GERD (gastroesophageal reflux disease)     Heart failure (HCC)     Hypertension     Multiple myeloma (HCC)     Sleep apnea     cpap    Thrombocytopenia (HCC)       Past Surgical History:   Procedure Laterality Date    COLONOSCOPY N/A 11/17/2021    COLONOSCOPY ( T I V A) performed by Michelle Cardoso MD at [facility]    ENDOSCOPY VISIT-OUTPATIENT  10/08/2021    HX COLONOSCOPY      HX HEART CATHETERIZATION      x 2     HX HERNIA REPAIR      HX HYSTERECTOMY      HX IMPLANTABLE CARDIOVERTER DEFIBRILLATOR      HX OOPHORECTOMY      HX PACEMAKER      pacemaker, defib    HX SVT ABLATION       Family History   Problem Relation Age of Onset    Breast Cancer Sister     Diabetes Mother     Hypertension Mother     Elevated Lipids Mother     Heart Surgery Father       Social History     Tobacco Use    Smoking status: Never Smoker    Smokeless tobacco: Never Used   Substance Use Topics    Alcohol use: Never       Prior to Admission medications    Medication Sig Start Date End Date Taking? Authorizing Provider   cholecalciferol, vitamin d3, (Vitamin D3) 10 mcg (400 unit) cap Take  by mouth daily. Yes Provider, Historical   pantoprazole (PROTONIX) 40 mg tablet Take 1 Tablet by mouth daily. 10/24/21  Yes Sinyd Jovel MD   carvediloL (Coreg) 25 mg tablet Take 25 mg by mouth two (2) times a day. Yes Provider, Historical   magnesium oxide (MAG-OX) 400 mg tablet Take 400 mg by mouth four (4) times daily. Yes Provider, Historical   atorvastatin (LIPITOR) 20 mg tablet Take 20 mg by mouth daily. Yes Provider, Historical   dapagliflozin (Farxiga) 10 mg tab tablet Take 10 mg by mouth daily. Yes Provider, Historical   losartan (COZAAR) 25 mg tablet Take 25 mg by mouth daily.    Yes Provider, Historical acyclovir (ZOVIRAX) 400 mg tablet Take 400 mg by mouth daily. 1/18/22   Provider, Historical   allopurinoL (ZYLOPRIM) 100 mg tablet Take 100 mg by mouth daily. 12/23/21   Provider, Historical   dexAMETHasone (DECADRON) 4 mg tablet Take 40 mg by mouth every seven (7) days. On Monday 1/18/22   Provider, Historical   Revlimid 15 mg cap  1/18/22   Provider, Historical   furosemide (Lasix) 20 mg tablet Take 1 Tablet by mouth daily as needed for Other (Use if greater than 3 pound weight gain in 24 hours or greater 5 pound weight gain in 5 days). 10/8/21   Leisa Rodrigez MD   metFORMIN (GLUCOPHAGE) 500 mg tablet Take  by mouth three (3) times daily (with meals). Provider, Historical     No Known Allergies     Review of Systems:  Review of Systems   Constitutional: Positive for appetite change and fatigue. HENT: Negative. Eyes: Negative. Respiratory: Positive for cough and shortness of breath. Cardiovascular: Negative. Gastrointestinal: Negative. Endocrine: Negative. Genitourinary: Negative. Musculoskeletal: Positive for myalgias. Allergic/Immunologic: Negative. Neurological: Negative. Hematological: Negative. Psychiatric/Behavioral: Negative. Objective:     Vitals:  Visit Vitals  /70   Pulse 60   Temp 98.4 °F (36.9 °C)   Resp 20   Ht 5' (1.524 m)   Wt 82.2 kg (181 lb 4.8 oz)   SpO2 100%   BMI 35.41 kg/m²       Physical Exam:  General: Alert, cooperative, no distress. Head:  Normocephalic, without obvious abnormality, atraumatic. Eyes:  Conjunctivae/corneas clear. Pupils equal, round, reactive to light. Extraocular movements intact. Lungs:  Diminished breath sounds bilaterally  no wheezes, crackles  Chest wall: No tenderness or deformity. Heart:  Regular rate and rhythm, S1, S2 normal, no murmur, click, rub, or gallop. Abdomen:   Soft, non-tender. Bowel sounds normal. No masses. No organomegaly.   Back:  No spine tenderness to palpation  Extremities: Extremities normal, atraumatic, no cyanosis or edema. Pulses: Symmetric all extremities. Skin: Skin color, texture, turgor normal.   Lymph nodes: Cervical nodes normal.  Neurologic: CNII-XII intact. Normal strength, sensation, and reflexes throughout.       Labs:  Recent Results (from the past 24 hour(s))   BLOOD GAS, ARTERIAL    Collection Time: 01/21/22  8:30 AM   Result Value Ref Range    pH 7.44 7.35 - 7.45      PCO2 35 35 - 45 mmHg    PO2 139 (H) 70 - 100 mmHg    BICARBONATE 23 22 - 26 mmol/L    BASE DEFICIT 0.9 0 - 2 mmol/L    O2 METHOD Nasal Cannula      O2 FLOW RATE 3 L/min    FIO2 32.0 %    Sample source Arterial      SITE Left Radial      MIGUEL'S TEST PASS      Carboxy-Hgb 0.9 0 - 5 %    Methemoglobin 0.0 0 - 5 %    tHb 7.6 (LL) 13.5 - 17.5 g/dL    Oxyhemoglobin 98.5 95 - 100 %   COVID-19 WITH INFLUENZA A/B    Collection Time: 01/21/22  2:30 PM   Result Value Ref Range    SARS-CoV-2 DETECTED (A) Not Detected      Influenza A by PCR Not Detected Not Detected      Influenza B by PCR Not Detected Not Detected     D DIMER    Collection Time: 01/22/22  4:52 AM   Result Value Ref Range    D-dimer >35.20 (H) 0.19 - 0.50 mg/L FEU   CBC W/O DIFF    Collection Time: 01/22/22  4:52 AM   Result Value Ref Range    WBC 5.0 4.4 - 11.3 K/uL    RBC 2.93 (L) 4.50 - 5.90 M/uL    HGB 9.6 (L) 13.5 - 17.5 g/dL    HCT 28.5 (L) 36 - 46 %    MCV 97.3 80 - 100 FL    MCH 32.9 31 - 34 PG    MCHC 33.8 31.0 - 36.0 g/dL    RDW 22.9 (H) 11.5 - 14.5 %    PLATELET 38 (LL) 607 - 400 K/uL    MPV 11.1 6.5 - 11.5 FL    NRBC 0.2  WBC    ABSOLUTE NRBC 5.66 K/uL   METABOLIC PANEL, BASIC    Collection Time: 01/22/22  4:52 AM   Result Value Ref Range    Sodium 132 (L) 136 - 145 mmol/L    Potassium 4.9 3.5 - 5.1 mmol/L    Chloride 101 97 - 108 mmol/L    CO2 22 21 - 32 mmol/L    Anion gap 9 5 - 15 mmol/L    Glucose 135 (H) 65 - 100 mg/dL    BUN 22 (H) 6 - 20 mg/dL    Creatinine 0.97 0.55 - 1.02 mg/dL    BUN/Creatinine ratio 23 (H) 12 - 20      GFR est AA >60 >60 ml/min/1.73m2    GFR est non-AA 60 (L) >60 ml/min/1.73m2    Calcium 8.3 (L) 8.5 - 10.1 mg/dL   GLUCOSE, POC    Collection Time: 01/22/22  8:09 AM   Result Value Ref Range    Glucose (POC) 140 (H) 65 - 117 mg/dL    Performed by [facility]        Imaging:  No results found. Assessment & Plan:     Hypoxic respiratory failure  - secondary to Covid pna   - placed on 3L NC and overnight increased to 5L but has been tapered   - ABG on 1/21 had Po2 of 139 so continue to taper o2 as tolerated to maintain saturation >92%  - will need encouragement for Incentive spirometry q4hr wa       COVID-19 PNA  - positive COVID PCR with bilateral opacities on cxr   - droplet precautions  - vitamin c, d and zinc, cetrezine   - dexamethasone 6mg IV bid (jpatient has been recently on 40mg of dexamethasone every Monday prior to chemotherapy)   - barcitinab 4mg oral daily   - follow inflammatory markers with initial CRP 15.5, Ferritin 3503  - Procalcitonin 1.84 so will place on IV abx of zosyn and azithromycin  - cough suppression and adjust depending on response   - prone position while sleeping and encourage incentive spirometry     YANETH  - creat 1.33 increased from baseline and was given IV fluids initially but dc after creat improved to 0.84  - continue to monitor     Symptomatic anemia  - has needed multiple transfusions in the past and work up for anemia patient found to have multiple myeloma.    - HG was 7.3 and baseline in 8 range  - transfuse if < 7 so repeat on 1/21 is 6.9 and transfusion 1 units prbc  - monitor with daily cbc     Thrombocytopenia  -Noted to be 23,000 on admission and repeat is about similar range discussed with hematologist and recommended transfusion of platelets if less than 10,000 or if bleeding episode noted  -Hold all anticoagulation    Hx of cardiomyopathy s/p AICD  - most recent echo shows resolution with now preserved EF  -  but no evidence of fluid overload  - patient only uses prn lasix and states has note needed it   - continue coreg but started at lower dose of 12.5mg bid and losartan 25mg daily   - monitor closely on tele  - stable at this time     Multiple Myeloma  - recent diagonisis and followed by Dr. Thanh Aaron MD and was able to discuss her care   - was started on allopurinol, acyclovir, and weekly dexamethasone   - is currently receiving chemotherapy and plan to transition to oral Revlimid 15mg   - continue to monitor cbc closely     Diabetes:  - monitor with sliding scale with accuchecks ACHS  - on metformin and Lucia Gala but will hold secondary to poor po intake   - monitor closely since will be on steroid therapy     GERD  - complains of abdominal pain most likely exacerbation of her GERD secondary to poor PO intake. - restart protonix and use prn maalox.      DVT Prophylaxis  - contraindicated lovenox use secondary the high risk of bleeding     Code Status: Full Code   Patient designated point of contact is her sister Dayne Hanley     Spent 55 minutes evaluate and coordinate patient care and expect at least 2-3 days of acute care stay       Electronically signed by Leonor Rubinstein, MD on 1/22/2022 at 4:43 PM

## 2022-01-21 NOTE — ACP (ADVANCE CARE PLANNING)
Advance Care Planning   Healthcare Decision Maker:       Primary Decision Maker: Daija Puckett Mother - [phone]    Secondary Decision Maker: Cisco Moulton - Sister - [phone]    Click here to complete [address] including selection of the Healthcare Decision Maker Relationship (ie \"Primary\")

## 2022-01-21 NOTE — ED NOTES
2145 Noticed that Covid/flu test showed in progress, spoke with lab specimen not obtained. Spoke with patient she had positive test yesterday at PCP office. Discussed consent for blood transfusion, had not been spoken to regarding a transfusion. Pt's sister called and said pt had received treatment from oncologist recently and they were aware of her labs and also said they may become lower. Heidi Garay is her sister, she is a nurse on the floor upstairs and she can be reached at [phone]. I spoke with Dr Saul Silva and collaborated regarding patients recent treatment and not having had discussion with physician regarding transfusion today. Will hold transfusion order and Dr. Kaushal Davis will reevaluate in the am, pt aware and agreeable to plan. 2200 Pt up to chair for a few hours, BP and oxygen saturation remained stable. Pt does have congested cough and generalized bodyaches. 0230 returned to stretcher side lying, congested cough noted. Still complaining of generalized body aches.

## 2022-01-22 NOTE — PROGRESS NOTES
Problem: Airway Clearance - Ineffective  Goal: Achieve or maintain patent airway  Outcome: Progressing Towards Goal     Problem: Gas Exchange - Impaired  Goal: Absence of hypoxia  Outcome: Progressing Towards Goal  Goal: Promote optimal lung function  Outcome: Progressing Towards Goal     Problem: Breathing Pattern - Ineffective  Goal: Ability to achieve and maintain a regular respiratory rate  Outcome: Progressing Towards Goal     Problem: Falls - Risk of  Goal: *Absence of Falls  Description: Document Annmarie Fall Risk and appropriate interventions in the flowsheet.   Outcome: Progressing Towards Goal  Note: Fall Risk Interventions:            Medication Interventions: Patient to call before getting OOB,Teach patient to arise slowly

## 2022-01-22 NOTE — PROGRESS NOTES
HOSPITALIST PROGRESS NOTE  Terrie Sandhoff MD, Riley Xiao         Daily Progress Note: 1/22/2022  COVID-19 positive. Appropriate PPE donned and doffed. Subjective:     Patient is alert and oriented x3. Continues to have cough and shortness of breath and currently dependent on 2 L NC O2 with COVID-19 pneumonia. No overnight fever/chills, chest pain, nausea/vomiting or abdominal pain noted.       Medications reviewed  Current Facility-Administered Medications   Medication Dose Route Frequency    losartan (COZAAR) tablet 25 mg  25 mg Oral DAILY    alum-mag hydroxide-simeth (MYLANTA) oral suspension 30 mL  30 mL Oral Q4H PRN    glucose chewable tablet 16 g  4 Tablet Oral PRN    dextrose (D50W) injection syrg 12.5-25 g  25-50 mL IntraVENous PRN    glucagon (GLUCAGEN) injection 1 mg  1 mg IntraMUSCular PRN    insulin lispro (HUMALOG) injection   SubCUTAneous AC&HS    cholecalciferol (VITAMIN D3) capsule 2,000 Units  2,000 Units Oral DAILY    guaiFENesin ER (MUCINEX) tablet 600 mg  600 mg Oral Q12H    guaiFENesin-codeine (ROBITUSSIN AC) 100-10 mg/5 mL solution 5 mL  5 mL Oral Q4H PRN    baricitinib (OLUMIANT) tablet 2 mg  2 mg Oral DAILY    dexamethasone (DECADRON) 10 mg/mL injection 6 mg  6 mg IntraVENous Q12H    carvediloL (COREG) tablet 12.5 mg  12.5 mg Oral BID WITH MEALS    piperacillin-tazobactam (ZOSYN) 3.375 g in 0.9% sodium chloride (MBP/ADV) 100 mL MBP  3.375 g IntraVENous Q8H    azithromycin (ZITHROMAX) 500 mg in 0.9% sodium chloride 250 mL (VIAL-MATE)  500 mg IntraVENous Q24H    0.9% sodium chloride infusion 250 mL  250 mL IntraVENous PRN    sodium chloride (NS) flush 5-40 mL  5-40 mL IntraVENous Q8H    sodium chloride (NS) flush 5-40 mL  5-40 mL IntraVENous PRN    acetaminophen (TYLENOL) tablet 650 mg  650 mg Oral Q6H PRN    Or    acetaminophen (TYLENOL) suppository 650 mg  650 mg Rectal Q6H PRN    polyethylene glycol (MIRALAX) packet 17 g  17 g Oral DAILY PRN    ondansetron (ZOFRAN) injection 4 mg  4 mg IntraVENous Q6H PRN    magnesium oxide (MAG-OX) tablet 400 mg  400 mg Oral TID    ascorbic acid (vitamin C) (VITAMIN C) tablet 500 mg  500 mg Oral BID    zinc sulfate (ZINCATE) 50 mg zinc (220 mg) capsule 1 Capsule  1 Capsule Oral DAILY    cetirizine (ZYRTEC) tablet 10 mg  10 mg Oral QPM    pantoprazole (PROTONIX) tablet 40 mg  40 mg Oral ACB       Review of Systems:   A comprehensive review of systems was negative except for that written in the HPI. Objective:   Physical Exam:     Visit Vitals  /77   Pulse 62   Temp 98.6 °F (37 °C)   Resp 18   Ht 5' (1.524 m)   Wt 82.2 kg (181 lb 4.8 oz)   SpO2 98%   BMI 35.41 kg/m²    O2 Flow Rate (L/min): 2 l/min O2 Device: Nasal cannula  Patient Vitals for the past 8 hrs:   Temp Pulse Resp BP SpO2   22 1340 98.6 °F (37 °C) 62 18 115/77 98 %   22 0845 -- -- -- -- 98 %          Temp (24hrs), Av.6 °F (36.4 °C), Min:96.9 °F (36.1 °C), Max:98.6 °F (37 °C)    No intake/output data recorded.  1901 -  0700  In: 650 [P.O.:240; I.V.:100]  Out: -     General:  Alert, cooperative, no distress, appears stated age. Lungs:   Clear to auscultation bilaterally. Chest wall:  No tenderness or deformity. Heart:  Regular rate and rhythm, S1, S2 normal, no murmur, click, rub or gallop. Abdomen:   Soft, non-tender. Bowel sounds normal. No masses,  No organomegaly. Extremities: Extremities normal, atraumatic, no cyanosis or edema. Pulses: 2+ and symmetric all extremities.    Skin: Skin color, texture, turgor normal. No rashes or lesions   Neurologic: No gross sensory or motor deficits     Data Review:       Recent Days:  Recent Labs     22  0452 22  0730 22  1510   WBC 5.0 5.8 6.6   HGB 9.6* 6.9* 7.3*   HCT 28.5* 20.2* 21.3*   PLT 38* 23* 23*     Recent Labs     22  0452 22  0730 22  1510 * 132* 128*   K 4.9 4.2 4.2    102 96*   CO2 22 23 22   * 120* 211*   BUN 22* 21* 26*   CREA 0.97 0.84 1.33*   CA 8.3* 7.9* 7.8*   MG  --  2.2 2.1   ALB  --  1.8* 2.2*   TBILI  --  0.7 1.0   ALT  --  25 27     Recent Labs     01/21/22  0830   PH 7.44   PCO2 35   PO2 139*   HCO3 23   FIO2 32.0       24 Hour Results:  Recent Results (from the past 24 hour(s))   D DIMER    Collection Time: 01/22/22  4:52 AM   Result Value Ref Range    D-dimer >35.20 (H) 0.19 - 0.50 mg/L FEU   CBC W/O DIFF    Collection Time: 01/22/22  4:52 AM   Result Value Ref Range    WBC 5.0 4.4 - 11.3 K/uL    RBC 2.93 (L) 4.50 - 5.90 M/uL    HGB 9.6 (L) 13.5 - 17.5 g/dL    HCT 28.5 (L) 36 - 46 %    MCV 97.3 80 - 100 FL    MCH 32.9 31 - 34 PG    MCHC 33.8 31.0 - 36.0 g/dL    RDW 22.9 (H) 11.5 - 14.5 %    PLATELET 38 (LL) 428 - 400 K/uL    MPV 11.1 6.5 - 11.5 FL    NRBC 0.2  WBC    ABSOLUTE NRBC 7.73 K/uL   METABOLIC PANEL, BASIC    Collection Time: 01/22/22  4:52 AM   Result Value Ref Range    Sodium 132 (L) 136 - 145 mmol/L    Potassium 4.9 3.5 - 5.1 mmol/L    Chloride 101 97 - 108 mmol/L    CO2 22 21 - 32 mmol/L    Anion gap 9 5 - 15 mmol/L    Glucose 135 (H) 65 - 100 mg/dL    BUN 22 (H) 6 - 20 mg/dL    Creatinine 0.97 0.55 - 1.02 mg/dL    BUN/Creatinine ratio 23 (H) 12 - 20      GFR est AA >60 >60 ml/min/1.73m2    GFR est non-AA 60 (L) >60 ml/min/1.73m2    Calcium 8.3 (L) 8.5 - 10.1 mg/dL   GLUCOSE, POC    Collection Time: 01/22/22  8:09 AM   Result Value Ref Range    Glucose (POC) 140 (H) 65 - 117 mg/dL    Performed by [facility]    GLUCOSE, POC    Collection Time: 01/22/22 12:18 PM   Result Value Ref Range    Glucose (POC) 137 (H) 65 - 117 mg/dL    Performed by [facility]            Assessment/Plan:     Hypoxic respiratory failure  Secondary to Covid-19 Pneumonia. Currently depending on 2 L NC O2. ABG on 1/21 had Po2 of 139 so continue to taper o2 as tolerated to maintain saturation >92%.   Cont' Incentive spirometry q4hr wa   Encourage prone position.     COVID-19 PNA  COVID PCR with bilateral opacities on cxr   Cont' droplet precautions  Vitamin c, d and zinc, cetrezine   Dexamethasone 6mg IV bid (patient has been recently on 40mg of dexamethasone every Monday prior to chemotherapy)   Barcitinab 4mg oral daily x 14 Days  Follow inflammatory markers with initial CRP 15.5, Ferritin 3503  Procalcitonin 1.84, empirically on IV abx of zosyn and azithromycin  Prone position while sleeping and encourage incentive spirometry      YANETH  Essentially resolved with IV fluids with     Symptomatic anemia  Previously required multiple transfusions in the past and work up for anemia patient found to have multiple myeloma. Hemoglobin currently 9.6. Transfuse if < 7 so repeat on 1/21 is 6.9 and transfusion 1 units prbc  Monitor with daily cbc      Thrombocytopenia  Noted to be 23,000 on admission and repeat is about similar range discussed with hematologist and recommended transfusion of platelets if less than 10,000 or if bleeding episode noted  Hold all anticoagulation     Hx of cardiomyopathy s/p AICD  Most recent echo shows resolution with now preserved EF   but no evidence of fluid overload  At home on prn lasix and states has note needed it   Continue coreg but started at lower dose of 12.5mg bid and losartan 25mg daily   Monitor closely on tele     Multiple Myeloma  Followed by Dr. Long Fall MD and was able to discuss her care. Cont' on allopurinol, acyclovir, and weekly dexamethasone. Currently receiving chemotherapy and plan to transition to oral Revlimid 15mg.    Continue to monitor cbc closely.      Diabetes:  Sliding scale with accuchecks ACHS  Holding metformin and Mevelyn Sarah, secondary to poor po intake      GERD  Restarted Protonix with complains of abdominal discomfort.     DVT Prophylaxis  Contraindicated lovenox use secondary the high risk of bleeding      Code Status: Full Code       Care Plan discussed with: Patient/Family and Nurse    Total time spent with patient: 35 minutes. With greater than 50% spent in coordination of care and counseling.     Georgiana Bansal MD

## 2022-01-23 NOTE — PROGRESS NOTES
Problem: Airway Clearance - Ineffective  Goal: Achieve or maintain patent airway  Outcome: Progressing Towards Goal     Problem: Gas Exchange - Impaired  Goal: Absence of hypoxia  Outcome: Progressing Towards Goal  Goal: Promote optimal lung function  Outcome: Progressing Towards Goal     Problem: Breathing Pattern - Ineffective  Goal: Ability to achieve and maintain a regular respiratory rate  Outcome: Progressing Towards Goal     Problem: Falls - Risk of  Goal: *Absence of Falls  Description: Document Annmarie Fall Risk and appropriate interventions in the flowsheet.   Outcome: Progressing Towards Goal  Note: Fall Risk Interventions:            Medication Interventions: Teach patient to arise slowly

## 2022-01-24 NOTE — PROGRESS NOTES
PHYSICAL THERAPY EVALUATION/DISCHARGE  Patient: Monique Garay (62 y.o. female)  Date: 1/24/2022  Primary Diagnosis: Pneumonia due to COVID-19 virus [U07.1, J12.82]  Pancytopenia ([facility] [D61.818]       Precautions: COVID         ASSESSMENT  Based on the objective data described below, the patient presents with decreased activity tolerance on 1 L supplemental oxygen with oxygen desaturation to 85% following ambulation approximately 50feet within room. Patient required encouragement of breathing through nasal cannula with decreased compliance noted for return of oxygen saturation to 90%. Patient was independent with all bed mobility and ambulation with no need for assistance. At this time, patient is at her baseline level of functional mobility and does not require skilled physical therapy services. Other factors to consider for discharge: patient reports living alone     Further skilled acute physical therapy is not indicated at this time. PLAN :  Recommendation for discharge: (in order for the patient to meet his/her long term goals)  No skilled physical therapy/ follow up rehabilitation needs identified at this time. This discharge recommendation:  Has been made in collaboration with the attending provider and/or case management    IF patient discharges home will need the following DME: patient owns DME required for discharge       SUBJECTIVE:   Patient stated I am not in pain.      OBJECTIVE DATA SUMMARY:   HISTORY:    Past Medical History:   Diagnosis Date    AICD (automatic cardioverter/defibrillator) present 12/2020    Anemia     Cardiomyopathy ([facility] 08/2020    Diabetes ([facility]     GERD (gastroesophageal reflux disease)     Heart failure (HCC)     Hypertension     Multiple myeloma (HCC)     Sleep apnea     cpap    Thrombocytopenia (HCC)      Past Surgical History:   Procedure Laterality Date    COLONOSCOPY N/A 11/17/2021    COLONOSCOPY ( T I V A) performed by Angelo Duarte MD at [facility]    ENDOSCOPY VISIT-OUTPATIENT  10/08/2021    HX COLONOSCOPY      HX HEART CATHETERIZATION      x 2     HX HERNIA REPAIR      HX HYSTERECTOMY      HX IMPLANTABLE CARDIOVERTER DEFIBRILLATOR      HX OOPHORECTOMY      HX PACEMAKER      pacemaker, defib    HX SVT ABLATION         Prior level of function: Patient was independent with functional mobility and ADLs. Personal factors and/or comorbidities impacting plan of care:     Home Situation  Home Environment: Private residence  # Steps to Enter: 4  Rails to Enter: Yes  Hand Rails : Bilateral  One/Two Story Residence: One story  Living Alone: Yes  Support Systems: Other Family Member(s)  Patient Expects to be Discharged to[de-identified] Home  Current DME Used/Available at Home: None    EXAMINATION/PRESENTATION/DECISION MAKING:   Critical Behavior:     Orientation Level: Oriented X4  Cognition: Appropriate decision making     Hearing: Auditory  Auditory Impairment: None  Range Of Motion:  AROM: Within functional limits                       Strength:    Strength:  Within functional limits               Functional Mobility:  Bed Mobility:  Rolling: Independent  Supine to Sit: Independent  Sit to Supine: Independent  Scooting: Independent  Transfers:  Sit to Stand: Independent  Stand to Sit: Independent  Stand Pivot Transfers: Independent                    Balance:   Sitting: Intact  Standing: Intact  Ambulation/Gait Training:  Distance (ft): 50 Feet (ft)     Ambulation - Level of Assistance: Independent     Gait Description (WDL): Exceptions to WDL  Gait Abnormalities: Shuffling gait        Base of Support: Widened     Speed/Natalie: Slow                     Stairs:  Number of Stairs Trained: 4 (simulated in room)  Stairs - Level of Assistance: Independent   Rail Use: Both           Physical Therapy Evaluation Charge Determination   History Examination Presentation Decision-Making   HIGH Complexity :3+ comorbidities / personal factors will impact the outcome/ POC  LOW Complexity : 1-2 Standardized tests and measures addressing body structure, function, activity limitation and / or participation in recreation  LOW Complexity : Stable, uncomplicated  Low      Based on the above components, the patient evaluation is determined to be of the following complexity level: LOW     Pain Ratin/10    Activity Tolerance:   Fair, desaturates with exertion and requires oxygen and observed SOB with activity      After treatment patient left in no apparent distress:   Sitting in chair and Call bell within reach    COMMUNICATION/EDUCATION:   The patients plan of care was discussed with: Physical therapist, Physician and Case management. Fall prevention education was provided and the patient/caregiver indicated understanding.     Thank you for this referral.  Sidra Singh, PT, DPT   Time Calculation: 20 mins

## 2022-01-24 NOTE — PROGRESS NOTES
Progress Note  Date:1/23/2022       Room:[facility]  Patient Name:Jody House     YOB: 1965     Age:56 y.o. Subjective    As per Admitting provider:  Moira Gonzáles is a 55 y.o. female followed by Marina Lam PA-C and hematologist / oncologist Dr. Larry Cleaning and Anyi Willis [facility]  has a past medical history of AICD (automatic cardioverter/defibrillator) present (12/2020), Anemia, Cardiomyopathy ([address] (08/2020), Diabetes ([address] GERD (gastroesophageal reflux disease), Heart failure ([address] Hypertension, Multiple myeloma ([address] Sleep apnea, and Thrombocytopenia ([address] presents with worsening fatigue, decreased PO intake, cough and sob since Wednesday. She followed up with Dr. Pito Medrano after her initial Chemotherapy dose given and felt she shouldn't have been feeling this bad and there was concern of possible COVID. She then got tested by primary and found to be positive. She had a few family members that were positive but has not been close to them secondary to her new diagnosis of multiple myeloma. Patient on evaluation on 1/21 says she feels bad all over with coughing and sob. On evaluation in the ED was found to be hypoxic and placed on 3L NC and repeat covid testing was positive. CXR showed patchy alveolar opacities throughout the lungs. Labs showed anemia but according to family was in similar range when checked by oncologist. Also noted thrombocytopenia of 23,000 but not noted evidence of bleeding. abg was done on 1/21 which shows Po2 of 139 on 3L NC. Dr Larry Cleaning was kind enough to call and was able to discuss her multiple myeloma which he notes was a fairly aggressive but appears to be responding to initial chemotherapy. No need to adjust treatments for her covid but did recommend transfusing if hg < 7 and platelets if level <44,734 or bleeding is noted.  Because of patient presentation was referred for admission for Acute hypoxic respiratory failure, Covid 19 PNA,, symptomatic anemia, thrombocytopenia     Patient seen on follow-up resting in bed no acute distress patient more awake alert feeling somewhat better and improved after 1 unit packed red blood cell transfusion patient at rest was able to be tapered down to room air but with any exertion dropped O2 sat into the mid 80%. D-dimer markedly elevated at greater than 35 but CTA was negative. Patient needs encouragement on deep breathing, afebrile    Review of Systems   Constitutional: Negative. HENT: Negative. Respiratory: Positive for cough and shortness of breath. Cardiovascular: Negative. Gastrointestinal: Negative. Endocrine: Negative. Genitourinary: Negative. Musculoskeletal: Negative. Allergic/Immunologic: Negative. Neurological: Negative. Hematological: Negative. Psychiatric/Behavioral: Negative. Objective           Vitals Last 24 Hours:  Patient Vitals for the past 24 hrs:   Temp Pulse Resp BP SpO2   01/23/22 1600 97.2 °F (36.2 °C) 85 22 131/70 93 %   01/23/22 1200 97.6 °F (36.4 °C) 82 22 124/67 94 %   01/23/22 0854 -- -- -- -- 98 %   01/23/22 0810 98.2 °F (36.8 °C) 82 18 (!) 148/57 98 %   01/23/22 0400 98 °F (36.7 °C) 60 18 (!) 151/67 96 %   01/23/22 0000 98 °F (36.7 °C) 60 18 125/67 94 %        I/O (24Hr): Intake/Output Summary (Last 24 hours) at 1/23/2022 2121  Last data filed at 1/23/2022 1200  Gross per 24 hour   Intake 700 ml   Output --   Net 700 ml       Physical Exam:  General: Alert, cooperative, no distress, appears stated age. Head:  Normocephalic, without obvious abnormality, atraumatic. Eyes:  Conjunctivae/corneas clear. Pupils equal, round, reactive to light. Extraocular movements intact. Lungs: Bilateral air entry but diminished with basilar rhonchi noted  Chest wall: No tenderness or deformity. Heart:  Regular rate and rhythm, S1, S2 normal, no murmur, click, rub or gallop. Abdomen:  Soft, non-tender.  Bowel sounds normal. No masses, No organomegaly. Extremities: Extremities normal, atraumatic, no cyanosis or edema. Pulses: 2+ and symmetric all extremities. Skin: Skin color, texture, turgor normal. No rashes or lesions  Neurologic: Awake, Alert, oriented.  No obvious gross sensory or motor deficits      Medications           Current Facility-Administered Medications   Medication Dose Route Frequency    [START ON 1/24/2022] acyclovir (ZOVIRAX) tablet 400 mg  400 mg Oral DAILY    [START ON 1/24/2022] allopurinoL (ZYLOPRIM) tablet 100 mg  100 mg Oral DAILY    fluticasone propionate (FLONASE) 50 mcg/actuation nasal spray 2 Spray  2 Spray Both Nostrils DAILY    sodium chloride (OCEAN) 0.65 % nasal squeeze bottle 2 Spray  2 Spray Both Nostrils QID    losartan (COZAAR) tablet 25 mg  25 mg Oral DAILY    alum-mag hydroxide-simeth (MYLANTA) oral suspension 30 mL  30 mL Oral Q4H PRN    glucose chewable tablet 16 g  4 Tablet Oral PRN    dextrose (D50W) injection syrg 12.5-25 g  25-50 mL IntraVENous PRN    glucagon (GLUCAGEN) injection 1 mg  1 mg IntraMUSCular PRN    insulin lispro (HUMALOG) injection   SubCUTAneous AC&HS    cholecalciferol (VITAMIN D3) capsule 2,000 Units  2,000 Units Oral DAILY    [Held by provider] guaiFENesin ER (MUCINEX) tablet 600 mg  600 mg Oral Q12H    guaiFENesin-codeine (ROBITUSSIN AC) 100-10 mg/5 mL solution 5 mL  5 mL Oral Q4H PRN    baricitinib (OLUMIANT) tablet 2 mg  2 mg Oral DAILY    dexamethasone (DECADRON) 10 mg/mL injection 6 mg  6 mg IntraVENous Q12H    carvediloL (COREG) tablet 12.5 mg  12.5 mg Oral BID WITH MEALS    piperacillin-tazobactam (ZOSYN) 3.375 g in 0.9% sodium chloride (MBP/ADV) 100 mL MBP  3.375 g IntraVENous Q8H    azithromycin (ZITHROMAX) 500 mg in 0.9% sodium chloride 250 mL (VIAL-MATE)  500 mg IntraVENous Q24H    0.9% sodium chloride infusion 250 mL  250 mL IntraVENous PRN    sodium chloride (NS) flush 5-40 mL  5-40 mL IntraVENous Q8H    sodium chloride (NS) flush 5-40 mL  5-40 mL IntraVENous PRN    acetaminophen (TYLENOL) tablet 650 mg  650 mg Oral Q6H PRN    Or    acetaminophen (TYLENOL) suppository 650 mg  650 mg Rectal Q6H PRN    polyethylene glycol (MIRALAX) packet 17 g  17 g Oral DAILY PRN    ondansetron (ZOFRAN) injection 4 mg  4 mg IntraVENous Q6H PRN    magnesium oxide (MAG-OX) tablet 400 mg  400 mg Oral TID    ascorbic acid (vitamin C) (VITAMIN C) tablet 500 mg  500 mg Oral BID    zinc sulfate (ZINCATE) 50 mg zinc (220 mg) capsule 1 Capsule  1 Capsule Oral DAILY    cetirizine (ZYRTEC) tablet 10 mg  10 mg Oral QPM    pantoprazole (PROTONIX) tablet 40 mg  40 mg Oral ACB         Allergies         Patient has no known allergies.        Labs/Imaging/Diagnostics      Labs:  Recent Results (from the past 48 hour(s))   C REACTIVE PROTEIN, QT    Collection Time: 01/22/22  4:52 AM   Result Value Ref Range    C-Reactive protein 9.74 mg/dL   D DIMER    Collection Time: 01/22/22  4:52 AM   Result Value Ref Range    D-dimer >35.20 (H) 0.19 - 0.50 mg/L FEU   PROCALCITONIN    Collection Time: 01/22/22  4:52 AM   Result Value Ref Range    Procalcitonin 1.27 ng/mL   CBC W/O DIFF    Collection Time: 01/22/22  4:52 AM   Result Value Ref Range    WBC 5.0 4.4 - 11.3 K/uL    RBC 2.93 (L) 4.50 - 5.90 M/uL    HGB 9.6 (L) 13.5 - 17.5 g/dL    HCT 28.5 (L) 36 - 46 %    MCV 97.3 80 - 100 FL    MCH 32.9 31 - 34 PG    MCHC 33.8 31.0 - 36.0 g/dL    RDW 22.9 (H) 11.5 - 14.5 %    PLATELET 38 (LL) 387 - 400 K/uL    MPV 11.1 6.5 - 11.5 FL    NRBC 0.2  WBC    ABSOLUTE NRBC 5.58 K/uL   METABOLIC PANEL, BASIC    Collection Time: 01/22/22  4:52 AM   Result Value Ref Range    Sodium 132 (L) 136 - 145 mmol/L    Potassium 4.9 3.5 - 5.1 mmol/L    Chloride 101 97 - 108 mmol/L    CO2 22 21 - 32 mmol/L    Anion gap 9 5 - 15 mmol/L    Glucose 135 (H) 65 - 100 mg/dL    BUN 22 (H) 6 - 20 mg/dL    Creatinine 0.97 0.55 - 1.02 mg/dL    BUN/Creatinine ratio 23 (H) 12 - 20      GFR est AA >60 >60 ml/min/1.73m2    GFR est non-AA 60 (L) >60 ml/min/1.73m2    Calcium 8.3 (L) 8.5 - 10.1 mg/dL   HEMOGLOBIN A1C WITH EAG    Collection Time: 01/22/22  6:30 AM   Result Value Ref Range    Hemoglobin A1c 6.8 (H) 4.0 - 5.6 %    Est. average glucose 148 mg/dL   GLUCOSE, POC    Collection Time: 01/22/22  8:09 AM   Result Value Ref Range    Glucose (POC) 140 (H) 65 - 117 mg/dL    Performed by HELLEN BE    GLUCOSE, POC    Collection Time: 01/22/22 12:18 PM   Result Value Ref Range    Glucose (POC) 137 (H) 65 - 117 mg/dL    Performed by Vumanity MediaARET    GLUCOSE, POC    Collection Time: 01/22/22  4:47 PM   Result Value Ref Range    Glucose (POC) 154 (H) 65 - 117 mg/dL    Performed by HELLEN LR    GLUCOSE, POC    Collection Time: 01/22/22  9:13 PM   Result Value Ref Range    Glucose (POC) 133 (H) 65 - 117 mg/dL    Performed by Abisai Martin (RN)    C REACTIVE PROTEIN, QT    Collection Time: 01/23/22  5:47 AM   Result Value Ref Range    C-Reactive protein 3.71 mg/dL   CBC WITH AUTOMATED DIFF    Collection Time: 01/23/22  5:47 AM   Result Value Ref Range    WBC 3.2 (L) 4.4 - 11.3 K/uL    RBC 2.74 (L) 4.50 - 5.90 M/uL    HGB 8.9 (L) 13.5 - 17.5 g/dL    HCT 26.6 (L) 36 - 46 %    MCV 96.8 80 - 100 FL    MCH 32.5 31 - 34 PG    MCHC 33.6 31.0 - 36.0 g/dL    RDW 22.6 (H) 11.5 - 14.5 %    PLATELET 51 (L) 192 - 400 K/uL    MPV 10.5 6.5 - 11.5 FL    NRBC 0.5  WBC    ABSOLUTE NRBC 0.02 K/uL    NEUTROPHILS 74 42 - 75 %    LYMPHOCYTES 21 20.5 - 51.1 %    MONOCYTES 5 1.7 - 9.3 %    EOSINOPHILS 0 (L) 0.9 - 2.9 %    BASOPHILS 0 0.0 - 2.5 %    ABS. NEUTROPHILS 2.3 1.8 - 7.7 K/UL    ABS. LYMPHOCYTES 0.7 (L) 1.0 - 4.8 K/UL    ABS. MONOCYTES 0.2 0.2 - 2.4 K/UL    ABS. EOSINOPHILS 0.0 0.0 - 0.7 K/UL    ABS.  BASOPHILS 0.0 0.0 - 0.2 K/UL   METABOLIC PANEL, COMPREHENSIVE    Collection Time: 01/23/22  5:47 AM   Result Value Ref Range    Sodium 135 (L) 136 - 145 mmol/L    Potassium 4.2 3.5 - 5.1 mmol/L    Chloride 104 97 - 108 mmol/L    CO2 24 21 - 32 mmol/L Anion gap 7 5 - 15 mmol/L    Glucose 106 (H) 65 - 100 mg/dL    BUN 25 (H) 6 - 20 mg/dL    Creatinine 1.02 0.55 - 1.02 mg/dL    BUN/Creatinine ratio 25 (H) 12 - 20      GFR est AA >60 >60 ml/min/1.73m2    GFR est non-AA 56 (L) >60 ml/min/1.73m2    Calcium 8.4 (L) 8.5 - 10.1 mg/dL    Bilirubin, total 0.8 0.2 - 1.0 mg/dL    AST (SGOT) 33 15 - 37 U/L    ALT (SGPT) 35 12 - 78 U/L    Alk. phosphatase 42 (L) 45 - 117 U/L    Protein, total 8.1 6.4 - 8.2 g/dL    Albumin 2.0 (L) 3.5 - 5.0 g/dL    Globulin 6.1 (H) 2.0 - 4.0 g/dL    A-G Ratio 0.3 (L) 1.1 - 2.2     MAGNESIUM    Collection Time: 01/23/22  5:47 AM   Result Value Ref Range    Magnesium 2.0 1.6 - 2.4 mg/dL   GLUCOSE, POC    Collection Time: 01/23/22  8:06 AM   Result Value Ref Range    Glucose (POC) 102 65 - 117 mg/dL    Performed by Carmen Dumont    D DIMER    Collection Time: 01/23/22 10:15 AM   Result Value Ref Range    D-dimer >35.20 (H) 0.19 - 0.50 mg/L FEU   GLUCOSE, POC    Collection Time: 01/23/22 11:49 AM   Result Value Ref Range    Glucose (POC) 125 (H) 65 - 117 mg/dL    Performed by Warner MARTIN    GLUCOSE, POC    Collection Time: 01/23/22  4:20 PM   Result Value Ref Range    Glucose (POC) 237 (H) 65 - 117 mg/dL    Performed by Warner MARTIN    GLUCOSE, POC    Collection Time: 01/23/22  8:56 PM   Result Value Ref Range    Glucose (POC) 246 (H) 65 - 117 mg/dL    Performed by Augustina Govea (RN)         Imaging:  CTA CHEST W OR W WO CONT    Result Date: 1/23/2022  1. No evidence of pulmonary embolus. 2. Bilateral scattered pulmonary parenchymal airspace opacities as described.         Assessment//Plan           Problem List:  Hospital Problems  Date Reviewed: 11/10/2021          Codes Class Noted POA    Pancytopenia ([facility] ICD-10-CM: Y14.552  ICD-9-CM: 284.19  1/20/2022 Unknown        * (Principal) Pneumonia due to COVID-19 virus ICD-10-CM: U07.1, J12.82  ICD-9-CM: 480.8, 079.89  1/20/2022 Unknown               Hypoxic respiratory failure  - secondary to Covid pna   - placed on 3L NC and overnight increased to 5L but has been tapered and currently on room air has had O2 sat in the low 90% range but with minimal exertion decreases still continued on 1 L nasal cannula  - ABG on 1/21 had Po2 of 139 so continue to taper o2 as tolerated to maintain saturation >92%  - will need encouragement for Incentive spirometry q4hr wa         COVID-19 PNA  - positive COVID PCR with bilateral opacities on cxr   - droplet precautions  - vitamin c, d and zinc, cetrezine   - dexamethasone 6mg IV bid (jpatient has been recently on 40mg of dexamethasone every Monday prior to chemotherapy)   - barcitinab 4mg oral daily   - follow inflammatory markers with initial CRP 15.5, Ferritin 3503 and repeat on downward trend with CRP at 9.74 and 3.71 on 120  - Procalcitonin 1.84 so will place on IV abx of zosyn and azithromycin repeat is at one-point  - cough suppression and adjust depending on response   - prone position while sleeping and encourage incentive spirometry     Elevated D-dimer  -Initially was 1.21 but then on repeat on 1/22 was greater than 35 and on recheck was again in similar range of greater than 35  -CTA of the chest was done which was noted to be negative for PE     YANETH  - creat 1.33 increased from baseline and was given IV fluids initially but dc after creat improved to 0.84  - continue to monitor      Symptomatic anemia  - has needed multiple transfusions in the past and work up for anemia patient found to have multiple myeloma.    - HG was 7.3 and baseline in 8 range  - transfuse if < 7 so repeat on 1/21 is 6.9 and transfusion 1 units prbc and repeat hemoglobin at 9.6 and on recheck on 1/23 is at 8.9  - monitor with daily cbc      Thrombocytopenia  -Noted to be 23,000 on admission and repeat is about similar range discussed with hematologist and recommended transfusion of platelets if less than 10,000 or if bleeding episode noted  -Hold all anticoagulation  -Improving platelet counts and continue to monitor     Hx of cardiomyopathy s/p AICD  - most recent echo shows resolution with now preserved EF  -  but no evidence of fluid overload  - patient only uses prn lasix and states has note needed it   - continue coreg but started at lower dose of 12.5mg bid and losartan 25mg daily   - monitor closely on tele  - stable at this time      Multiple Myeloma  - recent diagonisis and followed by Dr. Catherine Gage MD and was able to discuss her care   - was started on allopurinol, acyclovir, and weekly dexamethasone and will restart allopurinol acyclovir as per her home dose  - is currently receiving chemotherapy and plan to transition to oral Revlimid 15mg   - continue to monitor cbc closely      Diabetes:  - monitor with sliding scale with accuchecks ACHS  - on metformin and [place] but will hold secondary to poor po intake as well as IV contrast study done on 1/20  - monitor closely since will be on steroid therapy      GERD  - complains of abdominal pain most likely exacerbation of her GERD secondary to poor PO intake. - restart protonix and use prn maalox.      DVT Prophylaxis  - contraindicated lovenox use secondary the high risk of bleeding      Code Status: Full Code   Patient designated point of contact is her sister Kwaku Nuñez      Spent 35 minutes evaluting and coordinating patient care of which >50% was spent coordinating and counseling.          Electronically signed by Day Martino MD on 1/23/2022 at 9:21 PM

## 2022-01-24 NOTE — ROUTINE PROCESS
Bedside shift change report given to Chris Callahan LPN (oncoming nurse) by Terre Cheadle RN (offgoing nurse). Report included the following information SBAR.

## 2022-01-24 NOTE — PROGRESS NOTES
Patients case reviewed during interdisciplinary team meeting in [address] [place], [place] [address]Acute Care Unit. Rev.  Waleska Vásquez [address], [address]

## 2022-01-24 NOTE — PROGRESS NOTES
Problem: Airway Clearance - Ineffective  Goal: Achieve or maintain patent airway  Outcome: Progressing Towards Goal     Problem: Gas Exchange - Impaired  Goal: Absence of hypoxia  Outcome: Progressing Towards Goal  Goal: Promote optimal lung function  Outcome: Progressing Towards Goal     Problem: Breathing Pattern - Ineffective  Goal: Ability to achieve and maintain a regular respiratory rate  Outcome: Progressing Towards Goal     Problem: Body Temperature -  Risk of, Imbalanced  Goal: Ability to maintain a body temperature within defined limits  Outcome: Progressing Towards Goal     Problem: Falls - Risk of  Goal: *Absence of Falls  Description: Document Michelle Embs Fall Risk and appropriate interventions in the flowsheet.   Outcome: Progressing Towards Goal  Note: Fall Risk Interventions:            Medication Interventions: Patient to call before getting OOB,Teach patient to arise slowly

## 2022-01-24 NOTE — PROGRESS NOTES
Progress Note  Date:1/24/2022       Room:[facility]  Patient Name:Jody Storey     YOB: 1965     Age:56 y.o. Subjective    As per Admitting provider:  Martita Rodriguez is a 55 y.o. female followed by Diamante García PA-C and hematologist / oncologist Dr. Fely Rincon and Stephanie Willis [facility] [place] [facility] [facility]  has a past medical history of AICD (automatic cardioverter/defibrillator) present (12/2020), Anemia, Cardiomyopathy ([address] (08/2020), Diabetes ([address] GERD (gastroesophageal reflux disease), Heart failure ([address] Hypertension, Multiple myeloma ([address] Sleep apnea, and Thrombocytopenia ([address] presents with worsening fatigue, decreased PO intake, cough and sob since Wednesday. She followed up with Dr. Chayo Anna after her initial Chemotherapy dose given and felt she shouldn't have been feeling this bad and there was concern of possible COVID. She then got tested by primary and found to be positive. She had a few family members that were positive but has not been close to them secondary to her new diagnosis of multiple myeloma. Patient on evaluation on 1/21 says she feels bad all over with coughing and sob. On evaluation in the ED was found to be hypoxic and placed on 3L NC and repeat covid testing was positive. CXR showed patchy alveolar opacities throughout the lungs. Labs showed anemia but according to family was in similar range when checked by oncologist. Also noted thrombocytopenia of 23,000 but not noted evidence of bleeding. abg was done on 1/21 which shows Po2 of 139 on 3L NC. Dr Fely Rincon was kind enough to call and was able to discuss her multiple myeloma which he notes was a fairly aggressive but appears to be responding to initial chemotherapy. No need to adjust treatments for her covid but did recommend transfusing if hg < 7 and platelets if level <84,624 or bleeding is noted.  Because of patient presentation was referred for admission for Acute hypoxic respiratory failure, Covid 19 PNA,, symptomatic anemia, thrombocytopenia     Patient seen on follow-up being up in bed no acute distress continues to say she feels well attempted to take her off O2 but saturation dropped to 88% at rest but with deep breathing did go back up to 90 to 91% and place patient back on 1 L nasal cannula. Review of Systems   Constitutional: Negative. HENT: Negative. Respiratory: Positive for cough and shortness of breath. Cardiovascular: Negative. Gastrointestinal: Negative. Endocrine: Negative. Genitourinary: Negative. Musculoskeletal: Negative. Allergic/Immunologic: Negative. Neurological: Negative. Hematological: Negative. Psychiatric/Behavioral: Negative. Objective           Vitals Last 24 Hours:  Patient Vitals for the past 24 hrs:   Temp Pulse Resp BP SpO2   01/24/22 1126 -- -- -- -- 93 %   01/24/22 0745 98.7 °F (37.1 °C) 65 18 113/65 100 %   01/24/22 0400 98.9 °F (37.2 °C) 61 18 125/67 95 %   01/24/22 0000 98.1 °F (36.7 °C) 62 20 121/78 99 %   01/23/22 2000 97.5 °F (36.4 °C) 66 20 (!) 109/54 94 %   01/23/22 1600 97.2 °F (36.2 °C) 85 22 131/70 93 %        I/O (24Hr): Intake/Output Summary (Last 24 hours) at 1/24/2022 1313  Last data filed at 1/24/2022 0034  Gross per 24 hour   Intake 340 ml   Output --   Net 340 ml       Physical Exam:  General: Alert, cooperative, no distress, appears stated age. Head:  Normocephalic, without obvious abnormality, atraumatic. Eyes:  Conjunctivae/corneas clear. Pupils equal, round, reactive to light. Extraocular movements intact. Lungs: Bilateral air entry but diminished with basilar rhonchi noted  Chest wall: No tenderness or deformity. Heart:  Regular rate and rhythm, S1, S2 normal, no murmur, click, rub or gallop. Abdomen:  Soft, non-tender. Bowel sounds normal. No masses,  No organomegaly. Extremities: Extremities normal, atraumatic, no cyanosis or edema. Pulses: 2+ and symmetric all extremities.   Skin: Skin color, texture, turgor normal. No rashes or lesions  Neurologic: Awake, Alert, oriented.  No obvious gross sensory or motor deficits      Medications           Current Facility-Administered Medications   Medication Dose Route Frequency    acyclovir (ZOVIRAX) tablet 400 mg  400 mg Oral DAILY    allopurinoL (ZYLOPRIM) tablet 100 mg  100 mg Oral DAILY    fluticasone propionate (FLONASE) 50 mcg/actuation nasal spray 2 Spray  2 Spray Both Nostrils DAILY    sodium chloride (OCEAN) 0.65 % nasal squeeze bottle 2 Spray  2 Spray Both Nostrils QID    losartan (COZAAR) tablet 25 mg  25 mg Oral DAILY    alum-mag hydroxide-simeth (MYLANTA) oral suspension 30 mL  30 mL Oral Q4H PRN    glucose chewable tablet 16 g  4 Tablet Oral PRN    dextrose (D50W) injection syrg 12.5-25 g  25-50 mL IntraVENous PRN    glucagon (GLUCAGEN) injection 1 mg  1 mg IntraMUSCular PRN    insulin lispro (HUMALOG) injection   SubCUTAneous AC&HS    cholecalciferol (VITAMIN D3) capsule 2,000 Units  2,000 Units Oral DAILY    [Held by provider] guaiFENesin ER (MUCINEX) tablet 600 mg  600 mg Oral Q12H    guaiFENesin-codeine (ROBITUSSIN AC) 100-10 mg/5 mL solution 5 mL  5 mL Oral Q4H PRN    baricitinib (OLUMIANT) tablet 2 mg  2 mg Oral DAILY    dexamethasone (DECADRON) 10 mg/mL injection 6 mg  6 mg IntraVENous Q12H    carvediloL (COREG) tablet 12.5 mg  12.5 mg Oral BID WITH MEALS    piperacillin-tazobactam (ZOSYN) 3.375 g in 0.9% sodium chloride (MBP/ADV) 100 mL MBP  3.375 g IntraVENous Q8H    azithromycin (ZITHROMAX) 500 mg in 0.9% sodium chloride 250 mL (VIAL-MATE)  500 mg IntraVENous Q24H    0.9% sodium chloride infusion 250 mL  250 mL IntraVENous PRN    sodium chloride (NS) flush 5-40 mL  5-40 mL IntraVENous Q8H    sodium chloride (NS) flush 5-40 mL  5-40 mL IntraVENous PRN    acetaminophen (TYLENOL) tablet 650 mg  650 mg Oral Q6H PRN    Or    acetaminophen (TYLENOL) suppository 650 mg  650 mg Rectal Q6H PRN    polyethylene glycol (MIRALAX) packet 17 g  17 g Oral DAILY PRN    ondansetron (ZOFRAN) injection 4 mg  4 mg IntraVENous Q6H PRN    magnesium oxide (MAG-OX) tablet 400 mg  400 mg Oral TID    ascorbic acid (vitamin C) (VITAMIN C) tablet 500 mg  500 mg Oral BID    zinc sulfate (ZINCATE) 50 mg zinc (220 mg) capsule 1 Capsule  1 Capsule Oral DAILY    cetirizine (ZYRTEC) tablet 10 mg  10 mg Oral QPM    pantoprazole (PROTONIX) tablet 40 mg  40 mg Oral ACB         Allergies         Patient has no known allergies. Labs/Imaging/Diagnostics      Labs:  Recent Results (from the past 48 hour(s))   GLUCOSE, POC    Collection Time: 01/22/22  4:47 PM   Result Value Ref Range    Glucose (POC) 154 (H) 65 - 117 mg/dL    Performed by HELLEN LR    GLUCOSE, POC    Collection Time: 01/22/22  9:13 PM   Result Value Ref Range    Glucose (POC) 133 (H) 65 - 117 mg/dL    Performed by Abisai Martin (RN)    C REACTIVE PROTEIN, QT    Collection Time: 01/23/22  5:47 AM   Result Value Ref Range    C-Reactive protein 3.71 mg/dL   CBC WITH AUTOMATED DIFF    Collection Time: 01/23/22  5:47 AM   Result Value Ref Range    WBC 3.2 (L) 4.4 - 11.3 K/uL    RBC 2.74 (L) 4.50 - 5.90 M/uL    HGB 8.9 (L) 13.5 - 17.5 g/dL    HCT 26.6 (L) 36 - 46 %    MCV 96.8 80 - 100 FL    MCH 32.5 31 - 34 PG    MCHC 33.6 31.0 - 36.0 g/dL    RDW 22.6 (H) 11.5 - 14.5 %    PLATELET 51 (L) 706 - 400 K/uL    MPV 10.5 6.5 - 11.5 FL    NRBC 0.5  WBC    ABSOLUTE NRBC 0.02 K/uL    NEUTROPHILS 74 42 - 75 %    LYMPHOCYTES 21 20.5 - 51.1 %    MONOCYTES 5 1.7 - 9.3 %    EOSINOPHILS 0 (L) 0.9 - 2.9 %    BASOPHILS 0 0.0 - 2.5 %    ABS. NEUTROPHILS 2.3 1.8 - 7.7 K/UL    ABS. LYMPHOCYTES 0.7 (L) 1.0 - 4.8 K/UL    ABS. MONOCYTES 0.2 0.2 - 2.4 K/UL    ABS. EOSINOPHILS 0.0 0.0 - 0.7 K/UL    ABS.  BASOPHILS 0.0 0.0 - 0.2 K/UL   METABOLIC PANEL, COMPREHENSIVE    Collection Time: 01/23/22  5:47 AM   Result Value Ref Range    Sodium 135 (L) 136 - 145 mmol/L    Potassium 4.2 3.5 - 5.1 mmol/L    Chloride 104 97 - 108 mmol/L    CO2 24 21 - 32 mmol/L    Anion gap 7 5 - 15 mmol/L    Glucose 106 (H) 65 - 100 mg/dL    BUN 25 (H) 6 - 20 mg/dL    Creatinine 1.02 0.55 - 1.02 mg/dL    BUN/Creatinine ratio 25 (H) 12 - 20      GFR est AA >60 >60 ml/min/1.73m2    GFR est non-AA 56 (L) >60 ml/min/1.73m2    Calcium 8.4 (L) 8.5 - 10.1 mg/dL    Bilirubin, total 0.8 0.2 - 1.0 mg/dL    AST (SGOT) 33 15 - 37 U/L    ALT (SGPT) 35 12 - 78 U/L    Alk.  phosphatase 42 (L) 45 - 117 U/L    Protein, total 8.1 6.4 - 8.2 g/dL    Albumin 2.0 (L) 3.5 - 5.0 g/dL    Globulin 6.1 (H) 2.0 - 4.0 g/dL    A-G Ratio 0.3 (L) 1.1 - 2.2     MAGNESIUM    Collection Time: 01/23/22  5:47 AM   Result Value Ref Range    Magnesium 2.0 1.6 - 2.4 mg/dL   GLUCOSE, POC    Collection Time: 01/23/22  8:06 AM   Result Value Ref Range    Glucose (POC) 102 65 - 117 mg/dL    Performed by [place]    D DIMER    Collection Time: 01/23/22 10:15 AM   Result Value Ref Range    D-dimer >35.20 (H) 0.19 - 0.50 mg/L FEU   GLUCOSE, POC    Collection Time: 01/23/22 11:49 AM   Result Value Ref Range    Glucose (POC) 125 (H) 65 - 117 mg/dL    Performed by [place]    GLUCOSE, POC    Collection Time: 01/23/22  4:20 PM   Result Value Ref Range    Glucose (POC) 237 (H) 65 - 117 mg/dL    Performed by [place]    GLUCOSE, POC    Collection Time: 01/23/22  8:56 PM   Result Value Ref Range    Glucose (POC) 246 (H) 65 - 117 mg/dL    Performed by Virginia Tipton (RN)    CBC WITH AUTOMATED DIFF    Collection Time: 01/24/22 10:20 AM   Result Value Ref Range    WBC 4.7 4.4 - 11.3 K/uL    RBC 2.83 (L) 4.50 - 5.90 M/uL    HGB 9.1 (L) 13.5 - 17.5 g/dL    HCT 27.4 (L) 36 - 46 %    MCV 96.8 80 - 100 FL    MCH 32.2 31 - 34 PG    MCHC 33.3 31.0 - 36.0 g/dL    RDW 22.0 (H) 11.5 - 14.5 %    PLATELET 70 (L) 784 - 400 K/uL    MPV 10.4 6.5 - 11.5 FL    NRBC 0.5  WBC    ABSOLUTE NRBC 0.02 K/uL    NEUTROPHILS 69 42 - 75 %    LYMPHOCYTES 23 20.5 - 51.1 %    MONOCYTES 8 1.7 - 9.3 % EOSINOPHILS 0 (L) 0.9 - 2.9 %    BASOPHILS 0 0.0 - 2.5 %    ABS. NEUTROPHILS 3.2 1.8 - 7.7 K/UL    ABS. LYMPHOCYTES 1.1 1.0 - 4.8 K/UL    ABS. MONOCYTES 0.4 0.2 - 2.4 K/UL    ABS. EOSINOPHILS 0.0 0.0 - 0.7 K/UL    ABS. BASOPHILS 0.0 0.0 - 0.2 K/UL   METABOLIC PANEL, COMPREHENSIVE    Collection Time: 01/24/22 10:20 AM   Result Value Ref Range    Sodium 136 136 - 145 mmol/L    Potassium 3.6 3.5 - 5.1 mmol/L    Chloride 105 97 - 108 mmol/L    CO2 22 21 - 32 mmol/L    Anion gap 9 5 - 15 mmol/L    Glucose 117 (H) 65 - 100 mg/dL    BUN 19 6 - 20 mg/dL    Creatinine 1.05 (H) 0.55 - 1.02 mg/dL    BUN/Creatinine ratio 18 12 - 20      GFR est AA >60 >60 ml/min/1.73m2    GFR est non-AA 54 (L) >60 ml/min/1.73m2    Calcium 8.4 (L) 8.5 - 10.1 mg/dL    Bilirubin, total 0.6 0.2 - 1.0 mg/dL    AST (SGOT) 22 15 - 37 U/L    ALT (SGPT) 36 12 - 78 U/L    Alk. phosphatase 41 (L) 45 - 117 U/L    Protein, total 7.9 6.4 - 8.2 g/dL    Albumin 2.0 (L) 3.5 - 5.0 g/dL    Globulin 5.9 (H) 2.0 - 4.0 g/dL    A-G Ratio 0.3 (L) 1.1 - 2.2     GLUCOSE, POC    Collection Time: 01/24/22 12:11 PM   Result Value Ref Range    Glucose (POC) 125 (H) 65 - 117 mg/dL    Performed by Brion Bamberger         Imaging:  CTA CHEST W OR W WO CONT    Result Date: 1/23/2022  1. No evidence of pulmonary embolus. 2. Bilateral scattered pulmonary parenchymal airspace opacities as described.         Assessment//Plan           Problem List:  Hospital Problems  Date Reviewed: 11/10/2021          Codes Class Noted POA    Pancytopenia ([facility] [address] ICD-10-CM: X35.490  ICD-9-CM: 284.19  1/20/2022 Unknown        * (Principal) Pneumonia due to COVID-19 virus ICD-10-CM: U07.1, J12.82  ICD-9-CM: 480.8, 079.89  1/20/2022 Unknown               Hypoxic respiratory failure  - secondary to Covid pna   - placed on 3L NC and overnight increased to 5L but has been tapered and currently on room air has had O2 sat in the low 90% range but with minimal exertion decreases still continued on 1 L nasal cannula  - ABG on 1/21 had Po2 of 139 so continue to taper o2 as tolerated to maintain saturation >92%  - will need encouragement for Incentive spirometry q4hr wa         COVID-19 PNA  - positive COVID PCR with bilateral opacities on cxr   - droplet precautions  - vitamin c, d and zinc, cetrezine   - dexamethasone 6mg IV bid (jpminh has been recently on 40mg of dexamethasone every Monday prior to chemotherapy)   - barcitinab 4mg oral daily   - follow inflammatory markers with initial CRP 15.5, Ferritin 3503 and repeat on downward trend with CRP at 9.74 and 3.71 on 120  - Procalcitonin 1.84 so will place on IV abx of zosyn and azithromycin repeat is at one-point  - cough suppression and adjust depending on response   - prone position while sleeping and encourage incentive spirometry     Elevated D-dimer  -Initially was 1.21 but then on repeat on 1/22 was greater than 35 and on recheck was again in similar range of greater than 35  -CTA of the chest was done which was noted to be negative for PE     YANETH  - creat 1.33 increased from baseline and was given IV fluids initially but dc after creat improved to 0.84  - continue to monitor      Symptomatic anemia  - has needed multiple transfusions in the past and work up for anemia patient found to have multiple myeloma.    - HG was 7.3 and baseline in 8 range  - transfuse if < 7 so repeat on 1/21 is 6.9 and transfusion 1 units prbc and repeat hemoglobin at 9.6 and on recheck on 1/23 is at 8.9  - monitor with daily cbc      Thrombocytopenia  -Noted to be 23,000 on admission and repeat is about similar range discussed with hematologist and recommended transfusion of platelets if less than 10,000 or if bleeding episode noted  -Hold all anticoagulation  -Improving platelet counts and continue to monitor     Hx of cardiomyopathy s/p AICD  - most recent echo shows resolution with now preserved EF  -  but no evidence of fluid overload  - patient only uses prn lasix and states has note needed it   - continue coreg but started at lower dose of 12.5mg bid and losartan 25mg daily   - monitor closely on tele  - stable at this time      Multiple Myeloma  - recent diagonisis and followed by Dr. Kaden Dobson MD and was able to discuss her care   - was started on allopurinol, acyclovir, and weekly dexamethasone and will restart allopurinol acyclovir as per her home dose  - is currently receiving chemotherapy and plan to transition to oral Revlimid 15mg   - continue to monitor cbc closely      Diabetes:  - monitor with sliding scale with accuchecks ACHS  - on metformin and Sandoval Timmy but will hold secondary to poor po intake as well as IV contrast study done on 1/20  - monitor closely since will be on steroid therapy      GERD  - complains of abdominal pain most likely exacerbation of her GERD secondary to poor PO intake. - restart protonix and use prn maalox.      DVT Prophylaxis  - contraindicated lovenox use secondary the high risk of bleeding      Code Status: Full Code   Patient designated point of contact is her sister Nahed Ny      Spent 35 minutes evaluting and coordinating patient care of which >50% was spent coordinating and counseling.      Disposition: Continue taper O2 with possible discharge in 1 to 2 days    Electronically signed by Kelvin Dias MD on 1/24/2022 at 9:21 PM

## 2022-01-24 NOTE — PROGRESS NOTES
Problem: Airway Clearance - Ineffective  Goal: Achieve or maintain patent airway  Outcome: Progressing Towards Goal     Problem: Gas Exchange - Impaired  Goal: Absence of hypoxia  Outcome: Progressing Towards Goal  Goal: Promote optimal lung function  Outcome: Progressing Towards Goal     Problem: Breathing Pattern - Ineffective  Goal: Ability to achieve and maintain a regular respiratory rate  Outcome: Progressing Towards Goal     Problem: Body Temperature -  Risk of, Imbalanced  Goal: Ability to maintain a body temperature within defined limits  Outcome: Progressing Towards Goal  Goal: Will regain or maintain usual level of consciousness  Outcome: Progressing Towards Goal  Goal: Complications related to the disease process, condition or treatment will be avoided or minimized  Outcome: Progressing Towards Goal     Problem: Isolation Precautions - Risk of Spread of Infection  Goal: Prevent transmission of infectious organism to others  Outcome: Progressing Towards Goal     Problem: Nutrition Deficits  Goal: Optimize nutrtional status  Outcome: Progressing Towards Goal     Problem: Risk for Fluid Volume Deficit  Goal: Maintain normal heart rhythm  Outcome: Progressing Towards Goal  Goal: Maintain absence of muscle cramping  Outcome: Progressing Towards Goal  Goal: Maintain normal serum potassium, sodium, calcium, phosphorus, and pH  Outcome: Progressing Towards Goal     Problem: Loneliness or Risk for Loneliness  Goal: Demonstrate positive use of time alone when socialization is not possible  Outcome: Progressing Towards Goal     Problem: Fatigue  Goal: Verbalize increase energy and improved vitality  Outcome: Progressing Towards Goal     Problem: Patient Education: Go to Patient Education Activity  Goal: Patient/Family Education  Outcome: Progressing Towards Goal     Problem: Falls - Risk of  Goal: *Absence of Falls  Description: Document Annmarie Fall Risk and appropriate interventions in the flowsheet.   Outcome: Progressing Towards Goal  Note: Fall Risk Interventions:            Medication Interventions: Patient to call before getting OOB                   Problem: Patient Education: Go to Patient Education Activity  Goal: Patient/Family Education  Outcome: Progressing Towards Goal     Problem: Diabetes Self-Management  Goal: *Disease process and treatment process  Description: Define diabetes and identify own type of diabetes; list 3 options for treating diabetes. Outcome: Progressing Towards Goal  Goal: *Incorporating nutritional management into lifestyle  Description: Describe effect of type, amount and timing of food on blood glucose; list 3 methods for planning meals. Outcome: Progressing Towards Goal  Goal: *Incorporating physical activity into lifestyle  Description: State effect of exercise on blood glucose levels. Outcome: Progressing Towards Goal  Goal: *Developing strategies to promote health/change behavior  Description: Define the ABC's of diabetes; identify appropriate screenings, schedule and personal plan for screenings. Outcome: Progressing Towards Goal  Goal: *Using medications safely  Description: State effect of diabetes medications on diabetes; name diabetes medication taking, action and side effects. Outcome: Progressing Towards Goal  Goal: *Monitoring blood glucose, interpreting and using results  Description: Identify recommended blood glucose targets  and personal targets. Outcome: Progressing Towards Goal  Goal: *Prevention, detection, treatment of acute complications  Description: List symptoms of hyper- and hypoglycemia; describe how to treat low blood sugar and actions for lowering  high blood glucose level. Outcome: Progressing Towards Goal  Goal: *Prevention, detection and treatment of chronic complications  Description: Define the natural course of diabetes and describe the relationship of blood glucose levels to long term complications of diabetes.   Outcome: Progressing Towards Goal  Goal: *Developing strategies to address psychosocial issues  Description: Describe feelings about living with diabetes; identify support needed and support network  Outcome: Progressing Towards Goal  Goal: *Insulin pump training  Outcome: Progressing Towards Goal  Goal: *Sick day guidelines  Outcome: Progressing Towards Goal  Goal: *Patient Specific Goal (EDIT GOAL, INSERT TEXT)  Outcome: Progressing Towards Goal     Problem: Patient Education: Go to Patient Education Activity  Goal: Patient/Family Education  Outcome: Progressing Towards Goal

## 2022-01-25 NOTE — ROUTINE PROCESS
Report from [place] [id number]. Patient in bed resting, no complaints, HOB elevated, and call light within reach.

## 2022-01-25 NOTE — PROGRESS NOTES
HOSPITALIST PROGRESS NOTE  Artis Sage MD, [facility]  [address] [place] [address]         Daily Progress Note: 1/25/2022  COVID-19 positive. Appropriate PPE donned and doffed. Subjective:     Patient is alert and oriented x4. Patient feels significantly improved. Improving cough and shortness of breath and currently dependent on only 1 L NC O2 with COVID-19 pneumonia. No overnight fever/chills, chest pain, nausea/vomiting or abdominal pain noted. Patient is not encouraged to be discharged home on supplemental oxygen. Would like to attempt to be weaned off for an additional 24 hours prior to deciding to go home on supplemental oxygen per case management.       Medications reviewed  Current Facility-Administered Medications   Medication Dose Route Frequency    piperacillin-tazobactam (ZOSYN) 3.375 g in 0.9% sodium chloride (MBP/ADV) 100 mL MBP  3.375 g IntraVENous Q8H    acyclovir (ZOVIRAX) tablet 400 mg  400 mg Oral DAILY    allopurinoL (ZYLOPRIM) tablet 100 mg  100 mg Oral DAILY    fluticasone propionate (FLONASE) 50 mcg/actuation nasal spray 2 Spray  2 Spray Both Nostrils DAILY    sodium chloride (OCEAN) 0.65 % nasal squeeze bottle 2 Spray  2 Spray Both Nostrils QID    losartan (COZAAR) tablet 25 mg  25 mg Oral DAILY    alum-mag hydroxide-simeth (MYLANTA) oral suspension 30 mL  30 mL Oral Q4H PRN    glucose chewable tablet 16 g  4 Tablet Oral PRN    dextrose (D50W) injection syrg 12.5-25 g  25-50 mL IntraVENous PRN    glucagon (GLUCAGEN) injection 1 mg  1 mg IntraMUSCular PRN    insulin lispro (HUMALOG) injection   SubCUTAneous AC&HS    cholecalciferol (VITAMIN D3) capsule 2,000 Units  2,000 Units Oral DAILY    [Held by provider] guaiFENesin ER (MUCINEX) tablet 600 mg  600 mg Oral Q12H    guaiFENesin-codeine (ROBITUSSIN AC) 100-10 mg/5 mL solution 5 mL  5 mL Oral Q4H PRN    baricitinib (OLUMIANT) tablet 2 mg  2 mg Oral DAILY    dexamethasone (DECADRON) 10 mg/mL injection 6 mg  6 mg IntraVENous Q12H    carvediloL (COREG) tablet 12.5 mg  12.5 mg Oral BID WITH MEALS    azithromycin (ZITHROMAX) 500 mg in 0.9% sodium chloride 250 mL (VIAL-MATE)  500 mg IntraVENous Q24H    0.9% sodium chloride infusion 250 mL  250 mL IntraVENous PRN    sodium chloride (NS) flush 5-40 mL  5-40 mL IntraVENous Q8H    sodium chloride (NS) flush 5-40 mL  5-40 mL IntraVENous PRN    acetaminophen (TYLENOL) tablet 650 mg  650 mg Oral Q6H PRN    Or    acetaminophen (TYLENOL) suppository 650 mg  650 mg Rectal Q6H PRN    polyethylene glycol (MIRALAX) packet 17 g  17 g Oral DAILY PRN    ondansetron (ZOFRAN) injection 4 mg  4 mg IntraVENous Q6H PRN    magnesium oxide (MAG-OX) tablet 400 mg  400 mg Oral TID    ascorbic acid (vitamin C) (VITAMIN C) tablet 500 mg  500 mg Oral BID    zinc sulfate (ZINCATE) 50 mg zinc (220 mg) capsule 1 Capsule  1 Capsule Oral DAILY    cetirizine (ZYRTEC) tablet 10 mg  10 mg Oral QPM    pantoprazole (PROTONIX) tablet 40 mg  40 mg Oral ACB       Review of Systems:   A comprehensive review of systems was negative except for that written in the HPI. Objective:   Physical Exam:     Visit Vitals  /70 (BP 1 Location: Right upper arm, BP Patient Position: Sitting)   Pulse 64   Temp 98.7 °F (37.1 °C)   Resp 18   Ht 5' (1.524 m)   Wt 82.1 kg (181 lb)   SpO2 90%   BMI 35.35 kg/m²    O2 Flow Rate (L/min): 1 l/min O2 Device: Nasal cannula  Patient Vitals for the past 8 hrs:   Temp Pulse Resp BP SpO2   22 1137 98.7 °F (37.1 °C) 64 18 116/70 90 %          Temp (24hrs), Av.1 °F (36.7 °C), Min:97.2 °F (36.2 °C), Max:98.8 °F (37.1 °C)    No intake/output data recorded.  1901 -  0700  In: 340 [P.O.:240; I.V.:100]  Out: -     General:  Alert, cooperative, no distress, appears stated age. Lungs:   Clear to auscultation bilaterally. Chest wall:  No tenderness or deformity.    Heart:  Regular rate and rhythm, S1, S2 normal, no murmur, click, rub or gallop. Abdomen:   Soft, non-tender. Bowel sounds normal. No masses,  No organomegaly. Extremities: Extremities normal, atraumatic, no cyanosis or edema. Pulses: 2+ and symmetric all extremities. Skin: Skin color, texture, turgor normal. No rashes or lesions   Neurologic: No gross sensory or motor deficits     Data Review:       Recent Days:  Recent Labs     01/25/22  0601 01/24/22  1020 01/23/22  0547   WBC 3.3* 4.7 3.2*   HGB 9.0* 9.1* 8.9*   HCT 26.7* 27.4* 26.6*   PLT 93* 70* 51*     Recent Labs     01/25/22  0601 01/24/22  1020 01/23/22  0547   * 136 135*   K 5.1 3.6 4.2    105 104   CO2 26 22 24   * 117* 106*   BUN 17 19 25*   CREA 0.70 1.05* 1.02   CA 8.3* 8.4* 8.4*   MG  --   --  2.0   ALB 2.1* 2.0* 2.0*   TBILI 0.7 0.6 0.8   ALT 47 36 35     No results for input(s): PH, PCO2, PO2, HCO3, FIO2 in the last 72 hours. 24 Hour Results:  Recent Results (from the past 24 hour(s))   GLUCOSE, POC    Collection Time: 01/24/22  5:51 PM   Result Value Ref Range    Glucose (POC) 262 (H) 65 - 117 mg/dL    Performed by Darya Espinoza    GLUCOSE, POC    Collection Time: 01/24/22 10:11 PM   Result Value Ref Range    Glucose (POC) 161 (H) 65 - 117 mg/dL    Performed by Hermann Watson    CBC WITH AUTOMATED DIFF    Collection Time: 01/25/22  6:01 AM   Result Value Ref Range    WBC 3.3 (L) 4.4 - 11.3 K/uL    RBC 2.74 (L) 4.50 - 5.90 M/uL    HGB 9.0 (L) 13.5 - 17.5 g/dL    HCT 26.7 (L) 36 - 46 %    MCV 97.3 80 - 100 FL    MCH 32.9 31 - 34 PG    MCHC 33.8 31.0 - 36.0 g/dL    RDW 22.2 (H) 11.5 - 14.5 %    PLATELET 93 (L) 363 - 400 K/uL    MPV 10.6 6.5 - 11.5 FL    NRBC 0.6  WBC    ABSOLUTE NRBC 0.02 K/uL    NEUTROPHILS 74 42 - 75 %    LYMPHOCYTES 20 (L) 20.5 - 51.1 %    MONOCYTES 5 1.7 - 9.3 %    EOSINOPHILS 0 (L) 0.9 - 2.9 %    BASOPHILS 1 0.0 - 2.5 %    ABS. NEUTROPHILS 2.5 1.8 - 7.7 K/UL    ABS. LYMPHOCYTES 0.7 (L) 1.0 - 4.8 K/UL    ABS. MONOCYTES 0.2 0.2 - 2.4 K/UL    ABS. EOSINOPHILS 0.0 0.0 - 0.7 K/UL    ABS. BASOPHILS 0.0 0.0 - 0.2 K/UL   METABOLIC PANEL, COMPREHENSIVE    Collection Time: 01/25/22  6:01 AM   Result Value Ref Range    Sodium 133 (L) 136 - 145 mmol/L    Potassium 5.1 3.5 - 5.1 mmol/L    Chloride 102 97 - 108 mmol/L    CO2 26 21 - 32 mmol/L    Anion gap 5 5 - 15 mmol/L    Glucose 155 (H) 65 - 100 mg/dL    BUN 17 6 - 20 mg/dL    Creatinine 0.70 0.55 - 1.02 mg/dL    BUN/Creatinine ratio 24 (H) 12 - 20      GFR est AA >60 >60 ml/min/1.73m2    GFR est non-AA >60 >60 ml/min/1.73m2    Calcium 8.3 (L) 8.5 - 10.1 mg/dL    Bilirubin, total 0.7 0.2 - 1.0 mg/dL    AST (SGOT) 36 15 - 37 U/L    ALT (SGPT) 47 12 - 78 U/L    Alk. phosphatase 40 (L) 45 - 117 U/L    Protein, total 8.4 (H) 6.4 - 8.2 g/dL    Albumin 2.1 (L) 3.5 - 5.0 g/dL    Globulin 6.3 (H) 2.0 - 4.0 g/dL    A-G Ratio 0.3 (L) 1.1 - 2.2     GLUCOSE, POC    Collection Time: 01/25/22  7:56 AM   Result Value Ref Range    Glucose (POC) 132 (H) 65 - 117 mg/dL    Performed by Sarah Fermin    GLUCOSE, POC    Collection Time: 01/25/22 11:45 AM   Result Value Ref Range    Glucose (POC) 193 (H) 65 - 117 mg/dL    Performed by Sarah Eagle            Assessment/Plan:     Hypoxic respiratory failure  Secondary to Covid-19 Pneumonia. Currently depending on 1 L NC O2. ABG on 1/21 had Po2 of 139 so continue to taper o2 as tolerated to maintain saturation >92%.   Cont' Incentive spirometry q4hr wa   Continue prone position.     COVID-19 PNA  COVID PCR with bilateral opacities on cxr   Cont' droplet precautions  Vitamin c, d and zinc, cetrezine   Dexamethasone 6mg IV bid (patient has been recently on 40mg of dexamethasone every Monday prior to chemotherapy)   Barcitinab 4mg oral daily x 14 Days  Follow inflammatory markers with initial CRP 15.5, Ferritin 3503  Procalcitonin 1.84, empirically on IV abx of zosyn and azithromycin  Prone position while sleeping and encourage incentive spirometry. Change IV Zosyn to p.o. Levaquin empirically in preparation for discharge.     YANETH  Essentially resolved with IV fluids with     Symptomatic anemia  Previously required multiple transfusions in the past and work up for anemia patient found to have multiple myeloma. Hemoglobin currently 9.0. Transfuse if < 7 so repeat on 1/21 is 6.9 and transfusion 1 units prbc  Monitor with daily cbc      Thrombocytopenia  Noted to be 23,000 on admission and repeat is about similar range discussed with hematologist and recommended transfusion of platelets if less than 10,000 or if bleeding episode noted  Hold all anticoagulation. Platelets now back up to 93 K.     Hx of cardiomyopathy s/p AICD  Most recent echo shows resolution with now preserved EF   but no evidence of fluid overload  At home on prn lasix and states has note needed it   Continue coreg but started at lower dose of 12.5mg bid and losartan 25mg daily   Monitor closely on tele     Multiple Myeloma  Followed by Dr. Graciela Maldonado MD and was able to discuss her care. Cont' on allopurinol, acyclovir, and weekly dexamethasone. Currently receiving chemotherapy and plan to transition to oral Revlimid 15mg. Continue to monitor cbc closely.      Diabetes:  Sliding scale with accuchecks ACHS  Holding metformin and [place], secondary to poor po intake      GERD  Restarted Protonix with complains of abdominal discomfort.     DVT Prophylaxis  Contraindicated lovenox use secondary the high risk of bleeding      Code Status: Full Code       Care Plan discussed with: Patient/Family and Nurse, MDR    Patient likely can be discharged home in the next 24 to 48 hours. Total time spent with patient: 35 minutes. With greater than 50% spent in coordination of care and counseling.     Madhav Thomson MD

## 2022-01-25 NOTE — RT PROTOCOL NOTE
Patient's SpO2 on room air at rest was 96%. Patient's SpO2 on room air while ambulating was 91%. Patient walked from her room to the nursing station and back.

## 2022-01-25 NOTE — PROGRESS NOTES
Patients case reviewed during interdisciplinary team meeting in [address] [place] [address]/Acute Care Unit. Rev.  Sigrid Males, [address], [address]

## 2022-01-26 NOTE — ROUTINE PROCESS
Purposeful rounding was completed with no new findings. She have not voiced any c/o. Neto Wellington

## 2022-01-26 NOTE — ROUTINE PROCESS
Report was received from the offgoing nurse Victorina Flight. Care was resumed via the incoming nurse [facility] MICHELE LPN. The pt is resting awake in bed watching tv with no c/o being voiced. She have her O2 off. No c/o of dyspnea is being voiced. She is showing no diabetic symptoms.

## 2022-01-26 NOTE — PROGRESS NOTES
Patients case reviewed during interdisciplinary team meeting in [address] [place], [place] [address]Acute Care Unit. Rev.  Waleska Marcano 49, [address]

## 2022-01-26 NOTE — DISCHARGE SUMMARY
HOSPITALIST DISCHARGE NOTE  Saul Johnson MD, [address]       PATIENT ID: Braydon Troncoso  MRN: [id number]   YOB: 1965    DATE OF ADMISSION: 1/20/2022  2:54 PM    DATE OF DISCHARGE: 01/26/22    PRIMARY CARE PROVIDER: Winston Chase PA-C     ATTENDING PHYSICIAN: Saul Johnson MD  DISCHARGING PROVIDER: Saul Johnson MD        CONSULTATIONS: IP CONSULT TO HOSPITALIST    PROCEDURES/SURGERIES: * No surgery found *    ADMITTING [address] [place] [address] COURSE:   Braydon Troncoso is a 55 y.o. female followed by Winston Chase PA-C and hematologist / oncologist Dr. Sydney Zapata and [facility]  has a past medical history of AICD (automatic cardioverter/defibrillator) present (12/2020), Anemia, Cardiomyopathy ([address] (08/2020), Diabetes ([address] GERD (gastroesophageal reflux disease), Heart failure ([address] Hypertension, Multiple myeloma ([address] Sleep apnea, and Thrombocytopenia ([address] presents with worsening fatigue, decreased PO intake, cough and sob since Wednesday. She followed up with Dr. Connie Bazzi after her initial Chemotherapy dose given and felt she shouldn't have been feeling this bad and there was concern of possible COVID. She then got tested by primary and found to be positive. She had a few family members that were positive but has not been close to them secondary to her new diagnosis of multiple myeloma. Patient on evaluation on 1/21 says she feels bad all over with coughing and sob. On evaluation in the ED was found to be hypoxic and placed on 3L NC and repeat covid testing was positive. CXR showed patchy alveolar opacities throughout the lungs. Labs showed anemia but according to family was in similar range when checked by oncologist. Also noted thrombocytopenia of 23,000 but not noted evidence of bleeding.   abg was done on 1/21 which shows Po2 of 139 on 3L NC. Dr Melly Knight was kind enough to call and was able to discuss her multiple myeloma which he notes was a fairly aggressive but appears to be responding to initial chemotherapy. No need to adjust treatments for her covid but did recommend transfusing if hg < 7 and platelets if level <13,707 or bleeding is noted. Because of patient presentation was referred for admission for Acute hypoxic respiratory failure, Covid 19 PNA, symptomatic anemia, thrombocytopenia. DISCHARGE DIAGNOSES / PLAN:      Hypoxic respiratory failure  Secondary to Covid-19 Pneumonia. Essentially resolved and weaned down to room air satting 96 to 97% even with ambulation with respiratory therapy. ABG on 1/21 had Po2 of 139 so continue to taper o2 as tolerated to maintain saturation >92%. Cont' Incentive spirometry q4hr.     COVID-19 PNA  COVID PCR with bilateral opacities on cxr   Cont' droplet precautions  Vitamin c, d and zinc, cetrezine   S/p Dexamethasone 6mg IV bid (patient has been recently on 40mg of dexamethasone every Monday prior to chemotherapy)   S/p Barcitinab 4mg oral daily x 6 Days  Follow inflammatory markers with initial CRP 15.5, Ferritin 3503  Procalcitonin 1.84, empirically on IV abx of zosyn and azithromycin  Prone position while sleeping and encourage incentive spirometry. Change IV Zosyn to p.o. Levaquin empirically in preparation for discharge.     YANETH  Essentially resolved with IV fluids with     Symptomatic anemia  Previously required multiple transfusions in the past and work up for anemia patient found to have multiple myeloma. Hemoglobin currently 9.0. Transfuse if < 7 so repeat on 1/21 is 6.9 and transfusion 1 units prbc  Monitor with daily cbc      Thrombocytopenia  Noted to be 23,000 on admission and repeat is about similar range discussed with hematologist and recommended transfusion of platelets if less than 10,000 or if bleeding episode noted  Hold all anticoagulation.   Platelets now back up to 93 K.     Hx of cardiomyopathy s/p AICD  Most recent echo shows resolution with now preserved EF   but no evidence of fluid overload  At home on prn lasix and states has note needed it   Continue coreg but started at lower dose of 12.5mg bid and losartan 25mg daily   Monitor closely on tele     Multiple Myeloma  Followed by Dr. Silver Lopez MD and was able to discuss her care. Cont' on allopurinol, acyclovir, and weekly dexamethasone. Currently receiving chemotherapy and plan to transition to oral Revlimid 15mg. Continue to monitor cbc closely.      Diabetes:  Sliding scale with accuchecks ACHS  Holding metformin and Farxiga, secondary to poor po intake. Restart Metformin and Farxiga.     GERD  Restarted Protonix with complains of abdominal discomfort. Patient is stable enough to be discharged to home.       PENDING TEST RESULTS:   At the time of discharge the following test results are still pending: None    FOLLOW UP APPOINTMENTS:    Follow-up Information     Follow up With Specialties Details Why Contact Info    Abdirahman Gomez PA-C Physician Assistant On 2/2/2022 @ 9:00 [address] [place] [address] [phone]             DIET:  Diabetic Diet    ACTIVITY: Activity as tolerated      DISCHARGE MEDICATIONS:  Current Discharge Medication List      START taking these medications    Details   levoFLOXacin (Levaquin) 750 mg tablet Take 1 Tablet by mouth daily for 7 days. Indications: pneumonia caused by bacteria  Qty: 7 Tablet, Refills: 0  Start date: 1/26/2022, End date: 2/2/2022         CONTINUE these medications which have NOT CHANGED    Details   cholecalciferol, vitamin d3, (Vitamin D3) 10 mcg (400 unit) cap Take  by mouth daily. pantoprazole (PROTONIX) 40 mg tablet Take 1 Tablet by mouth daily. Qty: 30 Tablet, Refills: 0      carvediloL (Coreg) 25 mg tablet Take 25 mg by mouth two (2) times a day. magnesium oxide (MAG-OX) 400 mg tablet Take 400 mg by mouth four (4) times daily. atorvastatin (LIPITOR) 20 mg tablet Take 20 mg by mouth daily. dapagliflozin (Farxiga) 10 mg tab tablet Take 10 mg by mouth daily. losartan (COZAAR) 25 mg tablet Take 25 mg by mouth daily. acyclovir (ZOVIRAX) 400 mg tablet Take 400 mg by mouth daily. allopurinoL (ZYLOPRIM) 100 mg tablet Take 100 mg by mouth daily. dexAMETHasone (DECADRON) 4 mg tablet Take 40 mg by mouth every seven (7) days. On Monday      Revlimid 15 mg cap       furosemide (Lasix) 20 mg tablet Take 1 Tablet by mouth daily as needed for Other (Use if greater than 3 pound weight gain in 24 hours or greater 5 pound weight gain in 5 days). Qty: 30 Tablet, Refills: 0      metFORMIN (GLUCOPHAGE) 500 mg tablet Take  by mouth three (3) times daily (with meals). Recent Days:  Recent Labs     01/25/22  0601 01/24/22  1020   WBC 3.3* 4.7   HGB 9.0* 9.1*   HCT 26.7* 27.4*   PLT 93* 70*     Recent Labs     01/25/22  0601 01/24/22  1020   * 136   K 5.1 3.6    105   CO2 26 22   * 117*   BUN 17 19   CREA 0.70 1.05*   CA 8.3* 8.4*   ALB 2.1* 2.0*   TBILI 0.7 0.6   ALT 47 36     No results for input(s): PH, PCO2, PO2, HCO3, FIO2 in the last 72 hours. Recent Results (from the past 336 hour(s))   CBC WITH AUTOMATED DIFF    Collection Time: 01/20/22  3:10 PM   Result Value Ref Range    WBC 6.6 4.4 - 11.3 K/uL    RBC 2.15 (L) 4.50 - 5.90 M/uL    HGB 7.3 (L) 13.5 - 17.5 g/dL    HCT 21.3 (L) 36 - 46 %    MCV 99.0 80 - 100 FL    MCH 34.1 (H) 31 - 34 PG    MCHC 34.4 31.0 - 36.0 g/dL    RDW 22.1 (H) 11.5 - 14.5 %    PLATELET 23 (LL) 213 - 400 K/uL    MPV 9.7 6.5 - 11.5 FL    NRBC 0.1  WBC    ABSOLUTE NRBC 0.01 K/uL    NEUTROPHILS 93 (H) 42 - 75 %    LYMPHOCYTES 4 (L) 20.5 - 51.1 %    MONOCYTES 3 1.7 - 9.3 %    EOSINOPHILS 0 (L) 0.9 - 2.9 %    BASOPHILS 0 0.0 - 2.5 %    ABS. NEUTROPHILS 6.2 1.8 - 7.7 K/UL    ABS. LYMPHOCYTES 0.3 (L) 1.0 - 4.8 K/UL    ABS. MONOCYTES 0.2 0.2 - 2.4 K/UL    ABS.  EOSINOPHILS 0.0 0.0 - 0.7 K/UL    ABS. BASOPHILS 0.0 0.0 - 0.2 K/UL   METABOLIC PANEL, COMPREHENSIVE    Collection Time: 01/20/22  3:10 PM   Result Value Ref Range    Sodium 128 (L) 136 - 145 mmol/L    Potassium 4.2 3.5 - 5.1 mmol/L    Chloride 96 (L) 97 - 108 mmol/L    CO2 22 21 - 32 mmol/L    Anion gap 10 5 - 15 mmol/L    Glucose 211 (H) 65 - 100 mg/dL    BUN 26 (H) 6 - 20 mg/dL    Creatinine 1.33 (H) 0.55 - 1.02 mg/dL    BUN/Creatinine ratio 20 12 - 20      GFR est AA 50 (L) >60 ml/min/1.73m2    GFR est non-AA 41 (L) >60 ml/min/1.73m2    Calcium 7.8 (L) 8.5 - 10.1 mg/dL    Bilirubin, total 1.0 0.2 - 1.0 mg/dL    AST (SGOT) 25 15 - 37 U/L    ALT (SGPT) 27 12 - 78 U/L    Alk.  phosphatase 50 45 - 117 U/L    Protein, total 8.7 (H) 6.4 - 8.2 g/dL    Albumin 2.2 (L) 3.5 - 5.0 g/dL    Globulin 6.5 (H) 2.0 - 4.0 g/dL    A-G Ratio 0.3 (L) 1.1 - 2.2     LIPASE    Collection Time: 01/20/22  3:10 PM   Result Value Ref Range    Lipase 125 73 - 393 U/L   MAGNESIUM    Collection Time: 01/20/22  3:10 PM   Result Value Ref Range    Magnesium 2.1 1.6 - 2.4 mg/dL   D DIMER    Collection Time: 01/20/22  3:10 PM   Result Value Ref Range    D-dimer 1.21 (H) 0.19 - 0.50 mg/L FEU   C REACTIVE PROTEIN, QT    Collection Time: 01/20/22  3:10 PM   Result Value Ref Range    C-Reactive protein 15.50 mg/dL   FERRITIN    Collection Time: 01/20/22  3:10 PM   Result Value Ref Range    Ferritin 3,503 (H) 8 - 252 ng/mL   PROCALCITONIN    Collection Time: 01/20/22  3:10 PM   Result Value Ref Range    Procalcitonin 1.84 ng/mL   TYPE & SCREEN    Collection Time: 01/20/22  4:45 PM   Result Value Ref Range    Crossmatch Expiration 01/23/2022,2577     ABO/Rh(D) A Positive     Antibody screen Negative     Unit number [id number]     Blood component type RC LR     Unit division 00     Status of unit Issued,final     TRANSFUSION STATUS Ok to transfuse     Crossmatch result Compatible    METABOLIC PANEL, COMPREHENSIVE    Collection Time: 01/21/22  7:30 AM   Result Value Ref Range    Sodium 132 (L) 136 - 145 mmol/L    Potassium 4.2 3.5 - 5.1 mmol/L    Chloride 102 97 - 108 mmol/L    CO2 23 21 - 32 mmol/L    Anion gap 7 5 - 15 mmol/L    Glucose 120 (H) 65 - 100 mg/dL    BUN 21 (H) 6 - 20 mg/dL    Creatinine 0.84 0.55 - 1.02 mg/dL    BUN/Creatinine ratio 25 (H) 12 - 20      GFR est AA >60 >60 ml/min/1.73m2    GFR est non-AA >60 >60 ml/min/1.73m2    Calcium 7.9 (L) 8.5 - 10.1 mg/dL    Bilirubin, total 0.7 0.2 - 1.0 mg/dL    AST (SGOT) 38 (H) 15 - 37 U/L    ALT (SGPT) 25 12 - 78 U/L    Alk. phosphatase 39 (L) 45 - 117 U/L    Protein, total 7.9 6.4 - 8.2 g/dL    Albumin 1.8 (L) 3.5 - 5.0 g/dL    Globulin 6.1 (H) 2.0 - 4.0 g/dL    A-G Ratio 0.3 (L) 1.1 - 2.2     MAGNESIUM    Collection Time: 01/21/22  7:30 AM   Result Value Ref Range    Magnesium 2.2 1.6 - 2.4 mg/dL   CBC WITH AUTOMATED DIFF    Collection Time: 01/21/22  7:30 AM   Result Value Ref Range    WBC 5.8 4.4 - 11.3 K/uL    RBC 2.01 (L) 4.50 - 5.90 M/uL    HGB 6.9 (L) 13.5 - 17.5 g/dL    HCT 20.2 (L) 36 - 46 %    .3 (H) 80 - 100 FL    MCH 34.0 31 - 34 PG    MCHC 33.9 31.0 - 36.0 g/dL    RDW 23.1 (H) 11.5 - 14.5 %    PLATELET 23 (LL) 167 - 400 K/uL    MPV 10.9 6.5 - 11.5 FL    NRBC 0.3  WBC    ABSOLUTE NRBC 0.02 K/uL    NEUTROPHILS 89 (H) 42 - 75 %    LYMPHOCYTES 8 (L) 20.5 - 51.1 %    MONOCYTES 2 1.7 - 9.3 %    EOSINOPHILS 0 (L) 0.9 - 2.9 %    BASOPHILS 1 0.0 - 2.5 %    ABS. NEUTROPHILS 5.1 1.8 - 7.7 K/UL    ABS. LYMPHOCYTES 0.5 (L) 1.0 - 4.8 K/UL    ABS. MONOCYTES 0.1 (L) 0.2 - 2.4 K/UL    ABS. EOSINOPHILS 0.0 0.0 - 0.7 K/UL    ABS.  BASOPHILS 0.0 0.0 - 0.2 K/UL   NT-PRO BNP    Collection Time: 01/21/22  7:30 AM   Result Value Ref Range    NT pro- (H) <125 pg/mL   BLOOD GAS, ARTERIAL    Collection Time: 01/21/22  8:30 AM   Result Value Ref Range    pH 7.44 7.35 - 7.45      PCO2 35 35 - 45 mmHg    PO2 139 (H) 70 - 100 mmHg    BICARBONATE 23 22 - 26 mmol/L    BASE DEFICIT 0.9 0 - 2 mmol/L    O2 METHOD Nasal Cannula      O2 FLOW RATE 3 L/min    FIO2 32.0 %    Sample source Arterial      SITE Left Radial      MIGUEL'S TEST PASS      Carboxy-Hgb 0.9 0 - 5 %    Methemoglobin 0.0 0 - 5 %    tHb 7.6 (LL) 13.5 - 17.5 g/dL    Oxyhemoglobin 98.5 95 - 100 %   COVID-19 WITH INFLUENZA A/B    Collection Time: 01/21/22  2:30 PM   Result Value Ref Range    SARS-CoV-2 DETECTED (A) Not Detected      Influenza A by PCR Not Detected Not Detected      Influenza B by PCR Not Detected Not Detected     C REACTIVE PROTEIN, QT    Collection Time: 01/22/22  4:52 AM   Result Value Ref Range    C-Reactive protein 9.74 mg/dL   D DIMER    Collection Time: 01/22/22  4:52 AM   Result Value Ref Range    D-dimer >35.20 (H) 0.19 - 0.50 mg/L FEU   PROCALCITONIN    Collection Time: 01/22/22  4:52 AM   Result Value Ref Range    Procalcitonin 1.27 ng/mL   CBC W/O DIFF    Collection Time: 01/22/22  4:52 AM   Result Value Ref Range    WBC 5.0 4.4 - 11.3 K/uL    RBC 2.93 (L) 4.50 - 5.90 M/uL    HGB 9.6 (L) 13.5 - 17.5 g/dL    HCT 28.5 (L) 36 - 46 %    MCV 97.3 80 - 100 FL    MCH 32.9 31 - 34 PG    MCHC 33.8 31.0 - 36.0 g/dL    RDW 22.9 (H) 11.5 - 14.5 %    PLATELET 38 (LL) 529 - 400 K/uL    MPV 11.1 6.5 - 11.5 FL    NRBC 0.2  WBC    ABSOLUTE NRBC 1.10 K/uL   METABOLIC PANEL, BASIC    Collection Time: 01/22/22  4:52 AM   Result Value Ref Range    Sodium 132 (L) 136 - 145 mmol/L    Potassium 4.9 3.5 - 5.1 mmol/L    Chloride 101 97 - 108 mmol/L    CO2 22 21 - 32 mmol/L    Anion gap 9 5 - 15 mmol/L    Glucose 135 (H) 65 - 100 mg/dL    BUN 22 (H) 6 - 20 mg/dL    Creatinine 0.97 0.55 - 1.02 mg/dL    BUN/Creatinine ratio 23 (H) 12 - 20      GFR est AA >60 >60 ml/min/1.73m2    GFR est non-AA 60 (L) >60 ml/min/1.73m2    Calcium 8.3 (L) 8.5 - 10.1 mg/dL   HEMOGLOBIN A1C WITH EAG    Collection Time: 01/22/22  6:30 AM   Result Value Ref Range    Hemoglobin A1c 6.8 (H) 4.0 - 5.6 %    Est. average glucose 148 mg/dL   GLUCOSE, POC    Collection Time: 01/22/22 8:09 AM   Result Value Ref Range    Glucose (POC) 140 (H) 65 - 117 mg/dL    Performed by HELLEN LR    GLUCOSE, POC    Collection Time: 01/22/22 12:18 PM   Result Value Ref Range    Glucose (POC) 137 (H) 65 - 117 mg/dL    Performed by HELLEN LR    GLUCOSE, POC    Collection Time: 01/22/22  4:47 PM   Result Value Ref Range    Glucose (POC) 154 (H) 65 - 117 mg/dL    Performed by HELLEN LR    GLUCOSE, POC    Collection Time: 01/22/22  9:13 PM   Result Value Ref Range    Glucose (POC) 133 (H) 65 - 117 mg/dL    Performed by Lauri Wright (RN)    C REACTIVE PROTEIN, QT    Collection Time: 01/23/22  5:47 AM   Result Value Ref Range    C-Reactive protein 3.71 mg/dL   CBC WITH AUTOMATED DIFF    Collection Time: 01/23/22  5:47 AM   Result Value Ref Range    WBC 3.2 (L) 4.4 - 11.3 K/uL    RBC 2.74 (L) 4.50 - 5.90 M/uL    HGB 8.9 (L) 13.5 - 17.5 g/dL    HCT 26.6 (L) 36 - 46 %    MCV 96.8 80 - 100 FL    MCH 32.5 31 - 34 PG    MCHC 33.6 31.0 - 36.0 g/dL    RDW 22.6 (H) 11.5 - 14.5 %    PLATELET 51 (L) 966 - 400 K/uL    MPV 10.5 6.5 - 11.5 FL    NRBC 0.5  WBC    ABSOLUTE NRBC 0.02 K/uL    NEUTROPHILS 74 42 - 75 %    LYMPHOCYTES 21 20.5 - 51.1 %    MONOCYTES 5 1.7 - 9.3 %    EOSINOPHILS 0 (L) 0.9 - 2.9 %    BASOPHILS 0 0.0 - 2.5 %    ABS. NEUTROPHILS 2.3 1.8 - 7.7 K/UL    ABS. LYMPHOCYTES 0.7 (L) 1.0 - 4.8 K/UL    ABS. MONOCYTES 0.2 0.2 - 2.4 K/UL    ABS. EOSINOPHILS 0.0 0.0 - 0.7 K/UL    ABS.  BASOPHILS 0.0 0.0 - 0.2 K/UL   METABOLIC PANEL, COMPREHENSIVE    Collection Time: 01/23/22  5:47 AM   Result Value Ref Range    Sodium 135 (L) 136 - 145 mmol/L    Potassium 4.2 3.5 - 5.1 mmol/L    Chloride 104 97 - 108 mmol/L    CO2 24 21 - 32 mmol/L    Anion gap 7 5 - 15 mmol/L    Glucose 106 (H) 65 - 100 mg/dL    BUN 25 (H) 6 - 20 mg/dL    Creatinine 1.02 0.55 - 1.02 mg/dL    BUN/Creatinine ratio 25 (H) 12 - 20      GFR est AA >60 >60 ml/min/1.73m2    GFR est non-AA 56 (L) >60 ml/min/1.73m2    Calcium 8.4 (L) 8.5 - 10.1 mg/dL    Bilirubin, total 0.8 0.2 - 1.0 mg/dL    AST (SGOT) 33 15 - 37 U/L    ALT (SGPT) 35 12 - 78 U/L    Alk. phosphatase 42 (L) 45 - 117 U/L    Protein, total 8.1 6.4 - 8.2 g/dL    Albumin 2.0 (L) 3.5 - 5.0 g/dL    Globulin 6.1 (H) 2.0 - 4.0 g/dL    A-G Ratio 0.3 (L) 1.1 - 2.2     MAGNESIUM    Collection Time: 01/23/22  5:47 AM   Result Value Ref Range    Magnesium 2.0 1.6 - 2.4 mg/dL   GLUCOSE, POC    Collection Time: 01/23/22  8:06 AM   Result Value Ref Range    Glucose (POC) 102 65 - 117 mg/dL    Performed by Jd Donaldson    D DIMER    Collection Time: 01/23/22 10:15 AM   Result Value Ref Range    D-dimer >35.20 (H) 0.19 - 0.50 mg/L FEU   GLUCOSE, POC    Collection Time: 01/23/22 11:49 AM   Result Value Ref Range    Glucose (POC) 125 (H) 65 - 117 mg/dL    Performed by Jd Donaldson    GLUCOSE, POC    Collection Time: 01/23/22  4:20 PM   Result Value Ref Range    Glucose (POC) 237 (H) 65 - 117 mg/dL    Performed by Jd Donaldson    GLUCOSE, POC    Collection Time: 01/23/22  8:56 PM   Result Value Ref Range    Glucose (POC) 246 (H) 65 - 117 mg/dL    Performed by Abisai Martin (RN)    C REACTIVE PROTEIN, QT    Collection Time: 01/24/22 10:20 AM   Result Value Ref Range    C-Reactive protein 2.09 mg/dL   CBC WITH AUTOMATED DIFF    Collection Time: 01/24/22 10:20 AM   Result Value Ref Range    WBC 4.7 4.4 - 11.3 K/uL    RBC 2.83 (L) 4.50 - 5.90 M/uL    HGB 9.1 (L) 13.5 - 17.5 g/dL    HCT 27.4 (L) 36 - 46 %    MCV 96.8 80 - 100 FL    MCH 32.2 31 - 34 PG    MCHC 33.3 31.0 - 36.0 g/dL    RDW 22.0 (H) 11.5 - 14.5 %    PLATELET 70 (L) 532 - 400 K/uL    MPV 10.4 6.5 - 11.5 FL    NRBC 0.5  WBC    ABSOLUTE NRBC 0.02 K/uL    NEUTROPHILS 69 42 - 75 %    LYMPHOCYTES 23 20.5 - 51.1 %    MONOCYTES 8 1.7 - 9.3 %    EOSINOPHILS 0 (L) 0.9 - 2.9 %    BASOPHILS 0 0.0 - 2.5 %    ABS. NEUTROPHILS 3.2 1.8 - 7.7 K/UL    ABS. LYMPHOCYTES 1.1 1.0 - 4.8 K/UL    ABS. MONOCYTES 0.4 0.2 - 2.4 K/UL    ABS.  EOSINOPHILS 0.0 0.0 - 0.7 K/UL    ABS. BASOPHILS 0.0 0.0 - 0.2 K/UL   METABOLIC PANEL, COMPREHENSIVE    Collection Time: 01/24/22 10:20 AM   Result Value Ref Range    Sodium 136 136 - 145 mmol/L    Potassium 3.6 3.5 - 5.1 mmol/L    Chloride 105 97 - 108 mmol/L    CO2 22 21 - 32 mmol/L    Anion gap 9 5 - 15 mmol/L    Glucose 117 (H) 65 - 100 mg/dL    BUN 19 6 - 20 mg/dL    Creatinine 1.05 (H) 0.55 - 1.02 mg/dL    BUN/Creatinine ratio 18 12 - 20      GFR est AA >60 >60 ml/min/1.73m2    GFR est non-AA 54 (L) >60 ml/min/1.73m2    Calcium 8.4 (L) 8.5 - 10.1 mg/dL    Bilirubin, total 0.6 0.2 - 1.0 mg/dL    AST (SGOT) 22 15 - 37 U/L    ALT (SGPT) 36 12 - 78 U/L    Alk. phosphatase 41 (L) 45 - 117 U/L    Protein, total 7.9 6.4 - 8.2 g/dL    Albumin 2.0 (L) 3.5 - 5.0 g/dL    Globulin 5.9 (H) 2.0 - 4.0 g/dL    A-G Ratio 0.3 (L) 1.1 - 2.2     GLUCOSE, POC    Collection Time: 01/24/22 12:11 PM   Result Value Ref Range    Glucose (POC) 125 (H) 65 - 117 mg/dL    Performed by Karis Low    GLUCOSE, POC    Collection Time: 01/24/22  5:51 PM   Result Value Ref Range    Glucose (POC) 262 (H) 65 - 117 mg/dL    Performed by Karis Low    GLUCOSE, POC    Collection Time: 01/24/22 10:11 PM   Result Value Ref Range    Glucose (POC) 161 (H) 65 - 117 mg/dL    Performed by Kerline Sal    CBC WITH AUTOMATED DIFF    Collection Time: 01/25/22  6:01 AM   Result Value Ref Range    WBC 3.3 (L) 4.4 - 11.3 K/uL    RBC 2.74 (L) 4.50 - 5.90 M/uL    HGB 9.0 (L) 13.5 - 17.5 g/dL    HCT 26.7 (L) 36 - 46 %    MCV 97.3 80 - 100 FL    MCH 32.9 31 - 34 PG    MCHC 33.8 31.0 - 36.0 g/dL    RDW 22.2 (H) 11.5 - 14.5 %    PLATELET 93 (L) 141 - 400 K/uL    MPV 10.6 6.5 - 11.5 FL    NRBC 0.6  WBC    ABSOLUTE NRBC 0.02 K/uL    NEUTROPHILS 74 42 - 75 %    LYMPHOCYTES 20 (L) 20.5 - 51.1 %    MONOCYTES 5 1.7 - 9.3 %    EOSINOPHILS 0 (L) 0.9 - 2.9 %    BASOPHILS 1 0.0 - 2.5 %    ABS. NEUTROPHILS 2.5 1.8 - 7.7 K/UL    ABS. LYMPHOCYTES 0.7 (L) 1.0 - 4.8 K/UL    ABS.  MONOCYTES 0.2 0.2 - 2.4 K/UL    ABS. EOSINOPHILS 0.0 0.0 - 0.7 K/UL    ABS. BASOPHILS 0.0 0.0 - 0.2 K/UL   METABOLIC PANEL, COMPREHENSIVE    Collection Time: 01/25/22  6:01 AM   Result Value Ref Range    Sodium 133 (L) 136 - 145 mmol/L    Potassium 5.1 3.5 - 5.1 mmol/L    Chloride 102 97 - 108 mmol/L    CO2 26 21 - 32 mmol/L    Anion gap 5 5 - 15 mmol/L    Glucose 155 (H) 65 - 100 mg/dL    BUN 17 6 - 20 mg/dL    Creatinine 0.70 0.55 - 1.02 mg/dL    BUN/Creatinine ratio 24 (H) 12 - 20      GFR est AA >60 >60 ml/min/1.73m2    GFR est non-AA >60 >60 ml/min/1.73m2    Calcium 8.3 (L) 8.5 - 10.1 mg/dL    Bilirubin, total 0.7 0.2 - 1.0 mg/dL    AST (SGOT) 36 15 - 37 U/L    ALT (SGPT) 47 12 - 78 U/L    Alk. phosphatase 40 (L) 45 - 117 U/L    Protein, total 8.4 (H) 6.4 - 8.2 g/dL    Albumin 2.1 (L) 3.5 - 5.0 g/dL    Globulin 6.3 (H) 2.0 - 4.0 g/dL    A-G Ratio 0.3 (L) 1.1 - 2.2     GLUCOSE, POC    Collection Time: 01/25/22  7:56 AM   Result Value Ref Range    Glucose (POC) 132 (H) 65 - 117 mg/dL    Performed by Sophronia Epley    GLUCOSE, POC    Collection Time: 01/25/22 11:45 AM   Result Value Ref Range    Glucose (POC) 193 (H) 65 - 117 mg/dL    Performed by Sophronia Epley    GLUCOSE, POC    Collection Time: 01/25/22  5:15 PM   Result Value Ref Range    Glucose (POC) 168 (H) 65 - 117 mg/dL    Performed by Sophronia Epley    GLUCOSE, POC    Collection Time: 01/25/22  9:57 PM   Result Value Ref Range    Glucose (POC) 155 (H) 65 - 117 mg/dL    Performed by [address] [place] [address], POC    Collection Time: 01/26/22  7:22 AM   Result Value Ref Range    Glucose (POC) 156 (H) 65 - 117 mg/dL    Performed by Vincent Dietz    GLUCOSE, POC    Collection Time: 01/26/22 11:43 AM   Result Value Ref Range    Glucose (POC) 189 (H) 65 - 117 mg/dL    Performed by Vincent Dietz         NOTIFY YOUR PHYSICIAN FOR ANY OF THE FOLLOWING:   Fever over 101 degrees for 24 hours.    Chest pain, shortness of breath, fever, chills, nausea, vomiting, diarrhea, change in mentation, falling, weakness, bleeding. Severe pain or pain not relieved by medications. Or, any other signs or symptoms that you may have questions about. DISPOSITION:   x Home With:   OT  PT  HH  RN       Long term SNF/Inpatient Rehab    Independent/assisted living    Hospice    Other:       PATIENT CONDITION AT DISCHARGE:     Functional status    Poor     Deconditioned    x Independent      Cognition    x Lucid     Forgetful     Dementia      Catheters/lines (plus indication)    [place]     PICC     PEG    x None      Code status    x Full code     DNR      PHYSICAL EXAMINATION AT DISCHARGE:  General:          Alert, cooperative, no distress, appears stated age. Neck:               Supple, symmetrical  Lungs:             Clear to auscultation bilaterally. No Wheezing or Rhonchi. No rales. Chest wall:      No tenderness  No Accessory muscle use. Heart:              Regular  rhythm,  No  murmur   No edema  Abdomen:        Soft, non-tender. Not distended. Bowel sounds normal  Extremities:     No cyanosis. No clubbing,                            Skin turgor normal, Capillary refill normal  Skin:                Not pale. Not Jaundiced  No rashes   Psych:             Not anxious or agitated.   Neurologic:      Alert, moves all extremities, answers questions appropriately and responds to commands       CHRONIC MEDICAL DIAGNOSES:  Problem List as of 1/26/2022 Date Reviewed: 11/10/2021          Codes Class Noted - Resolved    Pancytopenia ([facility] [address] ICD-10-CM: Z10.543  ICD-9-CM: 284.19  1/20/2022 - Present        * (Principal) Pneumonia due to COVID-19 virus ICD-10-CM: U07.1, J12.82  ICD-9-CM: 480.8, 079.89  1/20/2022 - Present        Anemia ICD-10-CM: D64.9  ICD-9-CM: 285.9  12/23/2021 - Present        Heme positive stool ICD-10-CM: R19.5  ICD-9-CM: 792.1  10/7/2021 - Present        Chronic systolic HF (heart failure) (HCC) ICD-10-CM: I50.22  ICD-9-CM: 428.22  10/7/2021 - Present        YANETH (acute kidney injury) [facility]) ICD-10-CM: N17.9  ICD-9-CM: 584.9  10/7/2021 - Present        Symptomatic anemia ICD-10-CM: D64.9  ICD-9-CM: 285.9  10/7/2021 - Present              Greater than 35 minutes were spent with the patient on counseling and coordination of care    Signed:   Artis Sage MD  1/26/2022  12:12 PM

## 2022-01-27 NOTE — PROGRESS NOTES
Transitions of Care Call  Call within 2 business days of discharge: Yes     Patient: Michelle Galaviz Patient : 1965 MRN: [id number]    Last Discharge [address]       Complaint Diagnosis Description Type Department Provider    23 Fatigue Pneumonia due to COVID-19 virus . .. ED to Hosp-Admission (Discharged) (ADMIT) [id number] Shane Smith MD; Thania Garcia... Was this an external facility discharge? No Discharge Facility: [facility]    Challenges to be reviewed by the provider   Additional needs identified to be addressed with provider no         Encounter was not routed to provider for escalation. Method of communication with provider: none. Discussed COVID-19 related testing which was available at this time. Test results were positive. Patient informed of results, if available? yes. Current Symptoms:fatigue, no new symptoms and no worsening symptoms    Reviewed New or Changed Meds: yes    Do you have what you need at home?  Durable Medical Equipment ordered at discharge: None   Home Health/Outpatient orders at discharge: none    Pulse oximeter? no     Patient education provided: Reviewed appropriate site of care based on symptoms and resources available to patient including: PCP and When to call 911. Follow up appointment scheduled within 7 days of discharge: yes. If no appointment scheduled, scheduling offered: yes. No future appointments. Interventions: Obtained and reviewed discharge summary and/or continuity of care documents  CTN reviewed discharge instructions, medical action plan and red flags with patient who verbalized understanding. Provided contact information for future needs. Plan for follow-up call in 5-7 days based on severity of symptoms and risk factors.   Plan for next call: follow up symptoms     Checo Shelton RN (3) no apparent problem

## 2022-02-03 NOTE — PROGRESS NOTES
Follow Up Call    Challenges to be reviewed by the provider   Additional needs identified to be addressed with provider: no  none           Encounter was not routed to provider for escalation. Method of communication with provider: none. Contacted the patient by telephone to follow up after hospital visit. Status: improved  Interventions to address identified needs: Obtained and reviewed discharge summary and/or continuity of care documents    [facility] follow up appointment(s): No future appointments. Non-[facility] follow up appointment(s): none   Follow up appointment completed? yes. Provided contact information for future needs. Plan for follow-up call in 7-10 days based on severity of symptoms and risk factors.   Plan for next call: symptoms, covid vaccination status     Niya Saldaña RN

## 2022-02-22 NOTE — PROGRESS NOTES
Patient contacted regarding COVID-19 diagnosis. Discussed COVID-19 related testing which was available at this time. Test results were positive. Patient informed of results, if available? yes      Care Transition Nurse contacted the patient by telephone to perform follow-up assessment. Verified name and  with patient as identifiers. Patient has following risk factors of: heart failure. Symptoms reviewed with patient who verbalized the following symptoms: no new symptoms and no worsening symptoms. Due to no new or worsening symptoms encounter was not routed to provider for escalation. Interventions to address risk factors: Obtained and reviewed discharge summary and/or continuity of care documents    Educated patient about risk for severe COVID-19 due to risk factors according to CDC guidelines. CTN reviewed discharge instructions, medical action plan and red flag symptoms with the patient who verbalized understanding. Discussed COVID vaccination status: yes. Education provided on COVID-19 vaccination as appropriate. Discussed exposure protocols and quarantine with CDC Guidelines. Patient was given an opportunity to verbalize any questions and concerns and agrees to contact CTN or health care provider for questions related to their healthcare. Reviewed and educated patient on any new and changed medications related to discharge diagnosis     Was patient discharged with a pulse oximeter? no     CTN provided contact information. Plan for follow-up call in 5-7 days based on severity of symptoms and risk factors.

## 2022-05-25 NOTE — Clinical Note
Patient Class[de-identified] OBSERVATION [104]   Type of Bed: Remote Telemetry [29]   Cardiac Monitoring Required?: Yes   Reason for Observation: Hypotension   Admitting Diagnosis: Dehydration [276.51. ICD-9-CM]   Admitting Physician: Fran Melendrez [[id number]]   Attending Physician: Fran Melendrez [[id number]]

## 2022-05-26 PROBLEM — U07.1 PNEUMONIA DUE TO COVID-19 VIRUS: Status: RESOLVED | Noted: 2022-01-01 | Resolved: 2022-01-01

## 2022-05-26 PROBLEM — J12.82 PNEUMONIA DUE TO COVID-19 VIRUS: Status: RESOLVED | Noted: 2022-01-01 | Resolved: 2022-01-01

## 2022-05-26 PROBLEM — I42.9 CARDIOMYOPATHY (HCC): Status: ACTIVE | Noted: 2022-01-01

## 2022-05-26 PROBLEM — R65.21 SEPTIC SHOCK (HCC): Status: ACTIVE | Noted: 2022-01-01

## 2022-05-26 PROBLEM — C90.00 MULTIPLE MYELOMA (HCC): Status: ACTIVE | Noted: 2022-01-01

## 2022-05-26 PROBLEM — N39.0 UTI (URINARY TRACT INFECTION): Status: ACTIVE | Noted: 2022-01-01

## 2022-05-26 PROBLEM — R19.5 HEME POSITIVE STOOL: Status: RESOLVED | Noted: 2021-01-01 | Resolved: 2022-01-01

## 2022-05-26 PROBLEM — D64.9 SYMPTOMATIC ANEMIA: Status: RESOLVED | Noted: 2021-01-01 | Resolved: 2022-01-01

## 2022-05-26 PROBLEM — A41.9 SEPSIS (HCC): Status: ACTIVE | Noted: 2022-01-01

## 2022-05-26 PROBLEM — E86.0 DEHYDRATION: Status: ACTIVE | Noted: 2022-01-01

## 2022-05-26 NOTE — ROUTINE PROCESS
Bedside verbal report given to Meseret Meza RN. Pt transferred to ED for initiation of levophed.      Argenis Galvin RN

## 2022-05-26 NOTE — PROGRESS NOTES
Patients case reviewed during interdisciplinary team meeting in [address] [place], [place] [address]Acute Care Unit. Rev.  Waleska Fatima [address], [address]

## 2022-05-26 NOTE — H&P
50yo F with a PMH significant for multiple myeloma, HTN, non insulin dependent DM2 who presented to the emergency department for generalized weakness and multiple falls and decreased PO intake. Her vital signs were significant for hypotension improved with IV fluids. A CBC was unremarkable and a BMP was significant for a Cr of 2 (baseline of 1). UA showed 3+ bacteria and 10-20 WBCs. She will be placed on the hospitalist service for YANETH and dehydration.   -hold home BP medications  -125cc/hr NS  -ceftriaxone  -PT consult  -recheck BMP in the am, expect to be improved with IVFs overnight  -hold home oral diabetes medications  -SSI with accuchecks and check hem A1c

## 2022-05-26 NOTE — ED PROVIDER NOTES
EMERGENCY DEPARTMENT HISTORY AND PHYSICAL EXAM  ?    Date: 5/25/2022  Patient Name: Erica Akers    History of Presenting Illness    Patient presents with:  Extremity Weakness      History Provided By: Patient    HPI: Erica Akers, 55 y.o. female with a past medical history significant for diabetes, AICD and multiple myeloma presents to the ED with cc of general weakness and falling. Her daughter reports 4 falls this week. Patient complains of right big toe pain related to the fall. She had pre-existing nonspecific abdominal pain. Her daughter reports that she has no appetite and is not eating or drinking fluids. There are no other complaints, changes, or physical findings at this time. PCP: Corina Ramirez PA-C    Current Facility-Administered Medications:  sodium chloride 0.9 % bolus infusion 1,000 mL, 1,000 mL, IntraVENous, ONCE, , Willie Downey MD    Current Outpatient Medications:  acyclovir (ZOVIRAX) 400 mg tablet, Take 400 mg by mouth daily. , Disp: , Rfl:   allopurinoL (ZYLOPRIM) 100 mg tablet, Take 100 mg by mouth daily. , Disp: , Rfl:   dexAMETHasone (DECADRON) 4 mg tablet, Take 40 mg by mouth every seven (7) days. On Monday, Disp: , Rfl:   Revlimid 15 mg cap, , Disp: , Rfl:   cholecalciferol, vitamin d3, (Vitamin D3) 10 mcg (400 unit) cap, Take  by mouth daily. , Disp: , Rfl:   pantoprazole (PROTONIX) 40 mg tablet, Take 1 Tablet by mouth daily. , Disp: 30 Tablet, Rfl: 0  furosemide (Lasix) 20 mg tablet, Take 1 Tablet by mouth daily as needed for Other (Use if greater than 3 pound weight gain in 24 hours or greater 5 pound weight gain in 5 days). , Disp: 30 Tablet, Rfl: 0  metFORMIN (GLUCOPHAGE) 500 mg tablet, Take  by mouth three (3) times daily (with meals). , Disp: , Rfl:   carvediloL (Coreg) 25 mg tablet, Take 25 mg by mouth two (2) times a day., Disp: , Rfl:   magnesium oxide (MAG-OX) 400 mg tablet, Take 400 mg by mouth four (4) times daily. , Disp: , Rfl:   atorvastatin (LIPITOR) 20 mg tablet, Take 20 mg by mouth daily. , Disp: , Rfl:   dapagliflozin (Farxiga) 10 mg tab tablet, Take 10 mg by mouth daily. , Disp: , Rfl:   losartan (COZAAR) 25 mg tablet, Take 25 mg by mouth daily. , Disp: , Rfl:         Past History    Past Medical History:  Past Medical History:  12/2020: AICD (automatic cardioverter/defibrillator) present  No date: Anemia  08/2020: Cardiomyopathy ([address]  No date: Diabetes ([address]  No date: GERD (gastroesophageal reflux disease)  No date: Heart failure ([address]  No date: Hypertension  No date: Multiple myeloma ([address]  No date: Sleep apnea     Comment:  cpap  No date:  Thrombocytopenia ([address]    Past Surgical History:  Past Surgical History:  11/17/2021: COLONOSCOPY; N/A     Comment:  COLONOSCOPY ( T I V A) performed by Nicanor Pack MD at [facility] ENDOSCOPY  10/08/2021: ENDOSCOPY VISIT-OUTPATIENT  No date: HX COLONOSCOPY  No date: HX HEART CATHETERIZATION     Comment:  x 2   No date: HX HERNIA REPAIR  No date: HX HYSTERECTOMY  No date: HX IMPLANTABLE CARDIOVERTER DEFIBRILLATOR  No date: HX OOPHORECTOMY  No date: HX PACEMAKER     Comment:  pacemaker, defib  No date: HX SVT ABLATION    Family History:  Review of patient's family history indicates:  Problem: Breast Cancer     Relation: Sister         Age of Onset: (Not Specified)  Problem: Diabetes     Relation: Mother         Age of Onset: (Not Specified)  Problem: Hypertension     Relation: Mother         Age of Onset: (Not Specified)  Problem: Elevated Lipids     Relation: Mother         Age of Onset: (Not Specified)  Problem: Heart Surgery     Relation: Father         Age of Onset: (Not Specified)      Social History:  Social History   Tobacco Use     Smoking status: Never Smoker     Smokeless tobacco: Never Used   Vaping Use     Vaping Use: Never used   Alcohol use: Never   Drug use: Never      Allergies:  No Known Allergies      Review of Systems  @[facility]@    Physical Exam  [unfilled]    Diagnostic Study Results    Labs -  Recent Results (from the past 12 hour(s))  -CBC WITH AUTOMATED DIFF:   Collection Time: 05/25/22  9:15 PM      Result                      Value             Ref Range          WBC                         5.3               4.4 - 11.3 K*      RBC                         2.87 (L)          4.50 - 5.90 *      HGB                         9.1 (L)           13.5 - 17.5 *      HCT                         28.3 (L)          36 - 46 %          MCV                         98.5              80 - 100 FL        MCH                         31.8              31 - 34 PG         MCHC                        32.3              31.0 - 36.0 *      RDW                         16.5 (H)          11.5 - 14.5 %      PLATELET                    110 (L)           150 - 400 K/*      MPV                         8.5               6.5 - 11.5 FL      NRBC                        0.1                WBC        ABSOLUTE NRBC               0.01              K/uL               NEUTROPHILS                 52                42 - 75 %          LYMPHOCYTES                 22                20.5 - 51.1 %      MONOCYTES                   26 (H)            1.7 - 9.3 %        EOSINOPHILS                 0 (L)             0.9 - 2.9 %        BASOPHILS                   0                 0.0 - 2.5 %        ABS. NEUTROPHILS            2.7               1.8 - 7.7 K/*      ABS. LYMPHOCYTES            1.1               1.0 - 4.8 K/*      ABS. MONOCYTES              1.4               0.2 - 2.4 K/*      ABS. EOSINOPHILS            0.0               0.0 - 0.7 K/*      ABS.  BASOPHILS              0.0               0.0 - 0.2 K/*  -METABOLIC PANEL, COMPREHENSIVE:   Collection Time: 05/25/22  9:15 PM      Result                      Value             Ref Range          Sodium                      130 (L)           136 - 145 mm*      Potassium                   3.7               3.5 - 5.1 mm*      Chloride                    97                97 - 108 mmo*      CO2                         26                21 - 32 mmol*      Anion gap                   7                 5 - 15 mmol/L      Glucose                     232 (H)           65 - 100 mg/*      BUN                         25 (H)            6 - 20 mg/dL       Creatinine                  2.13 (H)          0.55 - 1.02 *      BUN/Creatinine ratio        12                12 - 20            GFR est AA                  29 (L)            >60 ml/min/1*      GFR est non-AA              24 (L)            >60 ml/min/1*      Calcium                     9.3               8.5 - 10.1 m*      Bilirubin, total            0.4               0.2 - 1.0 mg*      AST (SGOT)                  19                15 - 37 U/L        ALT (SGPT)                  42                12 - 78 U/L        Alk.  phosphatase            63                45 - 117 U/L       Protein, total              9.5 (H)           6.4 - 8.2 g/*      Albumin                     2.1 (L)           3.5 - 5.0 g/*      Globulin                    7.4 (H)           2.0 - 4.0 g/*      A-G Ratio                   0.3 (L)           1.1 - 2.2      -GLUCOSE, POC:   Collection Time: 05/25/22  9:19 PM      Result                      Value             Ref Range          Glucose (POC)               228 (H)           65 - 117 mg/*      Performed by                                                 TAYLOR TAO  -EKG, 12 LEAD, INITIAL:   Collection Time: 05/25/22 10:23 PM      Result                      Value             Ref Range          Ventricular Rate            84                BPM                Atrial Rate                 82                BPM                P-R Interval                174               ms                 QRS Duration                131               ms                 Q-T Interval                387               ms                 QTC Calculation (Bezet)     458               ms                 Calculated P Axis           110 degrees            Calculated R Axis           112               degrees            Calculated T Axis           168               degrees            Diagnosis                                                    Atrial-sensed ventricular-paced complexes No further analysis attempted due to paced rhythm     Radiologic Studies -   No orders to display  CT Results  (Last 48 hours)   None     CXR Results  (Last 48 hours)   None       Medical Decision Making and ED Course  I am the first provider for this patient. I reviewed the vital signs, available nursing notes, past medical history, past surgical history, family history and social history. Vital Signs-Reviewed the patient's vital signs. Empty flowsheet group. EKG interpretation: (Preliminary)  Rhythm: paced; and regular . Rate (approx.): 84      Records Reviewed: Nursing Notes and Previous Laboratory Studies    Provider Notes (Medical Decision Making):   Patient presents with hypotension, weakness, falls and history of poor intake. Patient is likely dehydrated. Rule out sepsis, anemia. ED Course:   Blood pressure response to the IV fluids. There are no signs of infection. I do not suspect GI bleed. Hypotension is not due to sepsis. Initial assessment performed. The patients presenting problems have been discussed, and they are in agreement with the care plan formulated and outlined with them. I have encouraged them to ask questions as they arise throughout their visit.               CRITICAL CARE NOTE :  11:49 PM  Amount of Critical Care Time: ___60(minutes)__    IMPENDING DETERIORATION -Cardiovascular and Metabolic  ASSOCIATED RISK FACTORS - Metabolic changes and Vascular Compromise  MANAGEMENT- Bedside Assessment and Supervision of Care  INTERPRETATION -  Cardiac Output Measures   INTERVENTIONS - vascular control  CASE REVIEW - Hospitalist/Intensivist  TREATMENT RESPONSE -Stable  PERFORMED BY - Self    NOTES   :  I have spent critical care time involved in lab review, consultations with specialist, family decision- making, bedside attention and documentation. This time excludes time spent in any separate billed procedures. During this entire length of time I was immediately available to the patient . Isaiah Maria MD        Disposition    Observation admission      Diagnosis    Clinical Impression: Dehydration  Attestations:    Isaiah Maria MD    Please note that this dictation was completed with appsplit, the computer voice recognition software. Quite often unanticipated grammatical, syntax, homophones, and other interpretive errors are inadvertently transcribed by the computer software. Please disregard these errors. Please excuse any errors that have escaped final proofreading. Thank you.    ?            Past Medical History:   Diagnosis Date    AICD (automatic cardioverter/defibrillator) present 12/2020    Anemia     Cardiomy[facility] 08/2020    Diabetes ([facility]     GERD (gastroesophageal reflux disease)     Heart failure (HCC)     Hypertension     Multiple myeloma (HCC)     Sleep apnea     cpap    Thrombocytopenia (HCC)        Past Surgical History:   Procedure Laterality Date    COLONOSCOPY N/A 11/17/2021    COLONOSCOPY ( T I V A) performed by Alden Jackson MD at [facility] ENDOSCOPY    ENDOSCOPY VISIT-OUTPATIENT  10/08/2021    HX COLONOSCOPY      HX HEART CATHETERIZATION      x 2     HX HERNIA REPAIR      HX HYSTERECTOMY      HX IMPLANTABLE CARDIOVERTER DEFIBRILLATOR      HX OOPHORECTOMY      HX PACEMAKER      pacemaker, defib    HX SVT ABLATION           Family History:   Problem Relation Age of Onset    Breast Cancer Sister     Diabetes Mother     Hypertension Mother     Elevated Lipids Mother     Heart Surgery Father        Social History     Socioeconomic History    Marital status:      Spouse name: Not on file    Number of children: Not on file    Years of education: Not on file    Highest education level: Not on file   Occupational History    Not on file   Tobacco Use    Smoking status: Never Smoker    Smokeless tobacco: Never Used   Vaping Use    Vaping Use: Never used   Substance and Sexual Activity    Alcohol use: Never    Drug use: Never    Sexual activity: Not on file   Other Topics Concern     Service Not Asked    Blood Transfusions Not Asked    Caffeine Concern Not Asked    Occupational Exposure Not Asked    Hobby Hazards Not Asked    Sleep Concern Not Asked    Stress Concern Not Asked    Weight Concern Not Asked    Special Diet Not Asked    Back Care Not Asked    Exercise Not Asked    Bike Helmet Not Asked   [address] Not Asked    Self-Exams Not Asked   Social History Narrative    Not on file     Social Determinants of Health     Financial Resource Strain:     Difficulty of Paying Living Expenses: Not on file   Food Insecurity:     Worried About Running Out of Food in the Last Year: Not on file    Jairo of Food in the Last Year: Not on file   Transportation Needs:     Lack of Transportation (Medical): Not on file    Lack of Transportation (Non-Medical):  Not on file   Physical Activity:     Days of Exercise per Week: Not on file    Minutes of Exercise per Session: Not on file   Stress:     Feeling of Stress : Not on file   Social Connections:     Frequency of Communication with Friends and Family: Not on file    Frequency of Social Gatherings with Friends and Family: Not on file    Attends [employer] Services: Not on file    Active Member of Clubs or Organizations: Not on file    Attends Club or Organization Meetings: Not on file    Marital Status: Not on file   Intimate Partner Violence:     Fear of Current or Ex-Partner: Not on file    Emotionally Abused: Not on file    Physically Abused: Not on file    Sexually Abused: Not on file   Housing Stability:     Unable to Pay for Housing in the Last Year: Not on file    Number of [place] in the Last Year: Not on file    Unstable Housing in the Last Year: Not on file         ALLERGIES: Patient has no known allergies. Review of Systems   Constitutional: Negative. General weakness   HENT: Negative. Eyes: Negative. Respiratory: Negative. Cardiovascular: Negative. Gastrointestinal: Positive for abdominal pain. Negative for diarrhea and vomiting. Endocrine: Negative. Genitourinary: Negative. Musculoskeletal:        Right big toe injury   Skin: Negative. Neurological: Positive for weakness. Hematological: Negative. Psychiatric/Behavioral: Negative. Vitals:    05/25/22 2130 05/25/22 2138 05/25/22 2140 05/25/22 2153   BP: (!) 74/48 105/62 (!) 77/53 (!) 90/48   Pulse: 88      Resp:       Temp:       SpO2: 99%      Weight:       Height:                Physical Exam  Vitals and nursing note reviewed. Constitutional:       Appearance: Normal appearance. HENT:      Head: Normocephalic and atraumatic. Right Ear: External ear normal.      Left Ear: External ear normal.      Nose: Nose normal.      Mouth/Throat:      Mouth: Mucous membranes are moist.      Pharynx: Oropharynx is clear. Eyes:      Extraocular Movements: Extraocular movements intact. Conjunctiva/sclera: Conjunctivae normal.      Pupils: Pupils are equal, round, and reactive to light. Cardiovascular:      Rate and Rhythm: Normal rate and regular rhythm. Pulses: Normal pulses. Heart sounds: Normal heart sounds. Pulmonary:      Effort: Pulmonary effort is normal.      Breath sounds: Normal breath sounds. Abdominal:      General: Abdomen is flat. Bowel sounds are normal.      Palpations: Abdomen is soft. Genitourinary:     Rectum: Guaiac result negative. Comments: Brown stool  Musculoskeletal:         General: Normal range of motion. Cervical back: Normal range of motion and neck supple. Skin:     General: Skin is warm and dry.    Neurological:      General: No focal deficit present. Mental Status: She is alert and oriented to person, place, and time.           MDM       Procedures

## 2022-05-26 NOTE — PROGRESS NOTES
PT received orders today to see pt today. However, after speaking with the doctor we will not be seeing this pt today. Further orders will be required to see pt at later time.

## 2022-05-26 NOTE — H&P
History and Physical    Patient: Alex Melgar MRN: [id number]  SSN: [id number]    YOB: 1965  Age: 55 y.o. Sex: female      Subjective:      Alex Melgar is a 55 y.o. female with a past medical history significant for diabetes, diabetic neuropathy, AICD placement, multiple myeloma, Heart failure, HTN, thrombocytopenia and GERD. Patient presented to [facility] ED on 5/25/20 for generalized weakness ongoing  for 4 weeks with multiple falls and decreased intake. Patient states she has fallen 4 times in the last week walking around her house, states her legs become weak and then she falls. She denies hitting her head but does report LOC. She was transferred to [facility] on 5/26 due to sepsis with hypotension despite IV fluid resuscitation and findings of UTI. On 5/26 she also reports epigastric abdominal pain which she states feels like typical pain she has after receiving her chemo injection, which she last received on 5/24. She also reports bilateral hand and bilateral foot pain consistent with her diabetic neuropathy. Reports 30 lb weight loss since she began taking her chemo medication in January.       Past Medical History:   Diagnosis Date    AICD (automatic cardioverter/defibrillator) present 12/2020    Anemia     Cardiomyopathy ([facility] 08/2020    Diabetes ([facility]     GERD (gastroesophageal reflux disease)     Heart failure (HCC)     Hypertension     Multiple myeloma ([facility]     Sleep apnea     cpap    Thrombocytopenia (HCC)      Past Surgical History:   Procedure Laterality Date    COLONOSCOPY N/A 11/17/2021    COLONOSCOPY ( T I V A) performed by Manuela Espino MD at [facility]. [address] VISIT-OUTPATIENT  10/08/2021    HX COLONOSCOPY      HX HEART CATHETERIZATION      x 2     HX HERNIA REPAIR      HX HYSTERECTOMY      HX IMPLANTABLE CARDIOVERTER DEFIBRILLATOR      HX OOPHORECTOMY      HX PACEMAKER      pacemaker, defib    HX SVT ABLATION        Family History   Problem Relation Age of Onset    Breast Cancer Sister     Diabetes Mother     Hypertension Mother     Elevated Lipids Mother     Heart Surgery Father      Social History     Tobacco Use    Smoking status: Never Smoker    Smokeless tobacco: Never Used   Substance Use Topics    Alcohol use: Never      Prior to Admission medications    Medication Sig Start Date End Date Taking? Authorizing Provider   dronabinoL (MARINOL) 5 mg capsule Take 5 mg by mouth two (2) times a day. Provider, Historical   gabapentin (NEURONTIN) 100 mg capsule TAKE 2 CAPS IN AM AND 2 AT BEDTIME  Patient not taking: Reported on 5/26/2022 4/9/22   Provider, Historical   acyclovir (ZOVIRAX) 400 mg tablet Take 400 mg by mouth daily. 1/18/22   Provider, Historical   allopurinoL (ZYLOPRIM) 100 mg tablet Take 100 mg by mouth daily. Patient not taking: Reported on 5/26/2022 12/23/21   Provider, Historical   dexAMETHasone (DECADRON) 4 mg tablet Take 40 mg by mouth every seven (7) days. On Monday 1/18/22   Provider, Historical   Revlimid 15 mg cap  1/18/22   Provider, Historical   cholecalciferol, vitamin d3, (Vitamin D3) 10 mcg (400 unit) cap Take  by mouth daily. Provider, Historical   pantoprazole (PROTONIX) 40 mg tablet Take 1 Tablet by mouth daily. 10/24/21   Rory Armendariz MD   furosemide (Lasix) 20 mg tablet Take 1 Tablet by mouth daily as needed for Other (Use if greater than 3 pound weight gain in 24 hours or greater 5 pound weight gain in 5 days). Patient not taking: Reported on 5/26/2022 10/8/21   Rory Armendariz MD   metFORMIN (GLUCOPHAGE) 500 mg tablet Take  by mouth three (3) times daily (with meals). Provider, Historical   carvediloL (Coreg) 25 mg tablet Take 25 mg by mouth two (2) times a day. Provider, Historical   magnesium oxide (MAG-OX) 400 mg tablet Take 400 mg by mouth four (4) times daily.     Provider, Historical   atorvastatin (LIPITOR) 20 mg tablet Take 20 mg by mouth daily.    Provider, Historical   dapagliflozin (Farxiga) 10 mg tab tablet Take 10 mg by mouth daily. Provider, Historical   losartan (COZAAR) 25 mg tablet Take 25 mg by mouth daily. Provider, Historical        No Known Allergies    Review of Systems:  Review of Systems   Constitutional: Positive for weight loss. Negative for chills and fever. Eyes: Negative. Respiratory: Negative for cough and shortness of breath. Cardiovascular: Negative. Gastrointestinal: Positive for abdominal pain. Genitourinary: Negative for dysuria, frequency and hematuria. Musculoskeletal: Positive for falls. Negative for back pain, joint pain and neck pain. Neurological: Negative for dizziness, focal weakness and headaches. Neuropathy pain to the bilateral hands and bilateral feet. Objective:     Recent Results (from the past 24 hour(s))   CBC WITH AUTOMATED DIFF    Collection Time: 05/25/22  9:15 PM   Result Value Ref Range    WBC 5.3 4.4 - 11.3 K/uL    RBC 2.87 (L) 4.50 - 5.90 M/uL    HGB 9.1 (L) 13.5 - 17.5 g/dL    HCT 28.3 (L) 36 - 46 %    MCV 98.5 80 - 100 FL    MCH 31.8 31 - 34 PG    MCHC 32.3 31.0 - 36.0 g/dL    RDW 16.5 (H) 11.5 - 14.5 %    PLATELET 061 (L) 176 - 400 K/uL    MPV 8.5 6.5 - 11.5 FL    NRBC 0.1  WBC    ABSOLUTE NRBC 0.01 K/uL    NEUTROPHILS 52 42 - 75 %    LYMPHOCYTES 22 20.5 - 51.1 %    MONOCYTES 26 (H) 1.7 - 9.3 %    EOSINOPHILS 0 (L) 0.9 - 2.9 %    BASOPHILS 0 0.0 - 2.5 %    ABS. NEUTROPHILS 2.7 1.8 - 7.7 K/UL    ABS. LYMPHOCYTES 1.1 1.0 - 4.8 K/UL    ABS. MONOCYTES 1.4 0.2 - 2.4 K/UL    ABS. EOSINOPHILS 0.0 0.0 - 0.7 K/UL    ABS.  BASOPHILS 0.0 0.0 - 0.2 K/UL   METABOLIC PANEL, COMPREHENSIVE    Collection Time: 05/25/22  9:15 PM   Result Value Ref Range    Sodium 130 (L) 136 - 145 mmol/L    Potassium 3.7 3.5 - 5.1 mmol/L    Chloride 97 97 - 108 mmol/L    CO2 26 21 - 32 mmol/L    Anion gap 7 5 - 15 mmol/L    Glucose 232 (H) 65 - 100 mg/dL    BUN 25 (H) 6 - 20 mg/dL Creatinine 2.13 (H) 0.55 - 1.02 mg/dL    BUN/Creatinine ratio 12 12 - 20      GFR est AA 29 (L) >60 ml/min/1.73m2    GFR est non-AA 24 (L) >60 ml/min/1.73m2    Calcium 9.3 8.5 - 10.1 mg/dL    Bilirubin, total 0.4 0.2 - 1.0 mg/dL    AST (SGOT) 19 15 - 37 U/L    ALT (SGPT) 42 12 - 78 U/L    Alk.  phosphatase 63 45 - 117 U/L    Protein, total 9.5 (H) 6.4 - 8.2 g/dL    Albumin 2.1 (L) 3.5 - 5.0 g/dL    Globulin 7.4 (H) 2.0 - 4.0 g/dL    A-G Ratio 0.3 (L) 1.1 - 2.2     HEMOGLOBIN A1C WITH EAG    Collection Time: 05/25/22  9:15 PM   Result Value Ref Range    Hemoglobin A1c 6.7 (H) 4.0 - 5.6 %    Est. average glucose 146 mg/dL   GLUCOSE, POC    Collection Time: 05/25/22  9:19 PM   Result Value Ref Range    Glucose (POC) 228 (H) 65 - 117 mg/dL    Performed by TAYLOR TAO    EKG, 12 LEAD, INITIAL    Collection Time: 05/25/22 10:23 PM   Result Value Ref Range    Ventricular Rate 84 BPM    Atrial Rate 82 BPM    P-R Interval 174 ms    QRS Duration 131 ms    Q-T Interval 387 ms    QTC Calculation (Bezet) 458 ms    Calculated P Axis 110 degrees    Calculated R Axis 112 degrees    Calculated T Axis 168 degrees    Diagnosis       Atrial-sensed ventricular-paced complexes  No further analysis attempted due to paced rhythm    Confirmed by Jerilyn Calderon M.D., Quinton Carvalho ([id number]) on 5/26/2022 10:13:12 AM     TROPONIN-HIGH SENSITIVITY    Collection Time: 05/25/22 11:45 PM   Result Value Ref Range    Troponin-High Sensitivity 5 0 - 51 ng/L   LACTIC ACID    Collection Time: 05/25/22 11:45 PM   Result Value Ref Range    Lactic acid 3.4 (HH) 0.4 - 2.0 mmol/L   URINALYSIS W/ RFLX MICROSCOPIC    Collection Time: 05/26/22  5:15 AM   Result Value Ref Range    Color Yellow/Straw      Appearance Clear Clear      Specific gravity 1.010 1.003 - 1.030      pH (UA) 6.0 5.0 - 8.0      Protein Negative Negative mg/dL    Glucose >1,000 (A) Negative mg/dL    Ketone Negative Negative mg/dL    Bilirubin Negative Negative      Blood Trace (A) Negative Urobilinogen 0.2 0.2 - 1.0 EU/dL    Nitrites Positive (A) Negative      Leukocyte Esterase Small (A) Negative     URINE MICROSCOPIC    Collection Time: 05/26/22  5:15 AM   Result Value Ref Range    WBC 10-20 0 - 5 /hpf    RBC 5-10 0 - 3 /hpf    Bacteria 3+ (A) Negative /hpf   METABOLIC PANEL, BASIC    Collection Time: 05/26/22  6:39 AM   Result Value Ref Range    Sodium 133 (L) 136 - 145 mmol/L    Potassium 3.9 3.5 - 5.1 mmol/L    Chloride 103 97 - 108 mmol/L    CO2 26 21 - 32 mmol/L    Anion gap 4 (L) 5 - 15 mmol/L    Glucose 165 (H) 65 - 100 mg/dL    BUN 17 6 - 20 mg/dL    Creatinine 1.30 (H) 0.55 - 1.02 mg/dL    BUN/Creatinine ratio 13 12 - 20      GFR est AA 52 (L) >60 ml/min/1.73m2    GFR est non-AA 43 (L) >60 ml/min/1.73m2    Calcium 8.2 (L) 8.5 - 10.1 mg/dL   CBC WITH AUTOMATED DIFF    Collection Time: 05/26/22  6:39 AM   Result Value Ref Range    WBC 3.5 (L) 4.4 - 11.3 K/uL    RBC 2.85 (L) 4.50 - 5.90 M/uL    HGB 9.1 (L) 13.5 - 17.5 g/dL    HCT 28.0 (L) 36 - 46 %    MCV 98.2 80 - 100 FL    MCH 32.0 31 - 34 PG    MCHC 32.6 31.0 - 36.0 g/dL    RDW 16.2 (H) 11.5 - 14.5 %    PLATELET 87 (L) 327 - 400 K/uL    MPV 7.9 6.5 - 11.5 FL    NRBC 0.1  WBC    ABSOLUTE NRBC 0.01 K/uL    NEUTROPHILS 67 42 - 75 %    LYMPHOCYTES 9 (L) 20.5 - 51.1 %    MONOCYTES 24 (H) 1.7 - 9.3 %    EOSINOPHILS 0 (L) 0.9 - 2.9 %    BASOPHILS 0 0.0 - 2.5 %    ABS. NEUTROPHILS 2.3 1.8 - 7.7 K/UL    ABS. LYMPHOCYTES 0.3 (L) 1.0 - 4.8 K/UL    ABS. MONOCYTES 0.8 0.2 - 2.4 K/UL    ABS. EOSINOPHILS 0.0 0.0 - 0.7 K/UL    ABS. BASOPHILS 0.0 0.0 - 0.2 K/UL   GLUCOSE, POC    Collection Time: 05/26/22  7:50 AM   Result Value Ref Range    Glucose (POC) 172 (H) 65 - 117 mg/dL    Performed by Leena Martinez RN         No orders to display        Vitals:    05/26/22 1515   SpO2: 100%        Physical Exam:  Physical Exam  Constitutional:       General: She is not in acute distress. Appearance: She is not ill-appearing.    HENT:      Head: Normocephalic and atraumatic. Mouth/Throat:      Mouth: Mucous membranes are moist.   Eyes:      Extraocular Movements: Extraocular movements intact. Cardiovascular:      Rate and Rhythm: Normal rate and regular rhythm. Comments: Patient has AICD  Pulmonary:      Effort: Pulmonary effort is normal. No respiratory distress. Breath sounds: Normal breath sounds. Comments: Patient denies recent cough but does have a slight non productive cough throughout exam.  Abdominal:      General: Abdomen is flat. Bowel sounds are normal. There is no distension. Palpations: Abdomen is soft. Comments: Tender to palpation in the epigastric region   Neurological:      Mental Status: She is alert. Assessment:     Hospital Problems  Date Reviewed: 5/26/2022          Codes Class Noted POA    Se[facility] shock ([facility] ICD-10-CM: A41.9, R65.21  ICD-9-CM: 038.9, 785.52, 995.92  5/26/2022 Unknown              Plan:     Impression/Plan:  1. Generalized Weakness with Falls  Head CT to rule out intracranial hemorrhage  Echo to further evaluate cardiac etiology  Phosphorus    2. Septic shock   Possibly urine or CT reveal patchy RLL lung disease  Possibly secondary to UTI, UA positive for nitrites and leukocyte esterase  Order urine cultures  Order blood cultures  Continue IV NS  Zosyn,  Recheck lactic acid to monitor for improvement  Levophed as needed for life threatening hypotension    2. YANETH  Improving, 2.13 ---> 1.30  GFR Improving, 29 --> 52  Continue IV NS    3. Thrombocytopenia  110---> 87 on 5/26  Near baseline    4. Diabetes  Hold Farxiga  Start sliding scale    5. GERD  Continue Pantoprazole    6. Multiple Myeloma  Oncology consult  Continue dronabinol prn    7. Peripheral Neuropathy  Continue Gabapentin    8. Heart Failure, diastolic, EF 62=-94%  Continue Furosemide  Hold Carvedilol    9. HTN  Hold Losartan    10.  HLD  Continue Atorvastatin    DVT PPX: SCD  Ulcer PPX: Protonix    Code Status: Full Code    Critical care time: 45 minutes    Signed By: Dom Melo PA-C     May 26, 2022

## 2022-05-26 NOTE — ROUTINE PROCESS
TRANSFER - IN REPORT:    Verbal report received from ELROY Puga RN on [employer]  being received from ED for routine progression of care      Report consisted of patients Situation, Background, Assessment and   Recommendations(SBAR). Information from the following report(s) SBAR was reviewed with the receiving nurse. Opportunity for questions and clarification was provided. Assessment completed upon patients arrival to unit and care assumed.

## 2022-05-26 NOTE — ROUTINE PROCESS
@ [id number] assisted to [facility], urinated and back to bed.  UA obtained and taken to lab

## 2022-05-26 NOTE — DISCHARGE SUMMARY
Physician Discharge Summary       Patient: You Elizabeth MRN: [id number]     YOB: 1965  Age: 55 y.o. Sex: female    PCP: Leti Andrews PA-C    Allergies: Patient has no known allergies.     Admit date: 5/25/2022  Admitting Provider: Marisa Downs MD    Discharge date: 5/26/2022  Discharging Provider: Jessica Agosto MD    * Admission Diagnoses:   Dehydration [E86.0]      * Discharge Diagnoses:    Hospital Problems as of 5/26/2022 Date Reviewed: 5/26/2022          Codes Class Noted - Resolved POA    Dehydration ICD-10-CM: E86.0  ICD-9-CM: 276.51  5/26/2022 - Present Unknown        Multiple myeloma ([facility] ICD-10-CM: C90.00  ICD-9-CM: 203.00  5/26/2022 - Present Unknown        UTI (urinary tract infection) ICD-10-CM: N39.0  ICD-9-CM: 599.0  5/26/2022 - Present Unknown        Septic shock ([facility] ICD-10-CM: A41.9, R65.21  ICD-9-CM: 038.9, 785.52, 995.92  5/26/2022 - Present Unknown        Cardiomyopathy ([facility] ICD-10-CM: I42.9  ICD-9-CM: 425.4  5/26/2022 - Present Unknown        * (Principal) YANETH (acute kidney injury) ([facility] ICD-10-CM: N17.9  ICD-9-CM: 584.9  10/7/2021 - Present Yes                * Hospital Course:   Septic shock secondary to UTI with acute end organ dysfunction   Evidenced by persistent hypotension, despite fluid resuscitation, YANETH, elevated lactic acid, thrombocytopenia  Urinalysis positive for nitrites, CXR and CT scan of abdomen and pelvis is pending  Currently on IV ceftriaxone 1gm daily pending clinical course  Continue with IVF hydration as per sepsis protocol  Start pressors Levophed  Start hydrocortisone because of persistent hypotension   Transfer to tertiary care center  Patient moved to the ER for close monitoring     Acute kidney injury   Secondary to sepsis  Creatinine 2.13 has trended down to 1.3 this morning, baseline is normal   Continue with IVF hydration    Thrombocytopenia  Platelet count noted, 110>>87  Hold anticoagulation     History of Cardiomyopathy/AICD/Pacemaker   Monitor closely patient is getting IVF for sepsis   Hold oral antihypertensives  Currently no SOB or chest pain  Monitor input and output, daily weights     Hx of Multiple Myeloma  Hold oral chemotherapy and dexamethasone, because of septic shock  Continue allopurinol and Zovirax  Currently  on steroids   Following with Hematology/Oncologist     Type 2 diabetes   Hold oral medications, has poor oral intake   Start SSI     GI prophylaxis  PPI     DVT prophylaxis   No anticoagulation due to thrombocytopenia     Transfer to [facility]. Accepted by DR Nii Monroe    Spent 60 minutes evalauting and coordinating patient care of which >50% was spent coordinating and counseling, transferring patient . * Procedures: none        Consults: none    Vitals Last 24 Hours:  Patient Vitals for the past 24 hrs:   Temp Pulse Resp BP SpO2   05/26/22 1024 -- 81 -- (!) 82/49 --   05/26/22 0905 -- 80 -- (!) 84/52 --   05/26/22 0800 -- 96 -- -- --   05/26/22 0744 96.9 °F (36.1 °C) 98 20 (!) 87/61 98 %   05/26/22 0353 97.1 °F (36.2 °C) 97 20 130/73 97 %   05/26/22 0143 97.3 °F (36.3 °C) 79 20 119/66 97 %   05/26/22 0106 -- -- -- 108/67 --   05/26/22 0023 -- -- -- 104/63 --   05/25/22 2354 -- -- -- 97/68 --   05/25/22 2153 -- -- -- (!) 90/48 --   05/25/22 2140 -- -- -- (!) 77/53 --   05/25/22 2138 -- -- -- 105/62 --   05/25/22 2130 -- 88 -- (!) 74/48 99 %   05/25/22 2053 98.6 °F (37 °C) 99 17 97/64 94 %        Discharge Exam:  General: ill appearing  Head:  Normocephalic, without obvious abnormality, atraumatic. Eyes:  Conjunctivae/corneas clear. Extraocular movements intact. Lungs:  Clear to auscultation bilaterally. no wheeze, rales, crackles, rhonchi   Chest wall: No tenderness or deformity. Heart:  Regular rate and rhythm, S1, S2 normal, no murmur. Abdomen:  Soft, non-tender. Bowel sounds normal. No masses,  No organomegaly.   Extremities: moving all 4 extremities   Pulses: 2+ and symmetric all extremities. Skin: Skin color, texture, turgor normal. No rashes or lesions  Neurologic: Awake, Alert, oriented. No obvious gross sensory or motor deficits  Generalized weakness     Labs:  Recent Results (from the past 24 hour(s))   CBC WITH AUTOMATED DIFF    Collection Time: 05/25/22  9:15 PM   Result Value Ref Range    WBC 5.3 4.4 - 11.3 K/uL    RBC 2.87 (L) 4.50 - 5.90 M/uL    HGB 9.1 (L) 13.5 - 17.5 g/dL    HCT 28.3 (L) 36 - 46 %    MCV 98.5 80 - 100 FL    MCH 31.8 31 - 34 PG    MCHC 32.3 31.0 - 36.0 g/dL    RDW 16.5 (H) 11.5 - 14.5 %    PLATELET 097 (L) 073 - 400 K/uL    MPV 8.5 6.5 - 11.5 FL    NRBC 0.1  WBC    ABSOLUTE NRBC 0.01 K/uL    NEUTROPHILS 52 42 - 75 %    LYMPHOCYTES 22 20.5 - 51.1 %    MONOCYTES 26 (H) 1.7 - 9.3 %    EOSINOPHILS 0 (L) 0.9 - 2.9 %    BASOPHILS 0 0.0 - 2.5 %    ABS. NEUTROPHILS 2.7 1.8 - 7.7 K/UL    ABS. LYMPHOCYTES 1.1 1.0 - 4.8 K/UL    ABS. MONOCYTES 1.4 0.2 - 2.4 K/UL    ABS. EOSINOPHILS 0.0 0.0 - 0.7 K/UL    ABS. BASOPHILS 0.0 0.0 - 0.2 K/UL   METABOLIC PANEL, COMPREHENSIVE    Collection Time: 05/25/22  9:15 PM   Result Value Ref Range    Sodium 130 (L) 136 - 145 mmol/L    Potassium 3.7 3.5 - 5.1 mmol/L    Chloride 97 97 - 108 mmol/L    CO2 26 21 - 32 mmol/L    Anion gap 7 5 - 15 mmol/L    Glucose 232 (H) 65 - 100 mg/dL    BUN 25 (H) 6 - 20 mg/dL    Creatinine 2.13 (H) 0.55 - 1.02 mg/dL    BUN/Creatinine ratio 12 12 - 20      GFR est AA 29 (L) >60 ml/min/1.73m2    GFR est non-AA 24 (L) >60 ml/min/1.73m2    Calcium 9.3 8.5 - 10.1 mg/dL    Bilirubin, total 0.4 0.2 - 1.0 mg/dL    AST (SGOT) 19 15 - 37 U/L    ALT (SGPT) 42 12 - 78 U/L    Alk.  phosphatase 63 45 - 117 U/L    Protein, total 9.5 (H) 6.4 - 8.2 g/dL    Albumin 2.1 (L) 3.5 - 5.0 g/dL    Globulin 7.4 (H) 2.0 - 4.0 g/dL    A-G Ratio 0.3 (L) 1.1 - 2.2     GLUCOSE, POC    Collection Time: 05/25/22  9:19 PM   Result Value Ref Range    Glucose (POC) 228 (H) 65 - 117 mg/dL    Performed by Nathan Chino EKG, 12 LEAD, INITIAL    Collection Time: 05/25/22 10:23 PM   Result Value Ref Range    Ventricular Rate 84 BPM    Atrial Rate 82 BPM    P-R Interval 174 ms    QRS Duration 131 ms    Q-T Interval 387 ms    QTC Calculation (Bezet) 458 ms    Calculated P Axis 110 degrees    Calculated R Axis 112 degrees    Calculated T Axis 168 degrees    Diagnosis       Atrial-sensed ventricular-paced complexes  No further analysis attempted due to paced rhythm    Confirmed by Terra Alba M.D., Danish Garcia ([id number]) on 5/26/2022 10:13:12 AM     TROPONIN-HIGH SENSITIVITY    Collection Time: 05/25/22 11:45 PM   Result Value Ref Range    Troponin-High Sensitivity 5 0 - 51 ng/L   LACTIC ACID    Collection Time: 05/25/22 11:45 PM   Result Value Ref Range    Lactic acid 3.4 (HH) 0.4 - 2.0 mmol/L   URINALYSIS W/ RFLX MICROSCOPIC    Collection Time: 05/26/22  5:15 AM   Result Value Ref Range    Color Yellow/Straw      Appearance Clear Clear      Specific gravity 1.010 1.003 - 1.030      pH (UA) 6.0 5.0 - 8.0      Protein Negative Negative mg/dL    Glucose >1,000 (A) Negative mg/dL    Ketone Negative Negative mg/dL    Bilirubin Negative Negative      Blood Trace (A) Negative      Urobilinogen 0.2 0.2 - 1.0 EU/dL    Nitrites Positive (A) Negative      Leukocyte Esterase Small (A) Negative     URINE MICROSCOPIC    Collection Time: 05/26/22  5:15 AM   Result Value Ref Range    WBC 10-20 0 - 5 /hpf    RBC 5-10 0 - 3 /hpf    Bacteria 3+ (A) Negative /hpf   METABOLIC PANEL, BASIC    Collection Time: 05/26/22  6:39 AM   Result Value Ref Range    Sodium 133 (L) 136 - 145 mmol/L    Potassium 3.9 3.5 - 5.1 mmol/L    Chloride 103 97 - 108 mmol/L    CO2 26 21 - 32 mmol/L    Anion gap 4 (L) 5 - 15 mmol/L    Glucose 165 (H) 65 - 100 mg/dL    BUN 17 6 - 20 mg/dL    Creatinine 1.30 (H) 0.55 - 1.02 mg/dL    BUN/Creatinine ratio 13 12 - 20      GFR est AA 52 (L) >60 ml/min/1.73m2    GFR est non-AA 43 (L) >60 ml/min/1.73m2    Calcium 8.2 (L) 8.5 - 10.1 mg/dL   CBC WITH AUTOMATED DIFF    Collection Time: 05/26/22  6:39 AM   Result Value Ref Range    WBC 3.5 (L) 4.4 - 11.3 K/uL    RBC 2.85 (L) 4.50 - 5.90 M/uL    HGB 9.1 (L) 13.5 - 17.5 g/dL    HCT 28.0 (L) 36 - 46 %    MCV 98.2 80 - 100 FL    MCH 32.0 31 - 34 PG    MCHC 32.6 31.0 - 36.0 g/dL    RDW 16.2 (H) 11.5 - 14.5 %    PLATELET 87 (L) 818 - 400 K/uL    MPV 7.9 6.5 - 11.5 FL    NRBC 0.1  WBC    ABSOLUTE NRBC 0.01 K/uL    NEUTROPHILS 67 42 - 75 %    LYMPHOCYTES 9 (L) 20.5 - 51.1 %    MONOCYTES 24 (H) 1.7 - 9.3 %    EOSINOPHILS 0 (L) 0.9 - 2.9 %    BASOPHILS 0 0.0 - 2.5 %    ABS. NEUTROPHILS 2.3 1.8 - 7.7 K/UL    ABS. LYMPHOCYTES 0.3 (L) 1.0 - 4.8 K/UL    ABS. MONOCYTES 0.8 0.2 - 2.4 K/UL    ABS. EOSINOPHILS 0.0 0.0 - 0.7 K/UL    ABS. BASOPHILS 0.0 0.0 - 0.2 K/UL   GLUCOSE, POC    Collection Time: 05/26/22  7:50 AM   Result Value Ref Range    Glucose (POC) 172 (H) 65 - 117 mg/dL    Performed by Gavino Mitchell RN          Imaging:  No results found. No current facility-administered medications on file prior to encounter. Current Outpatient Medications on File Prior to Encounter   Medication Sig Dispense Refill    dronabinoL (MARINOL) 5 mg capsule Take 5 mg by mouth two (2) times a day.  gabapentin (NEURONTIN) 100 mg capsule TAKE 2 CAPS IN AM AND 2 AT BEDTIME      acyclovir (ZOVIRAX) 400 mg tablet Take 400 mg by mouth daily.  allopurinoL (ZYLOPRIM) 100 mg tablet Take 100 mg by mouth daily. (Patient not taking: Reported on 5/26/2022)      dexAMETHasone (DECADRON) 4 mg tablet Take 40 mg by mouth every seven (7) days. On Monday      Revlimid 15 mg cap       cholecalciferol, vitamin d3, (Vitamin D3) 10 mcg (400 unit) cap Take  by mouth daily.  pantoprazole (PROTONIX) 40 mg tablet Take 1 Tablet by mouth daily. 30 Tablet 0    furosemide (Lasix) 20 mg tablet Take 1 Tablet by mouth daily as needed for Other (Use if greater than 3 pound weight gain in 24 hours or greater 5 pound weight gain in 5 days). (Patient not taking: Reported on 5/26/2022) 30 Tablet 0    metFORMIN (GLUCOPHAGE) 500 mg tablet Take  by mouth three (3) times daily (with meals).  carvediloL (Coreg) 25 mg tablet Take 25 mg by mouth two (2) times a day.  magnesium oxide (MAG-OX) 400 mg tablet Take 400 mg by mouth four (4) times daily.  atorvastatin (LIPITOR) 20 mg tablet Take 20 mg by mouth daily.  dapagliflozin (Farxiga) 10 mg tab tablet Take 10 mg by mouth daily.  losartan (COZAAR) 25 mg tablet Take 25 mg by mouth daily.                   * Discharge Condition: critical  * Disposition: [facility]      Discharge Medications:  Current Discharge Medication List            Follow-up Information     Follow up With Specialties Details Why Contact Info    Gabriel Thornton PA-C Physician Assistant On 6/2/2022 @ 2:00 Grisel Lopez [phone]            Signed:  Dominga Cruz MD  5/26/2022

## 2022-05-26 NOTE — H&P
History and Physical      Chief Complaints:     Chief Complaint   Patient presents with    Extremity Weakness         Subjective:     Jacki Hodge is a 55 y.o. female followed by Fiorella Dee PA-C and  has a past medical history of AICD (automatic cardioverter/defibrillator) present (12/2020), Anemia, Cardiomyopathy ([address] (08/2020), Diabetes ([address] GERD (gastroesophageal reflux disease), Heart failure ([address] Hypertension, Multiple myeloma ([address] Sleep apnea, and Thrombocytopenia ([address] admitted for weakness. She presented top the ER for complaints of weakness and \"not feeling good\". She had one episode of nausea and vomiting just clear liquid. On evaluation this morning was noted to be still feeling weak with nausea no vomiting. Her blood pressure was noted to be hypotensive systolic in the 12'H. Despite getting IVF boluses x2 of 250cc she has remained hypotensive. Patient is being moved to the ER to start Levophed and transfer to tertiary care center.        Past Medical History:   Diagnosis Date    AICD (automatic cardioverter/defibrillator) present 12/2020    Anemia     Cardiomyopathy ([address] 08/2020    Diabetes ([address]     GERD (gastroesophageal reflux disease)     Heart failure (HCC)     Hypertension     Multiple myeloma (HCC)     Sleep apnea     cpap    Thrombocytopenia (HCC)       Past Surgical History:   Procedure Laterality Date    COLONOSCOPY N/A 11/17/2021    COLONOSCOPY ( T I V A) performed by Justine Carrillo MD [facility] ENDOSCOPY    ENDOSCOPY VISIT-OUTPATIENT  10/08/2021    HX COLONOSCOPY      HX HEART CATHETERIZATION      x 2     HX HERNIA REPAIR      HX HYSTERECTOMY      HX IMPLANTABLE CARDIOVERTER DEFIBRILLATOR      HX OOPHORECTOMY      HX PACEMAKER      pacemaker, defib    HX SVT ABLATION       Family History   Problem Relation Age of Onset    Breast Cancer Sister     Diabetes Mother     Hypertension Mother     Elevated Lipids Mother     Heart Surgery Father       Social History     Tobacco Use    Smoking status: Never Smoker    Smokeless tobacco: Never Used   Substance Use Topics    Alcohol use: Never       Prior to Admission medications    Medication Sig Start Date End Date Taking? Authorizing Provider   dronabinoL (MARINOL) 5 mg capsule Take 5 mg by mouth two (2) times a day. Yes Provider, Historical   gabapentin (NEURONTIN) 100 mg capsule TAKE 2 CAPS IN AM AND 2 AT BEDTIME 4/9/22   Provider, Historical   acyclovir (ZOVIRAX) 400 mg tablet Take 400 mg by mouth daily. 1/18/22   Provider, Historical   allopurinoL (ZYLOPRIM) 100 mg tablet Take 100 mg by mouth daily. Patient not taking: Reported on 5/26/2022 12/23/21   Provider, Historical   dexAMETHasone (DECADRON) 4 mg tablet Take 40 mg by mouth every seven (7) days. On Monday 1/18/22   Provider, Historical   Revlimid 15 mg cap  1/18/22   Provider, Historical   cholecalciferol, vitamin d3, (Vitamin D3) 10 mcg (400 unit) cap Take  by mouth daily. Provider, Historical   pantoprazole (PROTONIX) 40 mg tablet Take 1 Tablet by mouth daily. 10/24/21   Mallory Hurtado MD   furosemide (Lasix) 20 mg tablet Take 1 Tablet by mouth daily as needed for Other (Use if greater than 3 pound weight gain in 24 hours or greater 5 pound weight gain in 5 days). Patient not taking: Reported on 5/26/2022 10/8/21   Mallory Hurtado MD   metFORMIN (GLUCOPHAGE) 500 mg tablet Take  by mouth three (3) times daily (with meals). Provider, Historical   carvediloL (Coreg) 25 mg tablet Take 25 mg by mouth two (2) times a day. Provider, Historical   magnesium oxide (MAG-OX) 400 mg tablet Take 400 mg by mouth four (4) times daily. Provider, Historical   atorvastatin (LIPITOR) 20 mg tablet Take 20 mg by mouth daily. Provider, Historical   dapagliflozin (Farxiga) 10 mg tab tablet Take 10 mg by mouth daily. Provider, Historical   losartan (COZAAR) 25 mg tablet Take 25 mg by mouth daily.     Provider, Historical     No Known Allergies     Review of Systems:  Review of Systems   Constitutional: Positive for appetite change and fatigue. Negative for fever. Respiratory: Negative. Cardiovascular: Negative. Gastrointestinal: Negative. Genitourinary: Positive for decreased urine volume. Musculoskeletal: Positive for myalgias. Neurological: Negative. Hematological: Negative. Psychiatric/Behavioral: Negative. Objective:     Vitals:  Visit Vitals  /73 (BP 1 Location: Right upper arm, BP Patient Position: Lying right side)   Pulse 97   Temp 97.1 °F (36.2 °C)   Resp 20   Ht 5' (1.524 m)   Wt 64.9 kg (143 lb)   SpO2 97%   BMI 27.93 kg/m²       Physical Exam:  General: Alert, cooperative, no distress. Generalized weakness  Head:  Normocephalic, without obvious abnormality, atraumatic. Eyes:  Conjunctivae/corneas clear. Extraocular movements intact. Lungs:  Clear to auscultation bilaterally, no wheezes, crackles  Chest wall: No tenderness or deformity. Heart:  Regular rate and rhythm, S1, S2 normal, no murmur, click, rub, or gallop. Abdomen:  Soft, non-tender. Bowel sounds normal. No masses. No organomegaly. Back:  No spine tenderness to palpation  Extremities: moving all four extremities, muscle atrophy   Pulses: Symmetric all extremities. Skin: Skin color, texture, turgor normal.   Lymph nodes: Cervical nodes normal.  Neurologic: CNII-XII intact.        Labs:  Recent Results (from the past 24 hour(s))   CBC WITH AUTOMATED DIFF    Collection Time: 05/25/22  9:15 PM   Result Value Ref Range    WBC 5.3 4.4 - 11.3 K/uL    RBC 2.87 (L) 4.50 - 5.90 M/uL    HGB 9.1 (L) 13.5 - 17.5 g/dL    HCT 28.3 (L) 36 - 46 %    MCV 98.5 80 - 100 FL    MCH 31.8 31 - 34 PG    MCHC 32.3 31.0 - 36.0 g/dL    RDW 16.5 (H) 11.5 - 14.5 %    PLATELET 727 (L) 136 - 400 K/uL    MPV 8.5 6.5 - 11.5 FL    NRBC 0.1  WBC    ABSOLUTE NRBC 0.01 K/uL    NEUTROPHILS 52 42 - 75 %    LYMPHOCYTES 22 20.5 - 51.1 %    MONOCYTES 26 (H) 1.7 - 9.3 %    EOSINOPHILS 0 (L) 0.9 - 2.9 %    BASOPHILS 0 0.0 - 2.5 %    ABS. NEUTROPHILS 2.7 1.8 - 7.7 K/UL    ABS. LYMPHOCYTES 1.1 1.0 - 4.8 K/UL    ABS. MONOCYTES 1.4 0.2 - 2.4 K/UL    ABS. EOSINOPHILS 0.0 0.0 - 0.7 K/UL    ABS. BASOPHILS 0.0 0.0 - 0.2 K/UL   METABOLIC PANEL, COMPREHENSIVE    Collection Time: 05/25/22  9:15 PM   Result Value Ref Range    Sodium 130 (L) 136 - 145 mmol/L    Potassium 3.7 3.5 - 5.1 mmol/L    Chloride 97 97 - 108 mmol/L    CO2 26 21 - 32 mmol/L    Anion gap 7 5 - 15 mmol/L    Glucose 232 (H) 65 - 100 mg/dL    BUN 25 (H) 6 - 20 mg/dL    Creatinine 2.13 (H) 0.55 - 1.02 mg/dL    BUN/Creatinine ratio 12 12 - 20      GFR est AA 29 (L) >60 ml/min/1.73m2    GFR est non-AA 24 (L) >60 ml/min/1.73m2    Calcium 9.3 8.5 - 10.1 mg/dL    Bilirubin, total 0.4 0.2 - 1.0 mg/dL    AST (SGOT) 19 15 - 37 U/L    ALT (SGPT) 42 12 - 78 U/L    Alk.  phosphatase 63 45 - 117 U/L    Protein, total 9.5 (H) 6.4 - 8.2 g/dL    Albumin 2.1 (L) 3.5 - 5.0 g/dL    Globulin 7.4 (H) 2.0 - 4.0 g/dL    A-G Ratio 0.3 (L) 1.1 - 2.2     GLUCOSE, POC    Collection Time: 05/25/22  9:19 PM   Result Value Ref Range    Glucose (POC) 228 (H) 65 - 117 mg/dL    Performed by TAYLOR TAO    EKG, 12 LEAD, INITIAL    Collection Time: 05/25/22 10:23 PM   Result Value Ref Range    Ventricular Rate 84 BPM    Atrial Rate 82 BPM    P-R Interval 174 ms    QRS Duration 131 ms    Q-T Interval 387 ms    QTC Calculation (Bezet) 458 ms    Calculated P Axis 110 degrees    Calculated R Axis 112 degrees    Calculated T Axis 168 degrees    Diagnosis       Atrial-sensed ventricular-paced complexes  No further analysis attempted due to paced rhythm     TROPONIN-HIGH SENSITIVITY    Collection Time: 05/25/22 11:45 PM   Result Value Ref Range    Troponin-High Sensitivity 5 0 - 51 ng/L   LACTIC ACID    Collection Time: 05/25/22 11:45 PM   Result Value Ref Range    Lactic acid 3.4 (HH) 0.4 - 2.0 mmol/L   URINALYSIS W/ RFLX MICROSCOPIC    Collection Time: 05/26/22  5:15 AM   Result Value Ref Range    Color Yellow/Straw      Appearance Clear Clear      Specific gravity 1.010 1.003 - 1.030      pH (UA) 6.0 5.0 - 8.0      Protein Negative Negative mg/dL    Glucose >1,000 (A) Negative mg/dL    Ketone Negative Negative mg/dL    Bilirubin Negative Negative      Blood Trace (A) Negative      Urobilinogen 0.2 0.2 - 1.0 EU/dL    Nitrites Positive (A) Negative      Leukocyte Esterase Small (A) Negative     URINE MICROSCOPIC    Collection Time: 05/26/22  5:15 AM   Result Value Ref Range    WBC 10-20 0 - 5 /hpf    RBC 5-10 0 - 3 /hpf    Bacteria 3+ (A) Negative /hpf   METABOLIC PANEL, BASIC    Collection Time: 05/26/22  6:39 AM   Result Value Ref Range    Sodium 133 (L) 136 - 145 mmol/L    Potassium 3.9 3.5 - 5.1 mmol/L    Chloride 103 97 - 108 mmol/L    CO2 26 21 - 32 mmol/L    Anion gap 4 (L) 5 - 15 mmol/L    Glucose 165 (H) 65 - 100 mg/dL    BUN 17 6 - 20 mg/dL    Creatinine 1.30 (H) 0.55 - 1.02 mg/dL    BUN/Creatinine ratio 13 12 - 20      GFR est AA 52 (L) >60 ml/min/1.73m2    GFR est non-AA 43 (L) >60 ml/min/1.73m2    Calcium 8.2 (L) 8.5 - 10.1 mg/dL   CBC WITH AUTOMATED DIFF    Collection Time: 05/26/22  6:39 AM   Result Value Ref Range    WBC 3.5 (L) 4.4 - 11.3 K/uL    RBC 2.85 (L) 4.50 - 5.90 M/uL    HGB 9.1 (L) 13.5 - 17.5 g/dL    HCT 28.0 (L) 36 - 46 %    MCV 98.2 80 - 100 FL    MCH 32.0 31 - 34 PG    MCHC 32.6 31.0 - 36.0 g/dL    RDW 16.2 (H) 11.5 - 14.5 %    PLATELET 87 (L) 351 - 400 K/uL    MPV 7.9 6.5 - 11.5 FL    NRBC 0.1  WBC    ABSOLUTE NRBC 0.01 K/uL    NEUTROPHILS 67 42 - 75 %    LYMPHOCYTES 9 (L) 20.5 - 51.1 %    MONOCYTES 24 (H) 1.7 - 9.3 %    EOSINOPHILS 0 (L) 0.9 - 2.9 %    BASOPHILS 0 0.0 - 2.5 %    ABS. NEUTROPHILS 2.3 1.8 - 7.7 K/UL    ABS. LYMPHOCYTES 0.3 (L) 1.0 - 4.8 K/UL    ABS. MONOCYTES 0.8 0.2 - 2.4 K/UL    ABS. EOSINOPHILS 0.0 0.0 - 0.7 K/UL    ABS. BASOPHILS 0.0 0.0 - 0.2 K/UL       Imaging:  No results found.      Assessment & Plan: Septic shock secondary to UTI  Evidenced by persistent hypotension, despite fluid resuscitation, YANETH, elevated lactic acid, thrombocytopenia  Urinalysis positive for nitrites, CXR and CT scan of abdomen and pelvis is pending  Currently on IV ceftriaxone 1gm daily pending clinical course  Continue with IVF hydration as per sepsis protocol  Start pressors Levophed  Start hydrocortisone because of persistent hypotension   Transfer to tertiary care center  Patient moved to the ER for close monitoring    History of Cardiomyopathy/AICD/Pacemaker   Monitor closely patient is getting IVF for sepsis   Currently no SOB or chest pain  Monitor input and output, daily weights    Hx of Multiple Myeloma  Hold oral chemotherapy and dexamethasone, because of septic shock  Continue allopurinol and Zovirax  Currently  on steroids     GI prophylaxis  PPI    DVT prophylaxis   No anticoagulation due to thrombocytopenia     Spent 70 minutes evaluting and coordinating patient care of which >50% was spent coordinating and counseling.          Electronically signed by Julita Giron MD on 5/26/2022

## 2022-05-26 NOTE — ROUTINE PROCESS
Bedside and Verbal shift change report given to Collin Gilbert RN (oncoming nurse) by Patrice Cifuentes (offgoing nurse). Report included the following information Kardex and MAR.

## 2022-05-26 NOTE — ROUTINE PROCESS
To room 214 @ 0130 via stretcher, ambulated with Ed, RN ED nurse to bed. Alert and oriented. No c/o pain. States has neuropathy in feet. Falls @ home. Fall Precaution. Chills noted. Extra blanket given. States taking medicine for shingles. Routine assessment completed.

## 2022-05-26 NOTE — PROGRESS NOTES
Care Management Interventions  PCP Verified by CM: Yes  Mode of Transport at Discharge: ALS  Transition of Care Consult (CM Consult): Discharge Planning  Support Systems: Other Family Member(s),Parent(s)  Confirm Follow Up Transport: Family  The Plan for Transition of Care is Related to the Following Treatment Goals : Patient lives alone with assistance from family. Pending transfer to a higher level of care.   Discharge Location  Patient Expects to be Discharged to[de-identified] Transferred to higher level of care

## 2022-05-26 NOTE — PROGRESS NOTES
1515- Patient arrived to unit on RA, complaints of 8/10 pain in her hands and feet r/t severe neuropathy. Patient v-paced on the monitor. 4mg Levophed/250 ml D5 running at 3mcg/min into LAC 18g PIV. Bp 116/62. Patient has currently not registered in system and unable to chart on EMR. Called registration, will be here shortly. 1530-Titrated Levophed down to 2 mcg/min. BP 94/57 MAP 69    1550-Tirated Levophed down to 1 mcg/min. /75.     1620- Stopped Levophed. 96/65 MAP 75    1700-Patient seen by Bravo Harris and Dr. Pat Yuan. Orders received. Levophed order placed in correct facility concentration (not needed at this time). Verbal orders to transfer to remote tele if patient remains off levophed for >4hrs.

## 2022-05-26 NOTE — ACP (ADVANCE CARE PLANNING)
Advance Care Planning   Healthcare Decision Maker:       Primary Decision Maker: Jung Rodriguez Mother - [phone]    Secondary Decision Maker: Dennys Meyer - Sister - [phone]    Click here to complete [address] including selection of the Healthcare Decision Maker Relationship (ie \"Primary\")

## 2022-05-27 NOTE — PROGRESS NOTES
Met w/patient at bedside w/her brother present. Informed of discharge recommendation SNF vs HH. Patient verbalized \"not going to a SNF. Will do HH. \" patient requested time to think about HH. Writer agreed to follow up w/her on [facility] choice. No referrals for [facility] given.          Zachary Romano, MSW

## 2022-05-27 NOTE — PROGRESS NOTES
PHYSICAL THERAPY EVALUATION  Patient: Isac Roberts (22 y.o. female)  Date: 5/27/2022  Primary Diagnosis: Septic shock ([facility] [A41.9, R65.21]        Precautions: falls       ASSESSMENT  Pt is a 56 y/o F with PMH significant for diabetes, diabetic neuropathy, AICD placement, multiple myeloma, heart failure, HTN, thrombocytopenia, GERD, GERD. Presented to [facility] ED 5/25 for generalized weakness, ongoing for 3 weeks with multiple falls and decreased intake per notes. Per notes pt had 4 falls in the last week walking around her house. Transferred to [facility] on 5/26 due to sepsis with hypotension despite IV fluid resuscitation and findings of UTI. Prior level pt reports being indep prior to this. Able to ambulate without use of DME, community distances pt reports using electric cart is grocery store. Has had a history of multiple falls. Lives in 1 level home alone with 3 JEREMIAS w/ HR. Reports she is able to stay with family however they will not be around 24/7. Based on the objective data described below, the patient presents with decreased B LE ROM grossly, decreased B LE strength, decreased functional indep, and decrease balance during OOB tasks. Mobility performed this session: Supine>sit: pt primarily complete with Maida for trunk assistance , sit <> stand completed with Maida x2, initial performance completed with cueing on pressing off bed to stand, second performed completed with Maida x2 provided via [facility] assist. Once upright pt denied lightheadedness/dizziness. Overall pt was able to perform bout of ambulation with Maida x2 for steadiness. No knee buckling observed, decr stride length B, more of a step to pattern progressing to step through, and slight knee flexion observed B. Once in chair, stand to sit completed with minAx2. All needs met in chair. Pt did well with session today, however reporting 6/10 pain throughout BLE/UE (hands/feet), RN notified.  Will continue to benefit from further PT to maximize functional mobility and progress indep prior to d/c, currently recommend SNF at this time, vs pt having 24 hr A and HHPT. Would benefit from further PT sessions to possibly introduce RW (pt did not want to use one this session) and to perform stairs. BP during session 115/84    Current Level of Function Impacting Discharge (mobility/balance): Maida x2, assistance at home, pain w/ mobility       PLAN :  Recommendations and Planned Interventions: bed mobility training, transfer training, gait training, therapeutic exercises, neuromuscular re-education, patient and family training/education and therapeutic activities      Recommend with staff: gait belt    Frequency/Duration: Patient will be followed by physical therapy:  3-5x/week to address goals.     Recommendation for discharge: (in order for the patient to meet his/her long term goals)  [facility] vs home with 24 hr A and HHPT      IF patient discharges home will need the following DME: to be determined (TBD)         SUBJECTIVE:   Patient agreeable to participation in therapy this session    OBJECTIVE DATA SUMMARY:   HISTORY:    Past Medical History:   Diagnosis Date    AICD (automatic cardioverter/defibrillator) present 12/2020    Anemia     Cardiomyopathy ([address] 08/2020    Diabetes ([address]     GERD (gastroesophageal reflux disease)     Heart failure ([address]     Hypertension     Multiple myeloma ([address]     Sleep apnea     cpap    Thrombocytopenia ([address]      Past Surgical History:   Procedure Laterality Date    COLONOSCOPY N/A 11/17/2021    COLONOSCOPY ( T I V A) performed by Amor Thomas MD at [facility] ENDOSCOPY    ENDOSCOPY VISIT-OUTPATIENT  10/08/2021    HX COLONOSCOPY      HX HEART CATHETERIZATION      x 2     HX HERNIA REPAIR      HX HYSTERECTOMY      HX IMPLANTABLE CARDIOVERTER DEFIBRILLATOR      HX OOPHORECTOMY      HX PACEMAKER      pacemaker, defib    HX SVT ABLATION         Home Situation  Home Environment: Private residence  # Steps to Enter: 3  Rails to Enter: Yes  One/Two Story Residence: One story  Living Alone: Yes  Current DME Used/Available at Home: Mery Morales, straight,Wheelchair    EXAMINATION/PRESENTATION/DECISION MAKING:   Critical Behavior:  Neurologic State: Alert  Orientation Level: Oriented to person,Oriented to place,Oriented to time  Cognition: Follows commands     Hearing: Auditory  Auditory Impairment: None  Range Of Motion:  AROM: Generally decreased, functional (grossly decr B knee ROM)                       Strength:    Strength: Generally decreased, functional (3+/5 DF, 3+/5 knee ext)                    Tone & Sensation:                  Sensation: Impaired (Decr distal BLE)               Coordination:     Vision:      Functional Mobility:  Bed Mobility:     Supine to Sit: Minimum assistance     Scooting: Contact guard assistance  Transfers:  Sit to Stand: Minimum assistance;Assist x2  Stand to Sit: Minimum assistance;Assist x2                       Balance:   Sitting: Intact; Without support  Standing: Impaired; With support  Ambulation/Gait Training:  Distance (ft): 7 Feet (ft)     Ambulation - Level of Assistance: Minimal assistance;Assist x2     Gait Description (WDL): Exceptions to WDL  Gait Abnormalities:  (step to, decr stride length, slight knee flex midstance B)                                       Functional Measure:  [address] [facility] [address] [address] AM-PAC 6 Clicks         Basic Mobility Inpatient Short Form  How much difficulty does the patient currently have. .. Unable A Lot A Little None   1. Turning over in bed (including adjusting bedclothes, sheets and blankets)? [] 1   [] 2   [x] 3   [] 4   2. Sitting down on and standing up from a chair with arms ( e.g., wheelchair, bedside commode, etc.)   [] 1   [] 2   [x] 3   [] 4   3. Moving from lying on back to sitting on the side of the bed? [] 1   [] 2   [x] 3   [] 4          How much help from another person does the patient currently need. .. Total A Lot A Little None   4. Moving to and from a bed to a chair (including a wheelchair)? [] 1   [] 2   [x] 3   [] 4   5. Need to walk in hospital room? [] 1   [] 2   [x] 3   [] 4   6. Climbing 3-5 steps with a railing? [] 1   [x] 2   [] 3   [] 4   © , Trustees of [facility] [employer], under license to [employer]. All rights reserved     Score:  Initial:  Most Recent: (Date: 22 )   Interpretation of Tool:  Represents activities that are increasingly more difficult (i.e. Bed mobility, Transfers, Gait). Score 24 23 22-20 19-15 14-10 9-7 6   Modifier CH CI CJ CK CL CM CN         Physical Therapy Evaluation Charge Determination   History Examination Presentation Decision-Making   MEDIUM  Complexity : 1-2 comorbidities / personal factors will impact the outcome/ POC  MEDIUM Complexity : 3 Standardized tests and measures addressing body structure, function, activity limitation and / or participation in recreation  LOW Complexity : Stable, uncomplicated  Other outcome measures ampac 6  mod      Based on the above components, the patient evaluation is determined to be of the following complexity level: LOW     Pain Ratin-7/10 throughout B feet / hands (RN notified)    Activity Tolerance:   Good    After treatment patient left in no apparent distress:   Sitting in chair and Call bell within reach and RN updated. GOALS:    Problem: Mobility Impaired (Adult and Pediatric)  Goal: *Acute Goals and Plan of Care (Insert Text)  Description:   Pt will perform bed mobility with mod I in 7 days. Pt will perform transfers with mod I in 7 days. Pt will amb 100 feet with LRAD safely with mod I in 7 days. Pt will ascend/descend 3 steps with B handrails and CGA in 7 days to safely enter home. Outcome: Not Met       COMMUNICATION/EDUCATION:   The patients plan of care was discussed with: Occupational therapist and Registered nurse.      Patient/family have participated as able in goal setting and plan of care. Co treat performed with OT to maximize safety and functional mobility of patient.       Thank you for this referral.  Jaime Tsai, PT, PT, DPT   Time Calculation: 20 mins

## 2022-05-27 NOTE — PROGRESS NOTES
TRANSFER - IN REPORT:    Verbal report received from Madison(name) on You Elizabeth  being received from ICU(unit) for routine progression of care      Report consisted of patients Situation, Background, Assessment and   Recommendations(SBAR). Information from the following report(s) was reviewed with the receiving nurse. Opportunity for questions and clarification was provided. Assessment completed upon patients arrival to unit and care assumed.

## 2022-05-27 NOTE — PROGRESS NOTES
OCCUPATIONAL THERAPY EVALUATION  Patient: Theresa Martines (18 y.o. female)  Date: 5/27/2022  Primary Diagnosis: Septic shock ([facility] [address] [A41.9, R65.21]        Precautions: fall risk       ASSESSMENT  Pt is a 54 y/o F with PMH of diabetes, diabetic neuropathy, AICD placement, multiple myeloma, heart failure, HTN, thrombocytopenia, and GERD presenting to [facility] from [facility] AND [facility] ED with c/o generalized weakness, ongoing for about 3 wks w/ multiple falls, admitted 5/26  and being treated for sepsis w/ hypotension despite IV fluid. During admission, pt discovered to have UTI. Pt received semi-supine in bed upon arrival, AXO x4, and agreeable to OT/PT evaluations at this time. Per physical therapy note, pt reports being indep prior to this. Able to ambulate without use of DME, community distances pt reports using electric cart is grocery store. Has had a history of multiple falls. Lives in 1 level home alone with 3 JEREMIAS w/ HR. Reports she is able to stay with family however they will not be around 24/7. Based on current observations, pt presents with deficits in generalized strength/AROM, bed mobility, static/dynamic sitting balance, static/dynamic standing balance, functional activity tolerance, sensation, decreased safety awareness, and pain impacting overall performance of ADLs and functional transfers/mobility. Pt req'd total A to don B socks and min A for sup>sit transfer and demo'd fair sitting balance. She req'd min A x2 for STS and bed>chair using HHA; pt declined using RW (see PT note further gait details). Pt declined toilet transfer and left in chair w/ all needs/call bell in reach. OT/PT educated pt on calling Nsg A to return to bed; pt verbalized understanding. Overall, pt tolerates session fair with c/o 6/10 pain in B hands and feet; no reports of increased pain during weight bearing. BP stable throughout session and no complaints of dizziness.  Pt would benefit from continued skilled OT services to address current impairments and improve IND and safety with self cares and functional transfers/mobility. Current OT d/c recommendation SNF vs. HHOT w/ 24.7 A, once medically appropriate. If unable to provide 24/7 A then pt would need placement for safety. Other factors to consider for discharge: family/social support, DME, time since onset, severity of deficits, decline from functional baseline     Patient will benefit from skilled therapy intervention to address the above noted impairments. PLAN :  Recommendations and Planned Interventions: self care training, functional mobility training, therapeutic exercise, balance training, therapeutic activities, endurance activities and patient education    Frequency/Duration: Patient will be followed by occupational therapy:  3-5x/week to address goals. Recommendation for discharge: (in order for the patient to meet his/her long term goals)  [facility] vs [facility] w/ 24/7 A     This discharge recommendation:  Has been made in collaboration with the attending provider and/or case management    IF patient discharges home will need the following DME: TBD at next placement        SUBJECTIVE:   Patient stated My fingers and feet hurt.     OBJECTIVE DATA SUMMARY:   HISTORY:   Past Medical History:   Diagnosis Date    AICD (automatic cardioverter/defibrillator) present 12/2020    Anemia     Cardiomyopathy ([facility] [address] 08/2020    Diabetes ([facility] [address]     GERD (gastroesophageal reflux disease)     Heart failure (HCC)     Hypertension     Multiple myeloma (HCC)     Sleep apnea     cpap    Thrombocytopenia (HCC)      Past Surgical History:   Procedure Laterality Date    COLONOSCOPY N/A 11/17/2021    COLONOSCOPY ( T I V A) performed [facility] Alden Jackson [facility] ENDOSCOPY    ENDOSCOPY VISIT-OUTPATIENT  10/08/2021    HX COLONOSCOPY      HX HEART CATHETERIZATION      x 2     HX HERNIA REPAIR      HX HYSTERECTOMY      HX IMPLANTABLE CARDIOVERTER DEFIBRILLATOR      HX OOPHORECTOMY      HX PACEMAKER      pacemaker, defib    HX SVT ABLATION         Expanded or extensive additional review of patient history:     Home Situation  Home Environment: Private residence  # Steps to Enter: 3  Rails to Enter: Yes  One/Two Story Residence: One story  Living Alone: Yes  Support Systems: Parent(s),Other Family Member(s)  Patient Expects to be Discharged to[de-identified] Home  Current DME Used/Available at Home: Cane, straight,Wheelchair    Hand dominance: Right    EXAMINATION OF PERFORMANCE DEFICITS:  Cognitive/Behavioral Status:  Neurologic State: Alert  Orientation Level: Oriented to person;Oriented to place;Oriented to time  Cognition: Follows commands               Hearing: Auditory  Auditory Impairment: None      Range of Motion:  AROM: Generally decreased, functional (unable to acheive full shoulder flexion)                         Strength:  Strength: Generally decreased, functional (BUE 3/5)                Coordination:     Fine Motor Skills-Upper: Left Intact; Right Intact    Gross Motor Skills-Upper: Left Intact; Right Intact    Tone & Sensation:     Sensation: Impaired (numbness/pain in B hands )                      Balance:  Sitting: Intact; Without support  Standing: Impaired; With support    Functional Mobility and Transfers for ADLs:  Bed Mobility:  Supine to Sit: Minimum assistance  Scooting: Contact guard assistance    Transfers:  Sit to Stand: Minimum assistance;Assist x2  Stand to Sit: Minimum assistance;Assist x2  Bed to Chair: Minimum assistance;Assist x2    ADL Assessment:                                            ADL Intervention and task modifications:                           Lower Body Dressing Assistance  Socks: Total assistance (dependent)              Therapeutic Exercise:  Pt will benefit from BUE HEP to improve participation in ADLs and mobility. Plan will be initiated at next session.       Functional Measure:    [employer] AM-PACTM \"6 Clicks\" Daily Activity Inpatient Short Form  How much help from another person does the patient currently need. .. Total; A Lot A Little None   1. Putting on and taking off regular lower body clothing? [x]  1 []  2 []  3 []  4   2. Bathing (including washing, rinsing, drying)? []  1 []  2 [x]  3 []  4   3. Toileting, which includes using toilet, bedpan or urinal? [] 1 []  2 [x]  3 []  4   4. Putting on and taking off regular upper body clothing? []  1 []  2 [x]  3 []  4   5. Taking care of personal grooming such as brushing teeth? []  1 []  2 [x]  3 []  4   6. Eating meals? []  1 []  2 []  3 [x]  4   © , Trustees of [address] [place] [address] [address] [address], under license to [employer]. All rights reserved     Score: 1824     Interpretation of Tool:  Represents clinically-significant functional categories (i.e. Activities of daily living). Percentage of Impairment CH    0%   CI    1-19% CJ    20-39% CK    40-59% CL    60-79% CM    80-99% CN     100%   [facility]  Score 6-24 24 23 20-22 15-19 10-14 7-9 6         Occupational Therapy Evaluation Charge Determination   History Examination Decision-Making   LOW Complexity : Brief history review  LOW Complexity : 1-3 performance deficits relating to physical, cognitive , or psychosocial skils that result in activity limitations and / or participation restrictions  MEDIUM Complexity : Patient may present with comorbidities that affect occupational performnce.  Miniml to moderate modification of tasks or assistance (eg, physical or verbal ) with assesment(s) is necessary to enable patient to complete evaluation       Based on the above components, the patient evaluation is determined to be of the following complexity level: LOW   Pain Ratin/10 pain in B hands and feet     Activity Tolerance:   Fair and requires rest breaks    After treatment patient left in no apparent distress:    Sitting in chair and Call bell within reach    COMMUNICATION/EDUCATION:   The patients plan of care was discussed with: Physical therapist and Registered nurse. Patient/family have participated as able in goal setting and plan of care. and Patient/family agree to work toward stated goals and plan of care. This patients plan of care is appropriate for delegation to EM. OT/PT sessions occurred together for increased patient and clinician safety as pt with decreased activity tolerance and requires A of 2 for mobility at this time. Thank you for this referral.  Kade Montano, OT  Time Calculation: 28 mins   Problem: Self Care Deficits Care Plan (Adult)  Goal: *Acute Goals and Plan of Care (Insert Text)  Description: Pt stated goal \"I want to go home\".      Pt will be IND sup <>sit in prep for EOB ADLs  Pt will be IND grooming sitting EOB  Pt will be IND LE dressing sitting EOB/long sit  Pt will be IND sit <>  prep for toileting LRAD  Pt will be IND toileting/toilet transfer/cloth mgmt LRAD  Pt will be IND following UE HEP in prep for self care tasks    Outcome: Not Met

## 2022-05-27 NOTE — PROGRESS NOTES
Reason for Admission:  Septic shock                      RUR Score:                  PCP: First and Last name:   Donna Morley PA-C     Name of Practice:    Are you a current patient: Yes/No:    Approximate date of last visit: March 2022   Can you participate in a virtual visit if needed:     Do you (patient/family) have any concerns for transition/discharge? Plan for utilizing home health:       Current Advanced Directive/Advance Care Plan:  Full Code      Healthcare Decision Maker:   Click here to complete [address] including selection of the Healthcare Decision Maker Relationship (ie \"Primary\")            Primary Decision Maker: Tete Gomez - Mother - [phone]    Secondary Decision Maker: Aaron Freedman - Sister - [phone]    Transition of Care Plan:          Patient lives alone in a single level home. Last seen in PCP office March 2022. Utilizes [facility] [employer]) to p/u all medications. Employed. Independent w/all ADL's & IADL's and requires no assistance at this present time. No home O2. DME consists of CPAP, walker, and cane. Family provides transportation to/from medical appointment's. Is being followed by Dr. Max Cedeño ([facility]). Currently undergoing chemo treatment (pill & injection shot) every 2 weeks. Spoke w/[facility] RN and is being followed. No prior HH, SNF, and or IRF in the past.  PT recommended SNF vs HH. Please Note: Patient can't go to SNF and receive chemo treatment. Treatment will have to be stopped temporarily while in SNF, if the patient is in agreement w/SNF at discharge. Will discuss with patient to inform. Care Management Interventions  PCP Verified by CM: Yes (March 2022)  Mode of Transport at Discharge: Other (see comment) (Family)  Transition of Care Consult (CM Consult): Discharge Planning  Discharge Durable Medical Equipment: No (No home O2.  DME (CPAP, walker, cane))  Support Systems: Parent(s),Other Family Member(s)  Confirm Follow Up Transport: Family  Discharge Location  Patient Expects to be Discharged to[de-identified] [facility], [facility]

## 2022-05-27 NOTE — PROGRESS NOTES
Nutrition Note    RD alerted by FNS ambassador of pt frequently asking for foods not on diet order ([employer]). Discussed with RN who denies knowledge of issues chewing/swallowing, noted pt independent and employed at baseline, however suffering muscle weakness since initiation of chemo treatments. Also noted pt reported 30# wt loss (17%, severe) x5-6 months. Will adjust to regular textured diet with preference for softer foods to help improve intakes, adding Ensure Enlive daily. Will f/u for full assessment Monday.      Electronically signed by Valentine Agee on 5/27/2022 at 3:43 PM    Contact: Ext [phone], or via [employer]

## 2022-05-27 NOTE — PROGRESS NOTES
Spiritual Care Assessment/Progress Note  [facility]      NAME: Delmi Santana      MRN: [id number]  AGE: 55 y.o.  SEX: female  Quaker Affiliation: Latter day   Language: English     5/27/2022     Total Time (in minutes): 30     Spiritual Assessment begun in [facility] 2 CCU through conversation with:         [x]Patient        [] Family    [] Friend(s)        Reason for Consult: Initial/Spiritual assessment, critical care,Request by staff     Spiritual beliefs: (Please include comment if needed)     [x] Identifies with a lior tradition:         [] Supported by a lior community:            [] Claims no spiritual orientation:           [] Seeking spiritual identity:                [] Adheres to an individual form of spirituality:           [] Not able to assess:                           Identified resources for coping:      [x] Prayer                               [] Music                  [] Guided Imagery     [x] Family/friends                 [] Pet visits     [] Devotional reading                         [] Unknown     [] Other:                                               Interventions offered during this visit: (See comments for more details)    Patient Interventions: Affirmation of lior,Catharsis/review of pertinent events in supportive environment,Affirmation of emotions/emotional suffering,Coping skills reviewed/reinforced,Initial/Spiritual assessment, patient floor           Plan of Care:     [] Support spiritual and/or cultural needs    [] Support AMD and/or advance care planning process      [] Support grieving process   [] Coordinate Rites and/or Rituals    [] Coordination with community clergy   [] No spiritual needs identified at this time   [] Detailed Plan of Care below (See Comments)  [] Make referral to Music Therapy  [] Make referral to Pet Therapy     [] Make referral to Addiction services  [] Make referral to [facility]  [] Make referral to Spiritual Care Partner  [] No future visits requested        [x] Contact Spiritual Care for further referrals     Comments: The purpose of the visit was in response to a staff referral and to do a spiritual assessment on the patient. The patient appeared to be resting peacefully. She shared that she was feeling hopeful in her progress. She mentioned her family as a supportive presence in her life. The  listened empathically and reflectively, and provided the ministry of spiritual care. [facility] Bety Santos.    can be reached by calling the  at [facility]  [phone]

## 2022-05-27 NOTE — PROGRESS NOTES
Hospitalist Progress Note    Subjective:   Daily Progress Note: 5/27/2022 5:16 PM    Admission HPI/Hx:  Jacki Hodge is a 55 y.o. female with a past medical history significant for diabetes, diabetic neuropathy, AICD placement, multiple myeloma, Heart failure, HTN, thrombocytopenia and GERD. Patient presented to [facility] ED on 5/25/20 for generalized weakness ongoing  for 4 weeks with multiple falls and decreased intake. Patient states she has fallen 4 times in the last week walking around her house, states her legs become weak and then she falls. She denies hitting her head but does report LOC. She was transferred to [facility] on 5/26 due to sepsis with hypotension despite IV fluid resuscitation and findings of UTI.     On 5/26 she also reports epigastric abdominal pain which she states feels like typical pain she has after receiving her chemo injection, which she last received on 5/24. She also reports bilateral hand and bilateral foot pain consistent with her diabetic neuropathy. Reports 30 lb weight loss since she began taking her chemo medication in January.  --------------------    Subjective: Feels much better. No active complaint; denies SOB, CP, N/V.       Current Facility-Administered Medications   Medication Dose Route Frequency    perflutren lipid microspheres (DEFINITY) 1.1 mg/mL contrast injection        levoFLOXacin (LEVAQUIN) tablet 750 mg  750 mg Oral Q24H    [START ON 5/28/2022] atorvastatin (LIPITOR) tablet 20 mg  20 mg Oral DAILY    [START ON 5/28/2022] dapagliflozin (FARXIGA) tablet 10 mg (Patient Supplied)  10 mg Oral DAILY WITH BREAKFAST    [START ON 5/30/2022] dexAMETHasone (DECADRON) tablet 40 mg  40 mg Oral Q7D    [START ON 5/28/2022] acyclovir (ZOVIRAX) tablet 400 mg  400 mg Oral DAILY    dronabinoL (MARINOL) capsule 5 mg  5 mg Oral BID    [START ON 5/28/2022] magnesium oxide (MAG-OX) tablet 400 mg  400 mg Oral DAILY    metFORMIN (GLUCOPHAGE) tablet 500 mg  500 mg Oral TID WITH MEALS    [START ON 2022] pantoprazole (PROTONIX) tablet 40 mg  40 mg Oral DAILY    sodium chloride (NS) flush 5-40 mL  5-40 mL IntraVENous PRN    acetaminophen (TYLENOL) tablet 650 mg  650 mg Oral Q6H PRN    Or    acetaminophen (TYLENOL) suppository 650 mg  650 mg Rectal Q6H PRN    polyethylene glycol (MIRALAX) packet 17 g  17 g Oral DAILY PRN    ondansetron (ZOFRAN ODT) tablet 4 mg  4 mg Oral Q8H PRN    Or    ondansetron (ZOFRAN) injection 4 mg  4 mg IntraVENous Q6H PRN    hydrALAZINE (APRESOLINE) tablet 25 mg  25 mg Oral TID PRN        Review of Systems    Constitutional: No - fever, chills, weight change    Respiratory: No - cough, sob, wheeze, hemoptysis, pleuritic pain    Cardiovascular: No - chest pain, palpitations, extremity edema    Gastrointestinal: No - n/v/d/bleeding/abd pain    Neurological: No - headache, focal weakness, LOC, paresthesia    Skin: No - new rash, discoloration    M/S: No - joint pain, back pain, injury      Objective:     Visit Vitals  /87   Pulse 73   Temp 98.6 °F (37 °C)   Resp 18   Ht 5' (1.524 m)   Wt 66.2 kg (145 lb 15.1 oz)   SpO2 99%   BMI 28.50 kg/m²      O2 Device: None (Room air)    Temp (24hrs), Av.6 °F (37 °C), Min:98 °F (36.7 °C), Max:99.2 °F (37.3 °C)      No intake/output data recorded.  1901 -  0700  In: -   Out: 1700 [Urine:1700]    PHYSICAL EXAM    Constitutional: NAD, Awake    HEENT: No lesions, rash    Neck: Supple     Cardiovascular: S1S2, no murmur    Respiratory:  CTA ant bilat; no overt wheeze     GI: Soft, NT; no rigidity; + bowel sounds    : voiding, clear yellow urine    Musculoskeletal: Moving all extremities spontaneously; no overt injury; no significant joint swelling    Neurological:  AxOx2; No new focal weakness; No tremors    Psychiatric: Appropriate mood; oriented    Skin: No new rash or significant discoloration     Vascular: Palpable pulses;  No edema      Data Review    Recent Results (from the past 24 hour(s))   LACTIC ACID    Collection Time: 05/26/22  5:20 PM   Result Value Ref Range    Lactic acid 1.4 0.4 - 2.0 mmol/L   URINALYSIS W/ REFLEX CULTURE    Collection Time: 05/26/22  5:20 PM    Specimen: Urine   Result Value Ref Range    Color Yellow/Straw      Appearance Clear Clear      Specific gravity 1.005 1.003 - 1.030      pH (UA) 6.0 5.0 - 8.0      Protein Negative Negative mg/dL    Glucose >300 (A) Negative mg/dL    Ketone Negative Negative mg/dL    Bilirubin Negative Negative      Blood Negative Negative      Urobilinogen 0.1 0.1 - 1.0 EU/dL    Nitrites Negative Negative      Leukocyte Esterase Small (A) Negative      UA:UC IF INDICATED Urine Culture Ordered (A) Culture not indicated by UA result      WBC 10-20 0 - 4 /hpf    RBC 0-5 0 - 5 /hpf    Bacteria Negative Negative /hpf    Mucus Trace /lpf   GLUCOSE, POC    Collection Time: 05/26/22  9:27 PM   Result Value Ref Range    Glucose (POC) 115 65 - 117 mg/dL    Performed by [facility]    CBC WITH AUTOMATED DIFF    Collection Time: 05/27/22  4:20 AM   Result Value Ref Range    WBC 4.0 3.6 - 11.0 K/uL    RBC 2.60 (L) 3.80 - 5.20 M/uL    HGB 8.3 (L) 11.5 - 16.0 g/dL    HCT 25.7 (L) 35.0 - 47.0 %    MCV 98.8 80.0 - 99.0 FL    MCH 31.9 26.0 - 34.0 PG    MCHC 32.3 30.0 - 36.5 g/dL    RDW 14.6 (H) 11.5 - 14.5 %    PLATELET 69 (L) 228 - 400 K/uL    MPV 10.0 8.9 - 12.9 FL    NRBC 0.0 0.0  WBC    ABSOLUTE NRBC 0.00 0.00 - 0.01 K/uL    NEUTROPHILS 55 32 - 75 %    LYMPHOCYTES 27 12 - 49 %    MONOCYTES 17 (H) 5 - 13 %    EOSINOPHILS 0 0 - 7 %    BASOPHILS 0 0 - 1 %    IMMATURE GRANULOCYTES 1 (H) 0 - 0.5 %    ABS. NEUTROPHILS 2.2 1.8 - 8.0 K/UL    ABS. LYMPHOCYTES 1.1 0.8 - 3.5 K/UL    ABS. MONOCYTES 0.7 0.0 - 1.0 K/UL    ABS. EOSINOPHILS 0.0 0.0 - 0.4 K/UL    ABS. BASOPHILS 0.0 0.0 - 0.1 K/UL    ABS. IMM.  GRANS. 0.0 0.00 - 0.04 K/UL    DF AUTOMATED     METABOLIC PANEL, COMPREHENSIVE    Collection Time: 05/27/22  4:20 AM   Result Value Ref Range    Sodium 138 136 - 145 mmol/L    Potassium 3.6 3.5 - 5.1 mmol/L    Chloride 108 97 - 108 mmol/L    CO2 26 21 - 32 mmol/L    Anion gap 4 (L) 5 - 15 mmol/L    Glucose 97 65 - 100 mg/dL    BUN 9 6 - 20 mg/dL    Creatinine 0.70 0.55 - 1.02 mg/dL    BUN/Creatinine ratio 13 12 - 20      GFR est AA >60 >60 ml/min/1.73m2    GFR est non-AA >60 >60 ml/min/1.73m2    Calcium 8.3 (L) 8.5 - 10.1 mg/dL    Bilirubin, total 0.4 0.2 - 1.0 mg/dL    AST (SGOT) 6 (L) 15 - 37 U/L    ALT (SGPT) 23 12 - 78 U/L    Alk.  phosphatase 49 45 - 117 U/L    Protein, total 7.4 6.4 - 8.2 g/dL    Albumin 1.7 (L) 3.5 - 5.0 g/dL    Globulin 5.7 (H) 2.0 - 4.0 g/dL    A-G Ratio 0.3 (L) 1.1 - 2.2     PROCALCITONIN    Collection Time: 05/27/22  4:20 AM   Result Value Ref Range    Procalcitonin 0.43 (H) 0 ng/mL   LACTIC ACID    Collection Time: 05/27/22  4:20 AM   Result Value Ref Range    Lactic acid 0.7 0.4 - 2.0 mmol/L   PHOSPHORUS    Collection Time: 05/27/22  4:20 AM   Result Value Ref Range    Phosphorus 2.4 (L) 2.6 - 4.7 mg/dL   GLUCOSE, POC    Collection Time: 05/27/22  7:50 AM   Result Value Ref Range    Glucose (POC) 97 65 - 117 mg/dL    Performed by Agustin Dobson    ECHO ADULT COMPLETE    Collection Time: 05/27/22  9:55 AM   Result Value Ref Range    LA Major [place] 5.9 cm    LA Area 4C 18.6 cm2    AV Mean Gradient 8 mmHg    AV VTI 37.7 cm    AV Mean Velocity 1.4 m/s    AV Peak Velocity 2.2 m/s    AV Peak Gradient 19 mmHg    AV Area by VTI 1.5 cm2    AV Area by Peak Velocity 1.4 cm2    Aortic Root 3.0 cm    Descending Aorta 1.9 cm    IVSd 1.2 (A) 0.6 - 0.9 cm    LVIDd 5.0 3.9 - 5.3 cm    LVIDs 3.9 cm    LVOT Diameter 1.9 cm    LVOT Mean Gradient 2 mmHg    LVOT VTI 19.2 cm    LVOT Peak Velocity 1.1 m/s    LVOT Peak Gradient 4 mmHg    LVPWd 1.2 (A) 0.6 - 0.9 cm    LV E' Lateral Velocity 11 cm/s    LV E' Septal Velocity 6 cm/s    LVOT Area 2.8 cm2    LVOT SV 54.4 ml    MR Peak Velocity 2.3 m/s    MR Peak Gradient 21 mmHg    MV Max Velocity 1.2 m/s    MV Peak Gradient 6 mmHg    MV E Wave Deceleration Time 331.0 ms    MV A Velocity 0.76 m/s    MV E Velocity 0.48 m/s    MV Mean Gradient 2 mmHg    MV VTI 32.7 cm    MV Mean Velocity 0.6 m/s    MV Area by VTI 1.7 cm2    Est. RA Pressure 3 mmHg    TR Max Velocity 1.95 m/s    TR Peak Gradient 15 mmHg    TAPSE 1.4 (A) 1.7 cm    Fractional Shortening 2D 22 28 - 44 %    LVIDd Index 3.07 cm/m2    LVIDs Index 2.39 cm/m2    LV RWT Ratio 0.48     LV Mass 2D 233.7 (A) 67 - 162 g    LV Mass 2D Index 143.4 (A) 43 - 95 g/m2    MV E/A 0.63     E/E' Ratio (Averaged) 6.18     E/E' Lateral 4.36     E/E' Septal 8.00     LVOT Stroke Volume Index 33.4 mL/m2    Ao Root Index 1.84 cm/m2    AV Velocity Ratio 0.50     LVOT:AV VTI Index 0.51     DENISE/BSA VTI 0.9 cm2/m2    DENISE/BSA Peak Velocity 0.9 cm2/m2    MV:LVOT VTI Index 1.70     RVSP 18 mmHg    Descending Aorta Index 1.17 cm/m2    EF BP 45 (A) 55 - 100 %    IVC Proxmal 1.5 cm    LA Diameter 2.8 cm   GLUCOSE, POC    Collection Time: 05/27/22 12:27 PM   Result Value Ref Range    Glucose (POC) 94 65 - 117 mg/dL    Performed by [address] [place] [address] Dr Burroughs, POC    Collection Time: 05/27/22  3:39 PM   Result Value Ref Range    Glucose (POC) 95 65 - 117 mg/dL    Performed by Sander LIGHT        CT HEAD WO CONT    (Results Pending)       Active Problems:    Septic shock ([address] (5/26/2022)        Assessment/Plan:     1. Generalized Weakness with Falls  Head CT to rule out intracranial hemorrhage  Echo to further evaluate cardiac etiology  Phosphorus     2. Septic shock; ltd; resolved most likely due to RLL PNA/CAP +/- UTI  Zosyn; off of vasopressor shortly after arrival     2. YANETH; improving; IVF     3. Thrombocytopenia  110---> 87 on 5/26  Near baseline     4. Diabetes  Hold Farxiga  Start sliding scale     5.  GERD  Continue Pantoprazole     6. Multiple Myeloma  Oncology consult  Continue dronabinol prn     7. Peripheral Neuropathy  Continue Gabapentin     8. Heart Failure, diastolic, EF 60=-65%  Continue Furosemide  Hold Carvedilol     9. HTN  Hold Losartan     10.  HLD  Continue Atorvastatin      ==> Transfer to Floor    DVT Prophylaxis: On AC  Code Status: Full Code  ________________________________________  Robin Crespo MD

## 2022-05-28 NOTE — PROGRESS NOTES
Hospitalist Progress Note       Daily Progress Note: 5/28/2022 10:18 AM  Hospital course:     Torie Edward is a 55 y. o. female with a past medical history significant for diabetes, diabetic neuropathy, AICD placement, multiple myeloma, Heart failure, HTN, thrombocytopenia and GERD. Patient presented to [facility] ED on 5/25/20 for generalized weakness ongoing  for 4 weeks with multiple falls and decreased intake. Patient states she has fallen 4 times in the last week walking around her house, states her legs become weak and then she falls. She denies hitting her head but does report LOC. She was transferred to [facility] on 5/26 due to sepsis with hypotension despite IV fluid resuscitation and findings of UTI.   On 5/26 she also reports epigastric abdominal pain which she states feels like typical pain she has after receiving her chemo injection, which she last received on 5/24. She also reports bilateral hand and bilateral foot pain consistent with her diabetic neuropathy. Reports 30 lb weight loss since she began taking her chemo medication in January. She was placed in the ICU and started on Levophed for blood pressure support and antibiotics for UTI and pneumonia. Hypotension quickly resolving transferred to the floor, IV Zosyn transition to oral Levaquin. PT and OT evaluated patient and recommends SNF versus home with 24/7 assistance and home health PT. Cultures preliminary results from urine revealed GNR's, currently on Levaquin. Subjective:     Examined patient at the bedside. She is resting comfortably. Understands that she cannot live alone. Assessment/Plan:   Active Problems:    Septic shock ([address] (5/26/2022)    1.  Generalized Weakness with Falls  Head CT to rule out intracranial hemorrhage  PT OT recommend 24/7 assistance, home with home health PT versus SNF     2. Septic shock; ltd; resolved most likely due to RLL PNA/CAP +/- UTI  Zosyn; off of vasopressor shortly after arrival  Transition to oral Levaquin  Urine culture reveals GNR's     2. YANETH- resolved     3. Thrombocytopenia  110---> 87 on 5/26  Near baseline     4. Diabetes  On Farxiga  Metformin 500 mg 3 times daily with meals     5. GERD  Continue Pantoprazole     6. Multiple Myeloma  Stable  Continue dronabinol prn     7. Peripheral Neuropathy  Continue Gabapentin     8. Heart Failure, diastolic, EF 93=-77%  Continue Furosemide  Hold Carvedilol  Repeat 2D echo reveals a decrease in EF to 45 to 50%     9. HTN  Hold Losartan     10.  HLD  Continue Atorvastatin        DVT Prophylaxis: SCDs  Code Status: Full Code  POA/NOK:    Disposition and discharge barriers:    Urine culture sensitivity  Care Plan discussed with: Patient, family member, staff    Current Facility-Administered Medications   Medication Dose Route Frequency    *Please  patient's home medications from pharmacy at discharge*  1 Each Other PRIOR TO DISCHARGE    levoFLOXacin (LEVAQUIN) tablet 750 mg  750 mg Oral Q24H    atorvastatin (LIPITOR) tablet 20 mg  20 mg Oral DAILY    dapagliflozin (FARXIGA) tablet 10 mg (Patient Supplied)  10 mg Oral DAILY WITH BREAKFAST    [START ON 5/30/2022] dexAMETHasone (DECADRON) tablet 40 mg  40 mg Oral Q7D    acyclovir (ZOVIRAX) tablet 400 mg  400 mg Oral DAILY    dronabinoL (MARINOL) capsule 5 mg  5 mg Oral BID    magnesium oxide (MAG-OX) tablet 400 mg  400 mg Oral DAILY    metFORMIN (GLUCOPHAGE) tablet 500 mg  500 mg Oral TID WITH MEALS    pantoprazole (PROTONIX) tablet 40 mg  40 mg Oral DAILY    sodium chloride (NS) flush 5-40 mL  5-40 mL IntraVENous PRN    acetaminophen (TYLENOL) tablet 650 mg  650 mg Oral Q6H PRN    Or    acetaminophen (TYLENOL) suppository 650 mg  650 mg Rectal Q6H PRN    polyethylene glycol (MIRALAX) packet 17 g  17 g Oral DAILY PRN    ondansetron (ZOFRAN ODT) tablet 4 mg  4 mg Oral Q8H PRN    Or    ondansetron (ZOFRAN) injection 4 mg  4 mg IntraVENous Q6H PRN    hydrALAZINE (APRESOLINE) tablet 25 mg  25 mg Oral TID PRN        REVIEW OF SYSTEMS    Review of Systems   Constitutional: Positive for malaise/fatigue and weight loss. HENT: Positive for congestion. Respiratory: Negative for cough and shortness of breath. Cardiovascular: Negative for chest pain. Neurological: Positive for weakness. Objective:     Visit Vitals  /70 (BP 1 Location: Left upper arm, BP Patient Position: At rest)   Pulse 76   Temp 98 °F (36.7 °C)   Resp 16   Ht 5' (1.524 m)   Wt 66.2 kg (145 lb 15.1 oz)   SpO2 98%   BMI 28.50 kg/m²      O2 Device: None (Room air)    Temp (24hrs), Av.2 °F (36.8 °C), Min:98 °F (36.7 °C), Max:98.6 °F (37 °C)      No intake/output data recorded.  1901 -  0700  In: 1908.3 [I.V.:1908.3]  Out: 2300 [Urine:2300]    PHYSICAL EXAM:    Physical Exam  Constitutional:       Appearance: She is obese. She is ill-appearing. HENT:      Nose: Congestion present. Cardiovascular:      Rate and Rhythm: Normal rate and regular rhythm. Pulmonary:      Effort: No respiratory distress. Neurological:      Motor: Weakness present.       Coordination: Coordination abnormal.          Data Review    Recent Results (from the past 24 hour(s))   GLUCOSE, POC    Collection Time: 22 12:27 PM   Result Value Ref Range    Glucose (POC) 94 65 - 117 mg/dL    Performed by [address] [place] [address] Dr Burroughs, POC    Collection Time: 22  3:39 PM   Result Value Ref Range    Glucose (POC) 95 65 - 117 mg/dL    Performed by [address] [place] [address] Dr Burroughs, POC    Collection Time: 22 11:08 PM   Result Value Ref Range    Glucose (POC) 92 65 - 117 mg/dL    Performed by Relda Closs Tyra    CBC WITH AUTOMATED DIFF    Collection Time: 22  6:45 AM   Result Value Ref Range    WBC 3.3 (L) 3.6 - 11.0 K/uL    RBC 2.70 (L) 3.80 - 5.20 M/uL    HGB 8.5 (L) 11.5 - 16.0 g/dL    HCT 26.2 (L) 35.0 - 47.0 %    MCV 97.0 80.0 - 99.0 FL    MCH 31.5 26.0 - 34.0 PG    MCHC 32.4 30.0 - 36.5 g/dL    RDW 14. 8 (H) 11.5 - 14.5 %    PLATELET 59 (L) 100 - 400 K/uL    MPV 10.2 8.9 - 12.9 FL    NRBC 0.0 0.0  WBC    ABSOLUTE NRBC 0.00 0.00 - 0.01 K/uL    NEUTROPHILS 51 32 - 75 %    LYMPHOCYTES 29 12 - 49 %    MONOCYTES 19 (H) 5 - 13 %    EOSINOPHILS 0 0 - 7 %    BASOPHILS 0 0 - 1 %    IMMATURE GRANULOCYTES 1 (H) 0 - 0.5 %    ABS. NEUTROPHILS 1.7 (L) 1.8 - 8.0 K/UL    ABS. LYMPHOCYTES 0.9 0.8 - 3.5 K/UL    ABS. MONOCYTES 0.6 0.0 - 1.0 K/UL    ABS. EOSINOPHILS 0.0 0.0 - 0.4 K/UL    ABS. BASOPHILS 0.0 0.0 - 0.1 K/UL    ABS. IMM. GRANS. 0.0 0.00 - 0.04 K/UL    DF AUTOMATED     METABOLIC PANEL, COMPREHENSIVE    Collection Time: 05/28/22  6:45 AM   Result Value Ref Range    Sodium 138 136 - 145 mmol/L    Potassium 3.2 (L) 3.5 - 5.1 mmol/L    Chloride 108 97 - 108 mmol/L    CO2 26 21 - 32 mmol/L    Anion gap 4 (L) 5 - 15 mmol/L    Glucose 93 65 - 100 mg/dL    BUN 5 (L) 6 - 20 mg/dL    Creatinine 0.68 0.55 - 1.02 mg/dL    BUN/Creatinine ratio 7 (L) 12 - 20      GFR est AA >60 >60 ml/min/1.73m2    GFR est non-AA >60 >60 ml/min/1.73m2    Calcium 8.5 8.5 - 10.1 mg/dL    Bilirubin, total 0.3 0.2 - 1.0 mg/dL    AST (SGOT) 10 (L) 15 - 37 U/L    ALT (SGPT) 21 12 - 78 U/L    Alk. phosphatase 48 45 - 117 U/L    Protein, total 7.9 6.4 - 8.2 g/dL    Albumin 1.6 (L) 3.5 - 5.0 g/dL    Globulin 6.3 (H) 2.0 - 4.0 g/dL    A-G Ratio 0.3 (L) 1.1 - 2.2         CT HEAD WO CONT    (Results Pending)             _____________________________________________________________________________  Time spent in direct care including coordination of service, review of data and examination: > 35 minutes    ______________________________________________________________________________    Patsy Allen NP    This is dictation was done by [employer], [employer] voice recognition software. Quite often unanticipated grammatical, syntax, homophones and other interpretive errors or inadvertently transcribed by the computer software.   Please excuse errors that have escaped final proofreading. Thank you.

## 2022-05-29 NOTE — PROGRESS NOTES
Problem: Mobility Impaired (Adult and Pediatric)  Goal: *Acute Goals and Plan of Care (Insert Text)  Description:   Pt will perform bed mobility with mod I in 7 days. Pt will perform transfers with mod I in 7 days. Pt will amb 100 feet with LRAD safely with mod I in 7 days. Pt will ascend/descend 3 steps with B handrails and CGA in 7 days to safely enter home. Outcome: Progressing Towards Goal   PHYSICAL THERAPY TREATMENT  Patient: Maylin Rinaldi (67 y.o. female)  Date: 5/29/2022  Diagnosis: Septic shock ([facility] [address] [A41.9, R65.21] <principal problem not specified>       Precautions:    Chart, physical therapy assessment, plan of care and goals were reviewed. ASSESSMENT  Patient continues with skilled PT services and is progressing towards goals. Upon entry into room, patient semi-supine in bed and agreeable to work with therapy. Bed mobility performed with supervision to come to sitting on EOB. She reports some dizziness with positional changes but subsided with prolonged positioning. Had patient perform LE therex while seated on EOB prior to standing. Sit<>stand with Maida and R HHA. Patient declined use of RW for ambulation. Patient performed 1 stand but then had to sit due to feeling slightly nauseous. She rested for a few minutes and then stood again and ambulation into the restroom to toilet. Patient stood for pericare but did not require assistance from therapist for pericare. She then ambulated around her room for 30 feet with R HHA and 2 seated rest breaks on EOB. She reports feeling fatigued with activity. Patient will benefit from continued skilled PT services to improve her strength, standing balance and endurance.      Current Level of Function Impacting Discharge (mobility/balance): decr strength, decr standing balance, decr endurance     Other factors to consider for discharge: medical history, septic shock, would benefit from AD with gait          PLAN :  Patient continues to benefit from skilled intervention to address the above impairments. Continue treatment per established plan of care. to address goals. Recommendation for discharge: (in order for the patient to meet his/her long term goals)  [facility] versus [facility] with 24/7 family assistance     This discharge recommendation:  Has been made in collaboration with the attending provider and/or case management    IF patient discharges home will need the following DME: rolling walker and to be determined (TBD)       SUBJECTIVE:   Patient stated I be telling them that it's cold in here but she tried to turn the heat up last night.     OBJECTIVE DATA SUMMARY:   Critical Behavior:  Neurologic State: Alert  Orientation Level: Oriented X4  Cognition: Appropriate decision making,Follows commands     Functional Mobility Training:  Bed Mobility:  Rolling: Supervision  Supine to Sit: Supervision  Sit to Supine: Supervision  Scooting: Supervision        Transfers:  Sit to Stand: Minimum assistance  Stand to Sit: Contact guard assistance       Balance:  Sitting: Intact  Standing: Impaired; Without support  Standing - Static: Good;Constant support  Standing - Dynamic : Fair;Constant support  Ambulation/Gait Training:  Distance (ft): 30 Feet (ft)     Ambulation - Level of Assistance: Contact guard assistance      Therapeutic Exercises:   Seated on EOB, 2 x 10: LAQ, marching, heel raises, toe raises with rest breaks in between due to fatigue     Pain Rating:  No pain, just a headache     Activity Tolerance:   Fair, SpO2 stable on RA, requires frequent rest breaks, and observed SOB with activity  Please refer to the flowsheet for vital signs taken during this treatment.     After treatment patient left in no apparent distress:   Supine in bed and Call bell within reach    COMMUNICATION/COLLABORATION:   The patients plan of care was discussed with: Physical therapist.     Nadira Sánchez   Time Calculation: 26 mins

## 2022-05-29 NOTE — PROGRESS NOTES
Hospitalist Progress Note       Daily Progress Note: 5/29/2022 10:18 AM  Hospital course:     Grayson Edward is a 55 y. o. female with a past medical history significant for diabetes, diabetic neuropathy, AICD placement, multiple myeloma, Heart failure, HTN, thrombocytopenia and GERD. Patient presented to [facility] ED on 5/25/20 for generalized weakness ongoing  for 4 weeks with multiple falls and decreased intake. Patient states she has fallen 4 times in the last week walking around her house, states her legs become weak and then she falls. She denies hitting her head but does report LOC. She was transferred to [facility] on 5/26 due to sepsis with hypotension despite IV fluid resuscitation and findings of UTI.   On 5/26 she also reports epigastric abdominal pain which she states feels like typical pain she has after receiving her chemo injection, which she last received on 5/24. She also reports bilateral hand and bilateral foot pain consistent with her diabetic neuropathy. Reports 30 lb weight loss since she began taking her chemo medication in January. She was placed in the ICU and started on Levophed for blood pressure support and antibiotics for UTI and pneumonia. Hypotension quickly resolving transferred to the floor, IV Zosyn transition to oral Levaquin. PT and OT evaluated patient and recommends SNF versus home with 24/7 assistance and home health PT. Cultures preliminary results from urine revealed GNR's, currently on Levaquin. Subjective:     Examined patient at the bedside. She is resting comfortably. Understands that she cannot live alone.   She will go home where her mother lives for 24/7 assistance-    Assessment/Plan:   Active Problems:    Septic shock ([address] (5/26/2022)    Generalized Weakness with Falls  Head CT to rule out intracranial hemorrhage  PT OT recommend 24/7 assistance, home with home health PT versus SNF  The patient will go home to live with her mother and sister will provide physical therapy for her     Septic shock; ltd; resolved most likely due to RLL PNA/CAP +/- UTI  Zosyn; off of vasopressor shortly after arrival  Transition to oral Levaquin  Urine culture reveals GNR's sensitivities pending     YANETH- resolved     Thrombocytopenia  Near baseline     Diabetes  On Farxiga  Metformin 500 mg 3 times daily with meals     GERD  Continue Pantoprazole     Multiple Myeloma  Stable  Continue dronabinol prn     Peripheral Neuropathy  Continue Gabapentin     Heart Failure, diastolic  Previous EF 38=-45%  Restart carvedilol  Repeat 2D echo reveals a decrease in EF to 45 to 50%     HTN  Restart Coreg     HLD  Continue Atorvastatin    Low albumin  Patient consented to receive albumin we will give 25 g x 4        DVT: SCDs  Code Status: Full Code  POA/NOK: Sister Jeremy Dill, patient states makes medical decisions for her  #[phone]    Disposition and discharge barriers:    Urine culture sensitivity expected discharge in the next 24 hours   Disposition PT OT recommending home with 24/7 attendance and home health PT versus SNF, patient's sister states she will go home with her mother for 24/7 assistance and she is a nurse that we will provide physical therapy for her  Care Plan discussed with: Patient, family member, staff    Current Facility-Administered Medications   Medication Dose Route Frequency    albumin human 25% (BUMINATE) solution 25 g  25 g IntraVENous Q6H    *Please  patient's home medications from pharmacy at discharge*  1 Each Other PRIOR TO DISCHARGE    levoFLOXacin (LEVAQUIN) tablet 750 mg  750 mg Oral Q24H    atorvastatin (LIPITOR) tablet 20 mg  20 mg Oral DAILY    dapagliflozin (FARXIGA) tablet 10 mg (Patient Supplied)  10 mg Oral DAILY WITH BREAKFAST    [START ON 5/30/2022] dexAMETHasone (DECADRON) tablet 40 mg  40 mg Oral Q7D    acyclovir (ZOVIRAX) tablet 400 mg  400 mg Oral DAILY    dronabinoL (MARINOL) capsule 5 mg  5 mg Oral BID    magnesium oxide (MAG-OX) tablet 400 mg  400 mg Oral DAILY    metFORMIN (GLUCOPHAGE) tablet 500 mg  500 mg Oral TID WITH MEALS    pantoprazole (PROTONIX) tablet 40 mg  40 mg Oral DAILY    sodium chloride (NS) flush 5-40 mL  5-40 mL IntraVENous PRN    acetaminophen (TYLENOL) tablet 650 mg  650 mg Oral Q6H PRN    Or    acetaminophen (TYLENOL) suppository 650 mg  650 mg Rectal Q6H PRN    polyethylene glycol (MIRALAX) packet 17 g  17 g Oral DAILY PRN    ondansetron (ZOFRAN ODT) tablet 4 mg  4 mg Oral Q8H PRN    Or    ondansetron (ZOFRAN) injection 4 mg  4 mg IntraVENous Q6H PRN    hydrALAZINE (APRESOLINE) tablet 25 mg  25 mg Oral TID PRN        REVIEW OF SYSTEMS    Review of Systems   Constitutional: Positive for malaise/fatigue and weight loss. HENT: Positive for congestion. Respiratory: Negative for cough and shortness of breath. Cardiovascular: Negative for chest pain. Neurological: Positive for weakness. Objective:     Visit Vitals  /87   Pulse 72   Temp 97.8 °F (36.6 °C)   Resp 16   Ht 5' (1.524 m)   Wt 66.2 kg (145 lb 15.1 oz)   SpO2 100%   BMI 28.50 kg/m²      O2 Device: None (Room air)    Temp (24hrs), Av.8 °F (36.6 °C), Min:97.3 °F (36.3 °C), Max:98.2 °F (36.8 °C)      No intake/output data recorded.  1901 -  0700  In: 18 [P.O.:860]  Out: -     PHYSICAL EXAM:    Physical Exam  Constitutional:       Appearance: She is obese. She is ill-appearing. HENT:      Nose: Congestion present. Cardiovascular:      Rate and Rhythm: Normal rate and regular rhythm. Pulmonary:      Effort: No respiratory distress. Neurological:      Motor: Weakness present.       Coordination: Coordination abnormal.          Data Review    Recent Results (from the past 24 hour(s))   GLUCOSE, POC    Collection Time: 22 11:48 AM   Result Value Ref Range    Glucose (POC) 122 (H) 65 - 117 mg/dL    Performed by Micaela Etienne POC    Collection Time: 22  7:42 PM   Result Value Ref Range    Glucose (POC) 86 65 - 117 mg/dL    Performed by Trever Gonzáles    CBC WITH AUTOMATED DIFF    Collection Time: 05/29/22  6:34 AM   Result Value Ref Range    WBC 2.8 (L) 3.6 - 11.0 K/uL    RBC 2.73 (L) 3.80 - 5.20 M/uL    HGB 8.7 (L) 11.5 - 16.0 g/dL    HCT 26.3 (L) 35.0 - 47.0 %    MCV 96.3 80.0 - 99.0 FL    MCH 31.9 26.0 - 34.0 PG    MCHC 33.1 30.0 - 36.5 g/dL    RDW 14.6 (H) 11.5 - 14.5 %    PLATELET 61 (L) 444 - 400 K/uL    MPV 11.6 8.9 - 12.9 FL    NRBC 0.0 0.0  WBC    ABSOLUTE NRBC 0.00 0.00 - 0.01 K/uL    NEUTROPHILS 36 32 - 75 %    LYMPHOCYTES 49 12 - 49 %    MONOCYTES 14 (H) 5 - 13 %    EOSINOPHILS 0 0 - 7 %    BASOPHILS 0 0 - 1 %    IMMATURE GRANULOCYTES 1 (H) 0 - 0.5 %    ABS. NEUTROPHILS 1.0 (L) 1.8 - 8.0 K/UL    ABS. LYMPHOCYTES 1.3 0.8 - 3.5 K/UL    ABS. MONOCYTES 0.4 0.0 - 1.0 K/UL    ABS. EOSINOPHILS 0.0 0.0 - 0.4 K/UL    ABS. BASOPHILS 0.0 0.0 - 0.1 K/UL    ABS. IMM. GRANS. 0.0 0.00 - 0.04 K/UL    DF AUTOMATED     METABOLIC PANEL, COMPREHENSIVE    Collection Time: 05/29/22  6:34 AM   Result Value Ref Range    Sodium 136 136 - 145 mmol/L    Potassium 3.9 3.5 - 5.1 mmol/L    Chloride 108 97 - 108 mmol/L    CO2 26 21 - 32 mmol/L    Anion gap 2 (L) 5 - 15 mmol/L    Glucose 80 65 - 100 mg/dL    BUN 5 (L) 6 - 20 mg/dL    Creatinine 0.72 0.55 - 1.02 mg/dL    BUN/Creatinine ratio 7 (L) 12 - 20      GFR est AA >60 >60 ml/min/1.73m2    GFR est non-AA >60 >60 ml/min/1.73m2    Calcium 8.8 8.5 - 10.1 mg/dL    Bilirubin, total 0.3 0.2 - 1.0 mg/dL    AST (SGOT) 9 (L) 15 - 37 U/L    ALT (SGPT) 20 12 - 78 U/L    Alk.  phosphatase 45 45 - 117 U/L    Protein, total 8.2 6.4 - 8.2 g/dL    Albumin 1.8 (L) 3.5 - 5.0 g/dL    Globulin 6.4 (H) 2.0 - 4.0 g/dL    A-G Ratio 0.3 (L) 1.1 - 2.2     GLUCOSE, POC    Collection Time: 05/29/22  7:56 AM   Result Value Ref Range    Glucose (POC) 78 65 - 117 mg/dL    Performed by Yesy Mcdermott        CT HEAD WO CONT   Final Result   No intracranial hemorrhage or mass effect.                   _____________________________________________________________________________  Time spent in direct care including coordination of service, review of data and examination: > 35 minutes    ______________________________________________________________________________    Tim Hawkins NP    This is dictation was done by [employer] voice recognition software. Quite often unanticipated grammatical, syntax, homophones and other interpretive errors or inadvertently transcribed by the computer software. Please excuse errors that have escaped final proofreading. Thank you.

## 2022-05-29 NOTE — PROGRESS NOTES
Problem: Diabetes Self-Management  Goal: *Disease process and treatment process  Description: Define diabetes and identify own type of diabetes; list 3 options for treating diabetes. Outcome: Progressing Towards Goal  Goal: *Incorporating nutritional management into lifestyle  Description: Describe effect of type, amount and timing of food on blood glucose; list 3 methods for planning meals. Outcome: Progressing Towards Goal  Goal: *Incorporating physical activity into lifestyle  Description: State effect of exercise on blood glucose levels. Outcome: Progressing Towards Goal  Goal: *Developing strategies to promote health/change behavior  Description: Define the ABC's of diabetes; identify appropriate screenings, schedule and personal plan for screenings. Outcome: Progressing Towards Goal  Goal: *Using medications safely  Description: State effect of diabetes medications on diabetes; name diabetes medication taking, action and side effects. Outcome: Progressing Towards Goal  Goal: *Monitoring blood glucose, interpreting and using results  Description: Identify recommended blood glucose targets  and personal targets. Outcome: Progressing Towards Goal  Goal: *Prevention, detection, treatment of acute complications  Description: List symptoms of hyper- and hypoglycemia; describe how to treat low blood sugar and actions for lowering  high blood glucose level. Outcome: Progressing Towards Goal  Goal: *Prevention, detection and treatment of chronic complications  Description: Define the natural course of diabetes and describe the relationship of blood glucose levels to long term complications of diabetes.   Outcome: Progressing Towards Goal  Goal: *Developing strategies to address psychosocial issues  Description: Describe feelings about living with diabetes; identify support needed and support network  Outcome: Progressing Towards Goal  Goal: *Insulin pump training  Outcome: Progressing Towards Goal  Goal: *Sick day guidelines  Outcome: Progressing Towards Goal  Goal: *Patient Specific Goal (EDIT GOAL, INSERT TEXT)  Outcome: Progressing Towards Goal     Problem: Patient Education: Go to Patient Education Activity  Goal: Patient/Family Education  Outcome: Progressing Towards Goal     Problem: Falls - Risk of  Goal: *Absence of Falls  Description: Document Shailesh Lazar Fall Risk and appropriate interventions in the flowsheet.   Outcome: Progressing Towards Goal  Note: Fall Risk Interventions:  Mobility Interventions: Bed/chair exit alarm         Medication Interventions: Bed/chair exit alarm    Elimination Interventions: Bed/chair exit alarm    History of Falls Interventions: Bed/chair exit alarm         Problem: Patient Education: Go to Patient Education Activity  Goal: Patient/Family Education  Outcome: Progressing Towards Goal     Problem: Patient Education: Go to Patient Education Activity  Goal: Patient/Family Education  Outcome: Progressing Towards Goal     Problem: Patient Education: Go to Patient Education Activity  Goal: Patient/Family Education  Outcome: Progressing Towards Goal

## 2022-05-30 NOTE — PROGRESS NOTES
Hospitalist Progress Note    Subjective:   Daily Progress Note: 5/30/2022 9:13 AM    Hospital Course: Patient is a 55 y. o. female with a past medical history significant for diabetes, diabetic neuropathy, AICD placement, multiple myeloma, Heart failure, HTN, thrombocytopenia and GERD. Patient presented to [facility] ED on 5/25/20 for generalized weakness ongoing  for 4 weeks with multiple falls and decreased intake. Patient states she has fallen 4 times in the last week walking around her house, states her legs become weak and then she falls. She denies hitting her head but does report LOC. She was transferred to [facility] on 5/26 due to sepsis with hypotension despite IV fluid resuscitation and findings of UTI.   On 5/26 she also reports epigastric abdominal pain which she states feels like typical pain she has after receiving her chemo injection, which she last received on 5/24. She also reports bilateral hand and bilateral foot pain consistent with her diabetic neuropathy. Reports 30 lb weight loss since she began taking her chemo medication in January. She was placed in the ICU and started on Levophed for blood pressure support and antibiotics for UTI and pneumonia. Hypotension quickly resolving transferred to the floor, IV Zosyn transition to oral Levaquin. PT and OT evaluated patient and recommends SNF versus home with 24/7 assistance and home health PT. Cultures preliminary results from urine revealed GNR's, currently on Levaquin. Waiting for final sensitivity      Subjective: Patient says she had about 5 bouts of loose watery stool yesterday. Has intermittent abdominal pain.   No nausea or vomiting    Current Facility-Administered Medications   Medication Dose Route Frequency    carvediloL (COREG) tablet 25 mg  25 mg Oral BID    *Please  patient's home medications from pharmacy at discharge*  1 Each Other PRIOR TO DISCHARGE    levoFLOXacin (LEVAQUIN) tablet 750 mg  750 mg Oral Q24H    atorvastatin (LIPITOR) tablet 20 mg  20 mg Oral DAILY    dapagliflozin (FARXIGA) tablet 10 mg (Patient Supplied)  10 mg Oral DAILY WITH BREAKFAST    dexAMETHasone (DECADRON) tablet 40 mg  40 mg Oral Q7D    acyclovir (ZOVIRAX) tablet 400 mg  400 mg Oral DAILY    dronabinoL (MARINOL) capsule 5 mg  5 mg Oral BID    magnesium oxide (MAG-OX) tablet 400 mg  400 mg Oral DAILY    metFORMIN (GLUCOPHAGE) tablet 500 mg  500 mg Oral TID WITH MEALS    pantoprazole (PROTONIX) tablet 40 mg  40 mg Oral DAILY    sodium chloride (NS) flush 5-40 mL  5-40 mL IntraVENous PRN    acetaminophen (TYLENOL) tablet 650 mg  650 mg Oral Q6H PRN    Or    acetaminophen (TYLENOL) suppository 650 mg  650 mg Rectal Q6H PRN    polyethylene glycol (MIRALAX) packet 17 g  17 g Oral DAILY PRN    ondansetron (ZOFRAN ODT) tablet 4 mg  4 mg Oral Q8H PRN    Or    ondansetron (ZOFRAN) injection 4 mg  4 mg IntraVENous Q6H PRN    hydrALAZINE (APRESOLINE) tablet 25 mg  25 mg Oral TID PRN        Review of Systems  Constitutional: No fevers, No chills, No sweats, No fatigue, No Weakness  Eyes: No redness  Ears, nose, mouth, throat, and face: No nasal congestion, No sore throat, No voice change  Respiratory: No Shortness of Breath, No cough, No wheezing  Cardiovascular: No chest pain, No palpitations, No extremity edema  Gastrointestinal: No nausea, No vomiting, ++ diarrhea, ++ abdominal pain  Genitourinary: No frequency, No dysuria, No hematuria  Integument/breast: No skin lesion(s)   Neurological: No Confusion, No headaches, No dizziness      Objective:     Visit Vitals  /70 (BP 1 Location: Right upper arm, BP Patient Position: At rest)   Pulse 70   Temp 98 °F (36.7 °C)   Resp 20   Ht 5' (1.524 m)   Wt 66.2 kg (145 lb 15.1 oz)   SpO2 99%   BMI 28.50 kg/m²      O2 Device: None (Room air)    Temp (24hrs), Av.9 °F (36.6 °C), Min:97.4 °F (36.3 °C), Max:98 °F (36.7 °C)      No intake/output data recorded.   No intake/output data recorded. PHYSICAL EXAM:  Constitutional: No acute distress  Skin: Extremities and face reveal no rashes. HEENT: Sclerae anicteric. Extra-occular muscles are intact. No oral ulcers. The neck is supple and no masses. Cardiovascular: Regular rate and rhythm. Respiratory:  Clear breath sounds bilaterally with no wheezes, rales, or rhonchi. GI: Abdomen nondistended, soft, and nontender. Normal active bowel sounds. Musculoskeletal: No pitting edema of the lower legs. Able to move all ext  Neurological:  Patient is alert and oriented. Cranial nerves II-XII grossly intact  Psychiatric: Mood appears appropriate       Data Review    Recent Results (from the past 24 hour(s))   GLUCOSE, POC    Collection Time: 05/29/22 11:02 AM   Result Value Ref Range    Glucose (POC) 119 (H) 65 - 117 mg/dL    Performed by Madyson Perrin, POC    Collection Time: 05/29/22  4:35 PM   Result Value Ref Range    Glucose (POC) 106 65 - 117 mg/dL    Performed by Lupe Eckert    GLUCOSE, POC    Collection Time: 05/29/22  7:58 PM   Result Value Ref Range    Glucose (POC) 102 65 - 117 mg/dL    Performed by Gera Garvin    CBC WITH AUTOMATED DIFF    Collection Time: 05/30/22  6:22 AM   Result Value Ref Range    WBC 2.2 (L) 3.6 - 11.0 K/uL    RBC 2.57 (L) 3.80 - 5.20 M/uL    HGB 8.1 (L) 11.5 - 16.0 g/dL    HCT 24.9 (L) 35.0 - 47.0 %    MCV 96.9 80.0 - 99.0 FL    MCH 31.5 26.0 - 34.0 PG    MCHC 32.5 30.0 - 36.5 g/dL    RDW 14.6 (H) 11.5 - 14.5 %    PLATELET 60 (L) 952 - 400 K/uL    MPV 11.1 8.9 - 12.9 FL    NRBC 0.0 0.0  WBC    ABSOLUTE NRBC 0.00 0.00 - 0.01 K/uL    NEUTROPHILS 32 32 - 75 %    LYMPHOCYTES 51 (H) 12 - 49 %    MONOCYTES 14 (H) 5 - 13 %    EOSINOPHILS 0 0 - 7 %    BASOPHILS 1 0 - 1 %    IMMATURE GRANULOCYTES 2 (H) 0 - 0.5 %    ABS. NEUTROPHILS 0.7 (L) 1.8 - 8.0 K/UL    ABS. LYMPHOCYTES 1.1 0.8 - 3.5 K/UL    ABS. MONOCYTES 0.3 0.0 - 1.0 K/UL    ABS. EOSINOPHILS 0.0 0.0 - 0.4 K/UL    ABS.  BASOPHILS 0.0 0.0 - 0.1 K/UL    ABS. IMM. GRANS. 0.0 0.00 - 0.04 K/UL    DF AUTOMATED     METABOLIC PANEL, COMPREHENSIVE    Collection Time: 05/30/22  6:22 AM   Result Value Ref Range    Sodium 140 136 - 145 mmol/L    Potassium 3.7 3.5 - 5.1 mmol/L    Chloride 108 97 - 108 mmol/L    CO2 29 21 - 32 mmol/L    Anion gap 3 (L) 5 - 15 mmol/L    Glucose 92 65 - 100 mg/dL    BUN 6 6 - 20 mg/dL    Creatinine 0.74 0.55 - 1.02 mg/dL    BUN/Creatinine ratio 8 (L) 12 - 20      GFR est AA >60 >60 ml/min/1.73m2    GFR est non-AA >60 >60 ml/min/1.73m2    Calcium 9.0 8.5 - 10.1 mg/dL    Bilirubin, total 0.4 0.2 - 1.0 mg/dL    AST (SGOT) 12 (L) 15 - 37 U/L    ALT (SGPT) 24 12 - 78 U/L    Alk. phosphatase 39 (L) 45 - 117 U/L    Protein, total 8.1 6.4 - 8.2 g/dL    Albumin 3.2 (L) 3.5 - 5.0 g/dL    Globulin 4.9 (H) 2.0 - 4.0 g/dL    A-G Ratio 0.7 (L) 1.1 - 2.2     GLUCOSE, POC    Collection Time: 05/30/22  7:57 AM   Result Value Ref Range    Glucose (POC) 92 65 - 117 mg/dL    Performed by Leopoldo Torres        Assessment:   1. Septic shock secondary to right lower lobe pneumonia/UTI  2. Generalized weakness  3. Acute kidney injury  4. Thrombocytopenia/multiple myeloma  5. Diabetes  6. GERD  7. Peripheral neuropathy  8. Diastolic heart failure with preserved ejection fraction  7. Hyperlipidemia  9. Hypoalbuminemia    Plan:   1. Patient was initially in the ICU and on pressors and IV Zosyn. Patient slowly weaned off of pressors. Urine culture positive for gram-negative rods. Waiting for sensitivity. On oral Levaquin. WBC 2.2. Afebrile. Lactic acid 0.7, procalcitonin 0.43.  2.  PT and OT. They recommended 24/7 assistance with home with home health PT versus SNF. However patient will go home to live with her mother and sister will provide physical therapy for her  3. Hold nephrotoxic medications. Renal function and electrolytes are stable. 4.  Platelet count stable at 60. On steroids. No signs of active bleeding. Hemoglobin 8.1.   On dronabinol prn  5. Glucose levels are stable. On metformin 500 mg 3 times daily and dapagliflozin  6. Protonix  7. We will start gabapentin 1 mg at night  8. No signs of fluid overload. 2D echo showed a decrease in EF of 45 to 50%. Patient on Coreg and statin. Hold aspirin due to thrombocytopenia. No ACE due to YANETH  9. On statin  10. Given IV albumin for 4 doses. 11.  CBC BMP in a.m. Dispo: 24 hours. Barriers include urine culture sensitivity. Patient will go home with home health and 24/7 care    CODE STATUS Full     DVT prophylaxis: SCD  Ulcer prophylaxis: Protonix    Care Plan discussed with: Patient/Family, Nurse and     Total time spent with patient: 34 minutes.

## 2022-05-31 NOTE — PROGRESS NOTES
Problem: Diabetes Self-Management  Goal: *Incorporating physical activity into lifestyle  Description: State effect of exercise on blood glucose levels.   Outcome: Progressing Towards Goal

## 2022-05-31 NOTE — PROGRESS NOTES
Patient left in stable condition with all belongings. Medication retrieved from pharmacy. Patient reviewed medication in bag to make sure right medication were given. Patient states that all medications in the bag are hers.

## 2022-05-31 NOTE — DISCHARGE SUMMARY
Hospitalist Discharge Summary     Patient ID:    Etienne Clark  [id number]  89 y.o.  1965    Admit date: 5/26/2022    Discharge date : 5/31/2022    Chronic Diagnoses:    Problem List as of 5/31/2022 Date Reviewed: 5/26/2022          Codes Class Noted - Resolved    Dehydration ICD-10-CM: E86.0  ICD-9-CM: 276.51  5/26/2022 - Present        Multiple myeloma ([place] [address].) ICD-10-CM: C90.00  ICD-9-CM: 203.00  5/26/2022 - Present        UTI (urinary tract infection) ICD-10-CM: N39.0  ICD-9-CM: 599.0  5/26/2022 - Present        Septic shock ([facility].) ICD-10-CM: A41.9, R65.21  ICD-9-CM: 038.9, 785.52, 995.92  5/26/2022 - Present        Cardiomyopathy ([place] [address].) ICD-10-CM: I42.9  ICD-9-CM: 425.4  5/26/2022 - Present        Pancytopenia ([facility].) ICD-10-CM: A51.355  ICD-9-CM: 284.19  1/20/2022 - Present        Anemia ICD-10-CM: D64.9  ICD-9-CM: 285.9  12/23/2021 - Present        YANETH (acute kidney injury) ([place] [address] ICD-10-CM: N17.9  ICD-9-CM: 584.9  10/7/2021 - Present        RESOLVED: Pneumonia due to COVID-19 virus ICD-10-CM: U07.1, J12.82  ICD-9-CM: 480.8, 079.89  1/20/2022 - 5/26/2022        RESOLVED: Heme positive stool ICD-10-CM: R19.5  ICD-9-CM: 792.1  10/7/2021 - 5/26/2022        RESOLVED: Symptomatic anemia ICD-10-CM: D64.9  ICD-9-CM: 285.9  10/7/2021 - 5/26/2022          22    Final Diagnoses:   1. Septic shock secondary to right lower lobe pneumonia/UTI  2. Generalized weakness  3. Acute kidney injury  4. Thrombocytopenia/multiple myeloma  5. Diabetes  6. GERD  7. Peripheral neuropathy  8. Diastolic heart failure with preserved ejection fraction  7. Hyperlipidemia  9. Hypoalbuminemia    Reason for Hospitalization: Weakness and Falls      Hospital Course: Patient is a 55 y. o. female with a past medical history significant for diabetes, diabetic neuropathy, AICD placement, multiple myeloma, Heart failure, HTN, thrombocytopenia and GERD.  Patient presented to [facility] ED on 5/25/20 for generalized weakness ongoing  for 4 weeks with multiple falls and decreased intake. Patient states she has fallen 4 times in the last week walking around her house, states her legs become weak and then she falls. She denies hitting her head but does report LOC. She was transferred to [facility] on 5/26 due to sepsis with hypotension despite IV fluid resuscitation and findings of UTI.   On 5/26 she also reports epigastric abdominal pain which she states feels like typical pain she has after receiving her chemo injection, which she last received on 5/24. She also reports bilateral hand and bilateral foot pain consistent with her diabetic neuropathy. Reports 30 lb weight loss since she began taking her chemo medication in January. She was placed in the ICU and started on Levophed for blood pressure support and antibiotics for UTI and pneumonia.  Hypotension quickly resolving transferred to the floor, IV Zosyn transition to oral Levaquin.  PT and OT evaluated patient and recommends SNF versus home with 24/7 assistance and home health PT. urine culture positive for E. coli and pansensitive. Will transition to oral levofloxacin for 5 more days to complete a 10-day course. Patient request to be discharged home and she has 24/7 assistance with her mother. If patient is agreeable discharge with home health      Discharge Medications:   Current Discharge Medication List      START taking these medications    Details   levoFLOXacin (LEVAQUIN) 750 mg tablet Take 1 Tablet by mouth every twenty-four (24) hours for 5 days. Qty: 5 Tablet, Refills: 0  Start date: 5/31/2022, End date: 6/5/2022         CONTINUE these medications which have NOT CHANGED    Details   dronabinoL (MARINOL) 5 mg capsule Take 5 mg by mouth two (2) times a day. acyclovir (ZOVIRAX) 400 mg tablet Take 400 mg by mouth daily. dexAMETHasone (DECADRON) 4 mg tablet Take 40 mg by mouth every seven (7) days.  On Monday      Revlimid 15 mg cap pantoprazole (PROTONIX) 40 mg tablet Take 1 Tablet by mouth daily. Qty: 30 Tablet, Refills: 0      metFORMIN (GLUCOPHAGE) 500 mg tablet Take  by mouth three (3) times daily (with meals). carvediloL (Coreg) 25 mg tablet Take 25 mg by mouth two (2) times a day. magnesium oxide (MAG-OX) 400 mg tablet Take 400 mg by mouth four (4) times daily. atorvastatin (LIPITOR) 20 mg tablet Take 20 mg by mouth daily. dapagliflozin (Farxiga) 10 mg tab tablet Take 10 mg by mouth daily. STOP taking these medications       cholecalciferol, vitamin d3, (Vitamin D3) 10 mcg (400 unit) cap Comments:   Reason for Stopping:         losartan (COZAAR) 25 mg tablet Comments:   Reason for Stopping: Follow up Care:    1. Gris Tidwell PA-C in 1-2 weeks. Follow-up Information     Follow up With Specialties Details Why Contact Info    Gris Tidwell PA-C Physician Assistant   [address]   [address]  [phone]              Patient Follow Up Instructions: Activity: Activity as tolerated  Diet:  Diabetic Diet  Wound care: None Needed    Condition at Discharge:  Stable  __________________________________________________________________    Disposition  Home with family and home health services  ____________________________________________________________________    Code Status: Full Code  ___________________________________________________________________    Discharge Exam:  Patient seen and examined by me on discharge day. Pertinent Findings:  Gen:    Not in distress  Chest: Clear lungs  CVS:   Regular rhythm. No edema  Abd:  Soft, not distended, not tender  Neuro:  Alert    CONSULTATIONS: None    Significant Diagnostic Studies:   5/26/2022: BUN 17 mg/dL (Ref range: 6 - 20 mg/dL); Calcium 8.2 mg/dL (L; Ref range: 8.5 - 10.1 mg/dL); CO2 26 mmol/L (Ref range: 21 - 32 mmol/L); Creatinine 1.30 mg/dL (H; Ref range: 0.55 - 1.02 mg/dL);  Glucose 165 mg/dL (H; Ref range: 65 - 100 mg/dL); HCT 28.0 % (L; Ref range: 36 - 46 %); HGB 9.1 g/dL (L; Ref range: 13.5 - 17.5 g/dL); Potassium 3.9 mmol/L (Ref range: 3.5 - 5.1 mmol/L); Sodium 133 mmol/L (L; Ref range: 136 - 145 mmol/L)  5/27/2022: BUN 9 mg/dL (Ref range: 6 - 20 mg/dL); Calcium 8.3 mg/dL (L; Ref range: 8.5 - 10.1 mg/dL); CO2 26 mmol/L (Ref range: 21 - 32 mmol/L); Creatinine 0.70 mg/dL (Ref range: 0.55 - 1.02 mg/dL); Glucose 97 mg/dL (Ref range: 65 - 100 mg/dL); HCT 25.7 % (L; Ref range: 35.0 - 47.0 %); HGB 8.3 g/dL (L; Ref range: 11.5 - 16.0 g/dL); Potassium 3.6 mmol/L (Ref range: 3.5 - 5.1 mmol/L); Sodium 138 mmol/L (Ref range: 136 - 145 mmol/L)  Recent Labs     05/30/22 0622 05/29/22  0634   WBC 2.2* 2.8*   HGB 8.1* 8.7*   HCT 24.9* 26.3*   PLT 60* 61*     Recent Labs     05/31/22 0622 05/30/22 0622 05/29/22  0634    140 136   K 3.8 3.7 3.9    108 108   CO2 26 29 26   BUN 14 6 5*   CREA 0.89 0.74 0.72   GLU 97 92 80   CA 9.5 9.0 8.8     Recent Labs     05/30/22 0622 05/29/22  0634   ALT 24 20   AP 39* 45   TBILI 0.4 0.3   TP 8.1 8.2   ALB 3.2* 1.8*   GLOB 4.9* 6.4*     No results for input(s): INR, PTP, APTT, INREXT in the last 72 hours. No results for input(s): FE, TIBC, PSAT, FERR in the last 72 hours. No results for input(s): PH, PCO2, PO2 in the last 72 hours. No results for input(s): CPK, CKMB in the last 72 hours.     No lab exists for component: TROPONINI  Lab Results   Component Value Date/Time    Glucose (POC) 103 05/30/2022 08:46 PM    Glucose (POC) 135 (H) 05/30/2022 04:35 PM    Glucose (POC) 165 (H) 05/30/2022 11:55 AM    Glucose (POC) 92 05/30/2022 07:57 AM    Glucose (POC) 102 05/29/2022 07:58 PM         Total Time: >30 min     Signed:  Robert Goldman PA-C  5/31/2022  8:35 AM

## 2022-05-31 NOTE — PROGRESS NOTES
CM met with patient to discuss DCP and choices for [facility] based on therapy recommendations. Patient declines to have [facility] at this time. She says she will be home with her mother who will assist her.      [address], [address]

## 2022-05-31 NOTE — DISCHARGE INSTRUCTIONS
Hospitalist Discharge Instructions     Patient ID:    Wen Rielly  [id number]  73 y.o.  1965    Admit date: 5/26/2022    Discharge date : 5/31/2022    Final Diagnoses:   1. Septic shock secondary to right lower lobe pneumonia/UTI  2. Generalized weakness  3. Acute kidney injury  4. Thrombocytopenia/multiple myeloma  5. Diabetes  6. GERD  7. Peripheral neuropathy  8. Diastolic heart failure with preserved ejection fraction  7. Hyperlipidemia  9. Hypoalbuminemia    Reason for Hospitalization: Weakness and Falls    Discharge Medications:   Current Discharge Medication List      START taking these medications    Details   levoFLOXacin (LEVAQUIN) 750 mg tablet Take 1 Tablet by mouth every twenty-four (24) hours for 5 days. Qty: 5 Tablet, Refills: 0  Start date: 5/31/2022, End date: 6/5/2022         CONTINUE these medications which have NOT CHANGED    Details   dronabinoL (MARINOL) 5 mg capsule Take 5 mg by mouth two (2) times a day. acyclovir (ZOVIRAX) 400 mg tablet Take 400 mg by mouth daily. dexAMETHasone (DECADRON) 4 mg tablet Take 40 mg by mouth every seven (7) days. On Monday      Revlimid 15 mg cap       pantoprazole (PROTONIX) 40 mg tablet Take 1 Tablet by mouth daily. Qty: 30 Tablet, Refills: 0      metFORMIN (GLUCOPHAGE) 500 mg tablet Take  by mouth three (3) times daily (with meals). carvediloL (Coreg) 25 mg tablet Take 25 mg by mouth two (2) times a day. magnesium oxide (MAG-OX) 400 mg tablet Take 400 mg by mouth four (4) times daily. atorvastatin (LIPITOR) 20 mg tablet Take 20 mg by mouth daily. dapagliflozin (Farxiga) 10 mg tab tablet Take 10 mg by mouth daily. STOP taking these medications       cholecalciferol, vitamin d3, (Vitamin D3) 10 mcg (400 unit) cap Comments:   Reason for Stopping:         losartan (COZAAR) 25 mg tablet Comments:   Reason for Stopping: Follow up Care:    1.  Ashley Hebert, DREW in 1-2 weeks. Follow-up Information     Follow up With Specialties Details Why Contact Info    Arvel Moritz, PA-C Physician Assistant   [address]   [address]  [phone]              Patient Follow Up Instructions:    Activity: Activity as tolerated  Diet:  Diabetic Diet  Wound care: None Needed    Condition at Discharge:  Stable  __________________________________________________________________    Disposition  Home with family and home health services  ____________________________________________________________________    Code Status: Full Code  ___________________________________________________________________    CONSULTATIONS: None    Padilla Leigh PA-C  5/31/2022  8:35 AM

## 2022-05-31 NOTE — PROGRESS NOTES
PHYSICAL THERAPY TREATMENT  Patient: Rosemarie Davis (89 y.o. female)  Date: 5/31/2022  Diagnosis: Septic shock ([facility] [A41.9, R65.21] <principal problem not specified>       Precautions:    Chart, physical therapy assessment, plan of care and goals were reviewed. ASSESSMENT  Patient continues with skilled PT services and is progressing towards goals. Patient seated EOB having just finished breakfast and agreed to therapy today. Patient was AXO times 4. Patient was supervision for bed mobility, sit>supine and scooting to EOB. Patient was then CGA for STS to RW and ambulated for 40 feet with CGA and RW. Patient had slow steady gait with a mixture of step to and step through gait pattern. Patient had no LOB or knee buckling with gait. Patient then performed seated TE ( see details below). Patient verbalized understanding needing to perform TE throughout the day to improve strength and mobility. Patient then supervision to return to bed at via sit>supine tranfser. Patient then left supine in bed with call bell within reach and  all needs meet. Patient nurse was alerted to patient requesting pain medications. Current Level of Function Impacting Discharge (mobility/balance): impaired balance, general weakness     Other factors to consider for discharge: PLOF, assistance at home, level of deficits. Acute medical state         PLAN :  Patient continues to benefit from skilled intervention to address the above impairments. Continue treatment per established plan of care. to address goals.     Recommendation for discharge: (in order for the patient to meet his/her long term goals)  Home with [address] [place] [address] and 24/7 care    This discharge recommendation:  Has been made in collaboration with the attending provider and/or case management    IF patient discharges home will need the following DME: rolling walker       SUBJECTIVE:   Patient stated Gaviota Monroe going to my moms house after this ill be find there.    OBJECTIVE DATA SUMMARY:   Critical Behavior:  Neurologic State: Alert  Orientation Level: Oriented X4  Cognition: Appropriate decision making,Follows commands     Functional Mobility Training:  Bed Mobility:  Rolling: Supervision  Supine to Sit: Supervision  Sit to Supine: Supervision  Scooting: Supervision  Transfers:  Sit to Stand: Contact guard assistance  Stand to Sit: Contact guard assistance  Balance:  Sitting: Intact; Without support  Standing: Impaired; With support  Standing - Static: Constant support;Good  Standing - Dynamic : Constant support; Fair  Ambulation/Gait Training:  Distance (ft): 40 Feet (ft)  Ambulation - Level of Assistance: Contact guard assistance  Assistive device: Rolling walker  Therapeutic Exercises:   1x15 AP  1x15 LAQ  1x15 seated marches  1x15 hip ADD/ABD  Pain Ratin/10 in fingers and toes KIRSTY    Activity Tolerance:   Good  Please refer to the flowsheet for vital signs taken during this treatment. After treatment patient left in no apparent distress:   Supine in bed and Call bell within reach    COMMUNICATION/COLLABORATION:   The patients plan of care was discussed with: Registered nurse. Problem: Mobility Impaired (Adult and Pediatric)  Goal: *Acute Goals and Plan of Care (Insert Text)  Description:   Pt will perform bed mobility with mod I in 7 days. Pt will perform transfers with mod I in 7 days. Pt will amb 100 feet with LRAD safely with mod I in 7 days. Pt will ascend/descend 3 steps with B handrails and CGA in 7 days to safely enter home.    Outcome: Progressing Towards Goal       Neville Banerjee PTA   Time Calculation: 20 mins

## 2022-05-31 NOTE — PROGRESS NOTES
Discharge plan of care/case management plan validated with provider discharge order. All discharge instruction reviewed with patient. Patient states that she understand all information given to her. She's awaiting family to come pick her up.

## 2022-05-31 NOTE — PROGRESS NOTES
Problem: Diabetes Self-Management  Goal: *Using medications safely  Description: State effect of diabetes medications on diabetes; name diabetes medication taking, action and side effects. Outcome: Progressing Towards Goal     Problem: Falls - Risk of  Goal: *Absence of Falls  Description: Document Eliud Cashs Fall Risk and appropriate interventions in the flowsheet.   Outcome: Progressing Towards Goal  Note: Fall Risk Interventions:  Mobility Interventions: Bed/chair exit alarm         Medication Interventions: Bed/chair exit alarm    Elimination Interventions: Bed/chair exit alarm    History of Falls Interventions: Bed/chair exit alarm

## 2022-08-30 PROBLEM — E87.5 HYPERKALEMIA: Status: ACTIVE | Noted: 2022-01-01

## 2022-08-30 PROBLEM — E83.52 HYPERCALCEMIA: Status: ACTIVE | Noted: 2022-01-01

## 2022-08-30 NOTE — PROGRESS NOTES
Rounding on [address] [place]/Acute Care Unit and attempted to visit with patient, however, patient was sleeping soundly. Rev.  Waleska Zhao 04, [address]

## 2022-08-30 NOTE — ROUTINE PROCESS
Tenderness in  mid abdomin on palpation. No resp distress. IVF infusing. Poor appetite. No n/v noted at this time. Been up to [facility] to void. Back sitting up in bed. HOB elevated. Family in at bedside to visit and provide support. Pt on RA.  No shortness of breath noted at rest.

## 2022-08-30 NOTE — ROUTINE PROCESS
Bedside shift change report given to Summer Cedeno LPN (oncoming nurse) by Larissa Rodarte RN (offgoing nurse). Report included the following information Kardex.

## 2022-08-30 NOTE — ROUTINE PROCESS
Verbal report received from Blaine Pearson (name) on Geryl Lob  being received from ED(unit) for routine progression of care      Report consisted of patients Situation, Background, Assessment and   Recommendations(SBAR). Information from the following report(s) Kardex was reviewed with the receiving nurse. Opportunity for questions and clarification was provided. Assessment completed upon patients arrival to unit and care assumed.

## 2022-08-30 NOTE — PROGRESS NOTES
Patients case reviewed during interdisciplinary team meeting in [address] [place] [address]/Acute Care Unit. Rev.  Waleska Hernandez 05, [address]

## 2022-08-30 NOTE — H&P
History and Physical    Date of Service:  8/30/2022  Primary Care Provider: Ferdinand Gomez PA-C  Source of information: The patient and Chart review    Chief Complaint: Abdominal Pain (Pt reports constant mid epigastric pain x 3 days. Pt denies any radiation of pain, no reports of n/v/d. No fever or chills reported also. )      History of Presenting Illness:   Jessica Aguilera is a 55 y.o. female who presents with epigastric abdominal pain of 3 days duration. Patient denies any nausea or vomiting but does report decreased p.o. intake. Patient has had prior history of multiple urinary tract infection with sepsis, recently admitted at [facility] in May. Upon presentation to the ER, urinalysis shows large leukocyte esterase although urine leukocyte count was not that remarkable. Patient also with known history of multiple myeloma, following with hematology. Patient's calcium level on presentation to the ER was 14.6. Also was noted low sodium of 128. Patient was admitted for further management. The patient denies any headache, blurry vision, sore throat, trouble swallowing, trouble with speech, chest pain, SOB, cough, fever, chills, N/V/D, abd pain, urinary symptoms, constipation, recent travels, sick contacts, focal or generalized neurological symptoms, falls, injuries, rashes, contact with COVID-19 diagnosed patients, hematemesis, melena, hemoptysis, hematuria, rashes, denies starting any new medications and denies any other concerns or problems besides as mentioned above. REVIEW OF SYSTEMS:  A comprehensive review of systems was negative except for that written in the History of Present Illness.      Past Medical History:   Diagnosis Date    AICD (automatic cardioverter/defibrillator) present 12/2020    Anemia     Cardiomyopathy ([address] 08/2020    Diabetes ([address]     GERD (gastroesophageal reflux disease)     Heart failure ([address]     Hypertension     Multiple myeloma ([address]     Sleep apnea     cpap    Thrombocytopenia (HCC)       Past Surgical History:   Procedure Laterality Date    COLONOSCOPY N/A 11/17/2021    COLONOSCOPY ( T I V A) performed by Crissy Albright MD at [facility] ENDOSCOPY    ENDOSCOPY VISIT-OUTPATIENT  10/08/2021    HX COLONOSCOPY      HX HEART CATHETERIZATION      x 2     HX HERNIA REPAIR      HX HYSTERECTOMY      HX IMPLANTABLE CARDIOVERTER DEFIBRILLATOR      HX OOPHORECTOMY      HX PACEMAKER      pacemaker, defib    HX SVT ABLATION       Prior to Admission medications    Medication Sig Start Date End Date Taking? Authorizing Provider   potassium chloride (K-DUR, KLOR-CON M20) 20 mEq tablet Take 20 mEq by mouth daily. Yes Provider, Historical   LOSARTAN POTASSIUM, BULK, Take 12.5 mg by mouth daily. Yes Provider, Historical   acyclovir (ZOVIRAX) 400 mg tablet Take 400 mg by mouth daily. 1/18/22  Yes Provider, Historical   dexAMETHasone (DECADRON) 4 mg tablet Take 40 mg by mouth every seven (7) days. On Monday 1/18/22  Yes Provider, Historical   pantoprazole (PROTONIX) 40 mg tablet Take 1 Tablet by mouth daily. Patient taking differently: Take 20 mg by mouth daily. 10/24/21  Yes Penny Mosley MD   metFORMIN (GLUCOPHAGE) 500 mg tablet Take  by mouth three (3) times daily (with meals). Yes Provider, Historical   carvediloL (COREG) 25 mg tablet Take 25 mg by mouth two (2) times a day. Yes Provider, Historical   magnesium oxide (MAG-OX) 400 mg tablet Take 400 mg by mouth four (4) times daily. Yes Provider, Historical   atorvastatin (LIPITOR) 20 mg tablet Take 20 mg by mouth daily. Yes Provider, Historical   dapagliflozin (FARXIGA) 10 mg tab tablet Take 10 mg by mouth daily. Yes Provider, Historical   dronabinoL (MARINOL) 5 mg capsule Take 5 mg by mouth two (2) times a day.     Provider, Historical   Revlimid 15 mg cap  1/18/22   Provider, Historical     No Known Allergies   Family History   Problem Relation Age of Onset    Breast Cancer Sister     Diabetes Mother Hypertension Mother     Elevated Lipids Mother     Heart Surgery Father       Social History:  reports that she has never smoked. She has never used smokeless tobacco. She reports that she does not drink alcohol and does not use drugs. Family and social history were personally reviewed, all pertinent and relevant details are outlined as above. Objective:   Visit Vitals  BP (!) 99/59 (BP 1 Location: Right upper arm, BP Patient Position: At rest;Lying left side)   Pulse 89   Temp 97.8 °F (36.6 °C)   Resp 18   Ht 5' (1.524 m)   Wt 57.9 kg (127 lb 11.2 oz)   SpO2 98%   BMI 24.94 kg/m²      O2 Device: None (Room air)    PHYSICAL EXAM:   General: Alert x oriented x 3, awake, no acute distress, resting in bed, pleasant female, appears to be stated age  HEENT: PEERL, EOMI, moist mucus membranes  Neck: Supple, no JVD, no meningeal signs  Chest: Clear to auscultation bilaterally   CVS: RRR, S1 S2 heard, no murmurs/rubs/gallops  Abd: Soft, non-tender, non-distended, +bowel sounds   Ext: No clubbing, no cyanosis, no edema  Neuro/Psych: Pleasant mood and affect, CN 2-12 grossly intact, sensory grossly within normal limit, Strength 5/5 in all extremities, DTR 1+ x 4  Cap refill: Brisk, less than 3 seconds  Pulses: 2+, symmetric in all extremities  Skin: Warm, dry, without rashes or lesions    Data Review: All diagnostic labs and studies have been reviewed. Abnormal Labs Reviewed   CBC WITH AUTOMATED DIFF - Abnormal; Notable for the following components:       Result Value    WBC 12.1 (*)     RBC 3.14 (*)     HGB 11.0 (*)     HCT 33.4 (*)     .5 (*)     MCH 35.1 (*)     RDW 15.6 (*)     PLATELET 80 (*)     ABS.  MONOCYTES 3.2 (*)     LYMPHOCYTES 14 (*)     MONOCYTES 26 (*)     EOSINOPHILS 0 (*)     All other components within normal limits   METABOLIC PANEL, COMPREHENSIVE - Abnormal; Notable for the following components:    Sodium 128 (*)     Creatinine 1.38 (*)     BUN/Creatinine ratio 8 (*)     GFR est AA 48 (*)     GFR est non-AA 40 (*)     Calcium 14.6 (*)     Protein, total 9.4 (*)     Albumin 2.4 (*)     Globulin 7.0 (*)     A-G Ratio 0.3 (*)     All other components within normal limits   LIPASE - Abnormal; Notable for the following components:    Lipase 45 (*)     All other components within normal limits   URINALYSIS W/ RFLX MICROSCOPIC - Abnormal; Notable for the following components:    Glucose 100 (*)     Leukocyte Esterase Large (*)     All other components within normal limits   URINE MICROSCOPIC - Abnormal; Notable for the following components:    Bacteria 3+ (*)     All other components within normal limits       All Micro Results       None            IMAGING:   No orders to display        ECG/ECHO:    Results for orders placed or performed during the hospital encounter of 08/30/22   EKG, 12 LEAD, INITIAL   Result Value Ref Range    Ventricular Rate 90 BPM    Atrial Rate 90 BPM    P-R Interval 165 ms    QRS Duration 123 ms    Q-T Interval 401 ms    QTC Calculation (Bezet) 491 ms    Calculated P Axis -21 degrees    Calculated R Axis 5 degrees    Calculated T Axis 132 degrees    Diagnosis       Atrial-sensed ventricular-paced rhythm  No further analysis attempted due to paced rhythm  Baseline wander in lead(s) II,aVF,V1,V2,V3,V4,V5,V6          Assessment:   Given the patient's current clinical presentation, there is a high level of concern for decompensation if discharged from the emergency department. Complex decision making was performed, which includes reviewing the patient's available past medical records, laboratory results, and imaging studies.     Active Problems:    Hypercalcemia (8/30/2022)      Plan:     Acute hypercalcemia  -This could be due to dehydration in the setting of multiple myeloma  -Given severe hypercalcemia, I will start treatment with calcitonin 4 mg/kg every 12 hours to immediately lower calcium level  -Also starting pamidronate infusion which will help lower serum calcium in the next 24 to 48 hours  -Aggressive IV fluid hydration is limited in this patient with cardiomyopathy, repeat calcium level this afternoon  -Check PTH and TSH, checking with pharmacy which bisphosphonate is available for management of hypercalcemia    Urinary tract infection  -Patient with large leukocyte esterase in UA concerning for infection  -Check urine culture and start Rocephin    YANETH  -Serum creatinine has bumped from her baseline  -This is likely due to prerenal cause from dehydration due to poor p.o. intake  -IV fluid therapy and monitor renal function    Hyponatremia  -Likely hypovolemic hyponatremia  -Continue normal saline and monitor sodium level    Ischemic cardiomyopathy  -Patient with ischemic cardiomyopathy with EF of 45 to 50% on the last echocardiogram May 2022, s/p AICD placement  -Does not appear decompensated  -Careful IV fluid hydration for hypercalcemia, monitor for fluid overload  -Continue losartan, Coreg and Farxiga    Diabetes type 2  -Continue metformin and Farxiga  -Start sliding scale insulin coverage    GERD  -Continue PPI    Multiple myeloma  -Patient receiving chemotherapy as outpatient  -Outpatient follow-up with hematology    Estimated length of stay is 2 midnights. DIET: ADULT DIET Regular; 3 carb choices (45 gm/meal)   ISOLATION PRECAUTIONS: There are currently no Active Isolations  CODE STATUS: Full Code   DVT PROPHYLAXIS: SCDs  FUNCTIONAL STATUS PRIOR TO HOSPITALIZATION: Fully active and ambulatory; able to carry on all self-care without restriction. EARLY MOBILITY ASSESSMENT: Recommend routine ambulation while hospitalized with the assistance of nursing staff  ANTICIPATED DISCHARGE: 24-48 hours. EMERGENCY CONTACT/SURROGATE DECISION MAKER:     CRITICAL CARE WAS PERFORMED FOR THIS ENCOUNTER: NO.      Signed By: Inés Norton MD     August 30, 2022         Please note that this dictation may have been completed with Dragon, the [employer] voice recognition software.   Quite often unanticipated grammatical, syntax, homophones, and other interpretive errors are inadvertently transcribed by the computer software. Please disregard these errors. Please excuse any errors that have escaped final proofreading.

## 2022-08-30 NOTE — ED PROVIDER NOTES
EMERGENCY DEPARTMENT HISTORY AND PHYSICAL EXAM  ?    Date: 8/30/2022  Patient Name: Cassidy Wynn    History of Presenting Illness    Patient presents with:  Abdominal Pain: Pt reports constant mid epigastric pain x 3 days. Pt denies any radiation of pain, no reports of n/v/d. No fever or chills reported also. History Provided By: Patient    HPI: Cassidy Wynn, 55 y.o. female with a past medical history significant diabetes, hypertension, and AICD and GERD, presents to the ED with cc of epigastric abdominal pain for 2 days. Pain is constant. She rates the pain 8 out of 10. Pain does not radiate. She has had poor appetite. She denies nausea or vomiting, or tarry stools. She has history of GERD, determined by endoscopy. There are no other complaints, changes, or physical findings at this time. PCP: Johana Mullins PA-C    Current Facility-Administered Medications:  pantoprazole (PROTONIX) 40 mg in 0.9% sodium chloride 10 mL injection, 40 mg, IntraVENous, NOW, Melina Cartwright MD  alum-mag hydroxide-simeth (MYLANTA) oral suspension 30 mL, 30 mL, Oral, Q4H PRN, Melina Cartwright MD  lidocaine (XYLOCAINE) 2 % viscous solution 15 mL, 15 mL, Mouth/Throat, PRN, Melina Cartwright MD    Current Outpatient Medications:  dronabinoL (MARINOL) 5 mg capsule, Take 5 mg by mouth two (2) times a day., Disp: , Rfl:   acyclovir (ZOVIRAX) 400 mg tablet, Take 400 mg by mouth daily. , Disp: , Rfl:   dexAMETHasone (DECADRON) 4 mg tablet, Take 40 mg by mouth every seven (7) days. On Monday, Disp: , Rfl:   Revlimid 15 mg cap, , Disp: , Rfl:   pantoprazole (PROTONIX) 40 mg tablet, Take 1 Tablet by mouth daily. , Disp: 30 Tablet, Rfl: 0  metFORMIN (GLUCOPHAGE) 500 mg tablet, Take  by mouth three (3) times daily (with meals). , Disp: , Rfl:   carvediloL (Coreg) 25 mg tablet, Take 25 mg by mouth two (2) times a day., Disp: , Rfl:   magnesium oxide (MAG-OX) 400 mg tablet, Take 400 mg by mouth four (4) times daily. , Disp: , Rfl:   atorvastatin (LIPITOR) 20 mg tablet, Take 20 mg by mouth daily. , Disp: , Rfl:   dapagliflozin (Farxiga) 10 mg tab tablet, Take 10 mg by mouth daily. , Disp: , Rfl:         Past History    Past Medical History:  Past Medical History:  12/2020: AICD (automatic cardioverter/defibrillator) present  No date: Anemia  08/2020: Cardiomyopathy ([address]  No date: Diabetes ([address]  No date: GERD (gastroesophageal reflux disease)  No date: Heart failure ([address]  No date: Hypertension  No date: Multiple myeloma ([address]  No date: Sleep apnea      Comment:  cpap  No date:  Thrombocytopenia ([address]    Past Surgical History:  Past Surgical History:  11/17/2021: COLONOSCOPY; N/A      Comment:  COLONOSCOPY ( T I V A) performed by Gaurang Blanco MD at [facility] ENDOSCOPY  10/08/2021: ENDOSCOPY VISIT-OUTPATIENT  No date: HX COLONOSCOPY  No date: HX HEART CATHETERIZATION      Comment:  x 2   No date: HX HERNIA REPAIR  No date: HX HYSTERECTOMY  No date: HX IMPLANTABLE CARDIOVERTER DEFIBRILLATOR  No date: HX OOPHORECTOMY  No date: HX PACEMAKER      Comment:  pacemaker, defib  No date: HX SVT ABLATION    Family History:  Review of patient's family history indicates:  Problem: Breast Cancer      Relation: Sister          Age of Onset: (Not Specified)  Problem: Diabetes      Relation: Mother          Age of Onset: (Not Specified)  Problem: Hypertension      Relation: Mother          Age of Onset: (Not Specified)  Problem: Elevated Lipids      Relation: Mother          Age of Onset: (Not Specified)  Problem: Heart Surgery      Relation: Father          Age of Onset: (Not Specified)      Social History:  Social History    Tobacco Use      Smoking status: Never      Smokeless tobacco: Never    Vaping Use      Vaping Use: Never used    Alcohol use: Never    Drug use: Never      Allergies:  No Known Allergies      Review of Systems  @[facility]@    Physical Exam  [unfilled]    Diagnostic Study Results    Labs -   No results found for this or any previous visit (from the past 12 hour(s)). Radiologic Studies -   No orders to display  CT Results  (Last 48 hours)    None      CXR Results  (Last 48 hours)    None        Medical Decision Making and ED Course  I am the first provider for this patient. I reviewed the vital signs, available nursing notes, past medical history, past surgical history, family history and social history. Vital Signs-Reviewed the patient's vital signs. Empty flowsheet group. EKG interpretation: (Preliminary)  Rhythm: paced; and regular . Rate (approx.): 90      The patient presents with differential diagnosis of GERD, PUD, electrolyte abnormality. ED Course:   Initial assessment performed. The patients presenting problems have been discussed, and they are in agreement with the care plan formulated and outlined with them. I have encouraged them to ask questions as they arise throughout their visit. Disposition    Discharge        DISCHARGE PLAN:  1. Current Discharge Medication List    CONTINUE these medications which have NOT CHANGED    dronabinoL (MARINOL) 5 mg capsule  Take 5 mg by mouth two (2) times a day. acyclovir (ZOVIRAX) 400 mg tablet  Take 400 mg by mouth daily. dexAMETHasone (DECADRON) 4 mg tablet  Take 40 mg by mouth every seven (7) days. On Monday    Revlimid 15 mg cap      pantoprazole (PROTONIX) 40 mg tablet  Take 1 Tablet by mouth daily. Qty: 30 Tablet Refills: 0    metFORMIN (GLUCOPHAGE) 500 mg tablet  Take  by mouth three (3) times daily (with meals). carvediloL (Coreg) 25 mg tablet  Take 25 mg by mouth two (2) times a day. magnesium oxide (MAG-OX) 400 mg tablet  Take 400 mg by mouth four (4) times daily. atorvastatin (LIPITOR) 20 mg tablet  Take 20 mg by mouth daily. dapagliflozin (Farxiga) 10 mg tab tablet  Take 10 mg by mouth daily. 2. Follow-up Information    None     3.   Return to ED if worse     Diagnosis    Clinical Impression: No diagnosis found. Attestations:    Priscilla Ospina MD    Please note that this dictation was completed with [employer], the computer voice recognition software. Quite often unanticipated grammatical, syntax, homophones, and other interpretive errors are inadvertently transcribed by the computer software. Please disregard these errors. Please excuse any errors that have escaped final proofreading. Thank you.    ?           Past Medical History:   Diagnosis Date    AICD (automatic cardioverter/defibrillator) present 12/2020    Anemia     Cardiomyopathy [address] 08/2020    Diabetes ([facility]     GERD (gastroesophageal reflux disease)     Heart failure (HCC)     Hypertension     Multiple myeloma (HCC)     Sleep apnea     cpap    Thrombocytopenia ([facility]        Past Surgical History:   Procedure Laterality Date    COLONOSCOPY N/A 11/17/2021    COLONOSCOPY ( T I V A) performed by Waldemar Waite MD at [facility] ENDOSCOPY    ENDOSCOPY VISIT-OUTPATIENT  10/08/2021    HX COLONOSCOPY      HX HEART CATHETERIZATION      x 2     HX HERNIA REPAIR      HX HYSTERECTOMY      HX IMPLANTABLE CARDIOVERTER DEFIBRILLATOR      HX OOPHORECTOMY      HX PACEMAKER      pacemaker, defib    HX SVT ABLATION           Family History:   Problem Relation Age of Onset    Breast Cancer Sister     Diabetes Mother     Hypertension Mother     Elevated Lipids Mother     Heart Surgery Father        Social History     Socioeconomic History    Marital status:      Spouse name: Not on file    Number of children: Not on file    Years of education: Not on file    Highest education level: Not on file   Occupational History    Not on file   Tobacco Use    Smoking status: Never    Smokeless tobacco: Never   Vaping Use    Vaping Use: Never used   Substance and Sexual Activity    Alcohol use: Never    Drug use: Never    Sexual activity: Not on file   Other Topics Concern     Service Not Asked    Blood Transfusions Not Asked    Caffeine Concern Not Asked    Occupational Exposure Not Asked    Hobby Hazards Not Asked    Sleep Concern Not Asked    Stress Concern Not Asked    Weight Concern Not Asked    Special Diet Not Asked    Back Care Not Asked    Exercise Not Asked    Bike Helmet Not Asked    Seat Belt Not Asked    Self-Exams Not Asked   Social History Narrative    Not on file     Social Determinants of Health     Financial Resource Strain: Not on file   Food Insecurity: Not on file   Transportation Needs: Not on file   Physical Activity: Not on file   Stress: Not on file   Social Connections: Not on file   Intimate Partner Violence: Not on file   Housing Stability: Not on file         ALLERGIES: Patient has no known allergies. Review of Systems   Constitutional: Negative. HENT: Negative. Eyes: Negative. Respiratory: Negative. Cardiovascular: Negative. Gastrointestinal:  Positive for abdominal pain. Negative for nausea and vomiting. Endocrine: Negative. Genitourinary: Negative. Musculoskeletal: Negative. Skin: Negative. Neurological: Negative. Hematological: Negative. Vitals:    08/30/22 0021   BP: 120/76   Pulse: 93   Resp: 16   Temp: 98.9 °F (37.2 °C)   SpO2: 98%   Weight: 57.9 kg (127 lb 11.2 oz)   Height: 5' (1.524 m)            Physical Exam  Vitals and nursing note reviewed. Constitutional:       Appearance: Normal appearance. She is ill-appearing. HENT:      Head: Normocephalic and atraumatic. Right Ear: Tympanic membrane and ear canal normal.      Left Ear: Tympanic membrane and ear canal normal.      Nose: Nose normal.      Mouth/Throat:      Mouth: Mucous membranes are moist.      Pharynx: Oropharynx is clear. Eyes:      Extraocular Movements: Extraocular movements intact. Conjunctiva/sclera: Conjunctivae normal.      Pupils: Pupils are equal, round, and reactive to light. Cardiovascular:      Rate and Rhythm: Normal rate and regular rhythm. Pulses: Normal pulses. Heart sounds: Normal heart sounds. Pulmonary:      Effort: Pulmonary effort is normal.      Breath sounds: Normal breath sounds. Abdominal:      General: Abdomen is flat. Bowel sounds are normal.      Palpations: Abdomen is soft. Tenderness: There is abdominal tenderness in the epigastric area. Musculoskeletal:         General: Normal range of motion. Cervical back: Normal range of motion and neck supple. Skin:     General: Skin is warm and dry. Neurological:      General: No focal deficit present. Mental Status: She is alert and oriented to person, place, and time. Psychiatric:         Mood and Affect: Mood normal.         Behavior: Behavior normal.        MDM  Risk of Complications, Morbidity, and/or Mortality  Presenting problems: high  Diagnostic procedures: high  Management options: moderate  General comments: Patient with multiple myeloma presents with abdominal pain, nausea and vomiting. Calcium is 14. Renal insufficiency has become slightly worse. Hypercalcemia and multiple myeloma may be due to dehydration, kidney infiltration with light chains or direct demineralization. I discussed case with Dr. Gomez Azar. He recommends keeping patient here and hydrating.       Patient Progress  Patient progress: stable  [place] ED to ED       Procedures        Addendum:  I have provided critical care time of 30 minutes, assessing metabolic issues, intervention of metabolic decompensation and hemodynamics; discussion with hospitalist.

## 2022-08-30 NOTE — PROGRESS NOTES
Report given to Sloane Rowe RN in acute care. Will take patient upstairs in approximately 15 minutes.

## 2022-08-31 NOTE — PROGRESS NOTES
HOSPITALIST PROGRESS NOTE  Yanick Mcguire MD, [address]         Daily Progress Note: 8/31/2022      Subjective:     Patient currently is alert and oriented x4 however continues to be significantly weak. No overnight fever/chills, nausea/vomiting, abdominal pain, chest pain, shortness of breath noted. Counseled patient and patient's family/sister regarding significant hypercalcemia along with acute renal failure. Spoke to nephrologist on-call extensively and given worsening renal function and renal failure will monitor patient in additional 24 to 48 hours with aggressive IV fluid resuscitation.       Medications reviewed  Current Facility-Administered Medications   Medication Dose Route Frequency    alum-mag hydroxide-simeth (MYLANTA) oral suspension 30 mL  30 mL Oral Q4H PRN    lidocaine (XYLOCAINE) 2 % viscous solution 15 mL  15 mL Mouth/Throat PRN    0.9% sodium chloride infusion  125 mL/hr IntraVENous CONTINUOUS    sodium chloride (NS) flush 5-40 mL  5-40 mL IntraVENous Q8H    sodium chloride (NS) flush 5-40 mL  5-40 mL IntraVENous PRN    acetaminophen (TYLENOL) tablet 650 mg  650 mg Oral Q6H PRN    Or    acetaminophen (TYLENOL) suppository 650 mg  650 mg Rectal Q6H PRN    polyethylene glycol (MIRALAX) packet 17 g  17 g Oral DAILY PRN    ondansetron (ZOFRAN ODT) tablet 4 mg  4 mg Oral Q8H PRN    Or    ondansetron (ZOFRAN) injection 4 mg  4 mg IntraVENous Q6H PRN    enoxaparin (LOVENOX) injection 40 mg  40 mg SubCUTAneous DAILY    cefTRIAXone (ROCEPHIN) 1 g in 0.9% sodium chloride (MBP/ADV) 50 mL MBP  1 g IntraVENous Q24H    insulin lispro (HUMALOG) injection   SubCUTAneous AC&HS    [Held by provider] calciTONIN (MIACALCIN) injection 240 Int'l Units  240 Int'l Units IntraMUSCular Q12H    morphine injection 2 mg  2 mg IntraVENous Q3H PRN       Review of Systems:   A comprehensive review of systems was negative except for that written in the HPI. Objective:   Physical Exam:     Visit Vitals  /75 (BP 1 Location: Right upper arm, BP Patient Position: Lying)   Pulse 83   Temp 98 °F (36.7 °C)   Resp 18   Ht 5' (1.524 m)   Wt 57.9 kg (127 lb 11.2 oz)   SpO2 97%   BMI 24.94 kg/m²      O2 Device: None (Room air)  Patient Vitals for the past 8 hrs:   Temp Pulse Resp BP SpO2   22 0800 -- 83 -- -- --   22 0715 98 °F (36.7 °C) 83 18 110/75 97 %   22 0425 96.9 °F (36.1 °C) 85 18 116/71 97 %   22 0400 -- 85 -- -- --          Temp (24hrs), Av.8 °F (36.6 °C), Min:96.9 °F (36.1 °C), Max:99.5 °F (37.5 °C)    No intake/output data recorded.  1901 -  0700  In: 360 [P.O.:360]  Out: 350 [Urine:350]    General:  Alert, cooperative, no distress, appears stated age. Lungs:   Clear to auscultation bilaterally. Chest wall:  No tenderness or deformity. Heart:  Regular rate and rhythm, S1, S2 normal, no murmur, click, rub or gallop. Abdomen:   Soft, non-tender. Bowel sounds normal. No masses,  No organomegaly. Extremities: Extremities normal, atraumatic, no cyanosis or edema. Pulses: 2+ and symmetric all extremities. Skin: Skin color, texture, turgor normal. No rashes or lesions   Neurologic: CNII-XII intact. No gross sensory or motor deficits     Data Review:       Recent Days:  Recent Labs     22  0157   WBC 12.1*   HGB 11.0*   HCT 33.4*   PLT 80*     Recent Labs     22  0940 22  1515 22  1030 22  0157   * 132*  --  128*   K 3.6 4.0  --  4.3    102  --  98   CO2 25 29  --  31   * 143*  --  96   BUN 13 12  --  11   CREA 1.29* 1.17*  --  1.38*   CA 10.2* 11.9* 13.7* 14.6*   PHOS 3.7  --   --   --    ALB 1.9*  --   --  2.4*   TBILI  --   --   --  0.4   ALT  --   --   --  24     No results for input(s): PH, PCO2, PO2, HCO3, FIO2 in the last 72 hours.     24 Hour Results:  Recent Results (from the past 24 hour(s))   GLUCOSE, POC Collection Time: 08/30/22 11:51 AM   Result Value Ref Range    Glucose (POC) 126 (H) 65 - 117 mg/dL    Performed by Jojo Bruno    METABOLIC PANEL, BASIC    Collection Time: 08/30/22  3:15 PM   Result Value Ref Range    Sodium 132 (L) 136 - 145 mmol/L    Potassium 4.0 3.5 - 5.1 mmol/L    Chloride 102 97 - 108 mmol/L    CO2 29 21 - 32 mmol/L    Anion gap 1 (L) 5 - 15 mmol/L    Glucose 143 (H) 65 - 100 mg/dL    BUN 12 6 - 20 mg/dL    Creatinine 1.17 (H) 0.55 - 1.02 mg/dL    BUN/Creatinine ratio 10 (L) 12 - 20      GFR est AA 58 (L) >60 ml/min/1.73m2    GFR est non-AA 48 (L) >60 ml/min/1.73m2    Calcium 11.9 (H) 8.5 - 10.1 mg/dL   GLUCOSE, POC    Collection Time: 08/30/22  4:45 PM   Result Value Ref Range    Glucose (POC) 138 (H) 65 - 117 mg/dL    Performed by Jojo Bruno    GLUCOSE, POC    Collection Time: 08/30/22  9:16 PM   Result Value Ref Range    Glucose (POC) 120 (H) 65 - 117 mg/dL    Performed by Zach 64, POC    Collection Time: 08/31/22  7:12 AM   Result Value Ref Range    Glucose (POC) 110 (H) 65 - 100 mg/dL    Performed by Divya Ty    RENAL FUNCTION PANEL    Collection Time: 08/31/22  9:40 AM   Result Value Ref Range    Sodium 134 (L) 136 - 145 mmol/L    Potassium 3.6 3.5 - 5.1 mmol/L    Chloride 104 97 - 108 mmol/L    CO2 25 21 - 32 mmol/L    Anion gap 5 5 - 15 mmol/L    Glucose 136 (H) 65 - 100 mg/dL    BUN 13 6 - 20 mg/dL    Creatinine 1.29 (H) 0.55 - 1.02 mg/dL    BUN/Creatinine ratio 10 (L) 12 - 20      GFR est AA 52 (L) >60 ml/min/1.73m2    GFR est non-AA 43 (L) >60 ml/min/1.73m2    Calcium 10.2 (H) 8.5 - 10.1 mg/dL    Phosphorus 3.7 2.6 - 4.7 mg/dL    Albumin 1.9 (L) 3.5 - 5.0 g/dL           Assessment/Plan:     Acute hypercalcemia  Likely secondary to acute renal failure and prerenal azotemia, in the setting of multiple myeloma  Status post calcitonin x2 doses with calcium improving from 14.6 down to 10.2.   Attempted bisphosphonate yesterday but is nonformulary. Continue aggressive IV resuscitation. Urinary tract infection  Large leukocyte esterase in UA concerning for infection  Continue on Rocephin for now however patient denies any fever/chills, dysuria, abdominal pain at home. Possibility of asymptomatic bacteriuria noted. Urine culture pending. Acute renal failure  Creatinine elevated to 1.29 with baseline of 0.7 noted. Secondary to prerenal azotemia and resulting hypercalcemia. Continue aggressive IV fluid resuscitation. Have spoken to nephrology extensively regarding continuing hospitalization and IV fluid resuscitation to monitor for improving renal function in concordance with improving hypercalcemia. Hyponatremia  Hypovolemic hyponatremia  Sodium has improved from 128 to 134 with normal saline. Ischemic cardiomyopathy  Ischemic cardiomyopathy with EF of 45 to 50% on the last echocardiogram May 2022, s/p AICD placement. Appears well compensated. Closely monitor with IV fluid resuscitation. Discontinue losartan. Continue Coreg. [place] is nonformulary. Diabetes type 2  Hold metformin and Farxiga  Continue corrective dose insulin for now. GERD  Continue PPI     Multiple myeloma  Chemotherapy as outpatient  Outpatient follow-up with hematology    DVT prophylaxis  Lovenox SC. Care Plan discussed with: Patient/Family, Nurse, , and Consultant Nephrology. Total time spent with patient: 40 minutes. With greater than 50% spent in coordination of care and counseling.     Hussain Angeles MD

## 2022-08-31 NOTE — ROUTINE PROCESS
Report from [facility] LPN. Patient in bed awake, no complaints, no distress, HOB elevated, patient assisted to [facility] and back to bed. Patient tolerated well. All concerns addressed and call light within reach.

## 2022-08-31 NOTE — PROGRESS NOTES
Informed Dr. Micheal Simmons that patient was in pain 10/10 - abdominal area. States she has been pain all day. Ordered per Regina  Morphina 2 mg prn and CT of abdomen.

## 2022-08-31 NOTE — CONSULTS
Renal Consult Note    Admit Date: 8/30/2022      HPI:   Gege Perales is a 55 y.o. female with history of multiple myeloma followed by hematology presented to ER with chief complaint of epigastric abdominal pain associated with fluid intake without any nausea, vomiting and diarrhea. Patient is known to have recurrent urinary tract sections leading to sepsis in the past requiring hospitalization in May 2022. In the ER patient noted to have very high calcium and low sodium along with increase in serum creatinine requiring this renal Consult. Since that time of admission patient is being treated aggressively with IV fluids and calcitonin with which she has been showing improvement clinically and labs wise. Case is discussed with Dr. Bubba Roberto who is taking care of her at present time. Since serum calcium level has come down to 10.2 it is decided to go ahead and stop calcitonin. So far serum sodium has come up to 134 and a serum creatinine has gone up from yesterday to today slightly. .  Reviewing the medical records it is noted that patient is not having strict intakes and outputs to see how her urine output has been. It is also noted that patient serum albumin is only 1.9 and corrected calcium is 11.87 . We will check calcium level in the a.m. and decide about treating him with calcitonin again if necessary.   Meanwhile we will keep up with intakes and outputs to ensure that she is not going to develop significant swelling especially in the presence of very low albumin          Current Facility-Administered Medications   Medication Dose Route Frequency    alum-mag hydroxide-simeth (MYLANTA) oral suspension 30 mL  30 mL Oral Q4H PRN    lidocaine (XYLOCAINE) 2 % viscous solution 15 mL  15 mL Mouth/Throat PRN    0.9% sodium chloride infusion  125 mL/hr IntraVENous CONTINUOUS    sodium chloride (NS) flush 5-40 mL  5-40 mL IntraVENous Q8H    sodium chloride (NS) flush 5-40 mL  5-40 mL IntraVENous PRN acetaminophen (TYLENOL) tablet 650 mg  650 mg Oral Q6H PRN    Or    acetaminophen (TYLENOL) suppository 650 mg  650 mg Rectal Q6H PRN    polyethylene glycol (MIRALAX) packet 17 g  17 g Oral DAILY PRN    ondansetron (ZOFRAN ODT) tablet 4 mg  4 mg Oral Q8H PRN    Or    ondansetron (ZOFRAN) injection 4 mg  4 mg IntraVENous Q6H PRN    enoxaparin (LOVENOX) injection 40 mg  40 mg SubCUTAneous DAILY    cefTRIAXone (ROCEPHIN) 1 g in 0.9% sodium chloride (MBP/ADV) 50 mL MBP  1 g IntraVENous Q24H    insulin lispro (HUMALOG) injection   SubCUTAneous AC&HS    calciTONIN (MIACALCIN) injection 240 Int'l Units  240 Int'l Units IntraMUSCular Q12H    morphine injection 2 mg  2 mg IntraVENous Q3H PRN        Past Medical History:   Diagnosis Date    AICD (automatic cardioverter/defibrillator) present 12/2020    Anemia     Cardiomyopathy ([facility] [address] 08/2020    Diabetes ([facility] [address]     GERD (gastroesophageal reflux disease)     Heart failure (HCC)     Hypertension     Multiple myeloma ([facility] [address]     Sleep apnea     cpap    Thrombocytopenia ([facility] [address]       Past Surgical History:   Procedure Laterality Date    COLONOSCOPY N/A 11/17/2021    COLONOSCOPY ( T I V A) performed by Cassia Meng MD at [facility] ENDOSCOPY    ENDOSCOPY VISIT-OUTPATIENT  10/08/2021    HX COLONOSCOPY      HX HEART CATHETERIZATION      x 2     HX HERNIA REPAIR      HX HYSTERECTOMY      HX IMPLANTABLE CARDIOVERTER DEFIBRILLATOR      HX OOPHORECTOMY      HX PACEMAKER      pacemaker, defib    HX SVT ABLATION       Family History   Problem Relation Age of Onset    Breast Cancer Sister     Diabetes Mother     Hypertension Mother     Elevated Lipids Mother     Heart Surgery Father       Social History     Tobacco Use    Smoking status: Never    Smokeless tobacco: Never   Substance Use Topics    Alcohol use: Never         Review of Systems    ROS       Physical Exam:     Physical Exam      Patient Vitals for the past 8 hrs:   BP Temp Pulse Resp SpO2   08/31/22 0800 -- -- 83 -- --   08/31/22 0715 110/75 98 °F (36.7 °C) 83 18 97 %   08/31/22 0425 116/71 96.9 °F (36.1 °C) 85 18 97 %   08/31/22 0400 -- -- 85 -- --     No intake/output data recorded. 08/29 1901 - 08/31 0700  In: 360 [P.O.:360]  Out: 350 [Urine:350]    No intake or output data in the 24 hours ending 08/31/22 2039       CT ABD W CONT AND PELVIS W WO CONT              Data Review   Recent Labs     08/30/22  0157   WBC 12.1*   HGB 11.0*   HCT 33.4*   PLT 80*     Recent Labs     08/31/22  0940 08/30/22  1515 08/30/22  1030 08/30/22  0157   * 132*  --  128*   K 3.6 4.0  --  4.3    102  --  98   CO2 25 29  --  31   * 143*  --  96   BUN 13 12  --  11   CREA 1.29* 1.17*  --  1.38*   CA 10.2* 11.9* 13.7* 14.6*   PHOS 3.7  --   --   --    ALB 1.9*  --   --  2.4*   ALT  --   --   --  24     No components found for: GLPOC  No results for input(s): PH, PCO2, PO2, HCO3, FIO2 in the last 72 hours. Admission on 08/30/2022   Component Date Value Ref Range Status    WBC 08/30/2022 12.1 (A) 4.4 - 11.3 K/uL Final    RBC 08/30/2022 3.14 (A) 4.50 - 5.90 M/uL Final    HGB 08/30/2022 11.0 (A) 13.5 - 17.5 g/dL Final    HCT 08/30/2022 33.4 (A) 36 - 46 % Final    MCV 08/30/2022 106.5 (A) 80 - 100 FL Final    MCH 08/30/2022 35.1 (A) 31 - 34 PG Final    MCHC 08/30/2022 33.0  31.0 - 36.0 g/dL Final    RDW 08/30/2022 15.6 (A) 11.5 - 14.5 % Final    PLATELET 03/85/1902 80 (A) 150 - 400 K/uL Final    MPV 08/30/2022 9.8  6.5 - 11.5 FL Final    NRBC 08/30/2022 0.2   WBC Final    ABSOLUTE NRBC 08/30/2022 0.03  K/uL Final    ABS. MONOCYTES 08/30/2022 3.2 (A) 0.0 - 1.0 K/UL Final    NEUTROPHILS 08/30/2022 59  42 - 75 % Final    LYMPHOCYTES 08/30/2022 14 (A) 20.5 - 51.1 % Final    MONOCYTES 08/30/2022 26 (A) 1.7 - 9.3 % Final    EOSINOPHILS 08/30/2022 0 (A) 0.9 - 2.9 % Final    BASOPHILS 08/30/2022 1  0.0 - 2.5 % Final    ABS. NEUTROPHILS 08/30/2022 7.2  1.8 - 7.7 K/UL Final    ABS. LYMPHOCYTES 08/30/2022 1.6  1.0 - 4.8 K/UL Final    ABS.  EOSINOPHILS 08/30/2022 0.0  0.0 - 0.7 K/UL Final    ABS. BASOPHILS 08/30/2022 0.1  0.0 - 0.2 K/UL Final    Sodium 08/30/2022 128 (A) 136 - 145 mmol/L Final    Potassium 08/30/2022 4.3  3.5 - 5.1 mmol/L Final    Chloride 08/30/2022 98  97 - 108 mmol/L Final    CO2 08/30/2022 31  21 - 32 mmol/L Final    Anion gap 08/30/2022 NEG 1  5 - 15 mmol/L Final    Glucose 08/30/2022 96  65 - 100 mg/dL Final    BUN 08/30/2022 11  6 - 20 mg/dL Final    Creatinine 08/30/2022 1.38 (A) 0.55 - 1.02 mg/dL Final    BUN/Creatinine ratio 08/30/2022 8 (A) 12 - 20   Final    GFR est AA 08/30/2022 48 (A) >60 ml/min/1.73m2 Final    GFR est non-AA 08/30/2022 40 (A) >60 ml/min/1.73m2 Final    Comment: Estimated GFR is calculated using the IDMS-traceable Modification of Diet in Renal Disease (MDRD) Study equation, reported for both  Americans (GFRAA) and non- Americans (GFRNA), and normalized to 1.73m2 body surface area. The physician must decide which value applies to the patient. The MDRD study equation should only be used in individuals age 25 or older. It has not been validated for the following: pregnant women, patients with serious comorbid conditions, or on certain medications, or persons with extremes of body size, muscle mass, or nutritional status. Calcium 08/30/2022 14.6 (A) 8.5 - 10.1 mg/dL Final    Results verified, phoned to and read back by [email or web address] Investigated per delta check protocol    Bilirubin, total 08/30/2022 0.4  0.2 - 1.0 mg/dL Final    AST (SGOT) 08/30/2022 26  15 - 37 U/L Final    ALT (SGPT) 08/30/2022 24  12 - 78 U/L Final    Alk.  phosphatase 08/30/2022 72  45 - 117 U/L Final    Protein, total 08/30/2022 9.4 (A) 6.4 - 8.2 g/dL Final    Albumin 08/30/2022 2.4 (A) 3.5 - 5.0 g/dL Final    Globulin 08/30/2022 7.0 (A) 2.0 - 4.0 g/dL Final    A-G Ratio 08/30/2022 0.3 (A) 1.1 - 2.2   Final    Lipase 08/30/2022 45 (A) 73 - 393 U/L Final    Ventricular Rate 08/30/2022 90  BPM Final    Atrial Rate 08/30/2022 90  BPM Final    P-R Interval 08/30/2022 165  ms Final    QRS Duration 08/30/2022 123  ms Final    Q-T Interval 08/30/2022 401  ms Final    QTC Calculation (Bezet) 08/30/2022 491  ms Final    Calculated P Axis 08/30/2022 -21  degrees Final    Calculated R Axis 08/30/2022 5  degrees Final    Calculated T Axis 08/30/2022 132  degrees Final    Diagnosis 08/30/2022    Final                    Value:Atrial-sensed ventricular-paced rhythm  No further analysis attempted due to paced rhythm  Baseline wander in lead(s) II,aVF,V1,V2,V3,V4,V5,V6    Confirmed by Alida Szymanski ([id number]) on 8/30/2022 8:57:02 AM      Color 08/30/2022 Yellow/Straw    Final    Color Reference Range: Straw, Yellow or Dark Yellow    Appearance 08/30/2022 Clear  Clear   Final    Specific gravity 08/30/2022 1.010  1.003 - 1.030   Final    pH (UA) 08/30/2022 7.0  5.0 - 8.0   Final    Protein 08/30/2022 Negative  Negative mg/dL Final    Glucose 08/30/2022 100 (A) Negative mg/dL Final    Ketone 08/30/2022 Negative  Negative mg/dL Final    Bilirubin 08/30/2022 Negative  Negative   Final    Blood 08/30/2022 Negative  Negative   Final    Urobilinogen 08/30/2022 0.2  0.2 - 1.0 EU/dL Final    Nitrites 08/30/2022 Negative  Negative   Final    Leukocyte Esterase 08/30/2022 Large (A) Negative   Final    WBC 08/30/2022 5-10  0 - 5 /hpf Final    RBC 08/30/2022 0-5  0 - 3 /hpf Final    Bacteria 08/30/2022 3+ (A) Negative /hpf Final    Glucose (POC) 08/30/2022 102  65 - 117 mg/dL Final    Comment: The Accu-Chek Inform II glucometer is not FDA cleared for critically ill patient use. A study was performed validating the equivalence of glucometer and clinical laboratory results on this patient population.   Despite the study, use of glucometers with capillary specimens from critically ill patients, regardless of their location, makes the test high complexity and requires the performing individual to comply with CLIA requirements more stringent than those for waived testing in the hospital setting. Critical thinking skills are necessary to determine a potentially critically ill patients status prior to using a glucometer. Performed by 08/30/2022 Denzel Quiñones   Final    Calcium 08/30/2022 13.7 (A) 8.5 - 10.1 mg/dL Final    Comment: RESULTS VERIFIED, PHONED TO AND READ BACK BY  [employer] [facility]/      PTH, Intact 08/30/2022 <6.3 (A) 18.4 - 88.0 pg/mL Final    Comment: (NOTE)  Calcium within the reference range and intact PTH below the  analytical measurement range suggestive of primary hypoparathyroidism. Elevated calcium and intact PTH below the analytical measurement range  suggestive of hypercalcemia of malignancy. Elevated calcium and intact PTH greater than the midpoint of the  reference range suggestive of primary hyperparathyroidism. TSH 08/30/2022 0.56  0.36 - 3.74 uIU/mL Final    Comment:    Due to TSH heterogeneity, both structurally and degree of glycosylation, monoclonal antibodies used in the TSH assay may not accurately quantitate TSH. Therefore, this result should be correlated with clinical findings as well as with other assessments of thyroid function, e.g., free T4, free T3. Glucose (POC) 08/30/2022 126 (A) 65 - 117 mg/dL Final    Comment: The Accu-Chek Inform II glucometer is not FDA cleared for critically ill patient use. A study was performed validating the equivalence of glucometer and clinical laboratory results on this patient population. Despite the study, use of glucometers with capillary specimens from critically ill patients, regardless of their location, makes the test high complexity and requires the performing individual to comply with CLIA requirements more stringent than those for waived testing in the hospital setting. Critical thinking skills are necessary to determine a potentially critically ill patients status prior to using a glucometer.       Performed by 08/30/2022 Donnice [place]   Final    Sodium 08/30/2022 132 (A) 136 - 145 mmol/L Final    Potassium 08/30/2022 4.0  3.5 - 5.1 mmol/L Final    Chloride 08/30/2022 102  97 - 108 mmol/L Final    CO2 08/30/2022 29  21 - 32 mmol/L Final    Anion gap 08/30/2022 1 (A) 5 - 15 mmol/L Final    Glucose 08/30/2022 143 (A) 65 - 100 mg/dL Final    BUN 08/30/2022 12  6 - 20 mg/dL Final    Creatinine 08/30/2022 1.17 (A) 0.55 - 1.02 mg/dL Final    BUN/Creatinine ratio 08/30/2022 10 (A) 12 - 20   Final    GFR est AA 08/30/2022 58 (A) >60 ml/min/1.73m2 Final    GFR est non-AA 08/30/2022 48 (A) >60 ml/min/1.73m2 Final    Calcium 08/30/2022 11.9 (A) 8.5 - 10.1 mg/dL Final    Glucose (POC) 08/30/2022 138 (A) 65 - 117 mg/dL Final    Comment: The Accu-Chek Inform II glucometer is not FDA cleared for critically ill patient use. A study was performed validating the equivalence of glucometer and clinical laboratory results on this patient population. Despite the study, use of glucometers with capillary specimens from critically ill patients, regardless of their location, makes the test high complexity and requires the performing individual to comply with CLIA requirements more stringent than those for waived testing in the hospital setting. Critical thinking skills are necessary to determine a potentially critically ill patients status prior to using a glucometer. Performed by 08/30/2022 Omaira Sterling   Final    Glucose (POC) 08/30/2022 120 (A) 65 - 117 mg/dL Final    Comment: The Accu-Chek Inform II glucometer is not FDA cleared for critically ill patient use. A study was performed validating the equivalence of glucometer and clinical laboratory results on this patient population.   Despite the study, use of glucometers with capillary specimens from critically ill patients, regardless of their location, makes the test high complexity and requires the performing individual to comply with CLIA requirements more stringent than those for waived testing in the hospital setting. Critical thinking skills are necessary to determine a potentially critically ill patients status prior to using a glucometer. Performed by 08/30/2022 [facility]   Final    Glucose (POC) 08/31/2022 110 (A) 65 - 100 mg/dL Final    Comment: The Accu-Chek Inform II glucometer is not FDA cleared for critically ill patient use. A study was performed validating the equivalence of glucometer and clinical laboratory results on this patient population. Despite the study, use of glucometers with capillary specimens from critically ill patients, regardless of their location, makes the test high complexity and requires the performing individual to comply with CLIA requirements more stringent than those for waived testing in the hospital setting. Critical thinking skills are necessary to determine a potentially critically ill patients status prior to using a glucometer.       Performed by 08/31/2022 Lay Cordoba   Final    Sodium 08/31/2022 134 (A) 136 - 145 mmol/L Final    Potassium 08/31/2022 3.6  3.5 - 5.1 mmol/L Final    Chloride 08/31/2022 104  97 - 108 mmol/L Final    CO2 08/31/2022 25  21 - 32 mmol/L Final    Anion gap 08/31/2022 5  5 - 15 mmol/L Final    Glucose 08/31/2022 136 (A) 65 - 100 mg/dL Final    BUN 08/31/2022 13  6 - 20 mg/dL Final    Creatinine 08/31/2022 1.29 (A) 0.55 - 1.02 mg/dL Final    BUN/Creatinine ratio 08/31/2022 10 (A) 12 - 20   Final    GFR est AA 08/31/2022 52 (A) >60 ml/min/1.73m2 Final    GFR est non-AA 08/31/2022 43 (A) >60 ml/min/1.73m2 Final    Calcium 08/31/2022 10.2 (A) 8.5 - 10.1 mg/dL Final    Phosphorus 08/31/2022 3.7  2.6 - 4.7 mg/dL Final    Albumin 08/31/2022 1.9 (A) 3.5 - 5.0 g/dL Final          Assessment:   #1 patient with acute kidney injury on CKD due to residual renal insult from the past.   #2 hyponatremia-resolved  #3 severe hypocalcemia - is improving  #4 recurrent UTI  #5 severe hypoalbuminemia      Active Problems:    Hypercalcemia (8/30/2022) Hyperkalemia (8/30/2022)        Plan:     Increase I.V fluids to 125 ml/hr if pt can tolerate   Urine for lytes   No I.V contrast with CT  C/w present RX for hypercalcemia. Will check repeat calcium level from today   Strict I&o s.   Thank you

## 2022-08-31 NOTE — ROUTINE PROCESS
Patient states \"I don't feel good and she vomited a small amount earlier\". Patient in bed resting.  Will notify MD.

## 2022-08-31 NOTE — PROGRESS NOTES
Problem: Pain  Goal: *Control of Pain  Outcome: Progressing Towards Goal     Problem: Falls - Risk of  Goal: *Absence of Falls  Description: Document Annmarie Fall Risk and appropriate interventions in the flowsheet.   Outcome: Progressing Towards Goal  Note: Fall Risk Interventions:  Mobility Interventions: Patient to call before getting OOB         Medication Interventions: Teach patient to arise slowly, Patient to call before getting OOB    Elimination Interventions: Call light in reach              Problem: Patient Education: Go to Patient Education Activity  Goal: Patient/Family Education  Outcome: Progressing Towards Goal

## 2022-08-31 NOTE — PROGRESS NOTES
Patients case reviewed during interdisciplinary team meeting in [address] [place], [place] [address]Acute Care Unit. Rev.  Clearance Bos[place], [address], [address]

## 2022-09-01 NOTE — PROGRESS NOTES
HOSPITALIST PROGRESS NOTE  Gildardo You MD, [facility]  [address] [place] [address]         Daily Progress Note: 9/1/2022      Subjective:     Patient currently is alert and oriented x4. Continues to be significantly weak. No overnight fever/chills, nausea/vomiting, abdominal pain, chest pain, shortness of breath noted. Noted low BPs this AM and held Cozaar, per Nursing. 8/31/22 - Counseled patient and patient's family/sister regarding significant hypercalcemia along with acute renal failure. Spoke to nephrologist on-call extensively and given worsening renal function and renal failure will monitor patient in additional 24 to 48 hours with aggressive IV fluid resuscitation. 9/1/22 - Counseled patient and Pt's Sister/MPOA, on normalized calcium levels and improved hyponatremia however with significant findings of GNR in urinalysis with significant colonies therefore would be prudent to await final culture with history of significant multiple myeloma with ongoing chemotherapy for appropriate antibiotic and appropriate discharge home. Patient acknowledges POC for discharge home in the next 24 to 48 hours once cultures are finalized.       Medications reviewed  Current Facility-Administered Medications   Medication Dose Route Frequency    potassium chloride (K-DUR, KLOR-CON M20) SR tablet 40 mEq  40 mEq Oral BID    0.9% sodium chloride infusion  500 mL/hr IntraVENous ONCE    carvediloL (COREG) tablet 25 mg  25 mg Oral BID    losartan (COZAAR) tablet 12.5 mg  12.5 mg Oral DAILY    magnesium oxide (MAG-OX) tablet 400 mg  400 mg Oral BID    acyclovir (ZOVIRAX) tablet 400 mg  400 mg Oral DAILY    atorvastatin (LIPITOR) tablet 20 mg  20 mg Oral QHS    pantoprazole (PROTONIX) tablet 40 mg  40 mg Oral DAILY    [Held by provider] dronabinoL (MARINOL) capsule 5 mg  5 mg Oral ACB&D    alum-mag hydroxide-simeth (MYLANTA) oral suspension 30 mL 30 mL Oral Q4H PRN    lidocaine (XYLOCAINE) 2 % viscous solution 15 mL  15 mL Mouth/Throat PRN    0.9% sodium chloride infusion  125 mL/hr IntraVENous CONTINUOUS    sodium chloride (NS) flush 5-40 mL  5-40 mL IntraVENous Q8H    sodium chloride (NS) flush 5-40 mL  5-40 mL IntraVENous PRN    acetaminophen (TYLENOL) tablet 650 mg  650 mg Oral Q6H PRN    Or    acetaminophen (TYLENOL) suppository 650 mg  650 mg Rectal Q6H PRN    polyethylene glycol (MIRALAX) packet 17 g  17 g Oral DAILY PRN    ondansetron (ZOFRAN ODT) tablet 4 mg  4 mg Oral Q8H PRN    Or    ondansetron (ZOFRAN) injection 4 mg  4 mg IntraVENous Q6H PRN    enoxaparin (LOVENOX) injection 40 mg  40 mg SubCUTAneous DAILY    cefTRIAXone (ROCEPHIN) 1 g in 0.9% sodium chloride (MBP/ADV) 50 mL MBP  1 g IntraVENous Q24H    insulin lispro (HUMALOG) injection   SubCUTAneous AC&HS    morphine injection 2 mg  2 mg IntraVENous Q3H PRN       Review of Systems:   A comprehensive review of systems was negative except for that written in the HPI. Objective:   Physical Exam:     Visit Vitals  BP 91/60 (BP 1 Location: Right upper arm)   Pulse 79   Temp 97.8 °F (36.6 °C)   Resp 20   Ht 5' (1.524 m)   Wt 57.9 kg (127 lb 11.2 oz)   SpO2 94%   BMI 24.94 kg/m²      O2 Device: None (Room air)  Patient Vitals for the past 8 hrs:   Temp Pulse Resp BP SpO2   22 1113 97.8 °F (36.6 °C) 79 20 91/60 94 %   22 0800 -- 80 -- -- --   22 0739 97.8 °F (36.6 °C) 80 20 103/63 94 %   22 0400 -- 77 -- -- --          Temp (24hrs), Av.9 °F (36.6 °C), Min:97.3 °F (36.3 °C), Max:98.8 °F (37.1 °C)    No intake/output data recorded. No intake/output data recorded. General:  Alert, cooperative, no distress, appears stated age. Lungs:   Clear to auscultation bilaterally. Chest wall:  No tenderness or deformity. Heart:  Regular rate and rhythm, S1, S2 normal, no murmur, click, rub or gallop. Abdomen:   Soft, non-tender.  Bowel sounds normal. No masses,  No organomegaly. Extremities: Extremities normal, atraumatic, no cyanosis or edema. Pulses: 2+ and symmetric all extremities. Skin: Skin color, texture, turgor normal. No rashes or lesions   Neurologic: No gross sensory or motor deficits     Data Review:       Recent Days:  Recent Labs     08/30/22  0157   WBC 12.1*   HGB 11.0*   HCT 33.4*   PLT 80*     Recent Labs     09/01/22  0543 08/31/22  0940 08/30/22  1515 08/30/22  1030 08/30/22  0157   * 134* 132*  --  128*   K 3.0* 3.6 4.0  --  4.3    104 102  --  98   CO2 25 25 29  --  31   * 136* 143*  --  96   BUN 10 13 12  --  11   CREA 1.26* 1.29* 1.17*  --  1.38*   CA 9.3 10.2* 11.9*   < > 14.6*   PHOS 2.6 3.7  --   --   --    ALB 1.7* 1.9*  --   --  2.4*   TBILI  --   --   --   --  0.4   ALT  --   --   --   --  24    < > = values in this interval not displayed. No results for input(s): PH, PCO2, PO2, HCO3, FIO2 in the last 72 hours.     24 Hour Results:  Recent Results (from the past 24 hour(s))   GLUCOSE, POC    Collection Time: 08/31/22 11:45 AM   Result Value Ref Range    Glucose (POC) 153 (H) 65 - 100 mg/dL    Performed by [address] [place] [address], POC    Collection Time: 08/31/22  4:54 PM   Result Value Ref Range    Glucose (POC) 182 (H) 65 - 100 mg/dL    Performed by [address] [place] [address], POC    Collection Time: 08/31/22  9:04 PM   Result Value Ref Range    Glucose (POC) 125 (H) 65 - 100 mg/dL    Performed by [facility]    RENAL FUNCTION PANEL    Collection Time: 09/01/22  5:43 AM   Result Value Ref Range    Sodium 133 (L) 136 - 145 mmol/L    Potassium 3.0 (L) 3.5 - 5.1 mmol/L    Chloride 103 97 - 108 mmol/L    CO2 25 21 - 32 mmol/L    Anion gap 5 5 - 15 mmol/L    Glucose 107 (H) 65 - 100 mg/dL    BUN 10 6 - 20 mg/dL    Creatinine 1.26 (H) 0.55 - 1.02 mg/dL    BUN/Creatinine ratio 8 (L) 12 - 20      GFR est AA 53 (L) >60 ml/min/1.73m2    GFR est non-AA 44 (L) >60 ml/min/1.73m2    Calcium 9.3 8.5 - 10.1 mg/dL    Phosphorus 2.6 2.6 - 4.7 mg/dL    Albumin 1.7 (L) 3.5 - 5.0 g/dL   GLUCOSE, POC    Collection Time: 09/01/22  7:38 AM   Result Value Ref Range    Glucose (POC) 96 65 - 100 mg/dL    Performed by Jonny Villeda    GLUCOSE, POC    Collection Time: 09/01/22 11:11 AM   Result Value Ref Range    Glucose (POC) 103 (H) 65 - 100 mg/dL    Performed by Jonny Villeda            Assessment/Plan:     Acute hypercalcemia  Likely secondary to acute renal failure and prerenal azotemia, in the setting of multiple myeloma  Status post calcitonin x2 doses with calcium improving from 14.6 down to 9.3. Continue IV resuscitation. Continues to have significant low blood pressures and will discontinue Cozaar for now. GNR urinary tract infection  Large leukocyte esterase in UA concerning for infection  Continue on Rocephin for now however patient denies any fever/chills, dysuria, abdominal pain at home. Patient with significant GNR greater than 100,000 colonies. Counseled patient on appropriate treatment with the correct antibiotic and high risk for discharge home with significant history of multiple myeloma and currently on chemotherapy with ongoing untreated infection. Will await finalization of cultures so patient can be discharged home on appropriate antibiotic. Acute renal failure  Creatinine elevated to 1.29 with baseline of 0.7 noted. Secondary to prerenal azotemia and resulting hypercalcemia. Continue aggressive IV fluid resuscitation. Have spoken to nephrology extensively regarding continuing hospitalization and IV fluid resuscitation to monitor for improving renal function in concordance with improving hypercalcemia. Discontinue Cozaar. Hyponatremia  Hypovolemic hyponatremia  Sodium has improved from 128 to 134 with normal saline. History of hypertension  Currently with low soft blood pressures noted. Discontinue Cozaar permanently. Continue Coreg for now. Hypokalemia  Replete with p.o. potassium.     Ischemic cardiomyopathy  Ischemic cardiomyopathy with EF of 45 to 50% on the last echocardiogram May 2022, s/p AICD placement. Appears well compensated. Closely monitor with IV fluid resuscitation. Discontinue losartan. Continue Coreg. [place] is nonformulary. Diabetes type 2  Hold metformin and Farxiga. Continue corrective dose insulin for now. GERD  Continue PPI     Multiple myeloma  Chemotherapy as outpatient  Outpatient follow-up with hematology    DVT prophylaxis  Lovenox SC. Care Plan discussed with: Patient, sister, nurse and . Total time spent with patient: 35 minutes. With greater than 50% spent in coordination of care and counseling.     Naveen Sterling MD

## 2022-09-01 NOTE — PROGRESS NOTES
Problem: Pain  Goal: *Control of Pain  Outcome: Progressing Towards Goal  Note: Assess pain characteristics  Administer pain meds as ordered  Make comfortable with repositioning  Assess pain relief

## 2022-09-02 NOTE — PROGRESS NOTES
Care Management Interventions  PCP Verified by CM: Yes  Mode of Transport at Discharge: Other (see comment) (sister)  Transition of Care Consult (CM Consult): Discharge Planning  Support Systems: Parent(s), Other Family Member(s)  Confirm Follow Up Transport: Family  The Plan for Transition of Care is Related to the Following Treatment Goals : Patient is being discharged home with assistance from her familly. Discharge Location  Patient Expects to be Discharged to[de-identified] Home with family assistance    Reason for Admission:  Hypercalcemia, Hyperkalemia                     RUR Score:  19-moderate risk                PCP: First and Last name:   Mirna Briceño PA-C     Name of Practice: [address] [place] [address]   Are you a current patient: Yes/No: yes   Approximate date of last visit: 2 months ago   Can you participate in a virtual visit if needed: yes    Do you (patient/family) have any concerns for transition/discharge? No               Plan for utilizing home health:  No     Current Advanced Directive/Advance Care Plan:  Full Code      Healthcare Decision Maker:   Click here to complete [id number] including selection of the Healthcare Decision Maker Relationship (ie \"Primary\")            Primary Decision Maker: Makenna Camacho - Mother - [phone]    Secondary Decision Maker: Deyanira Salinas - Sister - [phone]    Transition of Care Plan:   Patient is being discharged home with family. Patient's sister is a nurse and is her primary caregiver. Home health services are not needed.

## 2022-09-02 NOTE — PROGRESS NOTES
Progress Note  Date:2022       Room:[facility]  Patient Name:Jody Harden     YOB: 1965     Age:56 y.o. Subjective    Subjective Comfortable; continues to have low appetite and energy; reports stable breathing; Being continued on NS at 125 ml/hr   Review of Systems negative for SOB, CP, nausea, vomiting   Objective         Vitals Last 24 Hours:  TEMPERATURE:  Temp  Av.5 °F (36.4 °C)  Min: 97.1 °F (36.2 °C)  Max: 97.8 °F (36.6 °C)  RESPIRATIONS RANGE: Resp  Av  Min: 18  Max: 20  PULSE OXIMETRY RANGE: SpO2  Av.3 %  Min: 94 %  Max: 100 %  PULSE RANGE: Pulse  Av.9  Min: 77  Max: 84  BLOOD PRESSURE RANGE: Systolic (45ZPU), JPR:40 , Min:85 , NANCY:647   ; Diastolic (93FSL), AWD:39, Min:54, Max:69    I/O (24Hr): Intake/Output Summary (Last 24 hours) at 2022 0816  Last data filed at 2022 0732  Gross per 24 hour   Intake 2040 ml   Output 2700 ml   Net -660 ml     Objective    Physical exam:   General: Alert, conversant  HEENT: No JVD  Resp: Good aeration bilaterally  CVS: RRR  Ext: No significant edema noted    Labs/Imaging/Diagnostics    Labs:  CBC:No results for input(s): WBC, RBC, HGB, HCT, MCV, RDW, PLT, HGBEXT, HCTEXT, PLTEXT in the last 72 hours. CHEMISTRIES:  Recent Labs     22  0535 22  0543 22  0940    133* 134*   K 3.7 3.0* 3.6   * 103 104   CO2 20* 25 25   BUN 7 10 13   CA 8.5 9.3 10.2*   PHOS 1.9* 2.6 3.7   PT/INR:No results for input(s): INR, INREXT in the last 72 hours. No lab exists for component: PROTIME  APTT:No results for input(s): APTT in the last 72 hours. LIVER PROFILE:No results for input(s): AST, ALT in the last 72 hours. No lab exists for component: FREDA Fernandez  Lab Results   Component Value Date/Time    ALT (SGPT) 24 2022 01:57 AM    AST (SGOT) 26 2022 01:57 AM    Alk.  phosphatase 72 2022 01:57 AM    Bilirubin, total 0.4 2022 01:57 AM Imaging Last 24 Hours:  No results found. Assessment//Plan   Active Problems:    Hypercalcemia (8/30/2022)      Hyperkalemia (8/30/2022)      Assessment:   (Hypercalcemia  YANETH  Multiple Myeloma  Volume depletion  UTI     Plan:   - Obtain PTH rP, Ionized calcium and activated vitamin D level  - Continue with IV fluid at current infusion rate).      Electronically signed by Ria Dakins, MD on 9/2/2022 at 8:16 AM

## 2022-09-02 NOTE — ROUTINE PROCESS
Discharge instructions and medications discussed with patient with all questions answered. Patient escorted to vehicle via w/chair.

## 2022-09-02 NOTE — PROGRESS NOTES
Problem: Pain  Goal: *Control of Pain  Outcome: Progressing Towards Goal  Note: Assess pain characteristics  Administer pain meds  Assess pain effectiveness  Reposition patient for comfort

## 2022-09-02 NOTE — DISCHARGE SUMMARY
Physician Discharge Summary       Patient: Sai Yancey MRN: [id number]     YOB: 1965  Age: 55 y.o. Sex: female    PCP: Donnald Kussmaul, PA-C    Allergies: Patient has no known allergies. Admit date: 8/30/2022  Admitting Provider: Sourav Phillip MD    Discharge date: 9/2/2022  Discharging Provider: Vaughn Mederos MD    * Admission Diagnoses:   Hypercalcemia [E83.52]  Hyperkalemia [E87.5]      * Discharge Diagnoses:    Hospital Problems as of 9/2/2022 Date Reviewed: 5/26/2022            Codes Class Noted - Resolved POA    Hypercalcemia ICD-10-CM: E83.52  ICD-9-CM: 275.42  8/30/2022 - Present Unknown        Hyperkalemia ICD-10-CM: E87.5  ICD-9-CM: 276.7  8/30/2022 - Present Unknown             * Hospital Course: As per admitting provider on 8/30/22:  Sai Yancey is a 55 y.o. female who presents with epigastric abdominal pain of 3 days duration. Patient denies any nausea or vomiting but does report decreased p.o. intake. Patient has had prior history of multiple urinary tract infection with sepsis, recently admitted at [facility] in May. Upon presentation to the ER, urinalysis shows large leukocyte esterase although urine leukocyte count was not that remarkable. Patient also with known history of multiple myeloma, following with hematology. Patient's calcium level on presentation to the ER was 14.6. Also was noted low sodium of 128. Patient was admitted for further management.    The patient denies any headache, blurry vision, sore throat, trouble swallowing, trouble with speech, chest pain, SOB, cough, fever, chills, N/V/D, abd pain, urinary symptoms, constipation, recent travels, sick contacts, focal or generalized neurological symptoms, falls, injuries, rashes, contact with COVID-19 diagnosed patients, hematemesis, melena, hemoptysis, hematuria, rashes, denies starting any new medications and denies any other concerns or problems besides as mentioned above    Acute hypercalcemia  Likely secondary to acute renal failure and prerenal azotemia, in the setting of multiple myeloma. Status post calcitonin x2 doses with calcium improving from 14.6 down to 9.3. And with continued IV fluids calcium continues on downward trend and currently is normalized at 8.5. Vitamin D 125 as well as intact PTH and ionized calcium ordered by nephrology and currently pending at this time. Continue close monitoring with repeat labs with hematology oncology       GNR urinary tract infection  Large leukocyte esterase in UA concerning for infection was continued on Rocephin IV daily but patient denied any fever/chills, dysuria, abdominal pain. Urine culture shows E. coli with greater than 100,000 colonies sensitive to cephalosporins so discharge patient on Keflex 5 mg oral 4 times daily for additional 5 days to complete a 7-day course       Acute renal failure  Creatinine elevated to 1.29 with baseline of 0.7 noted. Secondary to prerenal azotemia and resulting hypercalcemia. Continued with aggressive IV fluid resuscitation at 125 cc an hour and nephrology consulted and helped guide treatment and creatinine and recommended probably discontinuing Cozaar. Blood pressures have continued to be low but stable and with IV fluids creatinine stayed in similar range at 1.3 and will need continued close monitoring on discharge    Hyponatremia  Hypovolemic hyponatremia  Sodium has improved from 128 to 136 with normal saline and discontinued IV fluids      History of hypertension  Currently with low soft blood pressures noted. Discontinue Cozaar permanently. Continue Coreg and monitor closely      Hypokalemia  Was in normal range but decreased on 9/1 to 3 and Replete with p.o. potassium and repeat stable at 3.7 and no need for po Kcl on discharge      Ischemic cardiomyopathy  Ischemic cardiomyopathy with EF of 45 to 50% on the last echocardiogram May 2022, s/p AICD placement.   Appears well compensated RA initial IV fluid resuscitation. Losartan was discontinued secondary to hypotension but continue on Coreg 25 mg oral twice daily should continue to follow closely with Raegan amador at [facility] cardiology with neck schedule appointment  Vazquez Hair is nonformulary but will be restarted on discharge      Diabetes type 2  Hold metformin and Farxiga and monitored with RISS and will restart Vazquez Pérezry on discharge but with continue PO intake variable will hold metformin on discharge and can be reevaluated on outpatient basis so will need log of accuchecks when follow up with pcp      GERD  Continue PPI     Multiple myeloma  Chemotherapy as outpatient  Outpatient follow-up with hematology  Hg chronic in 8 range and was 11 on admission and plt also chronicly low but stable at 80       * Procedures:   * No surgery found *        Consults:   Renal Consult Note     Admit Date: 8/30/2022        HPI:   Zakiya Navas is a 55 y.o. female with history of multiple myeloma followed by hematology presented to ER with chief complaint of epigastric abdominal pain associated with fluid intake without any nausea, vomiting and diarrhea. Patient is known to have recurrent urinary tract sections leading to sepsis in the past requiring hospitalization in May 2022. In the ER patient noted to have very high calcium and low sodium along with increase in serum creatinine requiring this renal Consult. Since that time of admission patient is being treated aggressively with IV fluids and calcitonin with which she has been showing improvement clinically and labs wise. Case is discussed with Dr. Roland Roman who is taking care of her at present time. Since serum calcium level has come down to 10.2 it is decided to go ahead and stop calcitonin. So far serum sodium has come up to 134 and a serum creatinine has gone up from yesterday to today slightly. .  Reviewing the medical records it is noted that patient is not having strict intakes and outputs to see how her urine output has been. It is also noted that patient serum albumin is only 1.9 and corrected calcium is 11.87 . We will check calcium level in the a.m. and decide about treating him with calcitonin again if necessary. Meanwhile we will keep up with intakes and outputs to ensure that she is not going to develop significant swelling especially in the presence of very low albumin    Assessment:   #1 patient with acute kidney injury on CKD due to residual renal insult from the past.   #2 hyponatremia-resolved  #3 severe hypocalcemia - is improving  #4 recurrent UTI  #5 severe hypoalbuminemia        Active Problems:    Hypercalcemia (8/30/2022)       Hyperkalemia (8/30/2022)           Plan:      Increase I.V fluids to 125 ml/hr if pt can tolerate   Urine for lytes   No I.V contrast with CT  C/w present RX for hypercalcemia. Will check repeat calcium level from today   Strict I&o s. Thank you     Nephrology follow up on 9/1/22:  Assessment:   (Hypercalcemia  YANETH  Multiple Myeloma  Volume depletion  UTI      Plan:   - Obtain PTH rP, Ionized calcium and activated vitamin D level  - Continue with IV fluid at current infusion rate). Vitals Last 24 Hours:  Patient Vitals for the past 24 hrs:   Temp Pulse Resp BP SpO2   09/02/22 1127 97 °F (36.1 °C) 83 18 105/61 95 %   09/02/22 0800 -- 83 -- -- --   09/02/22 0710 -- -- -- -- 97 %   09/02/22 0705 97.8 °F (36.6 °C) 83 18 108/69 97 %   09/02/22 0400 -- 84 -- -- --   09/02/22 0359 97.1 °F (36.2 °C) 83 20 104/68 98 %   09/02/22 0035 97.1 °F (36.2 °C) 83 18 96/63 100 %   09/02/22 0000 -- 83 -- -- --   09/01/22 2000 -- 84 -- 97/61 --   09/01/22 1935 97.7 °F (36.5 °C) 84 20 (!) 85/54 98 %   09/01/22 1600 -- 77 -- -- --   09/01/22 1539 97.7 °F (36.5 °C) -- 18 120/62 97 %   09/01/22 1359 -- -- -- 91/60 --   09/01/22 1200 -- 79 -- -- --        Discharge Exam:  General: Alert, cooperative, no distress, appears stated age.   Head: Normocephalic, without obvious abnormality, atraumatic. Eyes:  Conjunctivae/corneas clear. Pupils equal, round, reactive to light. Extraocular movements intact. Lungs:  Clear to auscultation bilaterally. no wheeze, rales, crackles, rhonchi   Chest wall: No tenderness or deformity. Heart:  Regular rate and rhythm, S1, S2 normal, no murmur, click, rub or gallop. Abdomen:  Soft, non-tender. Bowel sounds normal. No masses,  No organomegaly. Extremities: Extremities normal, atraumatic, no cyanosis or edema. Pulses: 2+ and symmetric all extremities. Skin: Skin color, texture, turgor normal. No rashes or lesions  Neurologic: Awake, Alert, oriented.  No obvious gross sensory or motor deficits    Labs:  Recent Results (from the past 24 hour(s))   GLUCOSE, POC    Collection Time: 09/01/22  3:32 PM   Result Value Ref Range    Glucose (POC) 117 (H) 65 - 100 mg/dL    Performed by Jermaine Serrato    GLUCOSE, POC    Collection Time: 09/01/22  9:22 PM   Result Value Ref Range    Glucose (POC) 132 (H) 65 - 100 mg/dL    Performed by [facility]    RENAL FUNCTION PANEL    Collection Time: 09/02/22  5:35 AM   Result Value Ref Range    Sodium 136 136 - 145 mmol/L    Potassium 3.7 3.5 - 5.1 mmol/L    Chloride 111 (H) 97 - 108 mmol/L    CO2 20 (L) 21 - 32 mmol/L    Anion gap 5 5 - 15 mmol/L    Glucose 82 65 - 100 mg/dL    BUN 7 6 - 20 mg/dL    Creatinine 1.30 (H) 0.55 - 1.02 mg/dL    BUN/Creatinine ratio 5 (L) 12 - 20      GFR est AA 52 (L) >60 ml/min/1.73m2    GFR est non-AA 43 (L) >60 ml/min/1.73m2    Calcium 8.5 8.5 - 10.1 mg/dL    Phosphorus 1.9 (L) 2.6 - 4.7 mg/dL    Albumin 1.6 (L) 3.5 - 5.0 g/dL   MAGNESIUM    Collection Time: 09/02/22  5:35 AM   Result Value Ref Range    Magnesium 1.2 (L) 1.6 - 2.4 mg/dL   GLUCOSE, POC    Collection Time: 09/02/22  7:22 AM   Result Value Ref Range    Glucose (POC) 96 65 - 100 mg/dL    Performed by Jermaine Serrato    GLUCOSE, POC    Collection Time: 09/02/22 11:16 AM   Result Value Ref Range Glucose (POC) 119 (H) 65 - 100 mg/dL    Performed by Nicko Chavez          Imaging:  CT ABD W CONT AND PELVIS W WO CONT    Result Date: 9/1/2022  1. Compression deformities at the superior endplates of B11 and L1, possibly acute. Recommend MRI thoracolumbar spine for further evaluation to determine acuity. 2.  No acute intra-abdominal pathology. * Discharge Condition: improved  * Disposition: Home w/Family      Discharge Medications:  Current Discharge Medication List        START taking these medications    Details   cephALEXin (Keflex) 500 mg capsule Take 1 Capsule by mouth four (4) times daily for 5 days. Qty: 20 Capsule, Refills: 0  Start date: 9/2/2022, End date: 9/7/2022           CONTINUE these medications which have CHANGED    Details   pantoprazole (PROTONIX) 20 mg tablet Take 1 Tablet by mouth daily. Qty: 30 Tablet, Refills: 0  Start date: 9/2/2022           CONTINUE these medications which have NOT CHANGED    Details   potassium chloride (K-DUR, KLOR-CON M20) 20 mEq tablet Take 20 mEq by mouth daily. acyclovir (ZOVIRAX) 400 mg tablet Take 400 mg by mouth daily. dexAMETHasone (DECADRON) 4 mg tablet Take 40 mg by mouth every seven (7) days. On Monday      carvediloL (COREG) 25 mg tablet Take 25 mg by mouth two (2) times a day. magnesium oxide (MAG-OX) 400 mg tablet Take 400 mg by mouth four (4) times daily. atorvastatin (LIPITOR) 20 mg tablet Take 20 mg by mouth daily. dapagliflozin (FARXIGA) 10 mg tab tablet Take 10 mg by mouth daily. dronabinoL (MARINOL) 5 mg capsule Take 5 mg by mouth two (2) times a day. STOP taking these medications       LOSARTAN POTASSIUM, BULK, Comments:   Reason for Stopping:         metFORMIN (GLUCOPHAGE) 500 mg tablet Comments:   Reason for Stopping:                 * Follow-up Care/Patient Instructions:   Activity: Activity as tolerated  Diet: Cardiac Diet and Diabetic Diet      Follow-up Information       Follow up With Specialties Details Why Contact Info    Mike Singh PA-C Physician Assistant Follow up on 9/2/2022 @ 3:30 [address]  [address]  [phone]            Spent 35 minutes evaluting and coordinating patient care discharge home of which >50% was spent coordinating and counseling.     Signed:  Neo Moore MD  9/2/2022  11:39 AM

## 2022-09-02 NOTE — ROUTINE PROCESS
Patient c/o general pain rated at 6. Patient declined morphrine or other pain meds at this time. Lotion applied to bilateral heels. Left heel had a 2 cm diameter discoloration area that was easily blanchable.

## 2022-09-02 NOTE — PROGRESS NOTES
Problem: Pain  Goal: *Control of Pain  9/2/2022 1236 by Dick Pollock RN  Outcome: Resolved/Met  9/2/2022 0939 by Dick Pollock RN  Outcome: Progressing Towards Goal  Note: Assess pain characteristics  Administer pain meds  Assess pain effectiveness  Reposition patient for comfort     Problem: Falls - Risk of  Goal: *Absence of Falls  Description: Document Paula Barriga Fall Risk and appropriate interventions in the flowsheet.   Outcome: Resolved/Met  Note: Fall Risk Interventions:  Mobility Interventions: Patient to call before getting OOB         Medication Interventions: Teach patient to arise slowly    Elimination Interventions: Call light in reach              Problem: Patient Education: Go to Patient Education Activity  Goal: Patient/Family Education  Outcome: Resolved/Met

## 2022-09-13 NOTE — Clinical Note
[address]  [facility] EMERGENCY DEPT  [address] [id number]  [phone]    Work/School Note    Date: 9/13/2022    To Whom It May concern:    Margy Philip was seen and treated today in the emergency room by the following provider(s):  Attending Provider: An Stiles MD.      Margy Philip is excused from work/school on 09/13/22 and 09/14/22. She is medically clear to return to work/school on 9/15/2022.        Sincerely,          Lorelei Rehman MD

## 2022-09-13 NOTE — ED PROVIDER NOTES
EMERGENCY DEPARTMENT HISTORY AND PHYSICAL EXAM      Date: 9/13/2022  Patient Name: Nancy Clarke    History of Presenting Illness     Chief Complaint   Patient presents with    Fatigue       History Provided By: Patient's Sister    HPI: Nancy Clarke, 55 y.o. female past medical history significant for diabetes, hypertension heart failure, multiple myeloma thrombocytopenia, patient presents with slowed thoughts, generalized weakness earlier today when patient went to [place] [facility] in [place] patient was to receive infusion treatment for her multiple myeloma but it was canceled due to her thrombocytopenia and her hemoglobin was 7, patient was sent home by car instead of being sent to the local ED the oncologist recommended blood transfusion with plan to do it as an outpatient tomorrow    There are no other complaints, changes, or physical findings at this time. PCP: Matty Collins PA-C    No current facility-administered medications on file prior to encounter. Current Outpatient Medications on File Prior to Encounter   Medication Sig Dispense Refill    pantoprazole (PROTONIX) 20 mg tablet Take 1 Tablet by mouth daily. 30 Tablet 0    potassium chloride (K-DUR, KLOR-CON M20) 20 mEq tablet Take 20 mEq by mouth daily. dronabinoL (MARINOL) 5 mg capsule Take 5 mg by mouth two (2) times a day. acyclovir (ZOVIRAX) 400 mg tablet Take 400 mg by mouth daily. dexAMETHasone (DECADRON) 4 mg tablet Take 40 mg by mouth every seven (7) days. On Monday      carvediloL (COREG) 25 mg tablet Take 25 mg by mouth two (2) times a day. magnesium oxide (MAG-OX) 400 mg tablet Take 400 mg by mouth four (4) times daily. atorvastatin (LIPITOR) 20 mg tablet Take 20 mg by mouth daily. dapagliflozin (FARXIGA) 10 mg tab tablet Take 10 mg by mouth daily.          Past History     Past Medical History:  Past Medical History:   Diagnosis Date    AICD (automatic cardioverter/defibrillator) present 12/2020    Anemia     Cardiomyopathy ([facility] [address] 08/2020    Diabetes ([facility] [address]     GERD (gastroesophageal reflux disease)     Heart failure (HCC)     Hypertension     Multiple myeloma (HCC)     Sleep apnea     cpap    Thrombocytopenia ([facility] [address]        Past Surgical History:  Past Surgical History:   Procedure Laterality Date    COLONOSCOPY N/A 11/17/2021    COLONOSCOPY ( T I V A) performed by Marley Turcios MD at [facility] ENDOSCOPY    ENDOSCOPY VISIT-OUTPATIENT  10/08/2021    HX COLONOSCOPY      HX HEART CATHETERIZATION      x 2     HX HERNIA REPAIR      HX HYSTERECTOMY      HX IMPLANTABLE CARDIOVERTER DEFIBRILLATOR      HX OOPHORECTOMY      HX PACEMAKER      pacemaker, defib    HX SVT ABLATION         Family History:  Family History   Problem Relation Age of Onset    Breast Cancer Sister     Diabetes Mother     Hypertension Mother     Elevated Lipids Mother     Heart Surgery Father        Social History:  Social History     Tobacco Use    Smoking status: Never    Smokeless tobacco: Never   Vaping Use    Vaping Use: Never used   Substance Use Topics    Alcohol use: Never    Drug use: Never       Allergies:  No Known Allergies    Review of Systems   Review of Systems   Constitutional:  Positive for fatigue. Negative for chills and fever. HENT:  Negative for rhinorrhea and sore throat. Eyes:  Negative for pain and visual disturbance. Respiratory:  Negative for cough and shortness of breath. Cardiovascular:  Negative for chest pain and leg swelling. Gastrointestinal:  Negative for abdominal pain and vomiting. Endocrine: Negative for polydipsia and polyuria. Genitourinary:  Negative for dysuria and hematuria. Musculoskeletal:  Negative for back pain and neck pain. Skin:  Positive for pallor. Negative for color change. Neurological:  Positive for weakness. Negative for headaches. Psychiatric/Behavioral:  Negative for agitation and suicidal ideas.       Physical Exam   Physical Exam  Vitals and nursing note reviewed. Constitutional:       General: She is not in acute distress. Appearance: She is not ill-appearing, toxic-appearing or diaphoretic. HENT:      Head: Normocephalic and atraumatic. Right Ear: Tympanic membrane normal.      Left Ear: Tympanic membrane normal.      Nose: Nose normal. No congestion. Mouth/Throat:      Mouth: Mucous membranes are moist.      Pharynx: Oropharynx is clear. Eyes:      Extraocular Movements: Extraocular movements intact. Conjunctiva/sclera: Conjunctivae normal.      Pupils: Pupils are equal, round, and reactive to light. Cardiovascular:      Rate and Rhythm: Normal rate and regular rhythm. Pulses: Normal pulses. Heart sounds: Normal heart sounds. Pulmonary:      Effort: Pulmonary effort is normal.      Breath sounds: Normal breath sounds. Abdominal:      General: Bowel sounds are normal.      Palpations: Abdomen is soft. Tenderness: There is no abdominal tenderness. Genitourinary:     Rectum: Guaiac result positive. Musculoskeletal:         General: No tenderness, deformity or signs of injury. Normal range of motion. Cervical back: Normal range of motion and neck supple. No rigidity or tenderness. Lymphadenopathy:      Cervical: No cervical adenopathy. Skin:     General: Skin is cool and dry. Capillary Refill: Capillary refill takes more than 3 seconds. Coloration: Skin is pale. Findings: Bruising present. No rash. Comments: Left lower abdomen bruising is secondary to Lovenox subcutaneous injection. Negative oral mucosal petechiae. Neurological:      General: No focal deficit present. Mental Status: She is alert and oriented to person, place, and time. Cranial Nerves: No cranial nerve deficit. Sensory: No sensory deficit.    Psychiatric:         Mood and Affect: Mood normal.         Behavior: Behavior normal.       Lab and Diagnostic Study Results   Labs -   No results found for this or any previous visit (from the past 12 hour(s)). Radiologic Studies -   @lastxrresult@  CT Results  (Last 48 hours)      None          CXR Results  (Last 48 hours)      None            Medical Decision Making and ED Course   Differential Diagnosis & Medical Decision Making Provider Note:   Weakness DDx hypoxia, anemia, hypoglycemia, sepsis    - I am the first provider for this patient. I reviewed the vital signs, available nursing notes, past medical history, past surgical history, family history and social history. The patients presenting problems have been discussed, and they are in agreement with the care plan formulated and outlined with them. I have encouraged them to ask questions as they arise throughout their visit. Vital Signs-Reviewed the patient's vital signs. Patient Vitals for the past 12 hrs:   Temp Pulse Resp BP SpO2   09/13/22 1734 98.8 °F (37.1 °C) 91 16 91/66 100 %       ED Course:   ED Course as of 09/22/22 1557   Tue Sep 13, 2022   2000 Patient care transitioned at shift change. Labs are pending. [RA]   2307 Hemoglobin is 7.4. Platelet count is 80,401. Guaiac is trace positive. [RA]   2308 I discussed case with Zachary Kehr. He recommends no transfusion unless there is jeremy bleeding. No petechiae is noted in the palate or facial skin. [RA]      ED Course User Index  [RA] Solis Ferrer MD         Procedures   Performed by: Verner Kanner, MD  Procedures      Disposition   Disposition: Transferred to [place] [id number] patient guardian agreed to transfer and understand the risks involved as outlined in the EMTALA form. DISCHARGE PLAN:  1. Current Discharge Medication List        CONTINUE these medications which have NOT CHANGED    Details   pantoprazole (PROTONIX) 20 mg tablet Take 1 Tablet by mouth daily. Qty: 30 Tablet, Refills: 0      potassium chloride (K-DUR, KLOR-CON M20) 20 mEq tablet Take 20 mEq by mouth daily.       dronabinoL (MARINOL) 5 mg capsule Take 5 mg by mouth two (2) times a day. acyclovir (ZOVIRAX) 400 mg tablet Take 400 mg by mouth daily. dexAMETHasone (DECADRON) 4 mg tablet Take 40 mg by mouth every seven (7) days. On Monday      carvediloL (COREG) 25 mg tablet Take 25 mg by mouth two (2) times a day. magnesium oxide (MAG-OX) 400 mg tablet Take 400 mg by mouth four (4) times daily. atorvastatin (LIPITOR) 20 mg tablet Take 20 mg by mouth daily. dapagliflozin (FARXIGA) 10 mg tab tablet Take 10 mg by mouth daily. 2.   Follow-up Information    None       3. Return to ED if worse   4. Current Discharge Medication List         Remove if admitted/transferred    Diagnosis/Clinical Impression     Clinical Impression: No diagnosis found. Attestations: Nery BULLOCK MD, am the primary clinician of record. Please note that this dictation was completed with [employer], the computer voice recognition software. Quite often unanticipated grammatical, syntax, homophones, and other interpretive errors are inadvertently transcribed by the computer software. Please disregard these errors. Please excuse any errors that have escaped final proofreading. Thank you.

## 2022-09-14 PROBLEM — R41.82 AMS (ALTERED MENTAL STATUS): Status: ACTIVE | Noted: 2022-01-01

## 2022-09-14 NOTE — ED NOTES
When taking pts vital signs this nurse was unable to obtain an oral or axillary temp. Rectal temp reading 95. Pt placed on jatinder hugger. Mittens placed on both pts hand as pt continues to rip at IV lines, montior, blankets, and clothes.

## 2022-09-14 NOTE — CONSULTS
Consult Date: 9/14/2022    Consults Sepsis    Subjective This is a 55year old female, diabetic with multiple myeloma on chemotherapy, who initially presented to the ED  at [facility] because of mental status changes. CBC showed severe anemia and thrombocytopenia. She was then transferred to [facility]. Here she was severely agitated. She was hypothermic but normotensive. WBC now elevated. Urinalysis showed moderate pyuria and 2+ bacteria. CXR showed  CT Abdomen showed  acute mild superior endplate compression fracture at S1 with a longitudinal component extending toward the right hemisacrum, new since 8/30/2022. There was no other acute abnormality in the abdomen or pelvis. Images reviewed by me. Blood culture sent. Patienton no antibiotics at this time. ID has been consulted for Sepsis. Patient seen in the ICU where she was confused and nonverbal. Family members at bedside. Reportedly she had complained of abdominal and buttock pain.   Past Medical History:   Diagnosis Date    AICD (automatic cardioverter/defibrillator) present 12/2020    Anemia     Cardiomyopathy ([address] 08/2020    Diabetes ([address]     GERD (gastroesophageal reflux disease)     Heart failure (HCC)     Hypertension     Multiple myeloma (HCC)     Sleep apnea     cpap    Thrombocytopenia ([address]       Past Surgical History:   Procedure Laterality Date    COLONOSCOPY N/A 11/17/2021    COLONOSCOPY ( T I V A) performed by Leif Lake MD at [facility] ENDOSCOPY    ENDOSCOPY VISIT-OUTPATIENT  10/08/2021    HX COLONOSCOPY      HX HEART CATHETERIZATION      x 2     HX HERNIA REPAIR      HX HYSTERECTOMY      HX IMPLANTABLE CARDIOVERTER DEFIBRILLATOR      HX OOPHORECTOMY      HX PACEMAKER      pacemaker, defib    HX SVT ABLATION       Family History   Problem Relation Age of Onset    Breast Cancer Sister     Diabetes Mother     Hypertension Mother     Elevated Lipids Mother     Heart Surgery Father       Social History     Tobacco Use    Smoking status: Never Smokeless tobacco: Never   Substance Use Topics    Alcohol use: Never       Current Facility-Administered Medications   Medication Dose Route Frequency Provider Last Rate Last Admin    sodium chloride (NS) flush 5-10 mL  5-10 mL IntraVENous PRN Dequan Desir MD        sodium chloride (NS) flush 5-40 mL  5-40 mL IntraVENous Q8H Naomi Canseco MD        sodium chloride (NS) flush 5-40 mL  5-40 mL IntraVENous PRN Kimberly ALMANZAR MD        acetaminophen (TYLENOL) tablet 650 mg  650 mg Oral Q6H PRN Kimberly Sun MD        Or    acetaminophen (TYLENOL) suppository 650 mg  650 mg Rectal Q6H PRN Kimberly ALMANZAR MD        polyethylene glycol (MIRALAX) packet 17 g  17 g Oral DAILY PRN Kimberly ALMANZAR MD        ondansetron (ZOFRAN ODT) tablet 4 mg  4 mg Oral Q8H PRN Kimberly ALMANZAR MD        Or    ondansetron (ZOFRAN) injection 4 mg  4 mg IntraVENous Q6H PRN Kimberly ALMANZAR MD        sodium bicarbonate (8.4%) 150 mEq in 0.45% sodium chloride 1,000 mL infusion   IntraVENous CONTINUOUS Kimberly ALMANZAR  mL/hr at 09/14/22 0924 New Bag at 09/14/22 0924    pantoprazole (PROTONIX) 40 mg in 0.9% sodium chloride 10 mL injection  40 mg IntraVENous DAILY Kimberly ALMANZAR MD   40 mg at 09/14/22 0920    0.9% sodium chloride infusion 250 mL  250 mL IntraVENous PRN Kimberly Sun MD         Current Outpatient Medications   Medication Sig Dispense Refill    pantoprazole (PROTONIX) 20 mg tablet Take 1 Tablet by mouth daily. 30 Tablet 0    potassium chloride (K-DUR, KLOR-CON M20) 20 mEq tablet Take 20 mEq by mouth daily. dronabinoL (MARINOL) 5 mg capsule Take 5 mg by mouth two (2) times a day. acyclovir (ZOVIRAX) 400 mg tablet Take 400 mg by mouth daily. dexAMETHasone (DECADRON) 4 mg tablet Take 40 mg by mouth every seven (7) days. On Monday      carvediloL (COREG) 25 mg tablet Take 25 mg by mouth two (2) times a day.       magnesium oxide (MAG-OX) 400 mg tablet Take 400 mg by mouth four (4) times daily. atorvastatin (LIPITOR) 20 mg tablet Take 20 mg by mouth daily. dapagliflozin (FARXIGA) 10 mg tab tablet Take 10 mg by mouth daily. Review of Systems   Unable to perform ROS: Mental status change     Objective     Vital signs for last 24 hours:  Visit Vitals  /67   Pulse 94   Temp (!) 96.6 °F (35.9 °C)   Resp 17   Ht 5' (1.524 m)   Wt 100 lb (45.4 kg)   SpO2 100%   BMI 19.53 kg/m²       Intake/Output this shift:  Current Shift: 09/14 0701 - 09/14 1900  In: 310   Out: -   Last 3 Shifts: 09/12 1901 - 09/14 0700  In: 962 [I.V.:600]  Out: -     Data Review:   Recent Results (from the past 24 hour(s))   CBC WITH AUTOMATED DIFF    Collection Time: 09/13/22  8:12 PM   Result Value Ref Range    WBC 4.9 4.4 - 11.3 K/uL    RBC 2.12 (L) 4.50 - 5.90 M/uL    HGB 7.4 (L) 13.5 - 17.5 g/dL    HCT 22.2 (L) 36 - 46 %    .7 (H) 80 - 100 FL    MCH 34.7 (H) 31 - 34 PG    MCHC 33.2 31.0 - 36.0 g/dL    RDW 15.2 (H) 11.5 - 14.5 %    PLATELET 16 (LL) 709 - 400 K/uL    MPV 9.0 6.5 - 11.5 FL    NRBC 0.5  WBC    ABSOLUTE NRBC 0.03 K/uL    NEUTROPHILS 25 (L) 42 - 75 %    LYMPHOCYTES 57 (H) 20.5 - 51.1 %    MONOCYTES 18 (H) 1.7 - 9.3 %    EOSINOPHILS 0 (L) 0.9 - 2.9 %    BASOPHILS 0 0.0 - 2.5 %    ABS. NEUTROPHILS 1.2 (L) 1.8 - 7.7 K/UL    ABS. LYMPHOCYTES 2.7 1.0 - 4.8 K/UL    ABS. MONOCYTES 0.9 0.2 - 2.4 K/UL    ABS. EOSINOPHILS 0.0 0.0 - 0.7 K/UL    ABS.  BASOPHILS 0.0 0.0 - 0.2 K/UL   PROTHROMBIN TIME + INR    Collection Time: 09/13/22  8:12 PM   Result Value Ref Range    Prothrombin time 19.8 (H) 11.9 - 14.6 sec    INR 1.8 (H) 0.9 - 1.1     METABOLIC PANEL, COMPREHENSIVE    Collection Time: 09/13/22  8:12 PM   Result Value Ref Range    Sodium 132 (L) 136 - 145 mmol/L    Potassium 4.9 3.5 - 5.1 mmol/L    Chloride 104 97 - 108 mmol/L    CO2 23 21 - 32 mmol/L    Anion gap 5 5 - 15 mmol/L    Glucose 239 (H) 65 - 100 mg/dL    BUN 11 6 - 20 mg/dL Creatinine 1.51 (H) 0.55 - 1.02 mg/dL    BUN/Creatinine ratio 7 (L) 12 - 20      GFR est AA 43 (L) >60 ml/min/1.73m2    GFR est non-AA 36 (L) >60 ml/min/1.73m2    Calcium 8.5 8.5 - 10.1 mg/dL    Bilirubin, total 0.9 0.2 - 1.0 mg/dL    AST (SGOT) 79 (H) 15 - 37 U/L    ALT (SGPT) 70 12 - 78 U/L    Alk.  phosphatase 104 45 - 117 U/L    Protein, total 11.1 (H) 6.4 - 8.2 g/dL    Albumin 1.6 (L) 3.5 - 5.0 g/dL    Globulin 9.5 (H) 2.0 - 4.0 g/dL    A-G Ratio 0.2 (L) 1.1 - 2.2     MAGNESIUM    Collection Time: 09/13/22  8:12 PM   Result Value Ref Range    Magnesium 1.9 1.6 - 2.4 mg/dL   URINALYSIS W/ RFLX MICROSCOPIC    Collection Time: 09/13/22  9:03 PM   Result Value Ref Range    Color Yellow/Straw      Appearance Clear Clear      Specific gravity 1.015 1.003 - 1.030      pH (UA) 7.5 5.0 - 8.0      Protein 30 (A) Negative mg/dL    Glucose >1,000 (A) Negative mg/dL    Ketone Trace (A) Negative mg/dL    Bilirubin Negative Negative      Blood Small (A) Negative      Urobilinogen 0.2 0.2 - 1.0 EU/dL    Nitrites Negative Negative      Leukocyte Esterase Small (A) Negative     URINE MICROSCOPIC    Collection Time: 09/13/22  9:03 PM   Result Value Ref Range    WBC 10-20 0 - 5 /hpf    RBC 10-20 0 - 3 /hpf    Bacteria 2+ (A) Negative /hpf   COVID-19 RAPID TEST    Collection Time: 09/14/22  1:50 AM   Result Value Ref Range    COVID-19 rapid test Not Detected Not Detected     INFLUENZA A & B AG (RAPID TEST)    Collection Time: 09/14/22  1:50 AM   Result Value Ref Range    Influenza A Antigen Negative Negative      Influenza B Antigen Negative Negative     OCCULT BLOOD, STOOL    Collection Time: 09/14/22  3:14 AM   Result Value Ref Range    Occult Blood,day 1 Positive (A) Negative      Day 1 date: 6,951,[id number]     CBC WITH AUTOMATED DIFF    Collection Time: 09/14/22  3:17 AM   Result Value Ref Range    WBC 7.5 3.6 - 11.0 K/uL    RBC 2.15 (L) 3.80 - 5.20 M/uL    HGB 7.4 (L) 11.5 - 16.0 g/dL    HCT 23.4 (L) 35.0 - 47.0 %    .8 (H) 80.0 - 99.0 FL    MCH 34.4 (H) 26.0 - 34.0 PG    MCHC 31.6 30.0 - 36.5 g/dL    RDW 14.5 11.5 - 14.5 %    PLATELET 14 (LL) 717 - 400 K/uL    NRBC 0.0 0.0  WBC    ABSOLUTE NRBC 0.00 0.00 - 0.01 K/uL    NEUTROPHILS 21 (L) 32 - 75 %    LYMPHOCYTES 54 (H) 12 - 49 %    MONOCYTES 24 (H) 5 - 13 %    EOSINOPHILS 0 0 - 7 %    BASOPHILS 0 0 - 1 %    IMMATURE GRANULOCYTES 1 (H) 0 - 0.5 %    ABS. NEUTROPHILS 1.5 (L) 1.8 - 8.0 K/UL    ABS. LYMPHOCYTES 4.0 (H) 0.8 - 3.5 K/UL    ABS. MONOCYTES 1.8 (H) 0.0 - 1.0 K/UL    ABS. EOSINOPHILS 0.0 0.0 - 0.4 K/UL    ABS. BASOPHILS 0.0 0.0 - 0.1 K/UL    ABS. IMM. GRANS. 0.1 (H) 0.00 - 0.04 K/UL    DF AUTOMATED     METABOLIC PANEL, COMPREHENSIVE    Collection Time: 09/14/22  3:17 AM   Result Value Ref Range    Sodium 133 (L) 136 - 145 mmol/L    Potassium 5.0 3.5 - 5.1 mmol/L    Chloride 109 (H) 97 - 108 mmol/L    CO2 19 (L) 21 - 32 mmol/L    Anion gap 5 5 - 15 mmol/L    Glucose 193 (H) 65 - 100 mg/dL    BUN 12 6 - 20 mg/dL    Creatinine 1.49 (H) 0.55 - 1.02 mg/dL    BUN/Creatinine ratio 8 (L) 12 - 20      GFR est AA 44 (L) >60 ml/min/1.73m2    GFR est non-AA 36 (L) >60 ml/min/1.73m2    Calcium 8.4 (L) 8.5 - 10.1 mg/dL    Bilirubin, total 0.8 0.2 - 1.0 mg/dL    AST (SGOT) 66 (H) 15 - 37 U/L    ALT (SGPT) 65 12 - 78 U/L    Alk.  phosphatase 102 45 - 117 U/L    Protein, total 11.7 (H) 6.4 - 8.2 g/dL    Albumin 1.7 (L) 3.5 - 5.0 g/dL    Globulin 10.0 (H) 2.0 - 4.0 g/dL    A-G Ratio 0.2 (L) 1.1 - 2.2     LIPASE    Collection Time: 09/14/22  3:17 AM   Result Value Ref Range    Lipase 69 (L) 73 - 393 U/L   LACTIC ACID    Collection Time: 09/14/22  3:17 AM   Result Value Ref Range    Lactic acid 4.1 (HH) 0.4 - 2.0 mmol/L   MAGNESIUM    Collection Time: 09/14/22  3:17 AM   Result Value Ref Range    Magnesium 1.8 1.6 - 2.4 mg/dL   CK    Collection Time: 09/14/22  3:17 AM   Result Value Ref Range    CK 81 26 - 192 U/L   PLATELETS, ALLOCATE    Collection Time: 09/14/22  4:45 AM   Result Value Ref Range    Unit number [id number]     Blood component type PLPH,LR PSTC     Unit division 00     Status of unit [address] to transfuse    TYPE & SCREEN    Collection Time: 09/14/22  5:06 AM   Result Value Ref Range    Crossmatch Expiration 09/17/2022,2359     ABO/Rh(D) A Positive     Antibody screen PENDING    LACTIC ACID    Collection Time: 09/14/22  5:06 AM   Result Value Ref Range    Lactic acid 6.7 (HH) 0.4 - 2.0 mmol/L   EKG, 12 LEAD, INITIAL    Collection Time: 09/14/22  6:18 AM   Result Value Ref Range    Ventricular Rate 95 BPM    Atrial Rate 95 BPM    P-R Interval 158 ms    QRS Duration 140 ms    Q-T Interval 446 ms    QTC Calculation (Bezet) 560 ms    Calculated P Axis 15 degrees    Calculated R Axis 46 degrees    Calculated T Axis 168 degrees    Diagnosis       Atrial-sensed ventricular-paced rhythm  Abnormal ECG    Confirmed by [facility] [place] [id number] ([id number]) on 9/14/2022 9:42:28 AM     LACTIC ACID    Collection Time: 09/14/22  7:04 AM   Result Value Ref Range    Lactic acid 13.4 (HH) 0.4 - 2.0 mmol/L   AMMONIA    Collection Time: 09/14/22  7:04 AM   Result Value Ref Range    Ammonia, plasma 58 (H) <32 umol/L   TROPONIN-HIGH SENSITIVITY    Collection Time: 09/14/22  8:26 AM   Result Value Ref Range    Troponin-High Sensitivity 16 0 - 51 ng/L   METABOLIC PANEL, COMPREHENSIVE    Collection Time: 09/14/22  8:26 AM   Result Value Ref Range    Sodium 143 136 - 145 mmol/L    Potassium 4.4 3.5 - 5.1 mmol/L    Chloride 119 (H) 97 - 108 mmol/L    CO2 <5 (LL) 21 - 32 mmol/L    Anion gap Not calculated 5 - 15 mmol/L    Glucose 142 (H) 65 - 100 mg/dL    BUN 11 6 - 20 mg/dL    Creatinine 1.40 (H) 0.55 - 1.02 mg/dL    BUN/Creatinine ratio 8 (L) 12 - 20      GFR est AA 47 (L) >60 ml/min/1.73m2    GFR est non-AA 39 (L) >60 ml/min/1.73m2    Calcium 6.6 (L) 8.5 - 10.1 mg/dL    Bilirubin, total 0.3 0.2 - 1.0 mg/dL    AST (SGOT) 60 (H) 15 - 37 U/L    ALT (SGPT) 44 12 - 78 U/L    Alk.  phosphatase 73 45 - 117 U/L    Protein, total 8.0 6.4 - 8.2 g/dL    Albumin 1.2 (L) 3.5 - 5.0 g/dL    Globulin 6.8 (H) 2.0 - 4.0 g/dL    A-G Ratio 0.2 (L) 1.1 - 2.2     BLOOD GAS, ARTERIAL    Collection Time: 09/14/22  8:28 AM   Result Value Ref Range    pH 6.97 (LL) 7.35 - 7.45      PCO2 16 (L) 35 - 45 mmHg    PO2 116 (H) 80 - 100 mmHg    O2 SATURATION 96 95 - 99 %    BICARBONATE 4 (L) 22 - 26 mmol/L    BASE DEFICIT 26.2 mmol/L    O2 METHOD Room air      Sample source Arterial      SITE Left Brachial      MIGUEL'S TEST YES      Carboxy-Hgb 0.3 (L) 1 - 2 %    Methemoglobin 0.2 0 - 1.4 %    Oxyhemoglobin 95.3 95 - 99 %    Performed by Calvin Tang     Critical value read back Called to l. buhls rn on 09/14/2022 at 08:36     TEMPERATURE 96.6     CBC WITH AUTOMATED DIFF    Collection Time: 09/14/22  8:33 AM   Result Value Ref Range    WBC 11.8 (H) 3.6 - 11.0 K/uL    RBC 1.19 (L) 3.80 - 5.20 M/uL    HGB 4.1 (LL) 11.5 - 16.0 g/dL    HCT 15.3 (LL) 35.0 - 47.0 %    .6 (H) 80.0 - 99.0 FL    MCH 34.5 (H) 26.0 - 34.0 PG    MCHC 26.8 (L) 30.0 - 36.5 g/dL    RDW 14.9 (H) 11.5 - 14.5 %    PLATELET 41 (LL) 283 - 400 K/uL    MPV 10.1 8.9 - 12.9 FL    NRBC 0.2 (H) 0.0  WBC    ABSOLUTE NRBC 0.02 (H) 0.00 - 0.01 K/uL    NEUTROPHILS PENDING %    LYMPHOCYTES PENDING %    MONOCYTES PENDING %    EOSINOPHILS PENDING %    BASOPHILS PENDING %    IMMATURE GRANULOCYTES PENDING %    ABS. NEUTROPHILS PENDING K/UL    ABS. LYMPHOCYTES PENDING K/UL    ABS. MONOCYTES PENDING K/UL    ABS. EOSINOPHILS PENDING K/UL    ABS. BASOPHILS PENDING K/UL    ABS. IMM. GRANS.  PENDING K/UL    DF PENDING    EMERGENT RELEASE OF UNCROSSMATCHED RED CELLS    Collection Time: 09/14/22  9:30 AM   Result Value Ref Range    Crossmatch Expiration 09/17/2022,6999     ABO/Rh(D) PENDING     Antibody screen PENDING     Unit number [id number]     Blood component type  LR     Unit division 00     Status of unit Issued     UNIT TAG COMMENT Emergency Release     [address] to transfuse     Crossmatch result Emergency Release     Unit number [id number]     Blood component type RC LR     Unit division 00     Status of unit Issued     UNIT TAG COMMENT Emergency Release     TRANSFUSION STATUS Ok to transfuse     Crossmatch result Emergency Release      CT Abdomen (9/14)         Physical Exam  Vitals and nursing note reviewed. Exam conducted with a chaperone present (?Sisters). Constitutional:       General: She is not in acute distress. Appearance: She is ill-appearing. HENT:      Head: Normocephalic and atraumatic. Right Ear: External ear normal.      Left Ear: External ear normal.      Nose: Nose normal.      Mouth/Throat:      Mouth: Mucous membranes are dry. Eyes:      Pupils: Pupils are equal, round, and reactive to light. Comments: Right eyelid swelling   Cardiovascular:      Rate and Rhythm: Normal rate and regular rhythm. Heart sounds: No murmur heard. Pulmonary:      Effort: Pulmonary effort is normal.      Breath sounds: Normal breath sounds. Abdominal:      General: Bowel sounds are normal. There is no distension. Palpations: Abdomen is soft. Tenderness: There is no abdominal tenderness. Genitourinary:     Comments: No urinary  catheter  Musculoskeletal:      Cervical back: Neck supple. Right lower leg: No edema. Left lower leg: No edema. Skin:     Findings: No rash. Neurological:      General: No focal deficit present. Mental Status: She is disoriented. Psychiatric:      Comments: Unable to assess     ASSESSMENT/PLAN    Suspected sepsis with hypothermia, leukocytosis  Pyuria and bacteriuria  Multiple myeloma on chemotherapy  Severe anemia  Severe thrombocytopenia  Compression fracture S1  Altered mental status with elevated ammonia level    1. Start IV Meropenem  2. Follow-up blood and urine cultures  3. In am, repeat CBC, procal and CRP    Sean Epps MD

## 2022-09-14 NOTE — ED TRIAGE NOTES
Pt transferred from [facility] for pancytopenia. Pt is scheduled for an outpatient blood transfusion this morning at the [place] [facility], but family stated that the pt was increasingly becoming more altered.

## 2022-09-14 NOTE — PROGRESS NOTES
SON provided to patient/representative with verbal explanation of the notice. Time allotted for questions regarding the notice. Patient /representative provided a completed copy of the SON notice. Copy placed on bedside chart. Pt's sister Peter Hernández ) signed & received. Pt just changed to INP status.

## 2022-09-14 NOTE — PROGRESS NOTES
Reason for Readmission:    Sepsis         RUR Score/Risk Level:  N/A     PCP: First and Last name:  Shell Tatum    Name of Practice:    Are you a current patient: Yes/No: Yes   Approximate date of last visit: 9/7/22   Can you participate in a virtual visit with your PCP: No    Is a Care Conference indicated: No      Did you attend your follow up appointment (s): If not, why not:Yes, on 9/7/22. Resources/supports as identified by patient/family:   Family       Top Challenges facing patient (as identified by patient/family and CM): Finances/Medication cost?   No    Transportation     No   Support system or lack thereof? No   Living arrangements? No       Self-care/ADLs/Cognition? Yes. Decreased mobility & needs assist with ADLs. Current Advanced Directive/Advance Care Plan:  DNR           Plan for utilizing home health: None @ this time. Transition of Care Plan:    Based on readmission, the patient's previous Plan of Care   has been evaluated and/or modified. The current Transition of Care Plan is:  D/C Plan is undetermined @ this time. Pt uses [facility] in [facility].

## 2022-09-14 NOTE — ED NOTES
Second of two units of PRBC's started as ordered. Pt becoming slightly restless, moving head from right to left. Family remains with pt at bedside.

## 2022-09-14 NOTE — ED NOTES
Chepe Jackson notified of increased confusion and critically high, increasing lactic acid. Torito Dewitt to see patient when he gets here to evaluate. Orders received for more blood work. Pt resting on stretcher sister at bedside.

## 2022-09-14 NOTE — Clinical Note
Status[de-identified] INPATIENT [101]   Type of Bed: Medical [8]   Inpatient Hospitalization Certified Necessary for the Following Reasons: 3.  Patient receiving treatment that can only be provided in an inpatient setting (further clarification in H&P documentation)   Admitting Diagnosis: AMS (altered mental status) [2054361]   Admitting Physician: Norman Burrell [[id number]]   Attending Physician: Norman Burrell [[id number]]   Estimated Length of Stay: 3-4 Midnights   Discharge Plan[de-identified] Other (Specify)

## 2022-09-14 NOTE — ED NOTES
First of two units of emergency blood started as ordered with consent signed by pt's sister. Pt's family ( mother, sister and brother) at bedside.

## 2022-09-14 NOTE — ED NOTES
DNR paper explained to pt's sister by Dr. Jesús Calvo and signed by pt's sister. Paper scanned into the chart and a copy given to pt's sister.

## 2022-09-14 NOTE — H&P
History and Physical (Inpatient)    Patient:    Yo Rinaldi   55 y.o. Chief Complaint:   Chief Complaint   Patient presents with    Transfer Of Care         History of Present Illness: This is a 71-year-old black female with history of multiple myeloma undergoing chemotherapy was seen by her oncologist and referred for blood transfusion 1 day prior to admission. Sister took her to the hospital because she was not acting herself she was a little more lethargic and had some difficulty walking very weak. Because of her anemia continued weakness she was transferred to [facility]. In the emergency room she was found to be pancytopenic and has been transfused platelet awaiting packed red cell blood transfusion. She continues to have worsening mental status with profound elevation of lactic acid level. She is getting more lethargic and appears to be in respiratory distress. Information was obtained only from the ER physician and her sister who is at bedside. Patient is single no children and mother and sister makes life and that decision for her. Sister tells me that they want her not to be resuscitated and they have discussed this in the past.  There was no complaint of fever chills nausea vomiting diarrhea. There is no urinary symptoms. When she saw her oncologist the day prior she did not get a routine chemo because she was lethargic and was anemic. ROS: Unable to obtain due to patient's status  Sister reports just lethargy weakness acutely. History:  Past Medical History:   Diagnosis Date    AICD (automatic cardioverter/defibrillator) present 12/2020    Anemia     Cardiomyopathy ([address] 08/2020    Diabetes ([address]     GERD (gastroesophageal reflux disease)     Heart failure (HCC)     Hypertension     Multiple myeloma ([address]     Sleep apnea     cpap    Thrombocytopenia ([address]      Social history. She is single no children does not smoke or drink alcohol.     Family history 2 sisters with breast cancer. Mother is alive. Family History   Problem Relation Age of Onset    Breast Cancer Sister     Diabetes Mother     Hypertension Mother     Elevated Lipids Mother     Heart Surgery Father        Allergies:  No Known Allergies    Current Medications:    Current Facility-Administered Medications:     0.9% sodium chloride infusion 250 mL, 250 mL, IntraVENous, PRN, Dequan Galarza MD    sodium chloride (NS) flush 5-10 mL, 5-10 mL, IntraVENous, PRN, Dequan Galarza MD    0.9% sodium chloride infusion, 75 mL/hr, IntraVENous, Dequan Cunningham MD, Last Rate: 75 mL/hr at 09/14/22 0537, 75 mL/hr at 09/14/22 0537    sodium chloride (NS) flush 5-40 mL, 5-40 mL, IntraVENous, Q8H, Naomi Canseco MD    sodium chloride (NS) flush 5-40 mL, 5-40 mL, IntraVENous, PRN, Naomi Faria MD    acetaminophen (TYLENOL) tablet 650 mg, 650 mg, Oral, Q6H PRN **OR** acetaminophen (TYLENOL) suppository 650 mg, 650 mg, Rectal, Q6H PRN, Naomi Faria MD    polyethylene glycol (MIRALAX) packet 17 g, 17 g, Oral, DAILY PRN, Naomi Faria MD    ondansetron (ZOFRAN ODT) tablet 4 mg, 4 mg, Oral, Q8H PRN **OR** ondansetron (ZOFRAN) injection 4 mg, 4 mg, IntraVENous, Q6H PRN, Naomi Faria MD    enoxaparin (LOVENOX) injection 30 mg, 30 mg, SubCUTAneous, DAILY, Naomi Faria MD    Current Outpatient Medications:     pantoprazole (PROTONIX) 20 mg tablet, Take 1 Tablet by mouth daily. , Disp: 30 Tablet, Rfl: 0    potassium chloride (K-DUR, KLOR-CON M20) 20 mEq tablet, Take 20 mEq by mouth daily. , Disp: , Rfl:     dronabinoL (MARINOL) 5 mg capsule, Take 5 mg by mouth two (2) times a day., Disp: , Rfl:     acyclovir (ZOVIRAX) 400 mg tablet, Take 400 mg by mouth daily. , Disp: , Rfl:     dexAMETHasone (DECADRON) 4 mg tablet, Take 40 mg by mouth every seven (7) days.  On Monday, Disp: , Rfl:     carvediloL (COREG) 25 mg tablet, Take 25 mg by mouth two (2) times a day., Disp: , Rfl: magnesium oxide (MAG-OX) 400 mg tablet, Take 400 mg by mouth four (4) times daily. , Disp: , Rfl:     atorvastatin (LIPITOR) 20 mg tablet, Take 20 mg by mouth daily. , Disp: , Rfl:     dapagliflozin (FARXIGA) 10 mg tab tablet, Take 10 mg by mouth daily. , Disp: , Rfl:        Physical Exam:  Visit Vitals  /84   Pulse 97   Temp (!) 96.6 °F (35.9 °C)   Resp 26   Ht 5' (1.524 m)   Wt 45.4 kg (100 lb)   SpO2 100%   BMI 19.53 kg/m²     On examination she is an adult black female who looks lethargic and in respiratory distress. HEENT normocephalic atraumatic. Mild strabismus. Pupils reactive anicteric sclera  Neck without any distended neck vein. Lungs generally clear anteriorly bilaterally no coarse crackles or wheeze  Heart S1-S2 regular  Abdomen soft positive bowel sounds no appreciable organomegaly  Lower extremities without any cyanosis or edema  CNS lethargic nonverbal, restless    Impression this is a 69-year-old black female with history of multiple myeloma undergoing chemotherapy history of cardiomyopathy presents with altered mental status with sepsis admitted for further care. Findings/Diagnosis: #1. Altered mental status most likely septic metabolic encephalopathy  #2. Sepsis of unclear etiology  #3. History of multiple myeloma undergoing chemotherapy  #4. Chronic anemia  #5.   Severe thrombocytopenia      Laboratory:      Recent Results (from the past 24 hour(s))   CBC WITH AUTOMATED DIFF    Collection Time: 09/13/22  8:12 PM   Result Value Ref Range    WBC 4.9 4.4 - 11.3 K/uL    RBC 2.12 (L) 4.50 - 5.90 M/uL    HGB 7.4 (L) 13.5 - 17.5 g/dL    HCT 22.2 (L) 36 - 46 %    .7 (H) 80 - 100 FL    MCH 34.7 (H) 31 - 34 PG    MCHC 33.2 31.0 - 36.0 g/dL    RDW 15.2 (H) 11.5 - 14.5 %    PLATELET 16 (LL) 474 - 400 K/uL    MPV 9.0 6.5 - 11.5 FL    NRBC 0.5  WBC    ABSOLUTE NRBC 0.03 K/uL    NEUTROPHILS 25 (L) 42 - 75 %    LYMPHOCYTES 57 (H) 20.5 - 51.1 %    MONOCYTES 18 (H) 1.7 - 9.3 % EOSINOPHILS 0 (L) 0.9 - 2.9 %    BASOPHILS 0 0.0 - 2.5 %    ABS. NEUTROPHILS 1.2 (L) 1.8 - 7.7 K/UL    ABS. LYMPHOCYTES 2.7 1.0 - 4.8 K/UL    ABS. MONOCYTES 0.9 0.2 - 2.4 K/UL    ABS. EOSINOPHILS 0.0 0.0 - 0.7 K/UL    ABS. BASOPHILS 0.0 0.0 - 0.2 K/UL   PROTHROMBIN TIME + INR    Collection Time: 09/13/22  8:12 PM   Result Value Ref Range    Prothrombin time 19.8 (H) 11.9 - 14.6 sec    INR 1.8 (H) 0.9 - 1.1     METABOLIC PANEL, COMPREHENSIVE    Collection Time: 09/13/22  8:12 PM   Result Value Ref Range    Sodium 132 (L) 136 - 145 mmol/L    Potassium 4.9 3.5 - 5.1 mmol/L    Chloride 104 97 - 108 mmol/L    CO2 23 21 - 32 mmol/L    Anion gap 5 5 - 15 mmol/L    Glucose 239 (H) 65 - 100 mg/dL    BUN 11 6 - 20 mg/dL    Creatinine 1.51 (H) 0.55 - 1.02 mg/dL    BUN/Creatinine ratio 7 (L) 12 - 20      GFR est AA 43 (L) >60 ml/min/1.73m2    GFR est non-AA 36 (L) >60 ml/min/1.73m2    Calcium 8.5 8.5 - 10.1 mg/dL    Bilirubin, total 0.9 0.2 - 1.0 mg/dL    AST (SGOT) 79 (H) 15 - 37 U/L    ALT (SGPT) 70 12 - 78 U/L    Alk.  phosphatase 104 45 - 117 U/L    Protein, total 11.1 (H) 6.4 - 8.2 g/dL    Albumin 1.6 (L) 3.5 - 5.0 g/dL    Globulin 9.5 (H) 2.0 - 4.0 g/dL    A-G Ratio 0.2 (L) 1.1 - 2.2     MAGNESIUM    Collection Time: 09/13/22  8:12 PM   Result Value Ref Range    Magnesium 1.9 1.6 - 2.4 mg/dL   URINALYSIS W/ RFLX MICROSCOPIC    Collection Time: 09/13/22  9:03 PM   Result Value Ref Range    Color Yellow/Straw      Appearance Clear Clear      Specific gravity 1.015 1.003 - 1.030      pH (UA) 7.5 5.0 - 8.0      Protein 30 (A) Negative mg/dL    Glucose >1,000 (A) Negative mg/dL    Ketone Trace (A) Negative mg/dL    Bilirubin Negative Negative      Blood Small (A) Negative      Urobilinogen 0.2 0.2 - 1.0 EU/dL    Nitrites Negative Negative      Leukocyte Esterase Small (A) Negative     URINE MICROSCOPIC    Collection Time: 09/13/22  9:03 PM   Result Value Ref Range    WBC 10-20 0 - 5 /hpf    RBC 10-20 0 - 3 /hpf    Bacteria 2+ (A) Negative /hpf   COVID-19 RAPID TEST    Collection Time: 09/14/22  1:50 AM   Result Value Ref Range    COVID-19 rapid test Not Detected Not Detected     INFLUENZA A & B AG (RAPID TEST)    Collection Time: 09/14/22  1:50 AM   Result Value Ref Range    Influenza A Antigen Negative Negative      Influenza B Antigen Negative Negative     OCCULT BLOOD, STOOL    Collection Time: 09/14/22  3:14 AM   Result Value Ref Range    Occult Blood,day 1 Positive (A) Negative      Day 1 date: 2,415,127     CBC WITH AUTOMATED DIFF    Collection Time: 09/14/22  3:17 AM   Result Value Ref Range    WBC 7.5 3.6 - 11.0 K/uL    RBC 2.15 (L) 3.80 - 5.20 M/uL    HGB 7.4 (L) 11.5 - 16.0 g/dL    HCT 23.4 (L) 35.0 - 47.0 %    .8 (H) 80.0 - 99.0 FL    MCH 34.4 (H) 26.0 - 34.0 PG    MCHC 31.6 30.0 - 36.5 g/dL    RDW 14.5 11.5 - 14.5 %    PLATELET 14 (LL) 471 - 400 K/uL    NRBC 0.0 0.0  WBC    ABSOLUTE NRBC 0.00 0.00 - 0.01 K/uL    NEUTROPHILS 21 (L) 32 - 75 %    LYMPHOCYTES 54 (H) 12 - 49 %    MONOCYTES 24 (H) 5 - 13 %    EOSINOPHILS 0 0 - 7 %    BASOPHILS 0 0 - 1 %    IMMATURE GRANULOCYTES 1 (H) 0 - 0.5 %    ABS. NEUTROPHILS 1.5 (L) 1.8 - 8.0 K/UL    ABS. LYMPHOCYTES 4.0 (H) 0.8 - 3.5 K/UL    ABS. MONOCYTES 1.8 (H) 0.0 - 1.0 K/UL    ABS. EOSINOPHILS 0.0 0.0 - 0.4 K/UL    ABS. BASOPHILS 0.0 0.0 - 0.1 K/UL    ABS. IMM.  GRANS. 0.1 (H) 0.00 - 0.04 K/UL    DF AUTOMATED     METABOLIC PANEL, COMPREHENSIVE    Collection Time: 09/14/22  3:17 AM   Result Value Ref Range    Sodium 133 (L) 136 - 145 mmol/L    Potassium 5.0 3.5 - 5.1 mmol/L    Chloride 109 (H) 97 - 108 mmol/L    CO2 19 (L) 21 - 32 mmol/L    Anion gap 5 5 - 15 mmol/L    Glucose 193 (H) 65 - 100 mg/dL    BUN 12 6 - 20 mg/dL    Creatinine 1.49 (H) 0.55 - 1.02 mg/dL    BUN/Creatinine ratio 8 (L) 12 - 20      GFR est AA 44 (L) >60 ml/min/1.73m2    GFR est non-AA 36 (L) >60 ml/min/1.73m2    Calcium 8.4 (L) 8.5 - 10.1 mg/dL    Bilirubin, total 0.8 0.2 - 1.0 mg/dL    AST (SGOT) 66 (H) 15 - 37 U/L    ALT (SGPT) 65 12 - 78 U/L    Alk. phosphatase 102 45 - 117 U/L    Protein, total 11.7 (H) 6.4 - 8.2 g/dL    Albumin 1.7 (L) 3.5 - 5.0 g/dL    Globulin 10.0 (H) 2.0 - 4.0 g/dL    A-G Ratio 0.2 (L) 1.1 - 2.2     LIPASE    Collection Time: 09/14/22  3:17 AM   Result Value Ref Range    Lipase 69 (L) 73 - 393 U/L   LACTIC ACID    Collection Time: 09/14/22  3:17 AM   Result Value Ref Range    Lactic acid 4.1 (HH) 0.4 - 2.0 mmol/L   MAGNESIUM    Collection Time: 09/14/22  3:17 AM   Result Value Ref Range    Magnesium 1.8 1.6 - 2.4 mg/dL   CK    Collection Time: 09/14/22  3:17 AM   Result Value Ref Range    CK 81 26 - 192 U/L   PLATELETS, ALLOCATE    Collection Time: 09/14/22  4:45 AM   Result Value Ref Range    Unit number [id number]     Blood component type PLPH,LR [facility]     Unit division 00     Status of unit [address] to transfuse    LACTIC ACID    Collection Time: 09/14/22  5:06 AM   Result Value Ref Range    Lactic acid 6.7 (HH) 0.4 - 2.0 mmol/L   LACTIC ACID    Collection Time: 09/14/22  7:04 AM   Result Value Ref Range    Lactic acid 13.4 (HH) 0.4 - 2.0 mmol/L   AMMONIA    Collection Time: 09/14/22  7:04 AM   Result Value Ref Range    Ammonia, plasma 58 (H) <32 umol/L   BLOOD GAS, ARTERIAL    Collection Time: 09/14/22  8:28 AM   Result Value Ref Range    pH 6.97 (LL) 7.35 - 7.45      PCO2 16 (L) 35 - 45 mmHg    PO2 116 (H) 80 - 100 mmHg    O2 SATURATION 96 95 - 99 %    BICARBONATE 4 (L) 22 - 26 mmol/L    BASE DEFICIT 26.2 mmol/L    O2 METHOD Room air      Sample source Arterial      SITE Left Brachial      MIGUEL'S TEST YES      Carboxy-Hgb 0.3 (L) 1 - 2 %    Methemoglobin 0.2 0 - 1.4 %    Oxyhemoglobin 95.3 95 - 99 %    Performed by Marco Stern     Critical value read back Called to l. buhls rn on 09/14/2022 at 08:36     TEMPERATURE 96.6         XR CHEST PORT   Final Result   Low lung volumes with minimal bibasilar atelectasis. No other acute   process.           CT ABD PELV W CONT   Final Result   1. Acute mild superior endplate compression fracture at S1 with a longitudinal   component extending toward the right hemisacrum, new since 8/30/2022. Mild   superior plate compression fractures are again noted at T12 and L1.      2. No other acute abnormality in the abdomen or pelvis. Plan of Care/Planned Procedure: #1 admit to the intensive care unit. #2 empiric double coverage antibiotics  #3 transfuse as necessary. #4 discussed with family at length. They understand the severity of her illness and critical prognosis. Nurse present. Will give IV bicarb. They do not want any resuscitation. DNR paperwork has been signed  Consult oncology Dr. Zebedee Brittle ID.   Consider CT head  Critical and guarded prognosis

## 2022-09-14 NOTE — ED PROVIDER NOTES
[id number]  EMERGENCY DEPARTMENT ENCOUNTER NOTE    Date: 9/14/2022  Patient Name: Luis Robledo    History of Presenting Illness     Chief Complaint   Patient presents with    Transfer Of Care     HPI: Luis Robledo, 55 y.o. female with a past medical history and outpatient medications as listed and reviewed below  presents as a transfer from OSH for thrombocytopenia. He presents to the outside hospital for generalized fatigue's and lethargy. He was about to get an infusion at the [facility] for his multiple myeloma but was canceled because of thrombocytopenia. Hemoglobin was noted to be 7. He was supposed to get a transfusion for oncology recommendations however he was sent home. Transfusion planned for tomorrow. Hb 7.4, Pltlt 83417, guaiac positive. Currently, the patient reports diffuse abdominal tenderness and pain. She reports nausea and muscle aches all over. She denies any fevers or chills. Unsure when she last passed a bowel movement.   Medical History   I reviewed the medical, surgical, family, and social history, as well as allergies:    PCP: Mirna Briceño PA-C    Past Medical History:  Past Medical History:   Diagnosis Date    AICD (automatic cardioverter/defibrillator) present 12/2020    Anemia     Cardiomyopathy ([address] 08/2020    Diabetes ([address]     GERD (gastroesophageal reflux disease)     Heart failure ([address]     Hypertension     Multiple myeloma ([address]     Sleep apnea     cpap    Thrombocytopenia ([address]      Past Surgical History:  Past Surgical History:   Procedure Laterality Date    COLONOSCOPY N/A 11/17/2021    COLONOSCOPY ( T I V A) performed by Crissy Albright MD at [facility] ENDOSCOPY    ENDOSCOPY VISIT-OUTPATIENT  10/08/2021    HX COLONOSCOPY      HX HEART CATHETERIZATION      x 2     HX HERNIA REPAIR      HX HYSTERECTOMY      HX IMPLANTABLE CARDIOVERTER DEFIBRILLATOR      HX OOPHORECTOMY      HX PACEMAKER pacemaker, defib    HX SVT ABLATION       Current Outpatient Medications:  Current Outpatient Medications   Medication Instructions    acyclovir (ZOVIRAX) 400 mg, Oral, DAILY    atorvastatin (LIPITOR) 20 mg, Oral, DAILY    carvediloL (COREG) 25 mg, Oral, 2 TIMES DAILY    dapagliflozin (FARXIGA) 10 mg, Oral, DAILY    dexAMETHasone (DECADRON) 40 mg, Oral, EVERY 7 DAYS, On Monday     dronabinoL (MARINOL) 5 mg, Oral, 2 TIMES DAILY    magnesium oxide (MAG-OX) 400 mg, Oral, 4 TIMES DAILY    pantoprazole (PROTONIX) 20 mg, Oral, DAILY    potassium chloride (K-DUR, KLOR-CON M20) 20 mEq tablet 20 mEq, Oral, DAILY      Family History:  Family History   Problem Relation Age of Onset    Breast Cancer Sister     Diabetes Mother     Hypertension Mother     Elevated Lipids Mother     Heart Surgery Father      Social History:  Social History     Tobacco Use    Smoking status: Never    Smokeless tobacco: Never   Vaping Use    Vaping Use: Never used   Substance Use Topics    Alcohol use: Never    Drug use: Never     Allergies:  No Known Allergies    Review of Systems     Review of Systems  Negative: Positives and pertinent negatives as per HPI. All other systems were reviewed and are negative. Physical Exam & Vital Signs   Vital Signs - Reviewed the patient's vital signs. Patient Vitals for the past 12 hrs:   Temp Pulse Resp BP SpO2   09/14/22 0435 -- 86 12 123/81 100 %   09/14/22 0053 97.4 °F (36.3 °C) 91 24 111/72 100 %     Physical Exam:    GENERAL: awake, alert, cooperative, not in distress  HEENT:  * Pupils equal, EOMI  * Head atraumatic  CV:  * audible heart sounds  * warm and perfused extremities bilaterally  PULMONARY: Good air movement, no wheezes, no crackles  ABDOMEN/: soft, no distension, no guarding, noted diffuse abdominal tenderness. Rectal exam shows normal colored stools.   EXTREMITIES/BACK: warm and perfused, no tenderness, no edema  SKIN: no rashes or signs of trauma  NEURO:  * Speech clear  * Moves U&LE to command    Medical Decision Making   - I am the first and primary provider for this patient and am the primary provider of record. - I reviewed the vital signs, available nursing notes, past medical history, past surgical history, family history and social history. - Initial assessment performed. The patients presenting problems have been discussed, and the staff are in agreement with the care plan formulated and outlined with them. I have encouraged them to ask questions as they arise throughout their visit. - Available medical records, nursing notes, old EKGs, and EMS run sheets (if patient was EMS transported) were reviewed    MDM:   Patient is a 55 y.o. female presenting for fatigue. Vitals reveal no significant abnormalities and physical exam reveals  diffuse tenderness . Based on the history, physical exam, risk factors, and vital signs, differential includes: COVID-19, influenza, anemia, dehydration, GI bleed x-rays abnormalities, dehydration, hypomagnesemia, rhabdomyolysis. See ED Course and Reassessment for evaluation and discussion. Results     Labs:  Recent Results (from the past 12 hour(s))   CBC WITH AUTOMATED DIFF    Collection Time: 09/13/22  8:12 PM   Result Value Ref Range    WBC 4.9 4.4 - 11.3 K/uL    RBC 2.12 (L) 4.50 - 5.90 M/uL    HGB 7.4 (L) 13.5 - 17.5 g/dL    HCT 22.2 (L) 36 - 46 %    .7 (H) 80 - 100 FL    MCH 34.7 (H) 31 - 34 PG    MCHC 33.2 31.0 - 36.0 g/dL    RDW 15.2 (H) 11.5 - 14.5 %    PLATELET 16 (LL) 063 - 400 K/uL    MPV 9.0 6.5 - 11.5 FL    NRBC 0.5  WBC    ABSOLUTE NRBC 0.03 K/uL    NEUTROPHILS 25 (L) 42 - 75 %    LYMPHOCYTES 57 (H) 20.5 - 51.1 %    MONOCYTES 18 (H) 1.7 - 9.3 %    EOSINOPHILS 0 (L) 0.9 - 2.9 %    BASOPHILS 0 0.0 - 2.5 %    ABS. NEUTROPHILS 1.2 (L) 1.8 - 7.7 K/UL    ABS. LYMPHOCYTES 2.7 1.0 - 4.8 K/UL    ABS. MONOCYTES 0.9 0.2 - 2.4 K/UL    ABS. EOSINOPHILS 0.0 0.0 - 0.7 K/UL    ABS.  BASOPHILS 0.0 0.0 - 0.2 K/UL   PROTHROMBIN TIME + INR Collection Time: 09/13/22  8:12 PM   Result Value Ref Range    Prothrombin time 19.8 (H) 11.9 - 14.6 sec    INR 1.8 (H) 0.9 - 1.1     METABOLIC PANEL, COMPREHENSIVE    Collection Time: 09/13/22  8:12 PM   Result Value Ref Range    Sodium 132 (L) 136 - 145 mmol/L    Potassium 4.9 3.5 - 5.1 mmol/L    Chloride 104 97 - 108 mmol/L    CO2 23 21 - 32 mmol/L    Anion gap 5 5 - 15 mmol/L    Glucose 239 (H) 65 - 100 mg/dL    BUN 11 6 - 20 mg/dL    Creatinine 1.51 (H) 0.55 - 1.02 mg/dL    BUN/Creatinine ratio 7 (L) 12 - 20      GFR est AA 43 (L) >60 ml/min/1.73m2    GFR est non-AA 36 (L) >60 ml/min/1.73m2    Calcium 8.5 8.5 - 10.1 mg/dL    Bilirubin, total 0.9 0.2 - 1.0 mg/dL    AST (SGOT) 79 (H) 15 - 37 U/L    ALT (SGPT) 70 12 - 78 U/L    Alk.  phosphatase 104 45 - 117 U/L    Protein, total 11.1 (H) 6.4 - 8.2 g/dL    Albumin 1.6 (L) 3.5 - 5.0 g/dL    Globulin 9.5 (H) 2.0 - 4.0 g/dL    A-G Ratio 0.2 (L) 1.1 - 2.2     MAGNESIUM    Collection Time: 09/13/22  8:12 PM   Result Value Ref Range    Magnesium 1.9 1.6 - 2.4 mg/dL   URINALYSIS W/ RFLX MICROSCOPIC    Collection Time: 09/13/22  9:03 PM   Result Value Ref Range    Color Yellow/Straw      Appearance Clear Clear      Specific gravity 1.015 1.003 - 1.030      pH (UA) 7.5 5.0 - 8.0      Protein 30 (A) Negative mg/dL    Glucose >1,000 (A) Negative mg/dL    Ketone Trace (A) Negative mg/dL    Bilirubin Negative Negative      Blood Small (A) Negative      Urobilinogen 0.2 0.2 - 1.0 EU/dL    Nitrites Negative Negative      Leukocyte Esterase Small (A) Negative     URINE MICROSCOPIC    Collection Time: 09/13/22  9:03 PM   Result Value Ref Range    WBC 10-20 0 - 5 /hpf    RBC 10-20 0 - 3 /hpf    Bacteria 2+ (A) Negative /hpf   COVID-19 RAPID TEST    Collection Time: 09/14/22  1:50 AM   Result Value Ref Range    COVID-19 rapid test Not Detected Not Detected     INFLUENZA A & B AG (RAPID TEST)    Collection Time: 09/14/22  1:50 AM   Result Value Ref Range    Influenza A Antigen Negative Negative      Influenza B Antigen Negative Negative     OCCULT BLOOD, STOOL    Collection Time: 09/14/22  3:14 AM   Result Value Ref Range    Occult Blood,day 1 Positive (A) Negative      Day 1 date: 8,[id number]     CBC WITH AUTOMATED DIFF    Collection Time: 09/14/22  3:17 AM   Result Value Ref Range    WBC 7.5 3.6 - 11.0 K/uL    RBC 2.15 (L) 3.80 - 5.20 M/uL    HGB 7.4 (L) 11.5 - 16.0 g/dL    HCT 23.4 (L) 35.0 - 47.0 %    .8 (H) 80.0 - 99.0 FL    MCH 34.4 (H) 26.0 - 34.0 PG    MCHC 31.6 30.0 - 36.5 g/dL    RDW 14.5 11.5 - 14.5 %    PLATELET 14 (LL) 052 - 400 K/uL    NRBC 0.0 0.0  WBC    ABSOLUTE NRBC 0.00 0.00 - 0.01 K/uL    NEUTROPHILS 21 (L) 32 - 75 %    LYMPHOCYTES 54 (H) 12 - 49 %    MONOCYTES 24 (H) 5 - 13 %    EOSINOPHILS 0 0 - 7 %    BASOPHILS 0 0 - 1 %    IMMATURE GRANULOCYTES 1 (H) 0 - 0.5 %    ABS. NEUTROPHILS 1.5 (L) 1.8 - 8.0 K/UL    ABS. LYMPHOCYTES 4.0 (H) 0.8 - 3.5 K/UL    ABS. MONOCYTES 1.8 (H) 0.0 - 1.0 K/UL    ABS. EOSINOPHILS 0.0 0.0 - 0.4 K/UL    ABS. BASOPHILS 0.0 0.0 - 0.1 K/UL    ABS. IMM. GRANS. 0.1 (H) 0.00 - 0.04 K/UL    DF AUTOMATED     METABOLIC PANEL, COMPREHENSIVE    Collection Time: 09/14/22  3:17 AM   Result Value Ref Range    Sodium 133 (L) 136 - 145 mmol/L    Potassium 5.0 3.5 - 5.1 mmol/L    Chloride 109 (H) 97 - 108 mmol/L    CO2 19 (L) 21 - 32 mmol/L    Anion gap 5 5 - 15 mmol/L    Glucose 193 (H) 65 - 100 mg/dL    BUN 12 6 - 20 mg/dL    Creatinine 1.49 (H) 0.55 - 1.02 mg/dL    BUN/Creatinine ratio 8 (L) 12 - 20      GFR est AA 44 (L) >60 ml/min/1.73m2    GFR est non-AA 36 (L) >60 ml/min/1.73m2    Calcium 8.4 (L) 8.5 - 10.1 mg/dL    Bilirubin, total 0.8 0.2 - 1.0 mg/dL    AST (SGOT) 66 (H) 15 - 37 U/L    ALT (SGPT) 65 12 - 78 U/L    Alk.  phosphatase 102 45 - 117 U/L    Protein, total 11.7 (H) 6.4 - 8.2 g/dL    Albumin 1.7 (L) 3.5 - 5.0 g/dL    Globulin 10.0 (H) 2.0 - 4.0 g/dL    A-G Ratio 0.2 (L) 1.1 - 2.2     LIPASE    Collection Time: 09/14/22  3:17 AM Result Value Ref Range    Lipase 69 (L) 73 - 393 U/L   LACTIC ACID    Collection Time: 09/14/22  3:17 AM   Result Value Ref Range    Lactic acid 4.1 (HH) 0.4 - 2.0 mmol/L   MAGNESIUM    Collection Time: 09/14/22  3:17 AM   Result Value Ref Range    Magnesium 1.8 1.6 - 2.4 mg/dL   CK    Collection Time: 09/14/22  3:17 AM   Result Value Ref Range    CK 81 26 - 192 U/L     Radiologic Studies:  CT Results  (Last 48 hours)                 09/14/22 0336  CT ABD PELV W CONT Final result    Impression:  1. Acute mild superior endplate compression fracture at S1 with a longitudinal   component extending toward the right hemisacrum, new since 8/30/2022. Mild   superior plate compression fractures are again noted at T12 and L1.       2. No other acute abnormality in the abdomen or pelvis. Narrative:  EXAM:  CT ABD PELV W CONT       INDICATION: Abdominal tenderness       COMPARISON: CT 8/30/2022. TECHNIQUE: Helical CT of the abdomen  and pelvis  following the uneventful   intravenous administration of nonionic contrast.  Coronal and sagittal reformats   are performed. CT dose reduction was achieved through use of a standardized   protocol tailored for this examination and automatic exposure control for dose   modulation. FINDINGS:    The visualized lung bases demonstrate patchy atelectasis or scarring, unchanged. The heart size is stable. There is no pericardial or pleural effusion. The liver, spleen, pancreas, and adrenal glands are normal. The gall bladder is   present  without intra- or extra-hepatic biliary dilatation. The kidneys are symmetric without hydronephrosis. There are no dilated bowel loops. The appendix is not visualized. There are no enlarged lymph nodes. There is no free fluid or free air. The   aorta tapers without aneurysm. The urinary bladder is normal.  There is no pelvic mass. The uterus is   surgically absent.        There are stable mild superior plate compression deformities at T12 and L1. There is a new acute mild superior endplate compression fracture at S1 with   longitudinal component extending toward the right hemisacrum. There is no   aggressive bony lesion. CXR Results  (Last 48 hours)      None          Medications ordered:  Medications   0.9% sodium chloride infusion 250 mL (has no administration in time range)   sodium chloride 0.9 % bolus infusion 500 mL (has no administration in time range)   sodium chloride (NS) flush 5-10 mL (has no administration in time range)   cefTRIAXone (ROCEPHIN) 1 g in sterile water (preservative free) 10 mL IV syringe (has no administration in time range)   metroNIDAZOLE (FLAGYL) IVPB premix 500 mg (has no administration in time range)   0.9% sodium chloride infusion (has no administration in time range)   iopamidoL (ISOVUE-370) 76 % injection 100 mL (100 mL IntraVENous Given 9/14/22 0311)     ED Course & Reassessment     ED Course:     ED Course as of 09/14/22 0900   Wed Sep 14, 2022   0226 COVID-19 testing is negative. Influenza swab negative.   [SS]   8282 CT: Acute mild superior endplate compression fracture at S1 with a longitudinal component extending toward the right hemisacrum, new since 8/30/2022. No lower extremity weakness. [SS]   0433 No significant electrolyte derangements. Creatinine is not elevated more than baseline range making YANETH unlikely. No significant transaminitis noted. Normal bilirubin. Magnesium within normal limits. CPK not suggestive of significant rhabdomyolysis. Lipase is not significantly elevated. Stool occult positive. Will transfuse pltlt and pRBC. CBC shows anemia and thrombocytopenia. Lactate 4.1, no source of infection yet, however given HR > 90 and RR 24 with lactate, will give empiric atbx and cultures.  Patient alreadu received fluids, will give additional plus volume of tranfusion to reach 30cc/kg ideal weight. [SS]      ED Course User Index  [SS] Shannan Erwin MD       Reassessment:    Will admit, GI consult in.    9:01 AM  After admission, while the patient was boarding in the ER, she became more confused and lethargic. She became more altered. Vital signs were unchanged initially but then she developed some hypothermia. A repeat lactate was 4.1, repeat increased up to 13. I was at the bedside. She reports that and the patient was confused yesterday and has significantly worsened today. She would like the patient to be made DNR, no intubation or CPR. The patient appears to be significantly confused, currently significant septic shock. CBC, repeat CMP, and ABG ordered. Admitting doctor updated. Disposition     Admission: [facility]    After completion of ED workup and discussion of results and diagnoses, patient was admitted to the hospital. Case was discussed with admitting provider. Clinical Tools & Critical Care     Heart Score: Not Applicable. CRITICAL CARE DOCUMENTATION: Critical care criteria and/or time were not met. Diagnosis     Clinical Impression:   1. Occult blood in stools    2. Sepsis, due to unspecified organism, unspecified whether acute organ dysfunction present ([address]    3. Thrombocytopenia ([address]    4. Anemia, unspecified type      Attestations:    Jose Honeycutt MD    Please note that this dictation was completed with [employer], the computer voice recognition software. Quite often unanticipated grammatical, syntax, homophones, and other interpretive errors are inadvertently transcribed by the computer software. Please disregard these errors. Please excuse any errors that have escaped final proofreading. Thank you.

## 2022-09-14 NOTE — ACP (ADVANCE CARE PLANNING)
Advance Care Planning   Healthcare Decision Maker:       Primary Decision Maker: Irene Dalal - Mother - [phone]    Secondary Decision Maker: Gallo Farley - Sister - [phone]

## 2022-09-14 NOTE — PROGRESS NOTES
Dr Lalita Plata called. Informed of patient's arrival to ICU. Aware that patient is agonal breathing with BP 86/65 RR 16 and HR paced at 99. Confirmed with Dr Lalita Plata that patient is a DNR and DNI. Aware last blood gas had ph of 6.98 and bicarb of 4. Patient received 2 amps of bicarb at 0930. Pt has blood infusing currently. When blood is finished resume Bicarb at ordered rate. To hang levophed for MAP less than 65 and will repeat ABG now.

## 2022-09-14 NOTE — CONSULTS
Hematology and Oncology Inpatient Consult Note     Patient: Desiree Castaneda MRN: [id number]  SSN: [id number]    YOB: 1965  Age: 55 y.o. Sex: female    Chief Complaint: Patient was admitted with extreme weakness    Reason for consult: Evaluation and management of patient with multiple myeloma, severe thrombocytopenia and anemia    Subjective:      Desiree Castaneda is a 55 y.o. -American female who has IgG kappa multiple myeloma and is under care of my associate Dr. Kip Thomson. Patient had a hemoglobin of 7 on 13 September and patient was supposed to get elective blood transfusion today. At home she was found to very lethargic and unresponsive so rescue squad was called and patient was brought to [facility] ER. Late last night [place] ER physician called me and informed me that patient had thrombocytopenia and he is going to transfer patient to [facility]. No bleeding was reported. And according to ER physician patient did not have any bleeding. Later on patient was transferred to [facility] and was admitted to ICU where I saw her. Patient is lethargic and not able to answer any questions. She does not respond to verbal command. Her sister and mother at bedside and they are the one giving most of the information. I have also obtained information from her [address] [place] [address] as well as recent data from our office.  -No bleeding was reported. No fever or chills noted by family. Past Medical History:   Diagnosis Date    AICD (automatic cardioverter/defibrillator) present 12/2020    Anemia     Cardiomyopathy ([address] 08/2020    Diabetes ([address]     GERD (gastroesophageal reflux disease)     Heart failure ([address]     Hypertension     Multiple myeloma ([address]     Sleep apnea     cpap    Thrombocytopenia (HCC)      Multiple myeloma, IgG kappa:  Ms. Akil Chowdhury is a pleasant 55yearold female patient who was being evaluated over the last few months for persistent anemia.  She  was admitted to [facility] in 10/2021 for worsening anemia and received 1 unit of packed red blood  cell followed by 2 more units as outpatient. She had an EGD and colonoscopy done recently which were unremarkable. She then  developed rib fracture after coughing at work which led to SPEP testing due to concern for multiple myeloma. SPEP done showed  an M spike of 5.0 which is indeed concerning for underlying multiple myeloma especially in light of abnormal CBC showing a  hemoglobin of 8.7 and a platelet count of 76. Bone marrow biopsy done on 12/14/2021 confirms plasma cell myeloma involving 70 to 75% of marrow cellularity. Flow cytometry  detects abnormal plasma cell population expressing bright CD38 and aberrantly expressing CD20. The plasma cells do not express  any significant CD56 which is a finding associated with a more aggressive clinical course. Per pathologist currently 10% circulating  plasma cells were noted in the peripheral blood which is not enough for diagnosis of plasma cell leukemia. Bone survey surprisingly does not show any lytic lesions. Started Velcade and dexamethasone on 1/4/2022. Currently on allopurinol and Acyclovir for TLS and Zoster prophylaxis  respectively. Cycle #2 delayed due to recent hospitalization due to [facility]. Feels back to baseline today. Labs from 3/2022 shows continued  good response with IgG down to 2845 down from 4412. Serum kappa light chain is lower at 55.1 down from 90.5 and ratio was down  to 27.5 down from 47.63. However, labs from 4/2022 shows higher IgG at 3780, Kappa light chain significantly higer at 199.4 and  ratio was higher at 83.08. VRD regimen was changed to weekly Velcade due to development of neuropathy. Dose of Velcade was further decreased to 1.1 mg  per metered squared. She is also on Revlimid 10 mg p.o. daily for 14 days on and 7 days off. CBC today shows a normal white  count of 5.4, hemoglobin of 10.2 and platelet of 526.  Given continued neuropathy symptoms will further reduce Velcade to 3 weeks  on and 1 week off. Recent myeloma labs suggest loss of response. Due to progression of disease, we will switch treatment to carfilzomib,  daratumumab and dexamethasone. Tolerating treatment quite well.  Recent serum light chain showed an elevated Kappa light chain  at 386.9 up from 235.8 and ratio elevated at 168.22    Past Surgical History:   Procedure Laterality Date    COLONOSCOPY N/A 11/17/2021    COLONOSCOPY ( T I V A) performed by Elizabeth Fish MD at [facility] ENDOSCOPY    ENDOSCOPY VISIT-OUTPATIENT  10/08/2021    HX COLONOSCOPY      HX HEART CATHETERIZATION      x 2     HX HERNIA REPAIR      HX HYSTERECTOMY      HX IMPLANTABLE CARDIOVERTER DEFIBRILLATOR      HX OOPHORECTOMY      HX PACEMAKER      pacemaker, defib    HX SVT ABLATION        Family History   Problem Relation Age of Onset    Breast Cancer Sister     Diabetes Mother     Hypertension Mother     Elevated Lipids Mother     Heart Surgery Father      Social History     Tobacco Use    Smoking status: Never    Smokeless tobacco: Never   Substance Use Topics    Alcohol use: Never      Current Facility-Administered Medications   Medication Dose Route Frequency Provider Last Rate Last Admin    sodium chloride (NS) flush 5-10 mL  5-10 mL IntraVENous PRN Dequan Desir MD        sodium chloride (NS) flush 5-40 mL  5-40 mL IntraVENous Q8H Naomi Canseco MD        sodium chloride (NS) flush 5-40 mL  5-40 mL IntraVENous PRN Sofia ALMANZAR MD        acetaminophen (TYLENOL) tablet 650 mg  650 mg Oral Q6H PRN Sofia ALMANZAR MD        Or    acetaminophen (TYLENOL) suppository 650 mg  650 mg Rectal Q6H PRN Sofia ALMANZAR MD        polyethylene glycol (MIRALAX) packet 17 g  17 g Oral DAILY PRN Sofia ALMANZAR MD        ondansetron (ZOFRAN ODT) tablet 4 mg  4 mg Oral Q8H PRN Sofia ALMANZAR MD        Or    ondansetron (ZOFRAN) injection 4 mg  4 mg IntraVENous Q6H PRN Kalyn Mayfield MD        sodium bicarbonate (8.4%) 150 mEq in 0.45% sodium chloride 1,000 mL infusion   IntraVENous CONTINUOUS Kalyn ALMANZAR  mL/hr at 09/14/22 0924 New Bag at 09/14/22 0924    pantoprazole (PROTONIX) 40 mg in 0.9% sodium chloride 10 mL injection  40 mg IntraVENous DAILY Kalyn ALMANZAR MD   40 mg at 09/14/22 0920    0.9% sodium chloride infusion 250 mL  250 mL IntraVENous PRN Kalyn Mayfield MD         Current Outpatient Medications   Medication Sig Dispense Refill    pantoprazole (PROTONIX) 20 mg tablet Take 1 Tablet by mouth daily. 30 Tablet 0    potassium chloride (K-DUR, KLOR-CON M20) 20 mEq tablet Take 20 mEq by mouth daily. dronabinoL (MARINOL) 5 mg capsule Take 5 mg by mouth two (2) times a day. acyclovir (ZOVIRAX) 400 mg tablet Take 400 mg by mouth daily. dexAMETHasone (DECADRON) 4 mg tablet Take 40 mg by mouth every seven (7) days. On Monday      carvediloL (COREG) 25 mg tablet Take 25 mg by mouth two (2) times a day. magnesium oxide (MAG-OX) 400 mg tablet Take 400 mg by mouth four (4) times daily. atorvastatin (LIPITOR) 20 mg tablet Take 20 mg by mouth daily. dapagliflozin (FARXIGA) 10 mg tab tablet Take 10 mg by mouth daily. No Known Allergies    Review of Systems:  -Unable to obtain any review of system from patient. Objective:     Vitals:    09/14/22 0930 09/14/22 0959 09/14/22 1000 09/14/22 1004   BP: 104/66 104/76 100/61 93/60   Pulse: 95 95 94 94   Resp: 16 16 15 16   Temp:  (!) 96.6 °F (35.9 °C)     SpO2: 100% 100%  100%   Weight:       Height:            Physical Exam:  Constitutional: Middle-aged -American female looks chronically ill. Patient is very lethargic and not interactive at all. Eyes: Sclerae anicteric. Conjunctivae shows pallor. ENMT: Oral mucosa is moist, no thrush, mucositis, or petechiae. Neck: No adenopathy. Respiratory: Not in distress.   Somewhat tachypneic. Cardiovascular: Sinus tachycardia. Abdomen: Soft, nontender, no hepatosplenomegaly. No guarding or rigidity. Bowel sounds present. Extremities: No edema, cyanosis or clubbing. Skin: No petechiae; no skin rash. Neurologic: Lethargic and does not follow any verbal command.      Latest Reference Range & Units 8/30/22 01:57 9/13/22 20:12 9/14/22 03:17 9/14/22 08:33   WBC 3.6 - 11.0 K/uL 12.1 (H) 4.9 7.5 11.8 (H)   NRBC 0.0  WBC 0.2 0.5 0.0 0.2 (H)   RBC 3.80 - 5.20 M/uL 3.14 (L) 2.12 (L) 2.15 (L) 1.19 (L)   HGB 11.5 - 16.0 g/dL 11.0 (L) 7.4 (L) 7.4 (L) 4.1 (LL)   HCT 35.0 - 47.0 % 33.4 (L) 22.2 (L) 23.4 (L) 15.3 (LL)   MCV 80.0 - 99.0 .5 (H) 104.7 (H) 108.8 (H) 128.6 (H)   MCH 26.0 - 34.0 PG 35.1 (H) 34.7 (H) 34.4 (H) 34.5 (H)   MCHC 30.0 - 36.5 g/dL 33.0 33.2 31.6 26.8 (L)   RDW 11.5 - 14.5 % 15.6 (H) 15.2 (H) 14.5 14.9 (H)   PLATELET 613 - 988 K/uL 80 (L) 16 (LL) 14 (LL) 41 (LL)   MPV 8.9 - 12.9 FL 9.8 9.0  10.1   NEUTROPHILS 32 - 75 % 59 25 (L) 21 (L) 13 (L)   LYMPHOCYTES 12 - 49 % 14 (L) 57 (H) 54 (H) 85 (H)   MONOCYTES 5 - 13 % 26 (H) 18 (H) 24 (H) 2 (L)   EOSINOPHILS 0 - 7 % 0 (L) 0 (L) 0 0   BASOPHILS 0 - 1 % 1 0 0 0   IMMATURE GRANULOCYTES %   1 (H) 0   DF -     AUTOMATED Manual   ABSOLUTE NRBC 0.00 - 0.01 K/uL 0.03 0.03 0.00 0.02 (H)   (LL): Data is critically low  (H): Data is abnormally high  (L): Data is abnormally low  Recent Results (from the past 24 hour(s))   CBC WITH AUTOMATED DIFF    Collection Time: 09/13/22  8:12 PM   Result Value Ref Range    WBC 4.9 4.4 - 11.3 K/uL    RBC 2.12 (L) 4.50 - 5.90 M/uL    HGB 7.4 (L) 13.5 - 17.5 g/dL    HCT 22.2 (L) 36 - 46 %    .7 (H) 80 - 100 FL    MCH 34.7 (H) 31 - 34 PG    MCHC 33.2 31.0 - 36.0 g/dL    RDW 15.2 (H) 11.5 - 14.5 %    PLATELET 16 (LL) 920 - 400 K/uL    MPV 9.0 6.5 - 11.5 FL    NRBC 0.5  WBC    ABSOLUTE NRBC 0.03 K/uL    NEUTROPHILS 25 (L) 42 - 75 %    LYMPHOCYTES 57 (H) 20.5 - 51.1 %    MONOCYTES 18 (H) 1.7 - 9.3 %    EOSINOPHILS 0 (L) 0.9 - 2.9 %    BASOPHILS 0 0.0 - 2.5 %    ABS. NEUTROPHILS 1.2 (L) 1.8 - 7.7 K/UL    ABS. LYMPHOCYTES 2.7 1.0 - 4.8 K/UL    ABS. MONOCYTES 0.9 0.2 - 2.4 K/UL    ABS. EOSINOPHILS 0.0 0.0 - 0.7 K/UL    ABS. BASOPHILS 0.0 0.0 - 0.2 K/UL   PROTHROMBIN TIME + INR    Collection Time: 09/13/22  8:12 PM   Result Value Ref Range    Prothrombin time 19.8 (H) 11.9 - 14.6 sec    INR 1.8 (H) 0.9 - 1.1     METABOLIC PANEL, COMPREHENSIVE    Collection Time: 09/13/22  8:12 PM   Result Value Ref Range    Sodium 132 (L) 136 - 145 mmol/L    Potassium 4.9 3.5 - 5.1 mmol/L    Chloride 104 97 - 108 mmol/L    CO2 23 21 - 32 mmol/L    Anion gap 5 5 - 15 mmol/L    Glucose 239 (H) 65 - 100 mg/dL    BUN 11 6 - 20 mg/dL    Creatinine 1.51 (H) 0.55 - 1.02 mg/dL    BUN/Creatinine ratio 7 (L) 12 - 20      GFR est AA 43 (L) >60 ml/min/1.73m2    GFR est non-AA 36 (L) >60 ml/min/1.73m2    Calcium 8.5 8.5 - 10.1 mg/dL    Bilirubin, total 0.9 0.2 - 1.0 mg/dL    AST (SGOT) 79 (H) 15 - 37 U/L    ALT (SGPT) 70 12 - 78 U/L    Alk.  phosphatase 104 45 - 117 U/L    Protein, total 11.1 (H) 6.4 - 8.2 g/dL    Albumin 1.6 (L) 3.5 - 5.0 g/dL    Globulin 9.5 (H) 2.0 - 4.0 g/dL    A-G Ratio 0.2 (L) 1.1 - 2.2     MAGNESIUM    Collection Time: 09/13/22  8:12 PM   Result Value Ref Range    Magnesium 1.9 1.6 - 2.4 mg/dL   URINALYSIS W/ RFLX MICROSCOPIC    Collection Time: 09/13/22  9:03 PM   Result Value Ref Range    Color Yellow/Straw      Appearance Clear Clear      Specific gravity 1.015 1.003 - 1.030      pH (UA) 7.5 5.0 - 8.0      Protein 30 (A) Negative mg/dL    Glucose >1,000 (A) Negative mg/dL    Ketone Trace (A) Negative mg/dL    Bilirubin Negative Negative      Blood Small (A) Negative      Urobilinogen 0.2 0.2 - 1.0 EU/dL    Nitrites Negative Negative      Leukocyte Esterase Small (A) Negative     URINE MICROSCOPIC    Collection Time: 09/13/22  9:03 PM   Result Value Ref Range    WBC 10-20 0 - 5 /hpf    RBC 10-20 0 - 3 /hpf Bacteria 2+ (A) Negative /hpf   COVID-19 RAPID TEST    Collection Time: 09/14/22  1:50 AM   Result Value Ref Range    COVID-19 rapid test Not Detected Not Detected     INFLUENZA A & B AG (RAPID TEST)    Collection Time: 09/14/22  1:50 AM   Result Value Ref Range    Influenza A Antigen Negative Negative      Influenza B Antigen Negative Negative     OCCULT BLOOD, STOOL    Collection Time: 09/14/22  3:14 AM   Result Value Ref Range    Occult Blood,day 1 Positive (A) Negative      Day 1 date: 0,[phone]     CBC WITH AUTOMATED DIFF    Collection Time: 09/14/22  3:17 AM   Result Value Ref Range    WBC 7.5 3.6 - 11.0 K/uL    RBC 2.15 (L) 3.80 - 5.20 M/uL    HGB 7.4 (L) 11.5 - 16.0 g/dL    HCT 23.4 (L) 35.0 - 47.0 %    .8 (H) 80.0 - 99.0 FL    MCH 34.4 (H) 26.0 - 34.0 PG    MCHC 31.6 30.0 - 36.5 g/dL    RDW 14.5 11.5 - 14.5 %    PLATELET 14 (LL) 566 - 400 K/uL    NRBC 0.0 0.0  WBC    ABSOLUTE NRBC 0.00 0.00 - 0.01 K/uL    NEUTROPHILS 21 (L) 32 - 75 %    LYMPHOCYTES 54 (H) 12 - 49 %    MONOCYTES 24 (H) 5 - 13 %    EOSINOPHILS 0 0 - 7 %    BASOPHILS 0 0 - 1 %    IMMATURE GRANULOCYTES 1 (H) 0 - 0.5 %    ABS. NEUTROPHILS 1.5 (L) 1.8 - 8.0 K/UL    ABS. LYMPHOCYTES 4.0 (H) 0.8 - 3.5 K/UL    ABS. MONOCYTES 1.8 (H) 0.0 - 1.0 K/UL    ABS. EOSINOPHILS 0.0 0.0 - 0.4 K/UL    ABS. BASOPHILS 0.0 0.0 - 0.1 K/UL    ABS. IMM.  GRANS. 0.1 (H) 0.00 - 0.04 K/UL    DF AUTOMATED     METABOLIC PANEL, COMPREHENSIVE    Collection Time: 09/14/22  3:17 AM   Result Value Ref Range    Sodium 133 (L) 136 - 145 mmol/L    Potassium 5.0 3.5 - 5.1 mmol/L    Chloride 109 (H) 97 - 108 mmol/L    CO2 19 (L) 21 - 32 mmol/L    Anion gap 5 5 - 15 mmol/L    Glucose 193 (H) 65 - 100 mg/dL    BUN 12 6 - 20 mg/dL    Creatinine 1.49 (H) 0.55 - 1.02 mg/dL    BUN/Creatinine ratio 8 (L) 12 - 20      GFR est AA 44 (L) >60 ml/min/1.73m2    GFR est non-AA 36 (L) >60 ml/min/1.73m2    Calcium 8.4 (L) 8.5 - 10.1 mg/dL    Bilirubin, total 0.8 0.2 - 1.0 mg/dL    AST (SGOT) 66 (H) 15 - 37 U/L    ALT (SGPT) 65 12 - 78 U/L    Alk.  phosphatase 102 45 - 117 U/L    Protein, total 11.7 (H) 6.4 - 8.2 g/dL    Albumin 1.7 (L) 3.5 - 5.0 g/dL    Globulin 10.0 (H) 2.0 - 4.0 g/dL    A-G Ratio 0.2 (L) 1.1 - 2.2     LIPASE    Collection Time: 09/14/22  3:17 AM   Result Value Ref Range    Lipase 69 (L) 73 - 393 U/L   LACTIC ACID    Collection Time: 09/14/22  3:17 AM   Result Value Ref Range    Lactic acid 4.1 (HH) 0.4 - 2.0 mmol/L   MAGNESIUM    Collection Time: 09/14/22  3:17 AM   Result Value Ref Range    Magnesium 1.8 1.6 - 2.4 mg/dL   CK    Collection Time: 09/14/22  3:17 AM   Result Value Ref Range    CK 81 26 - 192 U/L   PLATELETS, ALLOCATE    Collection Time: 09/14/22  4:45 AM   Result Value Ref Range    Unit number [id number]     Blood component type PLPH,LR [facility]     Unit division 00     Status of unit Issued     TRANSFUSION STATUS Ok to transfuse    LACTIC ACID    Collection Time: 09/14/22  5:06 AM   Result Value Ref Range    Lactic acid 6.7 (HH) 0.4 - 2.0 mmol/L   EKG, 12 LEAD, INITIAL    Collection Time: 09/14/22  6:18 AM   Result Value Ref Range    Ventricular Rate 95 BPM    Atrial Rate 95 BPM    P-R Interval 158 ms    QRS Duration 140 ms    Q-T Interval 446 ms    QTC Calculation (Bezet) 560 ms    Calculated P Axis 15 degrees    Calculated R Axis 46 degrees    Calculated T Axis 168 degrees    Diagnosis       Atrial-sensed ventricular-paced rhythm  Abnormal ECG    Confirmed by [facility] ([id number] on 9/14/2022 9:42:28 AM     LACTIC ACID    Collection Time: 09/14/22  7:04 AM   Result Value Ref Range    Lactic acid 13.4 (HH) 0.4 - 2.0 mmol/L   AMMONIA    Collection Time: 09/14/22  7:04 AM   Result Value Ref Range    Ammonia, plasma 58 (H) <32 umol/L   TROPONIN-HIGH SENSITIVITY    Collection Time: 09/14/22  8:26 AM   Result Value Ref Range    Troponin-High Sensitivity 16 0 - 51 ng/L   METABOLIC PANEL, COMPREHENSIVE    Collection Time: 09/14/22  8:26 AM   Result Value Ref Range Sodium 143 136 - 145 mmol/L    Potassium 4.4 3.5 - 5.1 mmol/L    Chloride 119 (H) 97 - 108 mmol/L    CO2 <5 (LL) 21 - 32 mmol/L    Anion gap Not calculated 5 - 15 mmol/L    Glucose 142 (H) 65 - 100 mg/dL    BUN 11 6 - 20 mg/dL    Creatinine 1.40 (H) 0.55 - 1.02 mg/dL    BUN/Creatinine ratio 8 (L) 12 - 20      GFR est AA 47 (L) >60 ml/min/1.73m2    GFR est non-AA 39 (L) >60 ml/min/1.73m2    Calcium 6.6 (L) 8.5 - 10.1 mg/dL    Bilirubin, total 0.3 0.2 - 1.0 mg/dL    AST (SGOT) 60 (H) 15 - 37 U/L    ALT (SGPT) 44 12 - 78 U/L    Alk. phosphatase 73 45 - 117 U/L    Protein, total 8.0 6.4 - 8.2 g/dL    Albumin 1.2 (L) 3.5 - 5.0 g/dL    Globulin 6.8 (H) 2.0 - 4.0 g/dL    A-G Ratio 0.2 (L) 1.1 - 2.2     BLOOD GAS, ARTERIAL    Collection Time: 09/14/22  8:28 AM   Result Value Ref Range    pH 6.97 (LL) 7.35 - 7.45      PCO2 16 (L) 35 - 45 mmHg    PO2 116 (H) 80 - 100 mmHg    O2 SATURATION 96 95 - 99 %    BICARBONATE 4 (L) 22 - 26 mmol/L    BASE DEFICIT 26.2 mmol/L    O2 METHOD Room air      Sample source Arterial      SITE Left Brachial      MIGUEL'S TEST YES      Carboxy-Hgb 0.3 (L) 1 - 2 %    Methemoglobin 0.2 0 - 1.4 %    Oxyhemoglobin 95.3 95 - 99 %    Performed by Cristiane Romero     Critical value read back Called to l. buhls rn on 09/14/2022 at 08:36     TEMPERATURE 96.6     CBC WITH AUTOMATED DIFF    Collection Time: 09/14/22  8:33 AM   Result Value Ref Range    WBC 11.8 (H) 3.6 - 11.0 K/uL    RBC 1.19 (L) 3.80 - 5.20 M/uL    HGB 4.1 (LL) 11.5 - 16.0 g/dL    HCT 15.3 (LL) 35.0 - 47.0 %    .6 (H) 80.0 - 99.0 FL    MCH 34.5 (H) 26.0 - 34.0 PG    MCHC 26.8 (L) 30.0 - 36.5 g/dL    RDW 14.9 (H) 11.5 - 14.5 %    PLATELET 41 (LL) 392 - 400 K/uL    MPV 10.1 8.9 - 12.9 FL    NRBC 0.2 (H) 0.0  WBC    ABSOLUTE NRBC 0.02 (H) 0.00 - 0.01 K/uL    NEUTROPHILS PENDING %    LYMPHOCYTES PENDING %    MONOCYTES PENDING %    EOSINOPHILS PENDING %    BASOPHILS PENDING %    IMMATURE GRANULOCYTES PENDING %    ABS.  NEUTROPHILS PENDING K/UL    ABS. LYMPHOCYTES PENDING K/UL    ABS. MONOCYTES PENDING K/UL    ABS. EOSINOPHILS PENDING K/UL    ABS. BASOPHILS PENDING K/UL    ABS. IMM. GRANS. PENDING K/UL    DF PENDING    EMERGENT RELEASE OF UNCROSSMATCHED RED CELLS    Collection Time: 09/14/22  9:30 AM   Result Value Ref Range    Crossmatch Expiration 09/17/2022,2359     ABO/Rh(D) PENDING     Antibody screen PENDING     Unit number [id number]     Blood component type RC LR     Unit division 00     Status of unit Issued     UNIT TAG COMMENT Emergency Release     TRANSFUSION STATUS Ok to transfuse     Crossmatch result Emergency Release     Unit number [id number]     Blood component type RC LR     Unit division 00     Status of unit Issued     UNIT TAG COMMENT Emergency Release     [address] to transfuse     Crossmatch result Emergency Release         XR CHEST PORT   Final Result   Low lung volumes with minimal bibasilar atelectasis. No other acute   process. CT ABD PELV W CONT   Final Result   1. Acute mild superior endplate compression fracture at S1 with a longitudinal   component extending toward the right hemisacrum, new since 8/30/2022. Mild   superior plate compression fractures are again noted at T12 and L1.      2. No other acute abnormality in the abdomen or pelvis. Assessment:     Hospital Problems  Date Reviewed: 5/26/2022            Codes Class Noted POA    AMS (altered mental status) ICD-10-CM: R41.82  ICD-9-CM: 780.97  9/14/2022 Unknown        Anemia ICD-10-CM: D64.9  ICD-9-CM: 285.9  12/23/2021 Unknown           Assessment & Plan:   45-year-old -American female with multiple myeloma who was on chemotherapy with carfilzomib, daratumumab and dexamethasone. Patient had last carfilzomib on 23 August.  She was found to be anemic and was supposed to get elective blood transfusion today.   Patient is now admitted with severe anemia with sudden follow-up hemoglobin, severe thrombocytopenia, sepsis, confusion, disorientation and patient is critically ill.    1) severe anemia: Patient's hemoglobin was 7 yesterday and it went to 4.1. Obviously this is most likely from bleeding. Patient's total bilirubin is 0.3 so there is no evidence of clinically significant hemolysis. This sudden drop of hemoglobin is not usually seen with myeloma alone or even from the treatment. As described above patient has not had any carfilzomib for almost 3 weeks.  -Patient had CT scan of abdomen which did not report any retroperitoneal bleed.  -Agree with GI work-up for possible GI blood loss. -Packed RBC transfusion to bring her hemoglobin 7 and maintain at or above 7.    2) severe thrombocytopenia: Unlikely to be from chemotherapy. It is possible that it may be from myeloma itself. We will try to keep platelet count above 20,000 because of concern of possible bleeding. 3) multiple myeloma: Patient had aggressive multiple myeloma and she is on second line chemotherapy with carfilzomib, daratumumab and dexamethasone. May consider getting peripheral blood flow cytometry to see if patient has any evidence of plasma cell leukemia.  -Patient was not able to get any treatment since August 23rd because of her overall condition and low blood count. 4) sepsis: Agree with empiric IV antibiotic coverage. Patient is immunocompromise because of myeloma as well as chemotherapy.    -I had a very long discussion with patient's primary oncologist Dr. Michael Waller.   -Overall guarded prognosis. Discussed with patient's mother and sister who are at bedside in ICU.  -Has spent more than 45 minutes in evaluation, management, coordination and counseling. This dictation was done by dragon, computer voice recognition software. Often unanticipated grammatical, syntax, phones and other interpretive errors are inadvertently transcribed. Please excuse errors that have escaped final proofreading.      Signed By: Mackenzie Cuevas MD September 14, 2022

## 2022-09-14 NOTE — ROUTINE PROCESS
TRANSFER - OUT REPORT:    Verbal report given to ARN Herr(name) on Kisha Blake  being transferred to ICU(unit) for routine progression of care       Report consisted of patients Situation, Background, Assessment and   Recommendations(SBAR). Information from the following report(s) SBAR and ED Summary was reviewed with the receiving nurse. Lines:   Peripheral IV 09/14/22 Left Antecubital (Active)       Peripheral IV 09/14/22 Anterior; Left Forearm (Active)       Peripheral IV 09/14/22 Posterior;Right Hand (Active)       Peripheral IV 09/14/22 Right Antecubital (Active)        Opportunity for questions and clarification was provided.       Patient transported with:   Registered Nurse

## 2022-09-14 NOTE — CONSULTS
Consult    Patient: Zakiya Navas MRN: [id number]  SSN: [id number]    YOB: 1965  Age: 55 y.o. Sex: female      Subjective:      Zakiya Navas is a 55 y.o. female who is being seen for anemia, possible GI bleeding. Patient was back to emergency room due to the significant weakness, patient was on chemotherapy for multiple myeloma, patient had worsening mental status, she was not able to give any history, but there was no document of GI bleeding,patient received blood transfusion, GI consultation placed.   History from medical records, CT of abdominal was done this morning unremarkable with GI tract, no signs of GI bleeding,  Past Medical History:   Diagnosis Date    AICD (automatic cardioverter/defibrillator) present 12/2020    Anemia     Cardiomyopathy ([facility] [address] 08/2020    Diabetes ([facility] [address]     GERD (gastroesophageal reflux disease)     Heart failure (HCC)     Hypertension     Multiple myeloma (HCC)     Sleep apnea     cpap    Thrombocytopenia ([address]      Past Surgical History:   Procedure Laterality Date    COLONOSCOPY N/A 11/17/2021    COLONOSCOPY ( T I V A) performed by Ed Aragon MD at [facility] ENDOSCOPY    ENDOSCOPY VISIT-OUTPATIENT  10/08/2021    HX COLONOSCOPY      HX HEART CATHETERIZATION      x 2     HX HERNIA REPAIR      HX HYSTERECTOMY      HX IMPLANTABLE CARDIOVERTER DEFIBRILLATOR      HX OOPHORECTOMY      HX PACEMAKER      pacemaker, defib    HX SVT ABLATION        Family History   Problem Relation Age of Onset    Breast Cancer Sister     Diabetes Mother     Hypertension Mother     Elevated Lipids Mother     Heart Surgery Father      Social History     Tobacco Use    Smoking status: Never    Smokeless tobacco: Never   Substance Use Topics    Alcohol use: Never      Current Facility-Administered Medications   Medication Dose Route Frequency Provider Last Rate Last Admin    sodium chloride (NS) flush 5-10 mL  5-10 mL IntraVENous PRN Dequan Vieira MD        sodium chloride (NS) flush 5-40 mL  5-40 mL IntraVENous Q8H Naomi Canseco MD   10 mL at 09/14/22 1248    sodium chloride (NS) flush 5-40 mL  5-40 mL IntraVENous PRN Anca Bryson MD        acetaminophen (TYLENOL) tablet 650 mg  650 mg Oral Q6H PRN Anca Bryson MD        Or    acetaminophen (TYLENOL) suppository 650 mg  650 mg Rectal Q6H PRN Anca ALMANZAR MD        polyethylene glycol (MIRALAX) packet 17 g  17 g Oral DAILY PRN Anca ALMANZAR MD        ondansetron (ZOFRAN ODT) tablet 4 mg  4 mg Oral Q8H PRN nAca ALMANZAR MD        Or    ondansetron (ZOFRAN) injection 4 mg  4 mg IntraVENous Q6H PRN Anca ALMANZAR MD        sodium bicarbonate (8.4%) 150 mEq in 0.45% sodium chloride 1,000 mL infusion   IntraVENous CONTINUOUS Anca ALMANZAR  mL/hr at 09/14/22 1247 Restarted at 09/14/22 1247    pantoprazole (PROTONIX) 40 mg in 0.9% sodium chloride 10 mL injection  40 mg IntraVENous DAILY Anca ALMANZAR MD   40 mg at 09/14/22 0920    0.9% sodium chloride infusion 250 mL  250 mL IntraVENous PRN Anca ALMANZAR MD        meropenem (MERREM) 1 g in 0.9% sodium chloride (MBP/ADV) 50 mL MBP  1 g IntraVENous Q12H Viola Wilson  mL/hr at 09/14/22 1420 1 g at 09/14/22 1420    NOREPINephrine (LEVOPHED) 8 mg in 0.9% NS 250ml infusion  0.5-30 mcg/min IntraVENous TITRATE Anca Bryson MD        hydrocortisone Sod Succ (PF) (SOLU-CORTEF) injection 100 mg  100 mg IntraVENous ONCE Anca Bryson MD            No Known Allergies    Review of Systems:  Review of Systems   Unable to perform ROS: Medical condition      Objective:     Vitals:    09/14/22 1115 09/14/22 1130 09/14/22 1211 09/14/22 1333   BP: 124/89 100/73     Pulse: (!) 106 (!) 104     Resp: 26 24     Temp:   97.5 °F (36.4 °C)    SpO2: 99% 100%     Weight:    59.8 kg (131 lb 13.4 oz)   Height:    5' (1.524 m)        Physical Exam:  Physical Exam  Constitutional:       Appearance: She is ill-appearing. HENT:      Head: Atraumatic. Eyes:      General: No scleral icterus. Cardiovascular:      Rate and Rhythm: Rhythm irregular. Pulses: Normal pulses. Pulmonary:      Breath sounds: Normal breath sounds. Abdominal:      General: Abdomen is flat. Palpations: Abdomen is soft. Musculoskeletal:         General: Normal range of motion. Cervical back: Neck supple. Right lower leg: No edema. Neurological:      General: No focal deficit present. Mental Status: Mental status is at baseline. Recent Results (from the past 24 hour(s))   CBC WITH AUTOMATED DIFF    Collection Time: 09/13/22  8:12 PM   Result Value Ref Range    WBC 4.9 4.4 - 11.3 K/uL    RBC 2.12 (L) 4.50 - 5.90 M/uL    HGB 7.4 (L) 13.5 - 17.5 g/dL    HCT 22.2 (L) 36 - 46 %    .7 (H) 80 - 100 FL    MCH 34.7 (H) 31 - 34 PG    MCHC 33.2 31.0 - 36.0 g/dL    RDW 15.2 (H) 11.5 - 14.5 %    PLATELET 16 (LL) 283 - 400 K/uL    MPV 9.0 6.5 - 11.5 FL    NRBC 0.5  WBC    ABSOLUTE NRBC 0.03 K/uL    NEUTROPHILS 25 (L) 42 - 75 %    LYMPHOCYTES 57 (H) 20.5 - 51.1 %    MONOCYTES 18 (H) 1.7 - 9.3 %    EOSINOPHILS 0 (L) 0.9 - 2.9 %    BASOPHILS 0 0.0 - 2.5 %    ABS. NEUTROPHILS 1.2 (L) 1.8 - 7.7 K/UL    ABS. LYMPHOCYTES 2.7 1.0 - 4.8 K/UL    ABS. MONOCYTES 0.9 0.2 - 2.4 K/UL    ABS. EOSINOPHILS 0.0 0.0 - 0.7 K/UL    ABS.  BASOPHILS 0.0 0.0 - 0.2 K/UL   PROTHROMBIN TIME + INR    Collection Time: 09/13/22  8:12 PM   Result Value Ref Range    Prothrombin time 19.8 (H) 11.9 - 14.6 sec    INR 1.8 (H) 0.9 - 1.1     METABOLIC PANEL, COMPREHENSIVE    Collection Time: 09/13/22  8:12 PM   Result Value Ref Range    Sodium 132 (L) 136 - 145 mmol/L    Potassium 4.9 3.5 - 5.1 mmol/L    Chloride 104 97 - 108 mmol/L    CO2 23 21 - 32 mmol/L    Anion gap 5 5 - 15 mmol/L    Glucose 239 (H) 65 - 100 mg/dL    BUN 11 6 - 20 mg/dL    Creatinine 1.51 (H) 0.55 - 1.02 mg/dL    BUN/Creatinine ratio 7 (L) 12 - 20      GFR est AA 43 (L) >60 ml/min/1.73m2    GFR est non-AA 36 (L) >60 ml/min/1.73m2    Calcium 8.5 8.5 - 10.1 mg/dL    Bilirubin, total 0.9 0.2 - 1.0 mg/dL    AST (SGOT) 79 (H) 15 - 37 U/L    ALT (SGPT) 70 12 - 78 U/L    Alk.  phosphatase 104 45 - 117 U/L    Protein, total 11.1 (H) 6.4 - 8.2 g/dL    Albumin 1.6 (L) 3.5 - 5.0 g/dL    Globulin 9.5 (H) 2.0 - 4.0 g/dL    A-G Ratio 0.2 (L) 1.1 - 2.2     MAGNESIUM    Collection Time: 09/13/22  8:12 PM   Result Value Ref Range    Magnesium 1.9 1.6 - 2.4 mg/dL   URINALYSIS W/ RFLX MICROSCOPIC    Collection Time: 09/13/22  9:03 PM   Result Value Ref Range    Color Yellow/Straw      Appearance Clear Clear      Specific gravity 1.015 1.003 - 1.030      pH (UA) 7.5 5.0 - 8.0      Protein 30 (A) Negative mg/dL    Glucose >1,000 (A) Negative mg/dL    Ketone Trace (A) Negative mg/dL    Bilirubin Negative Negative      Blood Small (A) Negative      Urobilinogen 0.2 0.2 - 1.0 EU/dL    Nitrites Negative Negative      Leukocyte Esterase Small (A) Negative     URINE MICROSCOPIC    Collection Time: 09/13/22  9:03 PM   Result Value Ref Range    WBC 10-20 0 - 5 /hpf    RBC 10-20 0 - 3 /hpf    Bacteria 2+ (A) Negative /hpf   COVID-19 RAPID TEST    Collection Time: 09/14/22  1:50 AM   Result Value Ref Range    COVID-19 rapid test Not Detected Not Detected     INFLUENZA A & B AG (RAPID TEST)    Collection Time: 09/14/22  1:50 AM   Result Value Ref Range    Influenza A Antigen Negative Negative      Influenza B Antigen Negative Negative     OCCULT BLOOD, STOOL    Collection Time: 09/14/22  3:14 AM   Result Value Ref Range    Occult Blood,day 1 Positive (A) Negative      Day 1 date: 2,133,883     CBC WITH AUTOMATED DIFF    Collection Time: 09/14/22  3:17 AM   Result Value Ref Range    WBC 7.5 3.6 - 11.0 K/uL    RBC 2.15 (L) 3.80 - 5.20 M/uL    HGB 7.4 (L) 11.5 - 16.0 g/dL    HCT 23.4 (L) 35.0 - 47.0 %    .8 (H) 80.0 - 99.0 FL    MCH 34.4 (H) 26.0 - 34.0 PG    MCHC 31.6 30.0 - 36.5 g/dL    RDW 14.5 11.5 - 14.5 %    PLATELET 14 (LL) 339 - 400 K/uL    NRBC 0.0 0.0  WBC    ABSOLUTE NRBC 0.00 0.00 - 0.01 K/uL    NEUTROPHILS 21 (L) 32 - 75 %    LYMPHOCYTES 54 (H) 12 - 49 %    MONOCYTES 24 (H) 5 - 13 %    EOSINOPHILS 0 0 - 7 %    BASOPHILS 0 0 - 1 %    IMMATURE GRANULOCYTES 1 (H) 0 - 0.5 %    ABS. NEUTROPHILS 1.5 (L) 1.8 - 8.0 K/UL    ABS. LYMPHOCYTES 4.0 (H) 0.8 - 3.5 K/UL    ABS. MONOCYTES 1.8 (H) 0.0 - 1.0 K/UL    ABS. EOSINOPHILS 0.0 0.0 - 0.4 K/UL    ABS. BASOPHILS 0.0 0.0 - 0.1 K/UL    ABS. IMM. GRANS. 0.1 (H) 0.00 - 0.04 K/UL    DF AUTOMATED     METABOLIC PANEL, COMPREHENSIVE    Collection Time: 09/14/22  3:17 AM   Result Value Ref Range    Sodium 133 (L) 136 - 145 mmol/L    Potassium 5.0 3.5 - 5.1 mmol/L    Chloride 109 (H) 97 - 108 mmol/L    CO2 19 (L) 21 - 32 mmol/L    Anion gap 5 5 - 15 mmol/L    Glucose 193 (H) 65 - 100 mg/dL    BUN 12 6 - 20 mg/dL    Creatinine 1.49 (H) 0.55 - 1.02 mg/dL    BUN/Creatinine ratio 8 (L) 12 - 20      GFR est AA 44 (L) >60 ml/min/1.73m2    GFR est non-AA 36 (L) >60 ml/min/1.73m2    Calcium 8.4 (L) 8.5 - 10.1 mg/dL    Bilirubin, total 0.8 0.2 - 1.0 mg/dL    AST (SGOT) 66 (H) 15 - 37 U/L    ALT (SGPT) 65 12 - 78 U/L    Alk.  phosphatase 102 45 - 117 U/L    Protein, total 11.7 (H) 6.4 - 8.2 g/dL    Albumin 1.7 (L) 3.5 - 5.0 g/dL    Globulin 10.0 (H) 2.0 - 4.0 g/dL    A-G Ratio 0.2 (L) 1.1 - 2.2     LIPASE    Collection Time: 09/14/22  3:17 AM   Result Value Ref Range    Lipase 69 (L) 73 - 393 U/L   LACTIC ACID    Collection Time: 09/14/22  3:17 AM   Result Value Ref Range    Lactic acid 4.1 (HH) 0.4 - 2.0 mmol/L   MAGNESIUM    Collection Time: 09/14/22  3:17 AM   Result Value Ref Range    Magnesium 1.8 1.6 - 2.4 mg/dL   CK    Collection Time: 09/14/22  3:17 AM   Result Value Ref Range    CK 81 26 - 192 U/L   PLATELETS, ALLOCATE    Collection Time: 09/14/22  4:45 AM   Result Value Ref Range    Unit number [id number]     Blood component type PLPH,LR PSTC     Unit division 00 Status of unit [address] to transfuse    LACTIC ACID    Collection Time: 09/14/22  5:06 AM   Result Value Ref Range    Lactic acid 6.7 (HH) 0.4 - 2.0 mmol/L   EKG, 12 LEAD, INITIAL    Collection Time: 09/14/22  6:18 AM   Result Value Ref Range    Ventricular Rate 95 BPM    Atrial Rate 95 BPM    P-R Interval 158 ms    QRS Duration 140 ms    Q-T Interval 446 ms    QTC Calculation (Bezet) 560 ms    Calculated P Axis 15 degrees    Calculated R Axis 46 degrees    Calculated T Axis 168 degrees    Diagnosis       Atrial-sensed ventricular-paced rhythm  Abnormal ECG    Confirmed by [facility] ALIZEEloy RANDALL ([id number]) on 9/14/2022 9:42:28 AM     LACTIC ACID    Collection Time: 09/14/22  7:04 AM   Result Value Ref Range    Lactic acid 13.4 (HH) 0.4 - 2.0 mmol/L   AMMONIA    Collection Time: 09/14/22  7:04 AM   Result Value Ref Range    Ammonia, plasma 58 (H) <32 umol/L   TROPONIN-HIGH SENSITIVITY    Collection Time: 09/14/22  8:26 AM   Result Value Ref Range    Troponin-High Sensitivity 16 0 - 51 ng/L   METABOLIC PANEL, COMPREHENSIVE    Collection Time: 09/14/22  8:26 AM   Result Value Ref Range    Sodium 143 136 - 145 mmol/L    Potassium 4.4 3.5 - 5.1 mmol/L    Chloride 119 (H) 97 - 108 mmol/L    CO2 <5 (LL) 21 - 32 mmol/L    Anion gap Not calculated 5 - 15 mmol/L    Glucose 142 (H) 65 - 100 mg/dL    BUN 11 6 - 20 mg/dL    Creatinine 1.40 (H) 0.55 - 1.02 mg/dL    BUN/Creatinine ratio 8 (L) 12 - 20      GFR est AA 47 (L) >60 ml/min/1.73m2    GFR est non-AA 39 (L) >60 ml/min/1.73m2    Calcium 6.6 (L) 8.5 - 10.1 mg/dL    Bilirubin, total 0.3 0.2 - 1.0 mg/dL    AST (SGOT) 60 (H) 15 - 37 U/L    ALT (SGPT) 44 12 - 78 U/L    Alk.  phosphatase 73 45 - 117 U/L    Protein, total 8.0 6.4 - 8.2 g/dL    Albumin 1.2 (L) 3.5 - 5.0 g/dL    Globulin 6.8 (H) 2.0 - 4.0 g/dL    A-G Ratio 0.2 (L) 1.1 - 2.2     BLOOD GAS, ARTERIAL    Collection Time: 09/14/22  8:28 AM   Result Value Ref Range    pH 6.97 (LL) 7.35 - 7.45      PCO2 16 (L) 35 - 45 mmHg    PO2 116 (H) 80 - 100 mmHg    O2 SATURATION 96 95 - 99 %    BICARBONATE 4 (L) 22 - 26 mmol/L    BASE DEFICIT 26.2 mmol/L    O2 METHOD Room air      Sample source Arterial      SITE Left Brachial      MIGUEL'S TEST YES      Carboxy-Hgb 0.3 (L) 1 - 2 %    Methemoglobin 0.2 0 - 1.4 %    Oxyhemoglobin 95.3 95 - 99 %    Performed by Keke Romo     Critical value read back Called to l. buhls rn on 09/14/2022 at 08:36     TEMPERATURE 96.6     CBC WITH AUTOMATED DIFF    Collection Time: 09/14/22  8:33 AM   Result Value Ref Range    WBC 11.8 (H) 3.6 - 11.0 K/uL    RBC 1.19 (L) 3.80 - 5.20 M/uL    HGB 4.1 (LL) 11.5 - 16.0 g/dL    HCT 15.3 (LL) 35.0 - 47.0 %    .6 (H) 80.0 - 99.0 FL    MCH 34.5 (H) 26.0 - 34.0 PG    MCHC 26.8 (L) 30.0 - 36.5 g/dL    RDW 14.9 (H) 11.5 - 14.5 %    PLATELET 41 (LL) 946 - 400 K/uL    MPV 10.1 8.9 - 12.9 FL    NRBC 0.2 (H) 0.0  WBC    ABSOLUTE NRBC 0.02 (H) 0.00 - 0.01 K/uL    NEUTROPHILS 13 (L) 32 - 75 %    LYMPHOCYTES 85 (H) 12 - 49 %    MONOCYTES 2 (L) 5 - 13 %    EOSINOPHILS 0 0 - 7 %    BASOPHILS 0 0 - 1 %    IMMATURE GRANULOCYTES 0 %    ABS. NEUTROPHILS 1.5 (L) 1.8 - 8.0 K/UL    ABS. LYMPHOCYTES 10.1 (H) 0.8 - 3.5 K/UL    ABS. MONOCYTES 0.2 0.0 - 1.0 K/UL    ABS. EOSINOPHILS 0.0 0.0 - 0.4 K/UL    ABS. BASOPHILS 0.0 0.0 - 0.1 K/UL    ABS. IMM.  GRANS. 0.0 K/UL    DF Manual      RBC COMMENTS Sylvie cells  2+       EMERGENT RELEASE OF UNCROSSMATCHED RED CELLS    Collection Time: 09/14/22  9:30 AM   Result Value Ref Range    Crossmatch Expiration 09/17/2022,2359     ABO/Rh(D) A Positive     Antibody screen Positive     Antibody ID PENDING     XXAUTO CONTROL Positive     Unit number [id number]     Blood component type [facility]     Unit division 00     Status of unit Issued     UNIT TAG COMMENT Emergency Release     TRANSFUSION STATUS Ok to transfuse     Crossmatch result Incompatible     Unit number [id number]     Blood component type [facility]     Unit division 00 Status of unit Issued     UNIT TAG COMMENT Emergency Release     TRANSFUSION STATUS Ok to transfuse     Crossmatch result Incompatible    LACTIC ACID    Collection Time: 09/14/22  9:30 AM   Result Value Ref Range    Lactic acid 18.9 (HH) 0.4 - 2.0 mmol/L   BLOOD GAS, ARTERIAL    Collection Time: 09/14/22  1:27 PM   Result Value Ref Range    pH 7.16 (LL) 7.35 - 7.45      PCO2 PENDING mmHg    PO2 128 (H) 80 - 100 mmHg    O2 SATURATION 98 95 - 99 %    O2 METHOD Room air      FIO2 21.0 %    Sample source Arterial      SITE Left Radial      MIGUEL'S TEST YES      Carboxy-Hgb 0.3 (L) 1 - 2 %    Methemoglobin 0.2 0 - 1.4 %    Oxyhemoglobin 97.1 95 - 99 %    Performed by Kishor Stevens     Critical value read back Called to Eryn Gibson RN on 09/14/2022 at 13:29     TEMPERATURE 97.0          XR CHEST PORT   Final Result   Low lung volumes with minimal bibasilar atelectasis. No other acute   process. CT ABD PELV W CONT   Final Result   1. Acute mild superior endplate compression fracture at S1 with a longitudinal   component extending toward the right hemisacrum, new since 8/30/2022. Mild   superior plate compression fractures are again noted at T12 and L1.      2. No other acute abnormality in the abdomen or pelvis.          Assessment:     Hospital Problems  Date Reviewed: 5/26/2022            Codes Class Noted POA    AMS (altered mental status) ICD-10-CM: R41.82  ICD-9-CM: 780.97  9/14/2022 Unknown        Anemia ICD-10-CM: D64.9  ICD-9-CM: 285.9  12/23/2021 Unknown       Not a microcytic, chronic anemia, obscure GI blood loss, no active obvious GI bleeding, last year iron study was normal  Had a positive stool,    Plan:   Continue current antibiotic coverage  Monitor hemoglobin closely  Blood transfusion if drop below 7  Followed by oncologist  May repeat iron studies  DC his IV PPIs, since no signs of active upper GI bleeding    Pepcid p.o. for GI prophylaxis  Signed By: Claudeen Judge, MD     September 14, 2022         Thank you for allowing me to participate in this patients care  Cc Referring Physician   Comer Felty, PA-C

## 2022-09-14 NOTE — ED NOTES
.Bedside shift change report given to Maia Santos and Cony Powers (oncoming nurse) by Renata Hurley (offgoing nurse). Report included the following information SBAR, ED Summary, MAR, Recent Results, and Dual Neuro Assessment.

## 2022-09-14 NOTE — PROGRESS NOTES
Spiritual Care Assessment/Progress Note  [facility]      NAME: Robert Nelson      MRN: [id number]  AGE: 55 y.o.  SEX: female  Mandaeism Affiliation: Spiritism   Language: English     9/14/2022     Total Time (in minutes): 34     Spiritual Assessment begun in [facility] EMERGENCY DEPT through conversation with:         [x]Patient        [] Family    [] Friend(s)        Reason for Consult: End-of-life support, Initial visit, Family care     Spiritual beliefs: (Please include comment if needed)     [x] Identifies with a liro tradition: Spiritism         [] Supported by a lior community:            [] Claims no spiritual orientation:           [] Seeking spiritual identity:                [] Adheres to an individual form of spirituality:           [] Not able to assess:                           Identified resources for coping:      [x] Prayer                               [] Music                  [] Guided Imagery     [x] Family/friends                 [] Pet visits     [] Devotional reading                         [] Unknown     [] Other:                                               Interventions offered during this visit: (See comments for more details)    Patient Interventions: Prayer (actual), Other (comment) (Ministry of presence)           Plan of Care:     [x] Support spiritual and/or cultural needs    [] Support AMD and/or advance care planning process      [] Support grieving process   [] Coordinate Rites and/or Rituals    [] Coordination with community clergy   [] No spiritual needs identified at this time   [] Detailed Plan of Care below (See Comments)  [] Make referral to Music Therapy  [] Make referral to Pet Therapy     [] Make referral to Addiction services  [] Make referral to [facility]  [] Make referral to Spiritual Care Partner  [] No future visits requested        [x] Contact Spiritual Care for further referrals     Comments:  responded to page for End of life and family support. Patient was seen in the ED room 5. Patient mother, sister and brother were all present at bedside. Family was very quite and requested a prayer.  held space for the family and offered prayer at this time. No other needs where named at this time.  respected the need for family time to be together. Advised nurse to contact [facility] for any further referrals.     [address], [facility]    Please [facility]  in order to get in touch with  for any Spiritual Care Needs   [phone]   OR   Reach out to us on [employer] at [facility] [place] [facility].

## 2022-09-15 NOTE — PROGRESS NOTES
Hematology and Oncology Progress Note    Patient: Trinity Hoyt MRN: [id number]  SSN: [id number]    YOB: 1965  Age: 55 y.o. Sex: female      Admit Date: 9/14/2022    LOS: 1 day     Chief Complaint: Patient was admitted with severe thrombocytopenia and anemia    Subjective:     Patient's mother and sister at bedside. Patient is slightly awake but not able to communicate. No active bleeding reported. Objective:     Vitals:    09/15/22 0400 09/15/22 0500 09/15/22 0600 09/15/22 0700   BP: 91/62 90/60 111/68    Pulse: 94 94 (!) 105    Resp: 19 19 23    Temp:    99 °F (37.2 °C)   SpO2: 99% 99% 100%    Weight: 60.2 kg (132 lb 11.5 oz)      Height:              Physical Exam:  Constitutional: Middle-aged -American female looks chronically ill. Patient is very lethargic and not interactive at all. Eyes: Sclerae anicteric. Conjunctivae shows pallor. ENMT: Oral mucosa is moist, no thrush, mucositis, or petechiae. Neck: No adenopathy. Respiratory: Not in distress. Somewhat tachypneic. Cardiovascular: Sinus tachycardia. Abdomen: Soft, nontender, no hepatosplenomegaly. No guarding or rigidity. Bowel sounds present. Extremities: No edema, cyanosis or clubbing. Skin: No petechiae; no skin rash. Neurologic: Lethargic and does not follow any verbal command.     Lab/Data Review:    Recent Results (from the past 24 hour(s))   TROPONIN-HIGH SENSITIVITY    Collection Time: 09/14/22  8:26 AM   Result Value Ref Range    Troponin-High Sensitivity 16 0 - 51 ng/L   METABOLIC PANEL, COMPREHENSIVE    Collection Time: 09/14/22  8:26 AM   Result Value Ref Range    Sodium 143 136 - 145 mmol/L    Potassium 4.4 3.5 - 5.1 mmol/L    Chloride 119 (H) 97 - 108 mmol/L    CO2 <5 (LL) 21 - 32 mmol/L    Anion gap Not calculated 5 - 15 mmol/L    Glucose 142 (H) 65 - 100 mg/dL    BUN 11 6 - 20 mg/dL    Creatinine 1.40 (H) 0.55 - 1.02 mg/dL    BUN/Creatinine ratio 8 (L) 12 - 20      GFR est AA 47 (L) >60 ml/min/1.73m2    GFR est non-AA 39 (L) >60 ml/min/1.73m2    Calcium 6.6 (L) 8.5 - 10.1 mg/dL    Bilirubin, total 0.3 0.2 - 1.0 mg/dL    AST (SGOT) 60 (H) 15 - 37 U/L    ALT (SGPT) 44 12 - 78 U/L    Alk. phosphatase 73 45 - 117 U/L    Protein, total 8.0 6.4 - 8.2 g/dL    Albumin 1.2 (L) 3.5 - 5.0 g/dL    Globulin 6.8 (H) 2.0 - 4.0 g/dL    A-G Ratio 0.2 (L) 1.1 - 2.2     BLOOD GAS, ARTERIAL    Collection Time: 09/14/22  8:28 AM   Result Value Ref Range    pH 6.97 (LL) 7.35 - 7.45      PCO2 16 (L) 35 - 45 mmHg    PO2 116 (H) 80 - 100 mmHg    O2 SATURATION 96 95 - 99 %    BICARBONATE 4 (L) 22 - 26 mmol/L    BASE DEFICIT 26.2 mmol/L    O2 METHOD Room air      Sample source Arterial      SITE Left Brachial      MIGUEL'S TEST YES      Carboxy-Hgb 0.3 (L) 1 - 2 %    Methemoglobin 0.2 0 - 1.4 %    Oxyhemoglobin 95.3 95 - 99 %    Performed by Claudette Gibes     Critical value read back Called to l. buhls rn on 09/14/2022 at 08:36     TEMPERATURE 96.6     CBC WITH AUTOMATED DIFF    Collection Time: 09/14/22  8:33 AM   Result Value Ref Range    WBC 11.8 (H) 3.6 - 11.0 K/uL    RBC 1.19 (L) 3.80 - 5.20 M/uL    HGB 4.1 (LL) 11.5 - 16.0 g/dL    HCT 15.3 (LL) 35.0 - 47.0 %    .6 (H) 80.0 - 99.0 FL    MCH 34.5 (H) 26.0 - 34.0 PG    MCHC 26.8 (L) 30.0 - 36.5 g/dL    RDW 14.9 (H) 11.5 - 14.5 %    PLATELET 41 (LL) 963 - 400 K/uL    MPV 10.1 8.9 - 12.9 FL    NRBC 0.2 (H) 0.0  WBC    ABSOLUTE NRBC 0.02 (H) 0.00 - 0.01 K/uL    NEUTROPHILS 13 (L) 32 - 75 %    LYMPHOCYTES 85 (H) 12 - 49 %    MONOCYTES 2 (L) 5 - 13 %    EOSINOPHILS 0 0 - 7 %    BASOPHILS 0 0 - 1 %    IMMATURE GRANULOCYTES 0 %    ABS. NEUTROPHILS 1.5 (L) 1.8 - 8.0 K/UL    ABS. LYMPHOCYTES 10.1 (H) 0.8 - 3.5 K/UL    ABS. MONOCYTES 0.2 0.0 - 1.0 K/UL    ABS. EOSINOPHILS 0.0 0.0 - 0.4 K/UL    ABS. BASOPHILS 0.0 0.0 - 0.1 K/UL    ABS. IMM.  GRANS. 0.0 K/UL    DF Manual      RBC COMMENTS [place] cells  2+       EMERGENT RELEASE OF UNCROSSMATCHED RED CELLS    Collection Time: 09/14/22  9:30 AM   Result Value Ref Range    Crossmatch Expiration 09/17/2022,2359     ABO/Rh(D) A Positive     Antibody screen Positive     Antibody ID Passive reactivity due to Daratumumab     XXAUTO CONTROL Positive     JEREMY Poly Positive     JEREMY IgG Positive     JEREMY C3b/C3d Negative     Unit number [id number]     Blood component type  LR     Unit division 00     Status of unit Issued,final     UNIT TAG COMMENT Emergency Release     TRANSFUSION STATUS Ok to transfuse     Crossmatch result Incompatible     Unit number [id number]     Blood component type  LR     Unit division 00     Status of unit Issued,final     UNIT TAG COMMENT Emergency Release     TRANSFUSION STATUS Ok to transfuse     Crossmatch result Incompatible    LACTIC ACID    Collection Time: 09/14/22  9:30 AM   Result Value Ref Range    Lactic acid 18.9 (HH) 0.4 - 2.0 mmol/L   BLOOD GAS, ARTERIAL    Collection Time: 09/14/22  1:27 PM   Result Value Ref Range    pH 7.16 (LL) 7.35 - 7.45      PCO2 PENDING mmHg    PO2 128 (H) 80 - 100 mmHg    O2 SATURATION 98 95 - 99 %    O2 METHOD Room air      FIO2 21.0 %    Sample source Arterial      SITE Left Radial      MIGUEL'S TEST YES      Carboxy-Hgb 0.3 (L) 1 - 2 %    Methemoglobin 0.2 0 - 1.4 %    Oxyhemoglobin 97.1 95 - 99 %    Performed by Jo Elizabeth     Critical value read back Called to Dominique Yuan RN on 09/14/2022 at 13:29     TEMPERATURE 97.0     MRSA SCREEN - PCR (NASAL)    Collection Time: 09/14/22  3:47 PM   Result Value Ref Range    MRSA by PCR, Nasal Not Detected Not Detected     CBC WITH AUTOMATED DIFF    Collection Time: 09/14/22  5:20 PM   Result Value Ref Range    WBC 6.4 3.6 - 11.0 K/uL    RBC 2.70 (L) 3.80 - 5.20 M/uL    HGB 8.8 (L) 11.5 - 16.0 g/dL    HCT 27.5 (L) 35.0 - 47.0 %    .9 (H) 80.0 - 99.0 FL    MCH 32.6 26.0 - 34.0 PG    MCHC 32.0 30.0 - 36.5 g/dL    RDW 19.1 (H) 11.5 - 14.5 %    PLATELET 33 (LL) 610 - 400 K/uL    MPV 10.6 8.9 - 12.9 FL    NRBC 0.6 (H) 0.0  WBC    ABSOLUTE NRBC 0.04 (H) 0.00 - 0.01 K/uL    NEUTROPHILS 34 32 - 75 %    BAND NEUTROPHILS 2 0 - 6 %    LYMPHOCYTES 54 (H) 12 - 49 %    MONOCYTES 7 5 - 13 %    EOSINOPHILS 0 0 - 7 %    BASOPHILS 0 0 - 1 %    METAMYELOCYTES 1 (H) 0 %    NRBC 3.0  WBC    OTHER CELL 2 %    IMMATURE GRANULOCYTES 0 %    ABS. NEUTROPHILS 2.3 1.8 - 8.0 K/UL    ABS. LYMPHOCYTES 3.7 (H) 0.8 - 3.5 K/UL    ABS. MONOCYTES 0.4 0.0 - 1.0 K/UL    ABS. EOSINOPHILS 0.0 0.0 - 0.4 K/UL    ABS. BASOPHILS 0.0 0.0 - 0.1 K/UL    ABSOLUTE NRBC 0.19 K/uL    ABS. IMM. GRANS. 0.0 K/UL    DF Manual      RBC COMMENTS Teardrop cells  1+        RBC COMMENTS Anisocytosis  2+       CBC WITH AUTOMATED DIFF    Collection Time: 09/15/22  4:00 AM   Result Value Ref Range    WBC 3.4 (L) 3.6 - 11.0 K/uL    RBC 2.61 (L) 3.80 - 5.20 M/uL    HGB 8.5 (L) 11.5 - 16.0 g/dL    HCT 26.1 (L) 35.0 - 47.0 %    .0 (H) 80.0 - 99.0 FL    MCH 32.6 26.0 - 34.0 PG    MCHC 32.6 30.0 - 36.5 g/dL    RDW 19.8 (H) 11.5 - 14.5 %    PLATELET 31 (LL) 076 - 400 K/uL    MPV 11.5 8.9 - 12.9 FL    NRBC 0.9 (H) 0.0  WBC    ABSOLUTE NRBC 0.03 (H) 0.00 - 0.01 K/uL    NEUTROPHILS 59 32 - 75 %    BAND NEUTROPHILS 1 0 - 6 %    LYMPHOCYTES 29 12 - 49 %    MONOCYTES 6 5 - 13 %    EOSINOPHILS 0 0 - 7 %    BASOPHILS 0 0 - 1 %    OTHER CELL 5 %    IMMATURE GRANULOCYTES 0 %    ABS. NEUTROPHILS 2.0 1.8 - 8.0 K/UL    ABS. LYMPHOCYTES 1.0 0.8 - 3.5 K/UL    ABS. MONOCYTES 0.2 0.0 - 1.0 K/UL    ABS. EOSINOPHILS 0.0 0.0 - 0.4 K/UL    ABS. BASOPHILS 0.0 0.0 - 0.1 K/UL    ABS. IMM.  GRANS. 0.0 K/UL    DF Manual      RBC COMMENTS Normocytic, Normochromic     METABOLIC PANEL, COMPREHENSIVE    Collection Time: 09/15/22  4:00 AM   Result Value Ref Range    Sodium 145 136 - 145 mmol/L    Potassium 4.7 3.5 - 5.1 mmol/L    Chloride 109 (H) 97 - 108 mmol/L    CO2 20 (L) 21 - 32 mmol/L    Anion gap 16 (H) 5 - 15 mmol/L    Glucose 146 (H) 65 - 100 mg/dL    BUN 21 (H) 6 - 20 mg/dL Creatinine 2.06 (H) 0.55 - 1.02 mg/dL    BUN/Creatinine ratio 10 (L) 12 - 20      GFR est AA 30 (L) >60 ml/min/1.73m2    GFR est non-AA 25 (L) >60 ml/min/1.73m2    Calcium 7.2 (L) 8.5 - 10.1 mg/dL    Bilirubin, total 0.8 0.2 - 1.0 mg/dL    AST (SGOT) 173 (H) 15 - 37 U/L    ALT (SGPT) 61 12 - 78 U/L    Alk. phosphatase 95 45 - 117 U/L    Protein, total 10.0 (H) 6.4 - 8.2 g/dL    Albumin 1.6 (L) 3.5 - 5.0 g/dL    Globulin 8.4 (H) 2.0 - 4.0 g/dL    A-G Ratio 0.2 (L) 1.1 - 2.2     C REACTIVE PROTEIN, QT    Collection Time: 09/15/22  4:00 AM   Result Value Ref Range    C-Reactive protein 1.96 (H) 0.00 - 0.60 mg/dL           Assessment and plan:     67-year-old -American female with multiple myeloma who was on chemotherapy with carfilzomib, daratumumab and dexamethasone. Patient had last carfilzomib on 23 August.  She was found to be anemic and was supposed to get elective blood transfusion today. Patient is now admitted with severe anemia with sudden follow-up hemoglobin, severe thrombocytopenia, sepsis, confusion, disorientation and patient is critically ill.     1) severe anemia: Patient's hemoglobin was 7 yesterday and it went to 4.1. Obviously this is most likely from bleeding. Patient's total bilirubin is 0.3 so there is no evidence of clinically significant hemolysis. This sudden drop of hemoglobin is not usually seen with myeloma alone or even from the treatment. As described above patient has not had any carfilzomib for almost 3 weeks.  -Patient had CT scan of abdomen which did not report any retroperitoneal bleed.  -Agree with GI work-up for possible GI blood loss. -Packed RBC transfusion to bring her hemoglobin 7 and maintain at or above 7.  -Patient's hemoglobin is 8.5 on 15 September     2) severe thrombocytopenia: Unlikely to be from chemotherapy. It is possible that it may be from myeloma itself.   We will try to keep platelet count above 20,000 because of concern of possible bleeding.  -Platelet counts are 06,086 on 15 September. 3) multiple myeloma: Patient had aggressive multiple myeloma and she is on second line chemotherapy with carfilzomib, daratumumab and dexamethasone. May consider getting peripheral blood flow cytometry to see if patient has any evidence of plasma cell leukemia.  -Patient was not able to get any treatment since August 23rd because of her overall condition and low blood count.  -I have reviewed her peripheral smear from today which shows significant left shift with frequent bands and toxic granulations. There are decrease in platelet numbers. No schistocytes or spherocytes were seen. No blast cells were seen. There were occasional abnormal looking plasma cells were seen. 4) sepsis: Agree with empiric IV antibiotic coverage. Patient is immunocompromise because of myeloma as well as chemotherapy.     -Case was discussed with patient's primary oncologist Dr. Ryan Urbina and patient's attending physician Dr. Doris Mercado. This dictation was done by dragon, computer voice recognition software. Often unanticipated grammatical, syntax, phones and other interpretive errors are inadvertently transcribed. Please excuse errors that have escaped final proofreading.        Signed By: Irwin Bonilla MD     September 15, 2022

## 2022-09-15 NOTE — PROGRESS NOTES
Problem: Mobility Impaired (Adult and Pediatric)  Goal: *Acute Goals and Plan of Care (Insert Text)  Description: In 1-5 days pt will be able to perform:  ST.  Bed mobility:  Rolling L to R to L modified independent for positioning. 2.  Supine to sit to supine CGA with HR for meals. 3.  Sit to stand to sit CGA with RW in prep for ambulation. LT.  Gait:  Ambulate >50ft CGA with RW, WBAT, for home/community mobility. 2.  Stair Negotiation:  Ascend/descend >1 step CGA with HR for home entry. 3.  Activity tolerance: Tolerate up in chair 1-2 hours for ADL's.  4.  Patient/Family Education:  Patient/family to be independent with HEP for follow-up care and safe discharge. Outcome: Progressing Towards Goal     PHYSICAL THERAPY TREATMENT    Patient: Dimple Okeefe (89 y.o. female)  Date: 2021  Diagnosis: Osteoarthritis of right knee [M17.11] <principal problem not specified>  Procedure(s) (LRB):  ESOPHAGOGASTRODUODENOSCOPY (EGD); CLIP PLACEMENT (N/A) 4 Days Post-Op  Precautions: Fall, WBAT(R TKA)   Chart, physical therapy assessment, plan of care and goals were reviewed. ASSESSMENT:  Pt found resting in bed with excessive SOB. Session began with exercises and SPO2 range of 87-95% on 3L. Pt assisted to EOB for continued ex and standing attempts. 4 standing trials completed, with mod/max assist. On forth trial, pt performs R sides (poorly). Pt remains in denial of current function v D/c goals. Educated on rec for placement to increase mobility ind and LE stregnth. Progression toward goals:  []      Improving appropriately and progressing toward goals  [x]      Improving slowly and progressing toward goals  []      Not making progress toward goals and plan of care will be adjusted     PLAN:  Patient continues to benefit from skilled intervention to address the above impairments. Continue treatment per established plan of care.   Discharge Recommendations:  [address] Problem: Pressure Injury - Risk of  Goal: *Prevention of pressure injury  Description: Document Blair Scale and appropriate interventions in the flowsheet. Outcome: Progressing Towards Goal  Note: Pressure Injury Interventions:  Sensory Interventions: Minimize linen layers, Check visual cues for pain, Assess changes in LOC    Moisture Interventions: Internal/External urinary devices, Apply protective barrier, creams and emollients, Absorbent underpads, Minimize layers    Activity Interventions: Pressure redistribution bed/mattress(bed type)    Mobility Interventions: Turn and reposition approx.  every two hours(pillow and wedges), HOB 30 degrees or less    Nutrition Interventions: Document food/fluid/supplement intake, Discuss nutritional consult with provider, Offer support with meals,snacks and hydration    Friction and Shear Interventions: HOB 30 degrees or less, Minimize layers, Apply protective barrier, creams and emollients                Problem: Patient Education: Go to Patient Education Activity  Goal: Patient/Family Education  Outcome: Progressing Towards Goal     Problem: Aspiration - Risk of  Goal: *Absence of aspiration  Outcome: Progressing Towards Goal Recommendations for Discharge:  rolling walker     SUBJECTIVE:   Patient stated I don't want to go to a nursing home. I want to go home. . I have a big son.     OBJECTIVE DATA SUMMARY:   Critical Behavior:  Neurologic State: Alert  Orientation Level: Oriented to person, Oriented to situation, Disoriented to place, Disoriented to time  Cognition: Follows commands  Safety/Judgement: Decreased awareness of need for assistance, Decreased awareness of need for safety, Lack of insight into deficits  Functional Mobility Training:  Bed Mobility:  Rolling: Moderate assistance  Supine to Sit: Moderate assistance;Maximum assistance  Sit to Supine: Maximum assistance  Scooting: Maximum assistance  Transfers:  Sit to Stand: Moderate assistance  Stand to Sit: Moderate assistance  Balance:  Sitting: Impaired  Sitting - Static: Fair (occasional)  Sitting - Dynamic: Fair (occasional)  Standing: Impaired; With support  Standing - Static: Fair  Standing - Dynamic : Fair  Ambulation/Gait Training:  Distance (ft): 2 Feet (ft)(R side steps)  Assistive Device: Gait belt;Walker, rolling  Ambulation - Level of Assistance: Moderate assistance  Gait Abnormalities: Antalgic;Decreased step clearance  Right Side Weight Bearing: As tolerated  Base of Support: Widened;Center of gravity altered  Speed/Dinah: Slow  Step Length: Right shortened;Left shortened  Swing Pattern: Right asymmetrical;Left asymmetrical  Therapeutic Exercises:   HEP in full x 10 min (with facilitation and manual biofeedback)  AAROM= 4-95 degrees   Pain:  Pain Scale 1: Numeric (0 - 10)  Pain Intensity 1: 5  Pain out: 5  Pain Location 1: Knee  Pain Orientation 1: Right  Pain Description 1: Aching  Pain Intervention(s) 1: Medication (see MAR)  Activity Tolerance:   Fair-  Please refer to the flowsheet for vital signs taken during this treatment.   After treatment:   [] Patient left in no apparent distress sitting up in chair  [x] Patient left in no apparent distress in bed  [x] Call bell left within reach  [x] Nursing notified  [] Caregiver present  [] Bed alarm activated      Yovanny Socks, PTA   Time Calculation: (P) 42 mins

## 2022-09-15 NOTE — PROGRESS NOTES
Received patient lying in bed, on RA, 1/2 NS+150 Meq Bicarb at 84 ml/hr via 20G to L forearm, doesn't respond to voice, unable to follow commands, restless, Mitts to Ottoniel. Hands. , purewick in place, no UOP, sister and mother at bedside.

## 2022-09-15 NOTE — PROGRESS NOTES
TRANSFER - OUT REPORT:    Verbal report given to Bryanna(name) on Arnol Rubin  being transferred to (unit) for routine progression of care       Report consisted of patients Situation, Background, Assessment and   Recommendations(SBAR). Information from the following report(s) SBAR, Intake/Output, MAR, Recent Results, and Cardiac Rhythm V-paced  was reviewed with the receiving nurse. Opportunity for questions and clarification was provided.       Patient transported with:   Registered Nurse  Tech

## 2022-09-15 NOTE — PROGRESS NOTES
Progress Note    Patient: Sallie Ahuja MRN: [id number]  SSN: [id number]    YOB: 1965  Age: 55 y.o. Sex: female      Admit Date: 9/14/2022    LOS: 1 day     Subjective:   Patient with multiple myeloma, followed for suspected sepsis with possible UTI. Blood and urine cultures pending. Currently on Meropenem. Afebrile with leukopenia and elevated procal and CRP. She has been transferred out of the ICU. Objective:     Vitals:    09/15/22 0400 09/15/22 0500 09/15/22 0600 09/15/22 0700   BP: 91/62 90/60 111/68    Pulse: 94 94 (!) 105    Resp: 19 19 23    Temp:    99 °F (37.2 °C)   SpO2: 99% 99% 100%    Weight: 132 lb 11.5 oz (60.2 kg)      Height:            Intake and Output:  Current Shift: No intake/output data recorded. Last three shifts: 09/13 1901 - 09/15 0700  In: 3440.3 [I.V.:2768.3]  Out: 650 [Urine:650]    Physical Exam:   Vitals and nursing note reviewed. Exam conducted with a chaperone present (?Sisters). Constitutional:       General: She is not in acute distress. Appearance: She is ill-appearing. HENT:      Head: Normocephalic and atraumatic. Right Ear: External ear normal.      Left Ear: External ear normal.      Nose: Nose normal.      Mouth/Throat:      Mouth: Mucous membranes are dry. Eyes:      Pupils: Pupils are equal, round, and reactive to light. Comments: Right eyelid swelling   Cardiovascular:      Rate and Rhythm: Normal rate and regular rhythm. Heart sounds: No murmur heard. Pulmonary:      Effort: Pulmonary effort is normal.      Breath sounds: Normal breath sounds. Abdominal:      General: Bowel sounds are normal. There is no distension. Palpations: Abdomen is soft. Tenderness: There is no abdominal tenderness. Genitourinary:     Comments: No urinary  catheter  Musculoskeletal:      Cervical back: Neck supple. Right lower leg: No edema. Left lower leg: No edema. Skin:     Findings: No rash. Neurological:      General: No focal deficit present. Mental Status: She is disoriented. Psychiatric:      Comments: Unable to assess    Lab/Data Review:     WBC 3,400 with ANC 2,000  PLT 31,000    CRP 1.96  Procal 6.82      Blood cultures (9/14) coagulase negative Staphylococcus species  Urine culture (9/14) in process    Assessment:     Active Problems:    Anemia (12/23/2021)      AMS (altered mental status) (9/14/2022)    Suspected sepsis with hypothermia, leukocytosis  Pyuria and bacteriuria, urine culture pending,  Day #2 IV Meropenem  Multiple myeloma on chemotherapy  Leukopenia but not neutropenic  Severe anemia  Severe thrombocytopenia  Compression fracture S1  Altered mental status with elevated ammonia level    Plan:   1. Continue IV Meropenem  2. Follow-up blood and urine cultures  3.  In am, repeat CBC, procal and CRP       Signed By: Marciarmen Acosta MD     September 15, 2022

## 2022-09-15 NOTE — PROGRESS NOTES
Primary Nurse Reginaldo Yeboah, RN and Triny Lee, RN, RN performed a dual skin assessment on this patient No impairment noted Blair score is 18, boggy sacrum, turn Q2hrs.

## 2022-09-15 NOTE — PROGRESS NOTES
PROGRESS NOTE      Subjective: Somewhat restless but more alert. Able to identify mom by name and sister by name both were at bedside. No bloody bowel movements. No fever or chills.      Visit Vitals  /68 (BP 1 Location: Right upper arm, BP Patient Position: At rest)   Pulse (!) 105   Temp 99 °F (37.2 °C)   Resp 23   Ht 5' (1.524 m)   Wt 60.2 kg (132 lb 11.5 oz)   SpO2 100%   Breastfeeding No   BMI 25.92 kg/m²       Current Facility-Administered Medications:     sodium chloride (NS) flush 5-10 mL, 5-10 mL, IntraVENous, PRN, Dequan Desir MD    sodium chloride (NS) flush 5-40 mL, 5-40 mL, IntraVENous, Q8H, Naomi Canseco MD, 10 mL at 09/15/22 0512    sodium chloride (NS) flush 5-40 mL, 5-40 mL, IntraVENous, PRN, Shaun ALMANZAR MD    acetaminophen (TYLENOL) tablet 650 mg, 650 mg, Oral, Q6H PRN **OR** acetaminophen (TYLENOL) suppository 650 mg, 650 mg, Rectal, Q6H PRN, Naomi Gross MD    polyethylene glycol (MIRALAX) packet 17 g, 17 g, Oral, DAILY PRN, Naomi Gross MD    ondansetron (ZOFRAN ODT) tablet 4 mg, 4 mg, Oral, Q8H PRN **OR** ondansetron (ZOFRAN) injection 4 mg, 4 mg, IntraVENous, Q6H PRN, Naomi Gross MD    sodium bicarbonate (8.4%) 150 mEq in 0.45% sodium chloride 1,000 mL infusion, , IntraVENous, CONTINUOUS, Naomi Gross MD, Last Rate: 100 mL/hr at 09/15/22 0000, New Bag at 09/15/22 0000    0.9% sodium chloride infusion 250 mL, 250 mL, IntraVENous, PRN, Naomi Gross MD    meropenem (MERREM) 1 g in 0.9% sodium chloride (MBP/ADV) 50 mL MBP, 1 g, IntraVENous, Q12H, Trent Gunnels, MD, Last Rate: 100 mL/hr at 09/15/22 0103, 1 g at 09/15/22 0103    NOREPINephrine (LEVOPHED) 8 mg in 0.9% NS 250ml infusion, 0.5-30 mcg/min, IntraVENous, TITRATE, Naomi Canseco MD    pantoprazole (PROTONIX) tablet 40 mg, 40 mg, Oral, ACB, Naomi Canseco MD  Recent Results (from the past 24 hour(s))   EMERGENT RELEASE OF UNCROSSMATCHED RED CELLS    Collection Time: 09/14/22  9:30 AM   Result Value Ref Range    Crossmatch Expiration 09/17/2022,2359     ABO/Rh(D) A Positive     Antibody screen Positive     Antibody ID Passive reactivity due to Daratumumab     XXAUTO CONTROL Positive     JEREMY Poly Positive     JEREMY IgG Positive     JEREMY C3b/C3d Negative     Unit number [id number]     Blood component type [facility]     Unit division 00     Status of unit Issued,final     UNIT TAG COMMENT Emergency Release     TRANSFUSION STATUS Ok to transfuse     Crossmatch result Incompatible     Unit number [id number]     Blood component type [facility]     Unit division 00     Status of unit Issued,final     UNIT TAG COMMENT Emergency Release     TRANSFUSION STATUS Ok to transfuse     Crossmatch result Incompatible    LACTIC ACID    Collection Time: 09/14/22  9:30 AM   Result Value Ref Range    Lactic acid 18.9 (HH) 0.4 - 2.0 mmol/L   BLOOD GAS, ARTERIAL    Collection Time: 09/14/22  1:27 PM   Result Value Ref Range    pH 7.16 (LL) 7.35 - 7.45      PCO2 PENDING mmHg    PO2 128 (H) 80 - 100 mmHg    O2 SATURATION 98 95 - 99 %    O2 METHOD Room air      FIO2 21.0 %    Sample source Arterial      SITE Left Radial      MIGUEL'S TEST YES      Carboxy-Hgb 0.3 (L) 1 - 2 %    Methemoglobin 0.2 0 - 1.4 %    Oxyhemoglobin 97.1 95 - 99 %    Performed by Garth Torres     Critical value read back Called to Javid Martinez RN on 09/14/2022 at 13:29     TEMPERATURE 97.0     MRSA SCREEN - PCR (NASAL)    Collection Time: 09/14/22  3:47 PM   Result Value Ref Range    MRSA by PCR, Nasal Not Detected Not Detected     CBC WITH AUTOMATED DIFF    Collection Time: 09/14/22  5:20 PM   Result Value Ref Range    WBC 6.4 3.6 - 11.0 K/uL    RBC 2.70 (L) 3.80 - 5.20 M/uL    HGB 8.8 (L) 11.5 - 16.0 g/dL    HCT 27.5 (L) 35.0 - 47.0 %    .9 (H) 80.0 - 99.0 FL    MCH 32.6 26.0 - 34.0 PG    MCHC 32.0 30.0 - 36.5 g/dL    RDW 19.1 (H) 11.5 - 14.5 %    PLATELET 33 (LL) 828 - 400 K/uL    MPV 10.6 8.9 - 12.9 FL    NRBC 0.6 (H) 0.0  WBC    ABSOLUTE NRBC 0.04 (H) 0.00 - 0.01 K/uL    NEUTROPHILS 34 32 - 75 %    BAND NEUTROPHILS 2 0 - 6 %    LYMPHOCYTES 54 (H) 12 - 49 %    MONOCYTES 7 5 - 13 %    EOSINOPHILS 0 0 - 7 %    BASOPHILS 0 0 - 1 %    METAMYELOCYTES 1 (H) 0 %    NRBC 3.0  WBC    OTHER CELL 2 %    IMMATURE GRANULOCYTES 0 %    ABS. NEUTROPHILS 2.3 1.8 - 8.0 K/UL    ABS. LYMPHOCYTES 3.7 (H) 0.8 - 3.5 K/UL    ABS. MONOCYTES 0.4 0.0 - 1.0 K/UL    ABS. EOSINOPHILS 0.0 0.0 - 0.4 K/UL    ABS. BASOPHILS 0.0 0.0 - 0.1 K/UL    ABSOLUTE NRBC 0.19 K/uL    ABS. IMM. GRANS. 0.0 K/UL    DF Manual      RBC COMMENTS Teardrop cells  1+        RBC COMMENTS Anisocytosis  2+       CBC WITH AUTOMATED DIFF    Collection Time: 09/15/22  4:00 AM   Result Value Ref Range    WBC 3.4 (L) 3.6 - 11.0 K/uL    RBC 2.61 (L) 3.80 - 5.20 M/uL    HGB 8.5 (L) 11.5 - 16.0 g/dL    HCT 26.1 (L) 35.0 - 47.0 %    .0 (H) 80.0 - 99.0 FL    MCH 32.6 26.0 - 34.0 PG    MCHC 32.6 30.0 - 36.5 g/dL    RDW 19.8 (H) 11.5 - 14.5 %    PLATELET 31 (LL) 271 - 400 K/uL    MPV 11.5 8.9 - 12.9 FL    NRBC 0.9 (H) 0.0  WBC    ABSOLUTE NRBC 0.03 (H) 0.00 - 0.01 K/uL    NEUTROPHILS 59 32 - 75 %    BAND NEUTROPHILS 1 0 - 6 %    LYMPHOCYTES 29 12 - 49 %    MONOCYTES 6 5 - 13 %    EOSINOPHILS 0 0 - 7 %    BASOPHILS 0 0 - 1 %    OTHER CELL 5 %    IMMATURE GRANULOCYTES 0 %    ABS. NEUTROPHILS 2.0 1.8 - 8.0 K/UL    ABS. LYMPHOCYTES 1.0 0.8 - 3.5 K/UL    ABS. MONOCYTES 0.2 0.0 - 1.0 K/UL    ABS. EOSINOPHILS 0.0 0.0 - 0.4 K/UL    ABS. BASOPHILS 0.0 0.0 - 0.1 K/UL    ABS. IMM.  GRANS. 0.0 K/UL    DF Manual      RBC COMMENTS Normocytic, Normochromic     METABOLIC PANEL, COMPREHENSIVE    Collection Time: 09/15/22  4:00 AM   Result Value Ref Range    Sodium 145 136 - 145 mmol/L    Potassium 4.7 3.5 - 5.1 mmol/L    Chloride 109 (H) 97 - 108 mmol/L    CO2 20 (L) 21 - 32 mmol/L    Anion gap 16 (H) 5 - 15 mmol/L    Glucose 146 (H) 65 - 100 mg/dL    BUN 21 (H) 6 - 20 mg/dL    Creatinine 2.06 (H) 0.55 - 1.02 mg/dL    BUN/Creatinine ratio 10 (L) 12 - 20      GFR est AA 30 (L) >60 ml/min/1.73m2    GFR est non-AA 25 (L) >60 ml/min/1.73m2    Calcium 7.2 (L) 8.5 - 10.1 mg/dL    Bilirubin, total 0.8 0.2 - 1.0 mg/dL    AST (SGOT) 173 (H) 15 - 37 U/L    ALT (SGPT) 61 12 - 78 U/L    Alk. phosphatase 95 45 - 117 U/L    Protein, total 10.0 (H) 6.4 - 8.2 g/dL    Albumin 1.6 (L) 3.5 - 5.0 g/dL    Globulin 8.4 (H) 2.0 - 4.0 g/dL    A-G Ratio 0.2 (L) 1.1 - 2.2     C REACTIVE PROTEIN, QT    Collection Time: 09/15/22  4:00 AM   Result Value Ref Range    C-Reactive protein 1.96 (H) 0.00 - 0.60 mg/dL   PROCALCITONIN    Collection Time: 09/15/22  6:20 AM   Result Value Ref Range    Procalcitonin 6.82 (H) 0 ng/mL     XR CHEST PORT   Final Result   Low lung volumes with minimal bibasilar atelectasis. No other acute   process. CT ABD PELV W CONT   Final Result   1. Acute mild superior endplate compression fracture at S1 with a longitudinal   component extending toward the right hemisacrum, new since 8/30/2022. Mild   superior plate compression fractures are again noted at T12 and L1.      2. No other acute abnormality in the abdomen or pelvis. HEENT: Normocephalic atraumatic. Dry mucous membrane. Neck: No distended neck vein. Lungs: Generally clear no wheeze. Heart: Tachycardic. Abdomen: Soft positive bowel sounds. Lower Extremities: No cyanosis or edema. CNS: Awake intermittently follows command. Able to raise legs on command. Psych: Restless. Assessment:     #1. Altered mental status most likely septic metabolic encephalopathy    #2. Sepsis of unclear etiology    #3. History of multiple myeloma undergoing chemotherapy    #4. Chronic anemia    #5. Severe thrombocytopenia  #6. Severe metabolic acidosis  #7. Pancytopenia. Plan: Transfer to medical floor.   I did a simple bedside swallow eval.  Patient was able to eat a cup full of applesauce without any problem. Continue empiric antibiotic. Await blood culture report. Discussed with other and sister both who are present at bedside. Follow-up labs in the morning. Guarded prognosis. Family aware.   Discussed with oncologist Dr. Colin Elias

## 2022-09-15 NOTE — PROGRESS NOTES
Progress Note    Patient: Elvira Jimenez MRN: [id number]  SSN: [id number]    YOB: 1965  Age: 55 y.o.   Sex: female      Admit Date: 9/14/2022    LOS: 1 day     Subjective:   No hematemesis no melena, no abdominal pain  Family member in room  Past Medical History:   Diagnosis Date    AICD (automatic cardioverter/defibrillator) present 12/2020    Anemia     Cardiomyopathy ([facility] 08/2020    Diabetes ([facility]     GERD (gastroesophageal reflux disease)     Heart failure (HCC)     Hypertension     Multiple myeloma ([facility]     Sleep apnea     cpap    Thrombocytopenia ([facility]         Current Facility-Administered Medications:     sodium chloride (NS) flush 5-10 mL, 5-10 mL, IntraVENous, PRN, Dequan Desir MD    sodium chloride (NS) flush 5-40 mL, 5-40 mL, IntraVENous, Q8H, Naomi Canseco MD, 10 mL at 09/15/22 0512    sodium chloride (NS) flush 5-40 mL, 5-40 mL, IntraVENous, PRN, Devora ALMANZAR MD    acetaminophen (TYLENOL) tablet 650 mg, 650 mg, Oral, Q6H PRN **OR** acetaminophen (TYLENOL) suppository 650 mg, 650 mg, Rectal, Q6H PRN, Naomi Grayson MD    polyethylene glycol (MIRALAX) packet 17 g, 17 g, Oral, DAILY PRN, Naomi Grayson MD    ondansetron (ZOFRAN ODT) tablet 4 mg, 4 mg, Oral, Q8H PRN **OR** ondansetron (ZOFRAN) injection 4 mg, 4 mg, IntraVENous, Q6H PRN, Naomi Grayson MD    sodium bicarbonate (8.4%) 150 mEq in 0.45% sodium chloride 1,000 mL infusion, , IntraVENous, CONTINUOUS, Naomi Grayson MD, Last Rate: 84 mL/hr at 09/15/22 1018, Rate Change at 09/15/22 1018    0.9% sodium chloride infusion 250 mL, 250 mL, IntraVENous, PRN, Naomi Grayson MD    meropenem (MERREM) 1 g in 0.9% sodium chloride (MBP/ADV) 50 mL MBP, 1 g, IntraVENous, Q12H, Lizandro Arnold MD, Last Rate: 100 mL/hr at 09/15/22 0103, 1 g at 09/15/22 0103    pantoprazole (PROTONIX) tablet 40 mg, 40 mg, Oral, ACB, Naomi Grayson MD, 40 mg at 09/15/22 1011    Objective:     Vitals:    09/15/22 0400 09/15/22 0500 09/15/22 0600 09/15/22 0700   BP: 91/62 90/60 111/68    Pulse: 94 94 (!) 105    Resp: 19 19 23    Temp:    99 °F (37.2 °C)   SpO2: 99% 99% 100%    Weight: 60.2 kg (132 lb 11.5 oz)      Height:            Intake and Output:  Current Shift: No intake/output data recorded. Last three shifts: 09/13 1901 - 09/15 0700  In: 3440.3 [I.V.:2768.3]  Out: 650 [Urine:650]    Physical Exam:   Physical Exam  Constitutional:       Appearance: She is ill-appearing. HENT:      Head: Atraumatic. Mouth/Throat:      Mouth: Mucous membranes are dry. Eyes:      Extraocular Movements: Extraocular movements intact. Cardiovascular:      Rate and Rhythm: Regular rhythm. Heart sounds: Normal heart sounds. Pulmonary:      Breath sounds: Normal breath sounds. Abdominal:      General: Abdomen is flat. Neurological:      General: No focal deficit present.    Psychiatric:         Mood and Affect: Mood normal.        Lab/Data Review:  Recent Results (from the past 24 hour(s))   BLOOD GAS, ARTERIAL    Collection Time: 09/14/22  1:27 PM   Result Value Ref Range    pH 7.16 (LL) 7.35 - 7.45      PCO2 PENDING mmHg    PO2 128 (H) 80 - 100 mmHg    O2 SATURATION 98 95 - 99 %    O2 METHOD Room air      FIO2 21.0 %    Sample source Arterial      SITE Left Radial      MIGUEL'S TEST YES      Carboxy-Hgb 0.3 (L) 1 - 2 %    Methemoglobin 0.2 0 - 1.4 %    Oxyhemoglobin 97.1 95 - 99 %    Performed by Tiny Glance     Critical value read back Called to Marya Fink RN on 09/14/2022 at 13:29     TEMPERATURE 97.0     MRSA SCREEN - PCR (NASAL)    Collection Time: 09/14/22  3:47 PM   Result Value Ref Range    MRSA by PCR, Nasal Not Detected Not Detected     CBC WITH AUTOMATED DIFF    Collection Time: 09/14/22  5:20 PM   Result Value Ref Range    WBC 6.4 3.6 - 11.0 K/uL    RBC 2.70 (L) 3.80 - 5.20 M/uL    HGB 8.8 (L) 11.5 - 16.0 g/dL    HCT 27.5 (L) 35.0 - 47.0 %    .9 (H) 80.0 - 99.0 FL    MCH 32.6 26.0 - 34.0 PG    MCHC 32.0 30.0 - 36.5 g/dL    RDW 19.1 (H) 11.5 - 14.5 %    PLATELET 33 (LL) 390 - 400 K/uL    MPV 10.6 8.9 - 12.9 FL    NRBC 0.6 (H) 0.0  WBC    ABSOLUTE NRBC 0.04 (H) 0.00 - 0.01 K/uL    NEUTROPHILS 34 32 - 75 %    BAND NEUTROPHILS 2 0 - 6 %    LYMPHOCYTES 54 (H) 12 - 49 %    MONOCYTES 7 5 - 13 %    EOSINOPHILS 0 0 - 7 %    BASOPHILS 0 0 - 1 %    METAMYELOCYTES 1 (H) 0 %    NRBC 3.0  WBC    OTHER CELL 2 %    IMMATURE GRANULOCYTES 0 %    ABS. NEUTROPHILS 2.3 1.8 - 8.0 K/UL    ABS. LYMPHOCYTES 3.7 (H) 0.8 - 3.5 K/UL    ABS. MONOCYTES 0.4 0.0 - 1.0 K/UL    ABS. EOSINOPHILS 0.0 0.0 - 0.4 K/UL    ABS. BASOPHILS 0.0 0.0 - 0.1 K/UL    ABSOLUTE NRBC 0.19 K/uL    ABS. IMM. GRANS. 0.0 K/UL    DF Manual      RBC COMMENTS Teardrop cells  1+        RBC COMMENTS Anisocytosis  2+       CBC WITH AUTOMATED DIFF    Collection Time: 09/15/22  4:00 AM   Result Value Ref Range    WBC 3.4 (L) 3.6 - 11.0 K/uL    RBC 2.61 (L) 3.80 - 5.20 M/uL    HGB 8.5 (L) 11.5 - 16.0 g/dL    HCT 26.1 (L) 35.0 - 47.0 %    .0 (H) 80.0 - 99.0 FL    MCH 32.6 26.0 - 34.0 PG    MCHC 32.6 30.0 - 36.5 g/dL    RDW 19.8 (H) 11.5 - 14.5 %    PLATELET 31 (LL) 517 - 400 K/uL    MPV 11.5 8.9 - 12.9 FL    NRBC 0.9 (H) 0.0  WBC    ABSOLUTE NRBC 0.03 (H) 0.00 - 0.01 K/uL    NEUTROPHILS 59 32 - 75 %    BAND NEUTROPHILS 1 0 - 6 %    LYMPHOCYTES 29 12 - 49 %    MONOCYTES 6 5 - 13 %    EOSINOPHILS 0 0 - 7 %    BASOPHILS 0 0 - 1 %    OTHER CELL 5 %    IMMATURE GRANULOCYTES 0 %    ABS. NEUTROPHILS 2.0 1.8 - 8.0 K/UL    ABS. LYMPHOCYTES 1.0 0.8 - 3.5 K/UL    ABS. MONOCYTES 0.2 0.0 - 1.0 K/UL    ABS. EOSINOPHILS 0.0 0.0 - 0.4 K/UL    ABS. BASOPHILS 0.0 0.0 - 0.1 K/UL    ABS. IMM.  GRANS. 0.0 K/UL    DF Manual      RBC COMMENTS Normocytic, Normochromic     METABOLIC PANEL, COMPREHENSIVE    Collection Time: 09/15/22  4:00 AM   Result Value Ref Range    Sodium 145 136 - 145 mmol/L    Potassium 4.7 3.5 - 5.1 mmol/L Chloride 109 (H) 97 - 108 mmol/L    CO2 20 (L) 21 - 32 mmol/L    Anion gap 16 (H) 5 - 15 mmol/L    Glucose 146 (H) 65 - 100 mg/dL    BUN 21 (H) 6 - 20 mg/dL    Creatinine 2.06 (H) 0.55 - 1.02 mg/dL    BUN/Creatinine ratio 10 (L) 12 - 20      GFR est AA 30 (L) >60 ml/min/1.73m2    GFR est non-AA 25 (L) >60 ml/min/1.73m2    Calcium 7.2 (L) 8.5 - 10.1 mg/dL    Bilirubin, total 0.8 0.2 - 1.0 mg/dL    AST (SGOT) 173 (H) 15 - 37 U/L    ALT (SGPT) 61 12 - 78 U/L    Alk. phosphatase 95 45 - 117 U/L    Protein, total 10.0 (H) 6.4 - 8.2 g/dL    Albumin 1.6 (L) 3.5 - 5.0 g/dL    Globulin 8.4 (H) 2.0 - 4.0 g/dL    A-G Ratio 0.2 (L) 1.1 - 2.2     C REACTIVE PROTEIN, QT    Collection Time: 09/15/22  4:00 AM   Result Value Ref Range    C-Reactive protein 1.96 (H) 0.00 - 0.60 mg/dL   PROCALCITONIN    Collection Time: 09/15/22  6:20 AM   Result Value Ref Range    Procalcitonin 6.82 (H) 0 ng/mL        XR CHEST PORT   Final Result   Low lung volumes with minimal bibasilar atelectasis. No other acute   process. CT ABD PELV W CONT   Final Result   1. Acute mild superior endplate compression fracture at S1 with a longitudinal   component extending toward the right hemisacrum, new since 8/30/2022. Mild   superior plate compression fractures are again noted at T12 and L1.      2. No other acute abnormality in the abdomen or pelvis. Assessment:     Active Problems:    Anemia (12/23/2021)      AMS (altered mental status) (9/14/2022)  GI bleeding .  Anemia stable    Monitor hemoglobin blood transfusion may needed      Signed By: An Pascual MD     September 15, 2022        Thank you for allowing me to participate in this patients care  Cc Referring Physician   Revonda Rubinstein, PA-C none

## 2022-09-15 NOTE — PROGRESS NOTES
Progress Note    Patient: Krista Shah MRN: [id number]  SSN: [id number]    YOB: 1965  Age: 55 y.o.   Sex: female      Admit Date: 9/14/2022    LOS: 1 day     Subjective:   No hematemesis no melena, no abdominal pain  Feel hungry when to eat  Past Medical History:   Diagnosis Date    AICD (automatic cardioverter/defibrillator) present 12/2020    Anemia     Cardiomyopathy ([facility] 08/2020    Diabetes ([facility]     GERD (gastroesophageal reflux disease)     Heart failure (HCC)     Hypertension     Multiple myeloma (HCC)     Sleep apnea     cpap    Thrombocytopenia ([facility]         Current Facility-Administered Medications:     sodium chloride (NS) flush 5-10 mL, 5-10 mL, IntraVENous, PRN, Dequan Desir MD    sodium chloride (NS) flush 5-40 mL, 5-40 mL, IntraVENous, Q8H, Naomi Canseco MD, 10 mL at 09/15/22 0512    sodium chloride (NS) flush 5-40 mL, 5-40 mL, IntraVENous, PRN, Sanaz ALMANZAR MD    acetaminophen (TYLENOL) tablet 650 mg, 650 mg, Oral, Q6H PRN **OR** acetaminophen (TYLENOL) suppository 650 mg, 650 mg, Rectal, Q6H PRN, Naomi Patel MD    polyethylene glycol (MIRALAX) packet 17 g, 17 g, Oral, DAILY PRN, Naomi Patel MD    ondansetron (ZOFRAN ODT) tablet 4 mg, 4 mg, Oral, Q8H PRN **OR** ondansetron (ZOFRAN) injection 4 mg, 4 mg, IntraVENous, Q6H PRN, Naomi Patel MD    sodium bicarbonate (8.4%) 150 mEq in 0.45% sodium chloride 1,000 mL infusion, , IntraVENous, CONTINUOUS, Naomi Patel MD, Last Rate: 100 mL/hr at 09/15/22 0000, New Bag at 09/15/22 0000    0.9% sodium chloride infusion 250 mL, 250 mL, IntraVENous, PRN, [place] Kelly, Naomi ALMANZAR MD    meropenem (MERREM) 1 g in 0.9% sodium chloride (MBP/ADV) 50 mL MBP, 1 g, IntraVENous, Q12H, Gt Lau MD, Last Rate: 100 mL/hr at 09/15/22 0103, 1 g at 09/15/22 0103    pantoprazole (PROTONIX) tablet 40 mg, 40 mg, Oral, ACB, Sanaz Denney MD    Objective:     Vitals:    09/15/22 0400 09/15/22 0500 09/15/22 0600 09/15/22 0700   BP: 91/62 90/60 111/68    Pulse: 94 94 (!) 105    Resp: 19 19 23    Temp:    99 °F (37.2 °C)   SpO2: 99% 99% 100%    Weight: 60.2 kg (132 lb 11.5 oz)      Height:            Intake and Output:  Current Shift: No intake/output data recorded. Last three shifts: 09/13 1901 - 09/15 0700  In: 3440.3 [I.V.:2768.3]  Out: 650 [Urine:650]    Physical Exam:   Physical Exam  Constitutional:       Appearance: She is ill-appearing. HENT:      Head: Atraumatic. Mouth/Throat:      Mouth: Mucous membranes are dry. Eyes:      Extraocular Movements: Extraocular movements intact. Cardiovascular:      Rate and Rhythm: Regular rhythm. Heart sounds: Normal heart sounds. Pulmonary:      Breath sounds: Normal breath sounds. Abdominal:      General: Abdomen is flat. Neurological:      General: No focal deficit present.    Psychiatric:         Mood and Affect: Mood normal.        Lab/Data Review:  Recent Results (from the past 24 hour(s))   BLOOD GAS, ARTERIAL    Collection Time: 09/14/22  1:27 PM   Result Value Ref Range    pH 7.16 (LL) 7.35 - 7.45      PCO2 PENDING mmHg    PO2 128 (H) 80 - 100 mmHg    O2 SATURATION 98 95 - 99 %    O2 METHOD Room air      FIO2 21.0 %    Sample source Arterial      SITE Left Radial      MIGUEL'S TEST YES      Carboxy-Hgb 0.3 (L) 1 - 2 %    Methemoglobin 0.2 0 - 1.4 %    Oxyhemoglobin 97.1 95 - 99 %    Performed by Ryan Pastrana     Critical value read back Called to Liza Hutson RN on 09/14/2022 at 13:29     TEMPERATURE 97.0     MRSA SCREEN - PCR (NASAL)    Collection Time: 09/14/22  3:47 PM   Result Value Ref Range    MRSA by PCR, Nasal Not Detected Not Detected     CBC WITH AUTOMATED DIFF    Collection Time: 09/14/22  5:20 PM   Result Value Ref Range    WBC 6.4 3.6 - 11.0 K/uL    RBC 2.70 (L) 3.80 - 5.20 M/uL    HGB 8.8 (L) 11.5 - 16.0 g/dL    HCT 27.5 (L) 35.0 - 47.0 %    .9 (H) 80.0 - 99.0 FL    MCH 32.6 26.0 - 34.0 PG MCHC 32.0 30.0 - 36.5 g/dL    RDW 19.1 (H) 11.5 - 14.5 %    PLATELET 33 (LL) 360 - 400 K/uL    MPV 10.6 8.9 - 12.9 FL    NRBC 0.6 (H) 0.0  WBC    ABSOLUTE NRBC 0.04 (H) 0.00 - 0.01 K/uL    NEUTROPHILS 34 32 - 75 %    BAND NEUTROPHILS 2 0 - 6 %    LYMPHOCYTES 54 (H) 12 - 49 %    MONOCYTES 7 5 - 13 %    EOSINOPHILS 0 0 - 7 %    BASOPHILS 0 0 - 1 %    METAMYELOCYTES 1 (H) 0 %    NRBC 3.0  WBC    OTHER CELL 2 %    IMMATURE GRANULOCYTES 0 %    ABS. NEUTROPHILS 2.3 1.8 - 8.0 K/UL    ABS. LYMPHOCYTES 3.7 (H) 0.8 - 3.5 K/UL    ABS. MONOCYTES 0.4 0.0 - 1.0 K/UL    ABS. EOSINOPHILS 0.0 0.0 - 0.4 K/UL    ABS. BASOPHILS 0.0 0.0 - 0.1 K/UL    ABSOLUTE NRBC 0.19 K/uL    ABS. IMM. GRANS. 0.0 K/UL    DF Manual      RBC COMMENTS Teardrop cells  1+        RBC COMMENTS Anisocytosis  2+       CBC WITH AUTOMATED DIFF    Collection Time: 09/15/22  4:00 AM   Result Value Ref Range    WBC 3.4 (L) 3.6 - 11.0 K/uL    RBC 2.61 (L) 3.80 - 5.20 M/uL    HGB 8.5 (L) 11.5 - 16.0 g/dL    HCT 26.1 (L) 35.0 - 47.0 %    .0 (H) 80.0 - 99.0 FL    MCH 32.6 26.0 - 34.0 PG    MCHC 32.6 30.0 - 36.5 g/dL    RDW 19.8 (H) 11.5 - 14.5 %    PLATELET 31 (LL) 709 - 400 K/uL    MPV 11.5 8.9 - 12.9 FL    NRBC 0.9 (H) 0.0  WBC    ABSOLUTE NRBC 0.03 (H) 0.00 - 0.01 K/uL    NEUTROPHILS 59 32 - 75 %    BAND NEUTROPHILS 1 0 - 6 %    LYMPHOCYTES 29 12 - 49 %    MONOCYTES 6 5 - 13 %    EOSINOPHILS 0 0 - 7 %    BASOPHILS 0 0 - 1 %    OTHER CELL 5 %    IMMATURE GRANULOCYTES 0 %    ABS. NEUTROPHILS 2.0 1.8 - 8.0 K/UL    ABS. LYMPHOCYTES 1.0 0.8 - 3.5 K/UL    ABS. MONOCYTES 0.2 0.0 - 1.0 K/UL    ABS. EOSINOPHILS 0.0 0.0 - 0.4 K/UL    ABS. BASOPHILS 0.0 0.0 - 0.1 K/UL    ABS. IMM.  GRANS. 0.0 K/UL    DF Manual      RBC COMMENTS Normocytic, Normochromic     METABOLIC PANEL, COMPREHENSIVE    Collection Time: 09/15/22  4:00 AM   Result Value Ref Range    Sodium 145 136 - 145 mmol/L    Potassium 4.7 3.5 - 5.1 mmol/L    Chloride 109 (H) 97 - 108 mmol/L CO2 20 (L) 21 - 32 mmol/L    Anion gap 16 (H) 5 - 15 mmol/L    Glucose 146 (H) 65 - 100 mg/dL    BUN 21 (H) 6 - 20 mg/dL    Creatinine 2.06 (H) 0.55 - 1.02 mg/dL    BUN/Creatinine ratio 10 (L) 12 - 20      GFR est AA 30 (L) >60 ml/min/1.73m2    GFR est non-AA 25 (L) >60 ml/min/1.73m2    Calcium 7.2 (L) 8.5 - 10.1 mg/dL    Bilirubin, total 0.8 0.2 - 1.0 mg/dL    AST (SGOT) 173 (H) 15 - 37 U/L    ALT (SGPT) 61 12 - 78 U/L    Alk. phosphatase 95 45 - 117 U/L    Protein, total 10.0 (H) 6.4 - 8.2 g/dL    Albumin 1.6 (L) 3.5 - 5.0 g/dL    Globulin 8.4 (H) 2.0 - 4.0 g/dL    A-G Ratio 0.2 (L) 1.1 - 2.2     C REACTIVE PROTEIN, QT    Collection Time: 09/15/22  4:00 AM   Result Value Ref Range    C-Reactive protein 1.96 (H) 0.00 - 0.60 mg/dL   PROCALCITONIN    Collection Time: 09/15/22  6:20 AM   Result Value Ref Range    Procalcitonin 6.82 (H) 0 ng/mL        XR CHEST PORT   Final Result   Low lung volumes with minimal bibasilar atelectasis. No other acute   process. CT ABD PELV W CONT   Final Result   1. Acute mild superior endplate compression fracture at S1 with a longitudinal   component extending toward the right hemisacrum, new since 8/30/2022. Mild   superior plate compression fractures are again noted at T12 and L1.      2. No other acute abnormality in the abdomen or pelvis.            Assessment:     Active Problems:    Anemia (12/23/2021)      AMS (altered mental status) (9/14/2022)    Bleeding, large superficial ulcer at  cardiac area of stomach, biopsy taken,  Acute anemia, from above,    PPI IV  Follow the biopsy report still pending  Monitor hemoglobin blood transfusion may needed  Start on clear liquid diet    Signed By: An Pascual MD     September 15, 2022        Thank you for allowing me to participate in this patients care  Cc Referring Physician   Revonda Rubinstein, PA-C

## 2022-09-16 PROBLEM — A41.9 SEPSIS (HCC): Status: ACTIVE | Noted: 2022-01-01

## 2022-09-16 NOTE — PROGRESS NOTES
Problem: Dyspnea Due to End of Life  Goal: Demonstrate understanding of and ability to manage respiratory symptoms at end of life  Outcome: Progressing Towards Goal     Problem: Hospice Orientation  Goal: Demonstrate understanding of hospice philosophy, plan of care, and home hospice program  Description: The patient/family/caregiver will demonstrate understanding of hospice philosophy, plan of care and the home hospice program as evidenced by participation in meeting the patient's psychosocial, spiritual, medical, and physical needs inclusive of medical supplies/equipment focusing on symptoms.   Outcome: Progressing Towards Goal     Problem: Pain  Goal: *Control of acute pain  Outcome: Progressing Towards Goal

## 2022-09-16 NOTE — PROGRESS NOTES
The purpose of the visit was to do a follow-up on the patient and family. The patient was being visited by her loved ones. As well she was unable to share during the visit. They each shared tearfully how they trust God, and their prayer as that God would see the patient through. They mentioned that they are still holding to their lior. The patient's mother mentioned that she has another daughter who dealing with illness also. They mentioned their lior and trust in God, however they know that the patient (their loved one) has struggled for a while and they want to see at peace. They mentioned not being under any illusion about the patient's prognosis. The talked about how they enjoy the scriptures and how they find value and kaden from the preached word. The  listened empathically, encouraged ongoing family supportive presence, extended prayer, and provided the ministry of presence and the comfort of spiritual care. [facility] Bety Santos.    can be reached by calling the  at [facility]  [phone]

## 2022-09-16 NOTE — HOSPICE
[facility]  [phone]    Inpatient Nursing Admission   Patient Name: Shady Lau  YOB: 1965  Age: 55 y.o. Date of Hospice Admission: 9/16/2022  Hospice Attending Elected by Patient: Curtis Blanco MD  Primary Care Physician: Pawan Storey PA-C  Admitting RN: Diane Murphy RN  : Kira Barrett LMSW     Level of Care (GIP/Routine/Respite): GIP  Facility of Care: [facility]  Patient Room: 422/01     HOSPICE SUMMARY   ER Visits/ Hospitalizations in past year: 4  Hospice Diagnosis: Sepsis ([address] [A41.9]  Onset Date of Hospice Diagnosis: 9/16/22  Summary of Disease Progression Leading to Hospice Diagnosis:   52-year-old -American female with multiple myeloma who was on chemotherapy with carfilzomib, daratumumab and dexamethasone. Patient had last carfilzomib on 23 August.  She was found to be anemic and was supposed to get elective blood transfusion today. Patient is now admitted with severe anemia with sudden follow-up hemoglobin, severe thrombocytopenia, sepsis, confusion, disorientation and patient is critically ill.     1) severe anemia: Patient's hemoglobin was 7 yesterday and it went to 4.1. Obviously this is most likely from bleeding. Patient's total bilirubin is 0.3 so there is no evidence of clinically significant hemolysis. This sudden drop of hemoglobin is not usually seen with myeloma alone or even from the treatment. As described above patient has not had any carfilzomib for almost 3 weeks.  -Patient had CT scan of abdomen which did not report any retroperitoneal bleed. 2) severe thrombocytopenia: Unlikely to be from chemotherapy. It is possible that it may be from myeloma itself. We will try to keep platelet count above 20,000 because of concern of possible bleeding. 3) multiple myeloma: Patient had aggressive multiple myeloma and she is on second line chemotherapy with carfilzomib, daratumumab and dexamethasone. May consider getting peripheral blood flow cytometry to see if patient has any evidence of plasma cell leukemia.  -Patient was not able to get any treatment since August 23rd because of her overall condition and low blood count.  -I have reviewed her peripheral smear from today which shows significant left shift with frequent bands and toxic granulations. There are decrease in platelet numbers. No schistocytes or spherocytes were seen. No blast cells were seen. There were occasional abnormal looking plasma cells were seen  4) sepsis: Agree with empiric IV antibiotic coverage. Patient is immunocompromise because of myeloma as well as chemotherapy. Co-Morbidities:   Patient Active Problem List   Diagnosis Code    YANETH (acute kidney injury) ([facility] N17.9    Anemia D64.9    Pancytopenia ([facility]) D61.818    Dehydration E86.0    Multiple myeloma ([facility]) C90.00    UTI (urinary tract infection) N39.0    Septic shock ([facility]) A41.9, R65.21    Cardiomyopathy ([facility] [address] I42.9    Hypercalcemia E83.52    Hyperkalemia E87.5    AMS (altered mental status) R41.82    Sepsis ([facility] [address] A41.9     Diagnoses RELATED to the terminal prognosis: anemia, UTI, Multiple myeloma, cardiomyopathy  Other Diagnoses: AMS    Rationale for a prognosis of life expectancy of 6 months or less if the disease follows its normal course (Disease Specific History):     Sanjuana Huff is a 55 y.o. who was admitted to [facility]. The patient's principle diagnosis of sepsis has resulted in pain, shortness of breath, agitation/restlessness and AMS. Functionally, the patient's Palliative Performance Scale has declined over a period of the past week and is estimated at 10. The patient/family chose comfort measures with the support of Hospice. Objective information that support this patients limited prognosis includes:     EXAM:  CT ABD PELV W CONT     INDICATION: Abdominal tenderness     COMPARISON: CT 8/30/2022.      TECHNIQUE: Helical CT of the abdomen  and pelvis  following the uneventful  intravenous administration of nonionic contrast.  Coronal and sagittal reformats  are performed. CT dose reduction was achieved through use of a standardized  protocol tailored for this examination and automatic exposure control for dose  modulation. FINDINGS:   The visualized lung bases demonstrate patchy atelectasis or scarring, unchanged. The heart size is stable. There is no pericardial or pleural effusion. The liver, spleen, pancreas, and adrenal glands are normal. The gall bladder is  present  without intra- or extra-hepatic biliary dilatation. The kidneys are symmetric without hydronephrosis. There are no dilated bowel loops. The appendix is not visualized. There are no enlarged lymph nodes. There is no free fluid or free air. The  aorta tapers without aneurysm. The urinary bladder is normal.  There is no pelvic mass. The uterus is  surgically absent. There are stable mild superior plate compression deformities at T12 and L1. There is a new acute mild superior endplate compression fracture at S1 with  longitudinal component extending toward the right hemisacrum. There is no  aggressive bony lesion. IMPRESSION  1. Acute mild superior endplate compression fracture at S1 with a longitudinal  component extending toward the right hemisacrum, new since 8/30/2022. Mild  superior plate compression fractures are again noted at T12 and L1.     2. No other acute abnormality in the abdomen or pelvis.   CBC WITH AUTOMATED DIFF     Collection Time: 09/15/22  4:00 AM   Result Value Ref Range     WBC 3.4 (L) 3.6 - 11.0 K/uL     RBC 2.61 (L) 3.80 - 5.20 M/uL     HGB 8.5 (L) 11.5 - 16.0 g/dL     HCT 26.1 (L) 35.0 - 47.0 %     .0 (H) 80.0 - 99.0 FL     MCH 32.6 26.0 - 34.0 PG     MCHC 32.6 30.0 - 36.5 g/dL     RDW 19.8 (H) 11.5 - 14.5 %     PLATELET 31 (LL) 495 - 400 K/uL     MPV 11.5 8.9 - 12.9 FL     NRBC 0.9 (H) 0.0  WBC     ABSOLUTE NRBC 0.03 (H) 0.00 - 0.01 K/uL     NEUTROPHILS 59 32 - 75 %     BAND NEUTROPHILS 1 0 - 6 %     LYMPHOCYTES 29 12 - 49 %     MONOCYTES 6 5 - 13 %     EOSINOPHILS 0 0 - 7 %     BASOPHILS 0 0 - 1 %     OTHER CELL 5 %     IMMATURE GRANULOCYTES 0 %     ABS. NEUTROPHILS 2.0 1.8 - 8.0 K/UL     ABS. LYMPHOCYTES 1.0 0.8 - 3.5 K/UL     ABS. MONOCYTES 0.2 0.0 - 1.0 K/UL     ABS. EOSINOPHILS 0.0 0.0 - 0.4 K/UL     ABS. BASOPHILS 0.0 0.0 - 0.1 K/UL     ABS. IMM. GRANS. 0.0 K/UL     DF Manual       RBC COMMENTS Normocytic, Normochromic     METABOLIC PANEL, COMPREHENSIVE     Collection Time: 09/15/22  4:00 AM   Result Value Ref Range     Sodium 145 136 - 145 mmol/L     Potassium 4.7 3.5 - 5.1 mmol/L     Chloride 109 (H) 97 - 108 mmol/L     CO2 20 (L) 21 - 32 mmol/L     Anion gap 16 (H) 5 - 15 mmol/L     Glucose 146 (H) 65 - 100 mg/dL     BUN 21 (H) 6 - 20 mg/dL     Creatinine 2.06 (H) 0.55 - 1.02 mg/dL     BUN/Creatinine ratio 10 (L) 12 - 20       GFR est AA 30 (L) >60 ml/min/1.73m2     GFR est non-AA 25 (L) >60 ml/min/1.73m2     Calcium 7.2 (L) 8.5 - 10.1 mg/dL     Bilirubin, total 0.8 0.2 - 1.0 mg/dL     AST (SGOT) 173 (H) 15 - 37 U/L     ALT (SGPT) 61 12 - 78 U/L     Alk. phosphatase 95 45 - 117 U/L     Protein, total 10.0 (H) 6.4 - 8.2 g/dL     Albumin 1.6 (L) 3.5 - 5.0 g/dL     Globulin 8.4 (H) 2.0 - 4.0 g/dL     A-G Ratio 0.2 (L) 1.1 - 2.2     C REACTIVE PROTEIN, QT     Collection Time: 09/15/22  4:00 AM   Result Value Ref Range     C-Reactive protein 1.96 (H) 0.00 - 0.60 mg/dL     Patient meets for GIP LOC as evidenced by need for IV medications to manage symptoms that cannot be administered in the home.     Prognosis estimated based on 09/16/22 clinical assessment is:   [] Few to Many Hours  [x] Hours to Days   [] Few to Many Days   [] Days to Weeks   [] Few to Many Weeks   [] Weeks to Months   [] Few to Many Months    ASSESSMENT    Patient self-reports:  []  Yes    [x] No    SYMPTOMS: pain, dyspnea, restlessness, agitation    SIGNS/PHYSICAL FINDINGS: moaning, grimacing, RR 20's, flailing arms    KARNOFSKY: 10    Learning Assessment:  Patient  Is patient willing/able to learn? unable  What is the highest level of education completed? Learning preference (written material, demonstration, visual)? Learning barriers (ESOL, [place], poor vision)? Caregiver  Is caregiver willing to learn care for patient?unable  What is the highest level of education completed? Learning preference (written material, demonstration, visual)? Learning barriers (ESOL, [place], poor vision)? CLINICAL INFORMATION       Visit Vitals  Ht 5' (1.524 m)   Wt 60.2 kg (132 lb 11.5 oz)   BMI 25.92 kg/m²     LAB VALUES    Lab Results   Component Value Date/Time    Protein, total 10.4 (H) 09/16/2022 07:57 AM    Albumin 1.3 (L) 09/16/2022 07:57 AM     Currently this patient has:  [] Supplemental O2 [] Peripheral IV  [] PICC    [] PORT   [] Payne Catheter [] NG Tube   [] PEG Tube [] Ostomy    [x] AICD: Has ICD been deactivated? [] Yes [x] No: pending    PLAN     1. Admit GIP for management of symptoms related to primary diagnosis of sepsis (pain, dyspnea, agitation, AMS)  2. Hydromorphone 1 mg IV every 4 hours and prn  3. Lorazepam 1 mg IV every 4 hours and prn  4. Other comfort meds as needed for fever, secretions, etc.  5. Payne insertion for bladder distention  6. Have AICD deactivated  7. Educate family in EOL care and symptom management  8.  and SW to meet needs of family at bedside    Hospice Team Frequency Orders:  Skilled Nurse - Daily x 7 days /every other day x 7 days with 5 PRN visits for symptom control. MSW - 1 visit for initial assessment/evaluation for family support and need for volunteer services. [place] - 1 visit for initial assessment/evaluation for spiritual support.      ADVANCE CARE PLANNING (Complete in ACP Flow Sheet)   Code Status: DNR  Durable DNR: []  Yes  [x]  No  Code Status Discussed/Confirmed: yes  Preference for Other Life Sustaining Treatment Discussed/Confirmed: yes  Hospitalization Preference:  Family would like EOL care in hospital.  Advance Care Planning 9/14/2022   Patient's Healthcare Decision Maker is: Legal Next of Kin   Confirm Advance Directive None   Patient Would Like to Complete Advance Directive -   Does the patient have other document types Do Not Resuscitate     [facility] Home: Ping Menon ([place])    [address]     1. Discharge Plan: Discharge to LTC should condition stabilize. Family unable to care for patient in home. 2. Patient/Family teaching: Goals of care/EOL process/Symptom management  3. Response to patient/family teaching: Family at bedside verbalized understanding    SOCIAL/EMOTIONAL/SPIRITUAL NEEDS     Spiritual Issues Identified: None. Hospital and hospice chaplains to offer spiritual support. Psych/ Social/ Emotional Issues Identified: Patient , no children. Sister works as LPN at [employer]. Family to remain at bedside daily until 11:00 PM    Caregiver Support:  [x] Provided information on End of Randene Schlatter Dr. Georgeana Short contacted, discharge to hospice order received  Dr. Neel Gutierrez contacted, agrees to serve as attending provider for hospice and provided verbal certification of terminal illness with life expectancy of 6 months or less. Orders for hospice admission, medications and plan of treatment received. Medication reconciliation completed.   MEDS: See medication list below  DME: Per hospital  Supplies: Per hospital  IDT communication to include MD, SN, SW, CH and support team    ALLERGIES AND MEDICATIONS     Allergies: No Known Allergies  Current Facility-Administered Medications   Medication Dose Route Frequency    LORazepam (ATIVAN) injection 1 mg  1 mg IntraVENous Q15MIN PRN    ketorolac (TORADOL) injection 30 mg  30 mg IntraVENous Q8H PRN    glycopyrrolate (ROBINUL) injection 0.2 mg  0.2 mg IntraVENous Q4H PRN    bisacodyL (DULCOLAX) suppository 10 mg  10 mg Rectal DAILY PRN    HYDROmorphone (DILAUDID) injection 1 mg  1 mg IntraVENous Q15MIN PRN    LORazepam (ATIVAN) injection 1 mg  1 mg IntraVENous Q4H    HYDROmorphone (DILAUDID) injection 1 mg  1 mg IntraVENous Q4H

## 2022-09-16 NOTE — PROGRESS NOTES
Pt has order tele monitor. But no box on pt. Supervisor informed. Will send box to station. Pt had sleep apnea last night according to day shift report. Writer talked to the family at the beginning of the shift. Family suggest not to put on CPAP, as the Pt will not cooperative with it and will remove. Bedside shift change report given to RAN Arce (oncoming nurse) by Christiano Zeng RN (offgoing nurse). Report included the following information SBAR, Kardex, Intake/Output, and MAR.

## 2022-09-16 NOTE — PROGRESS NOTES
Physician Progress Note      PATIENT:               Sharon Ocampo  CSN #:                  [id number]  :                       1965  ADMIT DATE:       2022 12:44 AM  DISCH DATE:  RESPONDING  PROVIDER #:        Phylicia Cassidy MD          QUERY TEXT:    Dear Dr. Catherine Booker,    Pt admitted with sepsis, encephalopathy, multiple myeloma with pancytopenia. Pt noted to have hypotension requiring IV vasopressor administration. If possible, please document in the progress notes and discharge summary if you are evaluating and/or treating any of the following: The medical record reflects the following:  Risk Factors: 55 F presnted with pancytopenia, confusion, lethargy; admitted with sepsis, encephalopathy, multiple myeloma with pancytopenia  Clinical Indicators: BPs 87/76, 107/68, 80/53, 95/68, 89/60; patient presented with Hgb 4.1 and platelets 41 - given PRBCs and platelets; patient admitted to ICU and received IV Norepinephrine -9/15  Treatment: labs, ICU admission, ID/HemOnc/GI consults, IV ABT, PRBCs and platelets, IV Norepinephrine, IV Solu-Medrol    Thank you,  LEX MoreiraN, RN, CRCR  Clinical   [email or web address] or contact via [employer]  Options provided:  -- Septic Shock  -- Hypovolemic Shock  -- Hypovolemia without Shock  -- Hypotension without Shock  -- Other - I will add my own diagnosis  -- Disagree - Not applicable / Not valid  -- Disagree - Clinically unable to determine / Unknown  -- Refer to Clinical Documentation Reviewer    PROVIDER RESPONSE TEXT:    This patient has Hypovolemic Shock.     Query created by: Christiano Stafford on 2022 7:44 AM      Electronically signed by:  Phylicia Cassidy MD 2022 10:29 AM

## 2022-09-16 NOTE — H&P
[address] [place] [address]   [facility]  [phone]    Patient Name: Rachel Stone  YOB: 1965    Date of Provider Hospice Visit: 09/16/22    Level of Care:   [x] General Inpatient (GIP)    [] Routine   [] Respite    Current Location of Care: [facility]  [] [facility] [] [facility] [] [facility] [] [facility] [] Hospice [facility], patient referred from:  [] [facility] [] [facility] [] [facility] [] [address] [place] [address] [] Home [] Other:     Date of Original Hospice Admission: 9/16/2022  Hospice Medical Director at time of admission: Dr. Gaye Becerra Diagnosis: Sepsis  Diagnoses RELATED to the terminal prognosis: Multiple myeloma, anemia, UTI, cardiomyopathy, compression fracture  Other Diagnoses: altered mental status     HOSPICE SUMMARY     Rachel Stone is a 55y.o. year old AA female who was admitted to [address] [place] [address]. Admission HPI as follows: This is a 80-year-old black female with history of multiple myeloma undergoing chemotherapy was seen by her oncologist and referred for blood transfusion 1 day prior to admission. Sister took her to the hospital because she was not acting herself she was a little more lethargic and had some difficulty walking very weak. Because of her anemia continued weakness she was transferred to [facility]. In the emergency room she was found to be pancytopenic and has been transfused platelet awaiting packed red cell blood transfusion. She continues to have worsening mental status with profound elevation of lactic acid level. She is getting more lethargic and appears to be in respiratory distress. Information was obtained only from the ER physician and her sister who is at bedside. Patient is single no children and mother and sister makes life and that decision for her.   Sister tells me that they want her not to be resuscitated and they have discussed this in the past.  There was no complaint of fever chills nausea vomiting diarrhea. There is no urinary symptoms. When she saw her oncologist the day prior she did not get a routine chemo because she was lethargic and was anemic. Pt remains altered and unable to safely tolerate PO; and given significant disease burden and poor prognosis family has elected to transition to hospice for comfort care, symptom management, and available support services. The patient's principle diagnosis has resulted in altered mental status, agitation, nonverbal signs of pain, and increased work of breathing. Functionally, the patient's Karnofsky and/or Palliative Performance Scale has declined over a period of weeks and is estimated at 10. The patient is dependent on the following ADLs: all    Objective information that support this patients limited prognosis includes:   EXAM:  CT ABD PELV W CONT     INDICATION: Abdominal tenderness     COMPARISON: CT 8/30/2022. TECHNIQUE: Helical CT of the abdomen  and pelvis  following the uneventful  intravenous administration of nonionic contrast.  Coronal and sagittal reformats  are performed. CT dose reduction was achieved through use of a standardized  protocol tailored for this examination and automatic exposure control for dose  modulation. FINDINGS:   The visualized lung bases demonstrate patchy atelectasis or scarring, unchanged. The heart size is stable. There is no pericardial or pleural effusion. The liver, spleen, pancreas, and adrenal glands are normal. The gall bladder is  present  without intra- or extra-hepatic biliary dilatation. The kidneys are symmetric without hydronephrosis. There are no dilated bowel loops. The appendix is not visualized. There are no enlarged lymph nodes. There is no free fluid or free air. The  aorta tapers without aneurysm. The urinary bladder is normal.  There is no pelvic mass. The uterus is  surgically absent.      There are stable mild superior plate compression deformities at T12 and L1. There is a new acute mild superior endplate compression fracture at S1 with  longitudinal component extending toward the right hemisacrum. There is no  aggressive bony lesion. IMPRESSION  1. Acute mild superior endplate compression fracture at S1 with a longitudinal  component extending toward the right hemisacrum, new since 8/30/2022. Mild  superior plate compression fractures are again noted at T12 and L1.     2. No other acute abnormality in the abdomen or pelvis. CBC WITH AUTOMATED DIFF     Collection Time: 09/15/22  4:00 AM   Result Value Ref Range     WBC 3.4 (L) 3.6 - 11.0 K/uL     RBC 2.61 (L) 3.80 - 5.20 M/uL     HGB 8.5 (L) 11.5 - 16.0 g/dL     HCT 26.1 (L) 35.0 - 47.0 %     .0 (H) 80.0 - 99.0 FL     MCH 32.6 26.0 - 34.0 PG     MCHC 32.6 30.0 - 36.5 g/dL     RDW 19.8 (H) 11.5 - 14.5 %     PLATELET 31 (LL) 429 - 400 K/uL     MPV 11.5 8.9 - 12.9 FL     NRBC 0.9 (H) 0.0  WBC     ABSOLUTE NRBC 0.03 (H) 0.00 - 0.01 K/uL     NEUTROPHILS 59 32 - 75 %     BAND NEUTROPHILS 1 0 - 6 %     LYMPHOCYTES 29 12 - 49 %     MONOCYTES 6 5 - 13 %     EOSINOPHILS 0 0 - 7 %     BASOPHILS 0 0 - 1 %     OTHER CELL 5 %     IMMATURE GRANULOCYTES 0 %     ABS. NEUTROPHILS 2.0 1.8 - 8.0 K/UL     ABS. LYMPHOCYTES 1.0 0.8 - 3.5 K/UL     ABS. MONOCYTES 0.2 0.0 - 1.0 K/UL     ABS. EOSINOPHILS 0.0 0.0 - 0.4 K/UL     ABS. BASOPHILS 0.0 0.0 - 0.1 K/UL     ABS. IMM.  GRANS. 0.0 K/UL     DF Manual       RBC COMMENTS Normocytic, Normochromic     METABOLIC PANEL, COMPREHENSIVE     Collection Time: 09/15/22  4:00 AM   Result Value Ref Range     Sodium 145 136 - 145 mmol/L     Potassium 4.7 3.5 - 5.1 mmol/L     Chloride 109 (H) 97 - 108 mmol/L     CO2 20 (L) 21 - 32 mmol/L     Anion gap 16 (H) 5 - 15 mmol/L     Glucose 146 (H) 65 - 100 mg/dL     BUN 21 (H) 6 - 20 mg/dL     Creatinine 2.06 (H) 0.55 - 1.02 mg/dL     BUN/Creatinine ratio 10 (L) 12 - 20       GFR est AA 30 (L) >60 ml/min/1.73m2 GFR est non-AA 25 (L) >60 ml/min/1.73m2     Calcium 7.2 (L) 8.5 - 10.1 mg/dL     Bilirubin, total 0.8 0.2 - 1.0 mg/dL     AST (SGOT) 173 (H) 15 - 37 U/L     ALT (SGPT) 61 12 - 78 U/L     Alk. phosphatase 95 45 - 117 U/L     Protein, total 10.0 (H) 6.4 - 8.2 g/dL     Albumin 1.6 (L) 3.5 - 5.0 g/dL     Globulin 8.4 (H) 2.0 - 4.0 g/dL     A-G Ratio 0.2 (L) 1.1 - 2.2     C REACTIVE PROTEIN, QT     Collection Time: 09/15/22  4:00 AM   Result Value Ref Range     C-Reactive protein 1.96 (H) 0.00 - 0.60         The patient/family chose comfort measures with the support of Hospice. HOSPICE DIAGNOSES   Active Symptoms:  1. Agitation  2. Non verbal signs of pain  3. Altered mental status  4. Increased work of breathing  5.  Hospice care     PLAN   Admit to [facility] level of care as pt is altered and unable to safely tolerate PO/SL medications; and requires frequent nursing assessment, and administration and titration of parenteral to manage increased symptom burden at this time; at high risk for rapid decline  Dilaudid 1 mg IV every 4 hours with prn dosing available every 15 minutes for breakthrough pain or air hunger  Lorazepam 1 mg IV every 4 hours with prn dosing available every 15 minutes for breakthrough restlessness/agitation  Robinul 0.2 mg IV every 4 hours as needed for increased oropharyngeal secretions  All other symptom management medications as needed  Jorgensen catheter to maintain bladder decompression; jorgensen care per facility protocol  Large family presence at bedside; reviewed hospice philosophy and goals of care with emphasis on comfort and safety; discussed symptom management medications and indications and rationale for use; all questions answered; all present verbalized understanding and agreeable to hospice admission   and SW to support family needs  Disposition: anticipate rapid decline and death in hospital; but if becomes more stable will need LTC placement  Hospice Plan of care was reviewed in detail and agree with current plan of care    Prognosis estimated based on 09/16/22 clinical assessment is:   [x] Hours to Days    [] Days to Weeks    [] Other:    Communicated plan of care with: Hospice Case Manager; Hospice IDT; Care Team     GOALS OF CARE     Patient/Medical POA stated Goal of Care: Comfort care and symptom management    [x] I have reviewed and/or updated ACP information in the Advance Care Planning Navigator. This information is available in the [address] link in the patient's chart header. Primary Decision [facility] (Health Care Agent):   Primary Decision Maker: Kvng Langford - Mother - [phone]    Secondary Decision Maker: Hernandez Humphrey - Sister - [phone]    Resuscitation Status: DNR  If DNR is there a Durable DNR on file? : [x] Yes [] No (If no, complete Durable DNR)    HISTORY     History obtained from: chart review; discussion with liaison, discussion with family    CHIEF COMPLAINT: unable to obtain  The patient is:   [] Verbal  [x] Nonverbal  [] Unresponsive    HPI/SUBJECTIVE:  Large family presence at bedside. Pt observed in hospital bed. Eyes open but not tracking movement. Nonverbal and not following commands. Persistent movement of all extremities; appears restless and agitated; with soft moaning and nonverbal signs of pain.         REVIEW OF SYSTEMS     The following systems were: [] reviewed  [x] unable to be reviewed    Positive ROS include:  Constitutional: fatigue, weakness, in pain, short of breath  Ears/nose/mouth/throat: increased airway secretions  Respiratory:shortness of breath, wheezing  Gastrointestinal:poor appetite, nausea, vomiting, abdominal pain, constipation, diarrhea  Musculoskeletal:pain, deformities, swelling legs  Neurologic:confusion, hallucinations, weakness  Psychiatric:anxiety, feeling depressed, poor sleep  Endocrine:     Adult Non-Verbal Pain Assessment Score: 4    Face  [] 0   No particular expression or smile  [x] 1   Occasional grimace, tearing, frowning, wrinkled forehead  [] 2   Frequent grimace, tearing, frowning, wrinkled forehead    Activity (movement)  [] 0   Lying quietly, normal position  [] 1   Seeking attention through movement or slow, cautious movement  [x] 2   Restless, excessive activity and/or withdrawal reflexes    Guarding  [] 0   Lying quietly, no positioning of hands over areas of body  [x] 1   Splinting areas of the body, tense  [] 2   Rigid, stiff    Physiology (vital signs)  [x] 0   Stable vital signs  [] 1   Change in any of the following: SBP > 20mm Hg; HR > 20/minute  [] 2   Change in any of the following: SBP > 30mm Hg; HR > 25/minute    Respiratory  [x] 0   Baseline RR/SpO2, compliant with ventilator  [] 1   RR > 10 above baseline, or 5% drop SpO2, mild asynchrony with ventilator  [] 2   RR > 20 above baseline, or 10% drop SpO2, asynchrony with ventilator     FUNCTIONAL ASSESSMENT     Palliative Performance Scale (PPS): 10       PSYCHOSOCIAL/SPIRITUAL ASSESSMENT     Active Problems:    Sepsis ([facility] (9/16/2022)      Past Medical History:   Diagnosis Date    AICD (automatic cardioverter/defibrillator) present 12/2020    Anemia     Cardiomyopathy ([facility] 08/2020    Diabetes ([facility]     GERD (gastroesophageal reflux disease)     Heart failure ([facility])     Hypertension     Multiple myeloma ([facility]     Sleep apnea     cpap    Thrombocytopenia ([facility]       Past Surgical History:   Procedure Laterality Date    COLONOSCOPY N/A 11/17/2021    COLONOSCOPY ( T I V A) performed by Roxann Cowden, MD at [facility] ENDOSCOPY    ENDOSCOPY VISIT-OUTPATIENT  10/08/2021    HX COLONOSCOPY      HX HEART CATHETERIZATION      x 2     HX HERNIA REPAIR      HX HYSTERECTOMY      HX IMPLANTABLE CARDIOVERTER DEFIBRILLATOR      HX OOPHORECTOMY      HX PACEMAKER      pacemaker, defib    HX SVT ABLATION        Social History     Tobacco Use    Smoking status: Never    Smokeless tobacco: Never   Substance Use Topics    Alcohol use: Never     Family History   Problem Relation Age of Onset    Breast Cancer Sister     Diabetes Mother     Hypertension Mother     Elevated Lipids Mother     Heart Surgery Father       No Known Allergies   Current Facility-Administered Medications   Medication Dose Route Frequency    LORazepam (ATIVAN) injection 1 mg  1 mg IntraVENous Q15MIN PRN    ketorolac (TORADOL) injection 30 mg  30 mg IntraVENous Q8H PRN    glycopyrrolate (ROBINUL) injection 0.2 mg  0.2 mg IntraVENous Q4H PRN    bisacodyL (DULCOLAX) suppository 10 mg  10 mg Rectal DAILY PRN    HYDROmorphone (DILAUDID) injection 1 mg  1 mg IntraVENous Q15MIN PRN    LORazepam (ATIVAN) injection 1 mg  1 mg IntraVENous Q4H    HYDROmorphone (DILAUDID) injection 1 mg  1 mg IntraVENous Q4H        PHYSICAL EXAM     Wt Readings from Last 3 Encounters:   09/16/22 60.2 kg (132 lb 11.5 oz)   09/15/22 60.2 kg (132 lb 11.5 oz)   09/13/22 61.1 kg (134 lb 11.2 oz)       Visit Vitals  Ht 5' (1.524 m)   Wt 60.2 kg (132 lb 11.5 oz)   BMI 25.92 kg/m²       Supplemental O2  [] Yes  [x] NO  Last bowel movement: unknown    Currently this patient has:  [x] Peripheral IV [] PICC  [] PORT [] ICD    [] Payne Catheter [] NG Tube   [] PEG Tube    [] Rectal Tube [] Drain  [x] Other: ICD left chest wall    Constitutional: ill-appearing  Eyes: open but not tracking movement; anicteric; no injection; R eyelid swelling  ENMT: dry oral mucosa  Cardiovascular: RRR  Respiratory: breath sounds clear/diminished  Gastrointestinal: +bowel sounds all quadrants; soft  Musculoskeletal: no contractures or gross deformities  Skin: feet cool to touch  Neurologic: altered; nonverbal; not following commands  Psychiatric: appears restless and agitated  Other:     Pertinent Lab and or Imaging Tests:  Lab Results   Component Value Date/Time    Sodium 143 09/16/2022 07:57 AM    Potassium 5.0 09/16/2022 07:57 AM    Chloride 106 09/16/2022 07:57 AM    CO2 18 (L) 09/16/2022 07:57 AM    Anion gap 19 (H) 09/16/2022 07:57 AM    Glucose 85 09/16/2022 07:57 AM BUN 25 (H) 09/16/2022 07:57 AM    Creatinine 2.23 (H) 09/16/2022 07:57 AM    BUN/Creatinine ratio 11 (L) 09/16/2022 07:57 AM    GFR est AA 28 (L) 09/16/2022 07:57 AM    GFR est non-AA 23 (L) 09/16/2022 07:57 AM    Calcium 7.2 (L) 09/16/2022 07:57 AM     Lab Results   Component Value Date/Time    Protein, total 10.4 (H) 09/16/2022 07:57 AM    Albumin 1.3 (L) 09/16/2022 07:57 AM           Gelacio BOWEN-RAGHU

## 2022-09-16 NOTE — PROGRESS NOTES
Provider at bedside and gave a verbal order to discontinue telemetry monitoring.  Order confirmed by readback, order discontinued in Epic, primary RN and monitor room notified

## 2022-09-16 NOTE — PROGRESS NOTES
CM present with physcian and family in room. Plans are for hospice. Referral has been sent to [facility] AND [facility] for information session and to discuss with family. Also to evaluate of GIP appropriate.

## 2022-09-16 NOTE — PROGRESS NOTES
[facility]  [phone]    Social Work Admission Note  Patient Name: Gege Perales  YOB: 1965  Age: 55 y.o. Date of Visit: 09/16/22  Facility of Care: [facility]  Patient Room: 422/01     Hospice Attending: Herson Devi MD  Hospice Diagnosis: Sepsis ([address] [A41.9]    Level of Care:    [x]  GIP    []  Respite   []  Routine    Consents/NCD Documentation:     Consents Reviewed:   []  Yes  [x]  No, completed by other hospice staff member. Person Reviewed/Signed with:  []  Patient   []  Pts NOK/MPOA  Name:     Right to NCD Reviewed:   []  Yes  []  No, completed by other hospice staff member. NCD Requested:   []  Yes  []  No    Admission Nurse/Intake Notified NCD was requested:  []  Yes  []  No  []  Not requested    Planned Start of Care Date:     Hospice Witness Representative:    NARRATIVE   Pt is a 44-year-old female admitted to inpatient hospice on 9/16/2022 with a hospice diagnosis of Sepsis. Pt was admitted to hospital on 9/14/2022 for fatigue and lethargy. Prior to hospitalization, pt lived with mother Jennifer Murray. Per Kaylee, pt planned to live with Jennifer Murray until pt was strong enough to live on her own. Several family and friends at bedside. At time of visit, pt was resting in bed, appearing to be breathing quickly. LMSW introduced role of MSW to Jennifer Murray and pt's friend. Kaylee reflected that pt's decline was very quick but she does not want pt to suffer. Plan to remove pt's mittens once medicated. LMSW explained hospice's interventions for comfort and suggested family talk with pt, even if pt cannot respond. Kaylee reported pt owns a home. SW will provide Kaylee with information for [employer], in case they need legal assistance. No other needs at this time. LMSW provided emotional support and supportive counseling in processing pt's prognosis. Pt is DNR code status. [facility] to serve. LMSW will continue to provide support.     ADVANCE CARE PLANNING    Advance Directive:  []  Yes  [x]  No   []  Would like to complete  Primary MPOA:  Secondary MPOA:   LNOK: mother Mcdonald  Code Status: DNR  Durable DNR: _ Yes  X_ No  Advance Care Planning 2022   Patient's Healthcare Decision Maker is: Legal Next of Kin   Confirm Advance Directive None   Patient Would Like to Complete Advance Directive -   Does the patient have other document types Do Not Resuscitate       Relationship Status:  [x]  Single     []        []      []  Domestic Partner     []  /  []  Common Law  []    []  Unknown    If in a relationship, name of partner/spouse:  Duration of relationship:    Religious/Spirituality: Gnosticist  [x]  Active In Religious/Spirituality   []  Not Active   []  N/A  Notes:     Home: [facility] in [facility]  Resources Provided:  []  [facility]  []  Information on applying for disability  []  FMLA Paperwork  []  Letters Requested by [facility]   []  Jane Rodriguez  []  Final Arrangements Resources   []  Outside counseling services (individual or group therapy)  []  Grief resources   []  Hans Edwards  []  [facility]   []  [facility]   []  [facility] [employer] [facility]   []  Referral Sent to [employer]   []  Referral Sent to [address] [place] [address]   []  Referral Sent to [facility]  []  Other  Social Work Initial Assessment     Sex:  female    Pronoun Preference:   []  He/Him/His   [x]  She/Her/Hers   []  They/Them/Theirs   []   Patient Name   []   Decline to Answer  []   Unknown  []   Other   Notes:     Race/Ethnicity: (teagan all that apply)  []  [place] [facility] or [place] Native  []    [x]  Black or [place] American  []   or   []  [facility] or [facility]  []  White  []  Unknown  []  Other     Inpatient Financial Agreement Completed:   [x]  Yes  []  No    Insurance:   []  Medicare   []  Medicaid     [x]  [employer]    []  Self-Pay/Uninsured   Notes:      Has pt applied for FAP? []  Needs to Apply  []  Application Completed and Submitted   []  Approved/ Expiration Date:   [x]  N/A  Notes:     Has pt applied for Medicaid? []  Needs to Apply  []  Application Completed and Submitted/Application Number:   [x]  N/A  Notes:     Has a long term care screening ([facility]) been requested? []  Requested   []  Not Requested, Needs follow up  []  Completed  [x]  N/A  Notes:     Does the pt have a long term care insurance policy? []  Yes  [x]  No  []  Yes, Needing assistance with paperwork  Notes:      Service:    []  Yes   [x]  No       []  Unknown    Appropriate for Pinning Ceremony:   []  Yes      [x]  No    Is patient using VA benefits? []  Yes      [x]  No  []  Needs assistance with accessing benefits  Notes:       Work History:   [x]  Full-Time/Part-Time  []  Retired   []  Other  Notes:     Primary Language: English  []   Needed  []   utilized during visit  []   Waived/ form completed    Ability to express thoughts/needs/feelings  []  Expressed thoughts/feelings/needs without difficulty  []  Requires extra time and cuing  []  Speech limited single words  []  Uses only gestures (eye, blinking eye or head movement/pointing)  []  Unable to express thoughts/feelings/needs (speech unintelligible or inappropriate)  [x]  Unresponsive  Notes:      Mental Status:  []  Alert-oriented to:     []  Person     []  Place     []  Time  []  Comatose-responds to:    []   Verbal stimuli    []  Tactile stimuli    []  Painful stimuli  []  Forgetful  []  Disoriented/Confused  []  Lethargic  [x]  Agitated  [x]  Unresponsive  []  Other (specify):    Notes:      Patients description of Illness/Current Health Status:    [x]  Patient unable to discuss  []  Patient unwilling to discuss  []  (Specify)        Knowledge/Understanding of Disease Process  Patient:    []  Demonstrates knowledge/understanding of disease process   []  Demonstrates knowledge/understanding of treatment plan   []  Demonstrates knowledge/understanding of prognosis   []  Demonstrates acceptance of prognosis   []  Demonstrates knowledge/understanding of resuscitation status   [x]  Other (specify) unresponsive  Caregiver:   [x]  Demonstrates knowledge/understanding of disease process   [x]  Demonstrates knowledge/understanding of treatment plan   [x]  Demonstrates knowledge/understanding of prognosis   [x]  Demonstrates acceptance of prognosis   [x]  Demonstrates knowledge/understanding of resuscitation status   []  Other (specify)  Notes:      Patients living arrangement/care setting:  Use the PRIOR COLUMN when the PATIENTS current health status necessitated a change in his/her primary residence. Prior Current Response              [x]             []    Patients own home/residence              []             []    Home of family member/friend              []             []    Boarding home/Group Home              []             []    Assisted living facility/custodial center              []             []    Hospital/Acute care facility              []             []    Skilled nursing facility              []             []    Long term care facility/Nursing home              []             [x]    Hospice in Patient      Primary Caregiver:  []  No Primary Caregiver  Name of Primary Caregiver: [email or web address]  Primary Caregiver Phone Number: [phone]  Relationship or Primary Caregiver:    []  Spouse/Significant other       []  Child        []  Step child       []  Sibling   [x]  Parent   []  Friend/Neighbor   []  Community/Denominational Volunteer   []  Paid help   []  Other (specify):___________  Notes:       Family members/Significant others:  Name: Amrik Tyler  Relationship: sister  Phone Number: [phone]  Actively involved in care? [x]  Yes  []  No    Name:  Relationship:  Phone Number:  Actively involved in care?   []  Yes  []  No    Social support systems: (select ONE best description)  [x]  Excellent social support system which includes three or more family members or friends  []  Good social support system which includes two or less members or friends  []  [address] support which includes one family member or friend  []  Poor social support; no family members or friends; basically ALONE  Notes:      Emotional Status: (teagan all that apply)    Patient Caregiver Response                 []                [x]    Mood/Affect stable and appropriate                   []                []    Angry                 []                []    Anxious                 []                []    Apprehensive                 []                []    Avoidant                 []                []    Clinging                 []                []    Depressed                 []                []    Distraught                 []                []    Fearful                 []                []    Flat Affect                 []                []    Helpless                 []                []    Hostile                 []                []    Impulsive                 []                []    Irritable                 []                []    Labile                 []                []    Manic                 []                []    Restlessness                 []                []    Sad                 []                []    Strain/Stress                 []                []    Suspicious                 []                [x]     Tearful                 []                 []     Withdrawn          Notes:     Coping Skills (strengths/weakness):    Patient: Coping Skills (strength/weakness): good familial support   Family/caregiver (strength/weakness): good familial support, sudden decline, young age     Abie of care upon discharge (teagan all that apply):     []  No burden evident   []  Family must administer medications   []  Illness causing financial strain   [x]  Family/Support feels overwhelmed   []  Family/Support sleep disturbed with patients care   []  Patients care causes extra physical stress  of death  []  Illness causes changes in family lifestyle  []  Illness impacting family/support employment  []  Family experiencing increased time demands  []  Patients behavior endangers family  []  Denial of patients illness  []  Concern over outcome of illness/fear  []  Patients behavior embarrassing to family   Notes:      Risk Factors: (teagan all that apply):    [x]  No burden evident   []  Alcohol abuse  []  Financial resources inadequate to meet basic needs (food/house/etc)  []  Financial resources inadequate to meet health care needs (supplies/equipment/medications)  []  Food/nutrition resources inadequate  []  Home environment unsafe/inadequate for home care  []  Homicidal risk  []  Lives alone or without concerned relatives  []  Multiple medications/complex schedule  []  Physical limitations increase likelihood of falls  []  Plan of care/treatments complicated  []  Substance use/abuse  []  Suicidal risk  []  Visual impairment threatens safety/ability to perform self-care  []  Other (specify):     Abuse/Neglect (actual/potential risks):  [x]  No signs of abuse/neglect  []  History of abuse/neglect                 []  Physical          []  Sexual  []  History of domestic violence  []  Lacks adequate physical care  []  Lacks emotional nurturing/support  []  Lacks appropriate stimulation/cognitive experiences  []  Left alone inappropriately  []  Lacks necessary supervision  []  Inadequate or delayed medical care  []  Unsafe environment (i.e guns/drug use/history of violence in the home/etc.)  []  Bruising or other physical signs of injury present  []  Refer to child/adult protective services  []  Other (specify):   Notes:      Current Sources of Stress (in Addition to Current Illness):   [x]  None reported  []  Bills/Debt    []  Career/Job change    []   (short term)    []   (long term)    []  Death of a child (recent)    [] Death of a parent (recent)   []  Death of a spouse (recent)   []  Employment status changed   []  Family discord    []  Financial loss/Inadequate inther (specify):come  []  Job loss  []  Legal issues unresolved  []  Lifestyle change  []  Marital discord  []  Marriage within the last year  []  Paperwork (insurance/legal/etc) overwhelming  []  Separation/Divorce  []  Other (specify):  Notes:       Interventions/Plan of Care   [x]  MSW will assess social and emotional factors related to coping with end of life issues  []  MSW will provide community resource planning/referral   []  MSW to assist with relocation to different care setting if/when symptoms stabilize  [x]  Pt/Pts family will receive emotional support. []  Pt/Pts family will share the details surrounding the pts disease progression  []  Pt/Pts Family will show expression of grief by participating in life review. []  Pt/Pts Family will be educated and able to verbalize understanding of mental, emotional, and physical symptoms of grief. []  Pt/Pts Family will be educated and able to identify skills and social support to help cope with grief. []  Pts family will receive support for grief with emphasis on developmentally appropriate language.   [] Other:   Notes:       Discharge Planning   []  Home with family member    []  Return back to nursing home/facility  [x]  Needs assistance with placement/paid caregivers  []  Short Stay under routine level of care at [facility]   []  Other  Notes:     MSW Assessment Completed by: Vazquez Denson LMSW  09/16/22    Time In: 1:20 pm       Time Out: 1:50 pm

## 2022-09-16 NOTE — HOSPICE
[facility]  [phone]     Patient Name: Francisca Peñaloza  YOB: 1965  Age: 55 y.o. [facility] RN Note:  Spoke with [employer] rep Artist Eleuterio ([phone]) who is unable to deactivate CRT defibrillator until Monday. Please call him first thing Mon A.M. so he can deactivate. In the meantime please place magnet over device. Please call with questions/concerns.     Shruti Martin RN  Clinical Liaison  [phone]

## 2022-09-16 NOTE — PROGRESS NOTES
Progress Note    Patient: Rachel Stone MRN: [id number]  SSN: [id number]    YOB: 1965  Age: 55 y.o. Sex: female      Admit Date: 9/14/2022    LOS: 2 days     Subjective:   Patient with multiple myeloma, followed for suspected sepsis with possible UTI. Blood and urine cultures pending. Currently on Meropenem. Afebrile with leukopenia and elevated procal and CRP. She has been transferred out of the ICU. Objective:     Vitals:    09/15/22 1839 09/15/22 1933 09/16/22 0755 09/16/22 1123   BP: 103/66 99/63 97/62    Pulse: (!) 106 (!) 113 61    Resp: 24 22 24    Temp: 98.1 °F (36.7 °C) 98.4 °F (36.9 °C) 98.6 °F (37 °C)    SpO2:   96%    Weight:       Height:    5' (1.524 m)        Intake and Output:  Current Shift: No intake/output data recorded. Last three shifts: 09/14 1901 - 09/16 0700  In: 1773.1 [P.O.:240; I.V.:1533.1]  Out: 250 [Urine:250]    Physical Exam:   Vitals and nursing note reviewed. Exam conducted with a chaperone present (?Sisters). Constitutional:       General: She is not in acute distress. Appearance: She is ill-appearing. HENT:      Head: Normocephalic and atraumatic. Right Ear: External ear normal.      Left Ear: External ear normal.      Nose: Nose normal.      Mouth/Throat:      Mouth: Mucous membranes are dry. Eyes:      Pupils: Pupils are equal, round, and reactive to light. Comments: Right eyelid swelling   Cardiovascular:      Rate and Rhythm: Normal rate and regular rhythm. Heart sounds: No murmur heard. Pulmonary:      Effort: Pulmonary effort is normal.      Breath sounds: Normal breath sounds. Abdominal:      General: Bowel sounds are normal. There is no distension. Palpations: Abdomen is soft. Tenderness: There is no abdominal tenderness. Genitourinary:     Comments: No urinary  catheter  Musculoskeletal:      Cervical back: Neck supple. Right lower leg: No edema. Left lower leg: No edema.    Skin: Findings: No rash. Neurological:      General: No focal deficit present. Mental Status: She is disoriented. Psychiatric:      Comments: Unable to assess    Lab/Data Review:     WBC 4,900  PLT 17,000    CRP 1.75 <1.96  Procal 2.84 <6.82      Blood cultures (9/14) coagulase negative Staphylococcus species  Urine culture (9/14) No growth FINAL    Assessment:     Active Problems:    Anemia (12/23/2021)      AMS (altered mental status) (9/14/2022)  Suspected sepsis with hypothermia, leukocytosis  Presumptive UTI with pyuria and bacteriuria, urine culture negative,  Day #3 IV Meropenem  Multiple myeloma on chemotherapy  Leukopenia but not neutropenic  Severe anemia  Severe thrombocytopenia  Compression fracture S1  Altered mental status with elevated ammonia level    Comment:  Given her abnormal urinalysis I think that UTI is a tenable diagnosis despite negative urine culture and given decreasing CRP and procal  on treatment,  I would favor continuing treatment. Plan:   1. Continue IV Meropenem (concerned about use of Zosyn and thrombocytopenia)  2. Follow-up blood cultures  3.  In am, repeat CBC, procal and CRP       Signed By: Bhumika Palomino MD     September 16, 2022

## 2022-09-16 NOTE — PROGRESS NOTES
Comprehensive Nutrition Assessment    Type and Reason for Visit: (P) Positive nutrition screen (MST4)    Nutrition Recommendations/Plan:   Continue current diet  Ensure HP 3x/day  Monitor and record PO intakes, supplement acceptance, and Bms in I/Os     Malnutrition Assessment:  Malnutrition Status:  Severe malnutrition (09/16/22 1122)    Context:  Acute illness     Findings of the 6 clinical characteristics of malnutrition:   Energy Intake:  50% or less of est energy requirements for 5 or more days  Weight Loss:  Greater than 7.5% over 3 months     Body Fat Loss:  Unable to assess,     Muscle Mass Loss:  Unable to assess,    Fluid Accumulation:  No significant fluid accumulation,     Strength:  Not performed     Nutrition Assessment:    (P) Admitted for anemia, +weakness, fatigue. S/p PRBCs. Significant weight loss, poor appetite/intake, <25% of meals. Per MD note, family does not want \"artificial nutrition\", potential for hospice/comfort care. Plan to add ONS. Labs: Na 143, K 5.0, BUN 25, Creat 2.23, Gluc 85, Alb 1.3. Meds: reviewed    Nutrition Related Findings:    (P) NFPE deferred. No n/v, d/c, or problems chewing/swallowing. No edema. BM PTA. Wound Type: (P) None    Current Nutrition Intake & Therapies:  Average Meal Intake: (P) 1-25%  Average Supplement Intake: (P) None ordered  ADULT DIET Dysphagia - Pureed; 3 carb choices (45 gm/meal); Mildly Thick (Nectar)    Anthropometric Measures:  Height: (P) 5' (152.4 cm)  Ideal Body Weight (IBW): (P) 100 lbs ((P) 45 kg)  Current Body Wt:  (P) 60.2 kg (132 lb 11.5 oz), (P) 132.7 % IBW. (P) Bed scale  Current BMI (kg/m2): (P) 25.9  Usual Body Weight: (P) 77.5 kg (170 lb 13.7 oz)  % Weight Change (Calculated): (P) -22.3  Weight Adjustment: (P) No adjustment  BMI Category: (P) Overweight (BMI 25.0-29. 9)    Estimated Daily Nutrient Needs:  Energy Requirements Based On: (P) Kcal/kg  Weight Used for Energy Requirements: (P) Current  Energy (kcal/day): (P) 1806kcal (30kcal/kg)  Weight Used for Protein Requirements: (P) Current  Protein (g/day): (P) 90g (1.5g/kg)  Method Used for Fluid Requirements: (P) 1 ml/kcal  Fluid (ml/day): (P) 1806mL    Nutrition Diagnosis:   (P) Severe malnutrition, In context of acute illness or injury related to (P) catabolic illness, inadequate protein-energy intake as evidenced by (P) poor intake prior to admission, intake 0-25%, weight loss 7.5% in 3 months    Nutrition Interventions:   Food and/or Nutrient Delivery: (P) Continue current diet, Start oral nutrition supplement  Nutrition Education/Counseling: (P) Education not indicated  Coordination of Nutrition Care: (P) Continue to monitor while inpatient    Goals:  Goals: (P) Meet at least 75% of estimated needs, within 7 days    Nutrition Monitoring and Evaluation:   Behavioral-Environmental Outcomes: (P) None identified  Food/Nutrient Intake Outcomes: (P) Diet advancement/tolerance, Food and nutrient intake, Supplement intake  Physical Signs/Symptoms Outcomes: (P) Meal time behavior, Weight    Discharge Planning:    (P) Too soon to determine    Shine Velásquez RD  Contact: [phone]

## 2022-09-16 NOTE — PROGRESS NOTES
PROGRESS NOTE      Subjective: Appears restless. Uneventful night. Mittens placed to prevent dislodgment of tubes. In no distress. Poor appetite.      Visit Vitals  BP 97/62   Pulse 61   Temp 98.6 °F (37 °C)   Resp 24   Ht 5' (1.524 m)   Wt 60.2 kg (132 lb 11.5 oz)   SpO2 96%   Breastfeeding No   BMI 25.92 kg/m²       Current Facility-Administered Medications:     sodium chloride (NS) flush 5-10 mL, 5-10 mL, IntraVENous, PRN, Dequan Desir MD    sodium chloride (NS) flush 5-40 mL, 5-40 mL, IntraVENous, Q8H, Naomi Canseco MD, 10 mL at 09/16/22 0654    sodium chloride (NS) flush 5-40 mL, 5-40 mL, IntraVENous, PRN, Phil ALMANZAR MD    acetaminophen (TYLENOL) tablet 650 mg, 650 mg, Oral, Q6H PRN, 650 mg at 09/15/22 2217 **OR** acetaminophen (TYLENOL) suppository 650 mg, 650 mg, Rectal, Q6H PRN, Naomi Downing MD    polyethylene glycol (MIRALAX) packet 17 g, 17 g, Oral, DAILY PRN, Naomi Downing MD    ondansetron (ZOFRAN ODT) tablet 4 mg, 4 mg, Oral, Q8H PRN **OR** ondansetron (ZOFRAN) injection 4 mg, 4 mg, IntraVENous, Q6H PRN, Naomi Downing MD    sodium bicarbonate (8.4%) 150 mEq in 0.45% sodium chloride 1,000 mL infusion, , IntraVENous, CONTINUOUS, Naomi Downing MD, Last Rate: 84 mL/hr at 09/15/22 1843, Rate Verify at 09/15/22 1843    0.9% sodium chloride infusion 250 mL, 250 mL, IntraVENous, PRN, Naomi Downing MD    meropenem (MERREM) 1 g in 0.9% sodium chloride (MBP/ADV) 50 mL MBP, 1 g, IntraVENous, Q12H, Jimmy Arnold MD, Last Rate: 100 mL/hr at 09/16/22 0157, 1 g at 09/16/22 0157    pantoprazole (PROTONIX) tablet 40 mg, 40 mg, Oral, ACB, Naomi Canseco MD, 40 mg at 09/15/22 1011  Recent Results (from the past 24 hour(s))   METABOLIC PANEL, COMPREHENSIVE    Collection Time: 09/16/22  7:57 AM   Result Value Ref Range    Sodium 143 136 - 145 mmol/L    Potassium 5.0 3.5 - 5.1 mmol/L    Chloride 106 97 - 108 mmol/L    CO2 18 (L) 21 - 32 mmol/L Anion gap 19 (H) 5 - 15 mmol/L    Glucose 85 65 - 100 mg/dL    BUN 25 (H) 6 - 20 mg/dL    Creatinine 2.23 (H) 0.55 - 1.02 mg/dL    BUN/Creatinine ratio 11 (L) 12 - 20      GFR est AA 28 (L) >60 ml/min/1.73m2    GFR est non-AA 23 (L) >60 ml/min/1.73m2    Calcium 7.2 (L) 8.5 - 10.1 mg/dL    Bilirubin, total 1.0 0.2 - 1.0 mg/dL    AST (SGOT) 302 (H) 15 - 37 U/L    ALT (SGPT) 92 (H) 12 - 78 U/L    Alk. phosphatase 98 45 - 117 U/L    Protein, total 10.4 (H) 6.4 - 8.2 g/dL    Albumin 1.3 (L) 3.5 - 5.0 g/dL    Globulin 9.1 (H) 2.0 - 4.0 g/dL    A-G Ratio 0.1 (L) 1.1 - 2.2     C REACTIVE PROTEIN, QT    Collection Time: 09/16/22  7:57 AM   Result Value Ref Range    C-Reactive protein 1.75 (H) 0.00 - 0.60 mg/dL     XR CHEST PORT   Final Result   Low lung volumes with minimal bibasilar atelectasis. No other acute   process. CT ABD PELV W CONT   Final Result   1. Acute mild superior endplate compression fracture at S1 with a longitudinal   component extending toward the right hemisacrum, new since 8/30/2022. Mild   superior plate compression fractures are again noted at T12 and L1.      2. No other acute abnormality in the abdomen or pelvis. HEENT: Normocephalic atraumatic. Dry mucous membrane. Neck: No distended neck vein. Lungs: Clear anteriorly. Heart: Regular. Abdomen: Soft obese nontender nondistended positive bowel sounds. Lower Extremities: No edema. CNS: Awake. Moves left upper extremity. Psych: LE really not following command not cooperative     Assessment:   #1. Altered mental status most likely septic metabolic encephalopathy  #2. Sepsis of unclear etiology  #3. History of multiple myeloma undergoing chemotherapy  #4. Chronic anemia  #5. Severe thrombocytopenia  #6. Severe metabolic acidosis  #7. Pancytopenia. #8.  YANETH multifactorial     Plan: Continue IV fluids and antibiotics. Encourage p.o. intake more appropriate.   If patient is not taking adequate p.o. might consider alternative means of nutrition and hydration. Physical therapy. Poor prognosis. Discharge planning either to nursing home or home with supportive care when more appropriate. Awaiting CBC today. Discussed with nurse    11am  Now family is at bedside discussed with them. Mother, sister and brother. They do not want artificial nutrition. They are leaning towards comfort care. I will await the results of ammonia and ABG and CBC now. We will try Lidoderm patch. I will consider hospice eval once the results of the testing comes back. 11.35am  Family meeting again. They would like to proceed with hospice. Stop current care and make her comfortable. 2 sisters and brother and mother present.  D/w Mr Dajuan Larson case management

## 2022-09-16 NOTE — PROGRESS NOTES
Progress Note    Patient: Laine Izaguirre MRN: [id number]  SSN: [id number]    YOB: 1965  Age: 55 y.o. Sex: female      Admit Date: 9/16/2022    LOS: 0 days     Subjective:   Patient with multiple myeloma, followed for suspected sepsis with possible UTI. Blood and urine cultures pending. Currently on Meropenem. Afebrile with leukopenia and elevated procal and CRP. She has been transferred out of the ICU. It appears that patient is now hospice. Objective:     Vitals:    09/16/22 1417   Weight: 132 lb 11.5 oz (60.2 kg)   Height: 5' (1.524 m)        Intake and Output:  Current Shift: No intake/output data recorded. Last three shifts: No intake/output data recorded. Physical Exam:   Vitals and nursing note reviewed. Exam conducted with a chaperone present (?Sisters). Constitutional:       General: She is not in acute distress. Appearance: She is ill-appearing. HENT:  unremarkable  Eyes:      Pupils: Pupils are equal, round, and reactive to light. Comments: Right eyelid swelling   Cardiovascular:      Rate and Rhythm: Normal rate and regular rhythm. Heart sounds: No murmur heard. Pulmonary:      Effort: Pulmonary effort is normal.      Breath sounds: Normal breath sounds. Abdominal:      General: Bowel sounds are normal. There is no distension. Palpations: Abdomen is soft. Tenderness: There is no abdominal tenderness. Genitourinary:     Comments: No urinary  catheter  Musculoskeletal:      Cervical back: Neck supple. Right lower leg: No edema. Left lower leg: No edema. Skin:     Findings: No rash. Neurological:      General: No focal deficit present. Mental Status: She is disoriented.    Psychiatric:      Comments: Unable to assess    Lab/Data Review:     WBC 3,400 with ANC 2,000  PLT 31,000    CRP 1.96  Procal 6.82      Blood cultures (9/14) coagulase negative Staphylococcus species  Urine culture (9/14) in process    Assessment: Active Problems:    Sepsis ([facility] [address] (9/16/2022)  Suspected sepsis with hypothermia, leukocytosis  Pyuria and bacteriuria, urine culture pending,  Day #2 IV Meropenem  Multiple myeloma on chemotherapy  Leukopenia but not neutropenic  Severe anemia  Severe thrombocytopenia  Compression fracture S1  Altered mental status with elevated ammonia level  9. HOSPICE    Plan:   1. Meropenem has been discontinued given hospice status  2. No further recommendations  3.  Will sign off case at this time       Signed By: Nathan Sprague MD     September 16, 2022

## 2022-09-17 NOTE — PROGRESS NOTES
Pt comfort care provided during the shift. This includes but not limited with pain management with narcotic meds, skin care and repositioning. Family at bedside. Family understand pt on hospice care. Family were encouraged to take part in taking care of their significant one. Bedside shift change report given to RAN Drummond (oncoming nurse) by Hari Veronica RN (offgoing nurse). Report included the following information SBAR, Kardex, Intake/Output, and MAR.

## 2022-09-17 NOTE — PROGRESS NOTES
During report Kevin Hu RN reported to writer that pt's ICD needed deactivated. No magnet present on code cart, RN called ICU and they are calling cath lab to retrieve a magnet.

## 2022-09-17 NOTE — PROGRESS NOTES
[facility] called and informed of pt's death, the pt is not eligible for organ/tissue donation but they are unsure of Eye donation at this time. They will call writer back shortly.

## 2022-09-17 NOTE — HOSPICE
Death note-Received triage call that patient passed at 08:08, family at bedside per Jeanne 0582.   This nurse informed [phone] [facility] liaisons and MD.

## 2022-09-17 NOTE — PROGRESS NOTES
Pt had pacemaker deactivated by magnet at 0806, at 0808 am Nurse went into pt's room and pt had no breath sounds or heart sounds. Shiraz Ann RN confirmed. Sister Erica Bragg and family at bedside. Hospice called and Notified. [facility] called at 0829 am message left due to being on hold.

## 2022-09-17 NOTE — PROGRESS NOTES
The purpose of the visit was in response to the death of the patient. The patient's family had been present throughout, as well they were at the bedside at the time of death. The  provided the ministry of presence and pastoral care on the unit as well to the family in room. The family along the  shared scriptures, told testimonial stories, and song songs of kaden, and provided the prayer of peace and comfort. [facility] Bety Santos.    can be reached by calling the  at [facility]  [phone]

## 2022-09-18 NOTE — HOSPICE
[address] Bereavement/Condolence Call: This LMSW called pt's sister Roni Gomez to offer condolences and support. Roni Gomez reported she and family are doing okay. LMSW offered [facility] information for ongoing support.        Taylor Wallace, Critical access hospital[facility]    [phone]

## 2022-09-20 NOTE — DISCHARGE SUMMARY
Discharge Summary     Arslan Mullins Date:   9/14/2022 12:44 AM  Discharge Date:   9/16/2022  Discharge Condition:   Poor  Discharge Diagnosis  Problem List Items Addressed This Visit          Other    Anemia     Other Visit Diagnoses       Occult blood in stools    -  Primary    Sepsis, due to unspecified organism, unspecified whether acute organ dysfunction present ([facility] [address]        Thrombocytopenia ([facility] [address]             #1.  Altered mental status most likely septic metabolic /encephalopathy    #2. Sepsis of unclear etiology    #3. History of multiple myeloma undergoing chemotherapy    #4. Acute on chronic  anemia status post blood transfusion    #5. Severe thrombocytopenia  #6. Pancytopenia. Hospital Stay  Narrative of Hospital Course: See H&P for full details. This is a 20-year-old unfortunate black female with history of aggressive multiple myeloma who was brought into the hospital because of mental status decline, weakness, anemia. Patient was admitted to the intensive care unit and had emergent blood transfusion for hemoglobin of 4. She also had platelet transfusion. She was started on empiric antibiotics. She had blood cultures drawn. I did meet with family member sisters and mother who makes medical decisions for patient. They made patient DNI/DNR. She was stabilized in the ICU and transferred to the medical floor. She continues to have moans and groans needing pain medication. Family at this point wanted her to be comfortable not wanting to pursue any aggressive measures. She was seen by her oncologist and recommendations were followed. She was transition to hospice care after family meeting with multiple sisters, brother and mother. They understand that she is very sick and without any further support of nutrition, hydration and blood transfusion it will be a short course. They proceeded with comfort care measures.            Consultants:  Infectious disease   Hematology oncologist. Dr Elia Cee     Surgeries/procedures Performed:  Emergent transfusion         Discharge Plan:    Hospice/Medical Facility          Diet: None    No orders of the defined types were placed in this encounter. Significant Diagnostic Studies:     XR CHEST PORT   Final Result   Low lung volumes with minimal bibasilar atelectasis. No other acute   process. CT ABD PELV W CONT   Final Result   1. Acute mild superior endplate compression fracture at S1 with a longitudinal   component extending toward the right hemisacrum, new since 8/30/2022. Mild   superior plate compression fractures are again noted at T12 and L1.      2. No other acute abnormality in the abdomen or pelvis. No results found for this or any previous visit (from the past 24 hour(s)). Discharge Medications:  Discharge Medication List as of 9/16/2022  1:28 PM           Time Spent on Discharge: More than 30 minutes discussing with family,  and coordinating care. Waqar Suarez

## 2022-09-21 NOTE — DISCHARGE SUMMARY
Discharge Summary    [address] [place] [address]  [facility]  [phone]      Date of Admission: 9/16/2022  Date of Discharge: 9/17/2022    Gege Perales is a 55y.o. year old who was admitted to [address] [place] [address] at [facility] with a Hospice diagnosis of Sepsis ([facility] [address] [A41.9]. Pt was admitted for [facility] level care. Per HPI  Active Symptoms:  1. Agitation  2. Non verbal signs of pain  3. Altered mental status  4. Increased work of breathing  5. Hospice care      PLAN   Admit to [facility] level of care as pt is altered and unable to safely tolerate PO/SL medications; and requires frequent nursing assessment, and administration and titration of parenteral to manage increased symptom burden at this time; at high risk for rapid decline  Dilaudid 1 mg IV every 4 hours with prn dosing available every 15 minutes for breakthrough pain or air hunger  Lorazepam 1 mg IV every 4 hours with prn dosing available every 15 minutes for breakthrough restlessness/agitation  Robinul 0.2 mg IV every 4 hours as needed for increased oropharyngeal secretions  All other symptom management medications as needed  Jorgensen catheter to maintain bladder decompression; jorgensen care per facility protocol  Large family presence at bedside; reviewed hospice philosophy and goals of care with emphasis on comfort and safety; discussed symptom management medications and indications and rationale for use; all questions answered; all present verbalized understanding and agreeable to hospice admission   and SW to support family needs  Disposition: anticipate rapid decline and death in hospital; but if becomes more stable will need LTC placement  Hospice Plan of care was reviewed in detail and agree with current plan of care          The patient's care was focused on comfort and the patient passed away on 9/17/2022.

## 2023-01-04 NOTE — ED NOTES
This nurse called Dr. Jamie Manriquez to update on pt's increasingly agitated state and increased lactic levels. Dr. Jamie Manriquez gave orders for PO hydroxyzine 12.5 mg, however pt refusing to take oral meds. Dr. Jamie Manriquez states medical admission still appropriate for pt. States he will be here to assess pt soon. Complex Repair And Flap Additional Text (Will Appearing After The Standard Complex Repair Text): The complex repair was not sufficient to completely close the primary defect. The remaining additional defect was repaired with the flap mentioned below.

## (undated) DEVICE — SOLUTION IRRIG 1000ML STRL H2O USP PLAS POUR BTL

## (undated) DEVICE — WASH CLOTH INCONT LO LINT PREM 12X13 IN LF DISP

## (undated) DEVICE — FCPS RAD JAW 4LC 240CM W/NDL -- BX/20 RADIAL JAW 4

## (undated) DEVICE — TUBING, SUCTION, 9/32" X 10', STRAIGHT: Brand: MEDLINE

## (undated) DEVICE — PAD,PREPPING,CUFFED,24X48,7",NONSTERILE: Brand: MEDLINE

## (undated) DEVICE — 1200CC GUARDIAN II: Brand: GUARDIAN

## (undated) DEVICE — JELLY,LUBE,STERILE,FLIP TOP,TUBE,4-OZ: Brand: MEDLINE

## (undated) DEVICE — GLOVE ORANGE PI 7 1/2   MSG9075

## (undated) DEVICE — SPONGE GZ W4XL4IN COT 12 PLY TYP VII WVN C FLD DSGN

## (undated) DEVICE — FCPS RAD JAW 4 SC 240CM W/NDL --